# Patient Record
Sex: FEMALE | Race: WHITE | NOT HISPANIC OR LATINO
[De-identification: names, ages, dates, MRNs, and addresses within clinical notes are randomized per-mention and may not be internally consistent; named-entity substitution may affect disease eponyms.]

---

## 2017-01-18 ENCOUNTER — APPOINTMENT (OUTPATIENT)
Dept: INTERNAL MEDICINE | Facility: CLINIC | Age: 67
End: 2017-01-18

## 2017-01-18 VITALS
BODY MASS INDEX: 21.33 KG/M2 | HEIGHT: 65 IN | OXYGEN SATURATION: 100 % | HEART RATE: 72 BPM | WEIGHT: 128 LBS | SYSTOLIC BLOOD PRESSURE: 146 MMHG | TEMPERATURE: 98.2 F | RESPIRATION RATE: 16 BRPM | DIASTOLIC BLOOD PRESSURE: 68 MMHG

## 2017-01-18 VITALS — HEART RATE: 56 BPM | SYSTOLIC BLOOD PRESSURE: 134 MMHG | DIASTOLIC BLOOD PRESSURE: 76 MMHG

## 2017-01-18 DIAGNOSIS — Z87.39 PERSONAL HISTORY OF OTHER DISEASES OF THE MUSCULOSKELETAL SYSTEM AND CONNECTIVE TISSUE: ICD-10-CM

## 2017-01-18 DIAGNOSIS — D34 BENIGN NEOPLASM OF THYROID GLAND: ICD-10-CM

## 2017-01-18 DIAGNOSIS — M76.30 ILIOTIBIAL BAND SYNDROME, UNSPECIFIED LEG: ICD-10-CM

## 2017-03-13 ENCOUNTER — NON-APPOINTMENT (OUTPATIENT)
Age: 67
End: 2017-03-13

## 2017-03-13 ENCOUNTER — APPOINTMENT (OUTPATIENT)
Dept: CARDIOLOGY | Facility: CLINIC | Age: 67
End: 2017-03-13

## 2017-03-13 VITALS
BODY MASS INDEX: 21.16 KG/M2 | HEIGHT: 65 IN | TEMPERATURE: 98.3 F | OXYGEN SATURATION: 99 % | SYSTOLIC BLOOD PRESSURE: 148 MMHG | WEIGHT: 127 LBS | DIASTOLIC BLOOD PRESSURE: 65 MMHG | RESPIRATION RATE: 14 BRPM | HEART RATE: 71 BPM

## 2017-03-20 ENCOUNTER — APPOINTMENT (OUTPATIENT)
Dept: ULTRASOUND IMAGING | Facility: IMAGING CENTER | Age: 67
End: 2017-03-20

## 2017-03-22 ENCOUNTER — OUTPATIENT (OUTPATIENT)
Dept: OUTPATIENT SERVICES | Facility: HOSPITAL | Age: 67
LOS: 1 days | End: 2017-03-22
Payer: MEDICARE

## 2017-03-22 ENCOUNTER — APPOINTMENT (OUTPATIENT)
Dept: ULTRASOUND IMAGING | Facility: CLINIC | Age: 67
End: 2017-03-22

## 2017-03-22 DIAGNOSIS — Z00.8 ENCOUNTER FOR OTHER GENERAL EXAMINATION: ICD-10-CM

## 2017-03-22 PROCEDURE — 76700 US EXAM ABDOM COMPLETE: CPT

## 2017-04-24 ENCOUNTER — APPOINTMENT (OUTPATIENT)
Dept: SURGERY | Facility: CLINIC | Age: 67
End: 2017-04-24

## 2017-06-29 ENCOUNTER — MEDICATION RENEWAL (OUTPATIENT)
Age: 67
End: 2017-06-29

## 2017-06-29 ENCOUNTER — RX RENEWAL (OUTPATIENT)
Age: 67
End: 2017-06-29

## 2017-07-19 ENCOUNTER — APPOINTMENT (OUTPATIENT)
Dept: INTERNAL MEDICINE | Facility: CLINIC | Age: 67
End: 2017-07-19

## 2017-07-19 VITALS
OXYGEN SATURATION: 99 % | BODY MASS INDEX: 20.25 KG/M2 | WEIGHT: 123 LBS | DIASTOLIC BLOOD PRESSURE: 60 MMHG | HEIGHT: 65.5 IN | TEMPERATURE: 98.1 F | HEART RATE: 68 BPM | SYSTOLIC BLOOD PRESSURE: 140 MMHG

## 2017-07-19 VITALS — SYSTOLIC BLOOD PRESSURE: 132 MMHG | DIASTOLIC BLOOD PRESSURE: 60 MMHG

## 2017-07-20 LAB
ALBUMIN SERPL ELPH-MCNC: 4.1 G/DL
ALP BLD-CCNC: 49 U/L
ALT SERPL-CCNC: 21 U/L
ANION GAP SERPL CALC-SCNC: 18 MMOL/L
APPEARANCE: CLEAR
AST SERPL-CCNC: 26 U/L
BACTERIA: NEGATIVE
BASOPHILS # BLD AUTO: 0.05 K/UL
BASOPHILS NFR BLD AUTO: 0.8 %
BILIRUB SERPL-MCNC: 0.2 MG/DL
BILIRUBIN URINE: NEGATIVE
BLOOD URINE: NEGATIVE
BUN SERPL-MCNC: 20 MG/DL
CALCIUM SERPL-MCNC: 10 MG/DL
CHLORIDE SERPL-SCNC: 90 MMOL/L
CO2 SERPL-SCNC: 24 MMOL/L
COLOR: YELLOW
CREAT SERPL-MCNC: 0.97 MG/DL
EOSINOPHIL # BLD AUTO: 0.04 K/UL
EOSINOPHIL NFR BLD AUTO: 0.6 %
FOLATE SERPL-MCNC: >20 NG/ML
GLUCOSE QUALITATIVE U: NORMAL MG/DL
GLUCOSE SERPL-MCNC: 140 MG/DL
HCT VFR BLD CALC: 37.6 %
HCV AB SER QL: NONREACTIVE
HCV S/CO RATIO: 0.17 S/CO
HGB BLD-MCNC: 12.6 G/DL
HYALINE CASTS: 0 /LPF
IMM GRANULOCYTES NFR BLD AUTO: 0.3 %
KETONES URINE: NEGATIVE
LEUKOCYTE ESTERASE URINE: NEGATIVE
LYMPHOCYTES # BLD AUTO: 1.03 K/UL
LYMPHOCYTES NFR BLD AUTO: 16.2 %
MAGNESIUM SERPL-MCNC: 2.1 MG/DL
MAN DIFF?: NORMAL
MCHC RBC-ENTMCNC: 29.9 PG
MCHC RBC-ENTMCNC: 33.5 GM/DL
MCV RBC AUTO: 89.3 FL
MICROSCOPIC-UA: NORMAL
MONOCYTES # BLD AUTO: 0.77 K/UL
MONOCYTES NFR BLD AUTO: 12.1 %
NEUTROPHILS # BLD AUTO: 4.46 K/UL
NEUTROPHILS NFR BLD AUTO: 70 %
NITRITE URINE: NEGATIVE
PH URINE: 7
PLATELET # BLD AUTO: 303 K/UL
POTASSIUM SERPL-SCNC: 4.9 MMOL/L
PROT SERPL-MCNC: 7.1 G/DL
PROTEIN URINE: NEGATIVE MG/DL
RBC # BLD: 4.21 M/UL
RBC # FLD: 13.4 %
RED BLOOD CELLS URINE: 1 /HPF
SODIUM SERPL-SCNC: 132 MMOL/L
SPECIFIC GRAVITY URINE: 1.01
SQUAMOUS EPITHELIAL CELLS: 2 /HPF
UROBILINOGEN URINE: NORMAL MG/DL
VIT B12 SERPL-MCNC: 1332 PG/ML
WBC # FLD AUTO: 6.37 K/UL
WHITE BLOOD CELLS URINE: 2 /HPF

## 2017-09-18 ENCOUNTER — NON-APPOINTMENT (OUTPATIENT)
Age: 67
End: 2017-09-18

## 2017-09-18 ENCOUNTER — APPOINTMENT (OUTPATIENT)
Dept: CARDIOLOGY | Facility: CLINIC | Age: 67
End: 2017-09-18
Payer: MEDICARE

## 2017-09-18 VITALS
BODY MASS INDEX: 20.83 KG/M2 | DIASTOLIC BLOOD PRESSURE: 72 MMHG | SYSTOLIC BLOOD PRESSURE: 158 MMHG | HEART RATE: 66 BPM | RESPIRATION RATE: 14 BRPM | WEIGHT: 125 LBS | HEIGHT: 65 IN

## 2017-09-18 PROCEDURE — 93000 ELECTROCARDIOGRAM COMPLETE: CPT

## 2017-09-18 PROCEDURE — 99215 OFFICE O/P EST HI 40 MIN: CPT

## 2017-09-25 ENCOUNTER — APPOINTMENT (OUTPATIENT)
Dept: OBGYN | Facility: CLINIC | Age: 67
End: 2017-09-25
Payer: MEDICARE

## 2017-09-25 ENCOUNTER — RESULT REVIEW (OUTPATIENT)
Age: 67
End: 2017-09-25

## 2017-09-25 PROCEDURE — 99213 OFFICE O/P EST LOW 20 MIN: CPT

## 2018-01-31 ENCOUNTER — APPOINTMENT (OUTPATIENT)
Dept: INTERNAL MEDICINE | Facility: CLINIC | Age: 68
End: 2018-01-31
Payer: MEDICARE

## 2018-01-31 VITALS
HEART RATE: 62 BPM | DIASTOLIC BLOOD PRESSURE: 60 MMHG | SYSTOLIC BLOOD PRESSURE: 120 MMHG | TEMPERATURE: 98.3 F | WEIGHT: 127 LBS | OXYGEN SATURATION: 99 % | BODY MASS INDEX: 20.91 KG/M2 | HEIGHT: 65.5 IN

## 2018-01-31 VITALS — HEART RATE: 68 BPM | SYSTOLIC BLOOD PRESSURE: 144 MMHG | DIASTOLIC BLOOD PRESSURE: 70 MMHG

## 2018-01-31 DIAGNOSIS — Z11.59 ENCOUNTER FOR SCREENING FOR OTHER VIRAL DISEASES: ICD-10-CM

## 2018-01-31 DIAGNOSIS — R21 RASH AND OTHER NONSPECIFIC SKIN ERUPTION: ICD-10-CM

## 2018-01-31 DIAGNOSIS — Z87.2 PERSONAL HISTORY OF DISEASES OF THE SKIN AND SUBCUTANEOUS TISSUE: ICD-10-CM

## 2018-01-31 PROCEDURE — 90714 TD VACC NO PRESV 7 YRS+ IM: CPT | Mod: GY

## 2018-01-31 PROCEDURE — 90471 IMMUNIZATION ADMIN: CPT | Mod: GY

## 2018-01-31 PROCEDURE — 99214 OFFICE O/P EST MOD 30 MIN: CPT | Mod: 25

## 2018-01-31 RX ORDER — BIFIDOBACTERIUM LONGUM 10MM CELL
CAPSULE ORAL
Refills: 0 | Status: DISCONTINUED | COMMUNITY
End: 2018-01-31

## 2018-01-31 RX ORDER — ALENDRONATE SODIUM 70 MG/1
70 TABLET ORAL
Qty: 4 | Refills: 6 | Status: DISCONTINUED | COMMUNITY
Start: 2017-07-20 | End: 2018-01-31

## 2018-05-08 ENCOUNTER — APPOINTMENT (OUTPATIENT)
Dept: CARDIOLOGY | Facility: CLINIC | Age: 68
End: 2018-05-08
Payer: MEDICARE

## 2018-05-08 PROCEDURE — 93351 STRESS TTE COMPLETE: CPT

## 2018-05-08 PROCEDURE — 93320 DOPPLER ECHO COMPLETE: CPT

## 2018-05-08 PROCEDURE — 93325 DOPPLER ECHO COLOR FLOW MAPG: CPT

## 2018-05-16 ENCOUNTER — APPOINTMENT (OUTPATIENT)
Dept: SURGERY | Facility: CLINIC | Age: 68
End: 2018-05-16
Payer: MEDICARE

## 2018-05-16 PROCEDURE — 99213K: CUSTOM

## 2018-07-27 ENCOUNTER — APPOINTMENT (OUTPATIENT)
Dept: INTERNAL MEDICINE | Facility: CLINIC | Age: 68
End: 2018-07-27

## 2018-09-05 ENCOUNTER — APPOINTMENT (OUTPATIENT)
Dept: INTERNAL MEDICINE | Facility: CLINIC | Age: 68
End: 2018-09-05
Payer: MEDICARE

## 2018-09-05 ENCOUNTER — NON-APPOINTMENT (OUTPATIENT)
Age: 68
End: 2018-09-05

## 2018-09-05 VITALS
BODY MASS INDEX: 20.74 KG/M2 | HEART RATE: 61 BPM | DIASTOLIC BLOOD PRESSURE: 66 MMHG | TEMPERATURE: 98.8 F | WEIGHT: 123 LBS | SYSTOLIC BLOOD PRESSURE: 146 MMHG | HEIGHT: 64.5 IN | OXYGEN SATURATION: 100 %

## 2018-09-05 VITALS — DIASTOLIC BLOOD PRESSURE: 64 MMHG | SYSTOLIC BLOOD PRESSURE: 132 MMHG

## 2018-09-05 DIAGNOSIS — H26.9 UNSPECIFIED CATARACT: ICD-10-CM

## 2018-09-05 PROCEDURE — 93000 ELECTROCARDIOGRAM COMPLETE: CPT

## 2018-09-05 PROCEDURE — 99214 OFFICE O/P EST MOD 30 MIN: CPT

## 2018-09-05 NOTE — RESULTS/DATA
[] : results reviewed [de-identified] : 9/5/18: sinus bradycardia @ 46, rate variation, no ST-T abnormalities, normal axis

## 2018-09-05 NOTE — PHYSICAL EXAM
[No Acute Distress] : no acute distress [Well Nourished] : well nourished [Well-Appearing] : well-appearing [PERRL] : pupils equal round and reactive to light [EOMI] : extraocular movements intact [Normal Oropharynx] : the oropharynx was normal [Normal TMs] : both tympanic membranes were normal [Supple] : supple [No Respiratory Distress] : no respiratory distress  [Clear to Auscultation] : lungs were clear to auscultation bilaterally [Normal Rate] : normal rate  [Regular Rhythm] : with a regular rhythm [Normal S1, S2] : normal S1 and S2 [No Murmur] : no murmur heard [No Edema] : there was no peripheral edema [Soft] : abdomen soft [Non Tender] : non-tender [Non-distended] : non-distended [Normal Bowel Sounds] : normal bowel sounds [No Rash] : no rash [Normal Sclera/Conjunctiva] : normal sclera/conjunctiva [Normal Gait] : normal gait [Normal Affect] : the affect was normal [Normal Mood] : the mood was normal [Normal Insight/Judgement] : insight and judgment were intact

## 2018-09-05 NOTE — REVIEW OF SYSTEMS
[Negative] : Heme/Lymph [Vision Problems] : vision problems [Discharge] : no discharge [Pain] : no pain [Itching] : no itching

## 2018-09-05 NOTE — ASSESSMENT
[Patient Optimized for Surgery] : Patient optimized for surgery [No Further Testing Recommended] : no further testing recommended [Modify anti-platelet treatment prior to procedure] : Modify anti-platelet treatment prior to procedure [FreeTextEntry4] : Patient is a low cardiac risk for a low risk surgical procedure. No medical contraindications for planned surgery.\par \par EKG showed sinus bradycardia but no evidence of ischemia. She had a normal stress test in 5/2018. Preoperative blood work is not required for cataract surgery.\par \par Regarding her medications, patient will hold her morning Lantus to prevent hypoglycemia as she will be fasting. She will hold aspirin and all vitamins starting 1 week prior to surgery. She will take her blood pressure medications on morning of surgery with a sip of water.  [FreeTextEntry6] : hold baby aspirin 1 week prior to surgery [FreeTextEntry7] : hold all vitamins 1 week prior to surgery [FreeTextEntry2] : not required

## 2018-09-05 NOTE — HISTORY OF PRESENT ILLNESS
[No Pertinent Cardiac History] : no history of aortic stenosis, atrial fibrillation, coronary artery disease, recent myocardial infarction, or implantable device/pacemaker [No Pertinent Pulmonary History] : no history of asthma, COPD, sleep apnea, or smoking [No Adverse Anesthesia Reaction] : no adverse anesthesia reaction in self or family member [Diabetes] : diabetes [(Patient denies any chest pain, claudication, dyspnea on exertion, orthopnea, palpitations or syncope)] : Patient denies any chest pain, claudication, dyspnea on exertion, orthopnea, palpitations or syncope [Excellent (>10 METs)] : Excellent (>10 METs) [Anti-Platelet Agents: _____] : Anti-Platelet Agents: [unfilled] [Chronic Anticoagulation] : no chronic anticoagulation [Chronic Kidney Disease] : no chronic kidney disease [FreeTextEntry1] : Right cataract extraction followed by left cataract extraction [FreeTextEntry2] : September 14, 2018 then October 2, 2018 [FreeTextEntry3] : Dr. Leslie Goldberg at [facility]. Fax: [phone]

## 2018-09-07 ENCOUNTER — APPOINTMENT (OUTPATIENT)
Dept: CARDIOLOGY | Facility: CLINIC | Age: 68
End: 2018-09-07

## 2018-10-16 ENCOUNTER — RESULT REVIEW (OUTPATIENT)
Age: 68
End: 2018-10-16

## 2018-10-16 ENCOUNTER — APPOINTMENT (OUTPATIENT)
Dept: OBGYN | Facility: CLINIC | Age: 68
End: 2018-10-16
Payer: MEDICARE

## 2018-10-16 PROCEDURE — G0101: CPT

## 2018-11-13 ENCOUNTER — NON-APPOINTMENT (OUTPATIENT)
Age: 68
End: 2018-11-13

## 2018-11-13 ENCOUNTER — APPOINTMENT (OUTPATIENT)
Dept: CARDIOLOGY | Facility: CLINIC | Age: 68
End: 2018-11-13
Payer: MEDICARE

## 2018-11-13 VITALS
HEIGHT: 64.5 IN | HEART RATE: 61 BPM | WEIGHT: 124 LBS | RESPIRATION RATE: 14 BRPM | BODY MASS INDEX: 20.91 KG/M2 | OXYGEN SATURATION: 100 % | SYSTOLIC BLOOD PRESSURE: 164 MMHG | DIASTOLIC BLOOD PRESSURE: 73 MMHG

## 2018-11-13 PROCEDURE — 93000 ELECTROCARDIOGRAM COMPLETE: CPT

## 2018-11-13 PROCEDURE — 99214 OFFICE O/P EST MOD 30 MIN: CPT

## 2018-11-13 NOTE — ASSESSMENT
[FreeTextEntry1] : Hypertension, with "white coat" component. 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

## 2018-11-13 NOTE — DISCUSSION/SUMMARY
[FreeTextEntry1] : Hypertension, with "white coat" component; BP in reasonable range today on current meds. \par \par Hyperlipidemia, on medication.  Lipid profile checked last month by Dr. Phillips with good results; will continue statin.\par \par Stress echo in May showed no evidence of ischemia or infarct.  Mild MR seen with mildly myxomatous MV, but no prolapse.\par \par I advised her to continue on her current medications; she does not need new prescriptions at this time.\par \par She will return in six months.

## 2018-11-13 NOTE — REASON FOR VISIT
[FreeTextEntry1] : Keisha Roe returns today for a followup visit regarding hypertension, hyperlipidemia, and mild mitral valve insufficiency.

## 2018-11-13 NOTE — PHYSICAL EXAM
[General Appearance - Well Developed] : well developed [Normal Appearance] : normal appearance [Well Groomed] : well groomed [General Appearance - Well Nourished] : well nourished [General Appearance - In No Acute Distress] : no acute distress [Normal Conjunctiva] : the conjunctiva exhibited no abnormalities [Eyelids - No Xanthelasma] : the eyelids demonstrated no xanthelasmas [Normal Jugular Venous A Waves Present] : normal jugular venous A waves present [Normal Jugular Venous V Waves Present] : normal jugular venous V waves present [Respiration, Rhythm And Depth] : normal respiratory rhythm and effort [Auscultation Breath Sounds / Voice Sounds] : lungs were clear to auscultation bilaterally [Heart Rate And Rhythm] : heart rate and rhythm were normal [Bowel Sounds] : normal bowel sounds [Abdomen Soft] : soft [Abdomen Tenderness] : non-tender [Abnormal Walk] : normal gait [Nail Clubbing] : no clubbing of the fingernails [Cyanosis, Localized] : no localized cyanosis [Skin Color & Pigmentation] : normal skin color and pigmentation [] : no rash [No Venous Stasis] : no venous stasis [Impaired Insight] : insight and judgment were intact [Affect] : the affect was normal [Mood] : the mood was normal [FreeTextEntry1] : 2+ pulses in the upper and lower extremities. No edema.

## 2018-11-13 NOTE — HISTORY OF PRESENT ILLNESS
[FreeTextEntry1] : Since I saw her last in September.  \par \par Since her last visit, she had cataract surgery, and is pleased with the outcome.  \par \par From a cardiac standpoint she feels well.  She continues to monitor her blood pressure periodically, and numbers have remained in normal range.  She reports no episodes of exertional chest discomfort or dyspnea. She reports no palpitations, and no episodes of lightheadedness. She describes no orthopnea or PND.\par \par Her medication regimen is unchanged.

## 2018-11-28 ENCOUNTER — APPOINTMENT (OUTPATIENT)
Dept: INTERNAL MEDICINE | Facility: CLINIC | Age: 68
End: 2018-11-28
Payer: MEDICARE

## 2018-11-28 VITALS
HEIGHT: 65 IN | TEMPERATURE: 99.4 F | DIASTOLIC BLOOD PRESSURE: 60 MMHG | HEART RATE: 62 BPM | BODY MASS INDEX: 20.66 KG/M2 | WEIGHT: 124 LBS | SYSTOLIC BLOOD PRESSURE: 120 MMHG | OXYGEN SATURATION: 100 %

## 2018-11-28 DIAGNOSIS — R19.4 CHANGE IN BOWEL HABIT: ICD-10-CM

## 2018-11-28 DIAGNOSIS — Z01.818 ENCOUNTER FOR OTHER PREPROCEDURAL EXAMINATION: ICD-10-CM

## 2018-11-28 DIAGNOSIS — Z23 ENCOUNTER FOR IMMUNIZATION: ICD-10-CM

## 2018-11-28 PROCEDURE — G0439: CPT

## 2018-11-28 PROCEDURE — 99214 OFFICE O/P EST MOD 30 MIN: CPT | Mod: 25

## 2018-12-02 PROBLEM — R19.4 ALTERED BOWEL HABITS: Status: RESOLVED | Noted: 2018-01-31 | Resolved: 2018-12-02

## 2018-12-02 PROBLEM — Z23 IMMUNIZATION, TETANUS-DIPHTHERIA: Status: RESOLVED | Noted: 2018-01-31 | Resolved: 2018-12-02

## 2018-12-02 PROBLEM — Z01.818 PREOPERATIVE EXAMINATION: Status: RESOLVED | Noted: 2018-09-05 | Resolved: 2018-12-02

## 2018-12-02 LAB
APPEARANCE: CLEAR
BACTERIA: NEGATIVE
BILIRUBIN URINE: NEGATIVE
BLOOD URINE: NEGATIVE
COLOR: YELLOW
CREAT SPEC-SCNC: 18 MG/DL
GLUCOSE QUALITATIVE U: NEGATIVE MG/DL
HYALINE CASTS: 4 /LPF
KETONES URINE: NEGATIVE
LEUKOCYTE ESTERASE URINE: NEGATIVE
MICROALBUMIN 24H UR DL<=1MG/L-MCNC: <1.2 MG/DL
MICROALBUMIN/CREAT 24H UR-RTO: NORMAL
MICROSCOPIC-UA: NORMAL
NITRITE URINE: NEGATIVE
PH URINE: 7
PROTEIN URINE: NEGATIVE MG/DL
RED BLOOD CELLS URINE: 0 /HPF
SPECIFIC GRAVITY URINE: 1.01
SQUAMOUS EPITHELIAL CELLS: 1 /HPF
UROBILINOGEN URINE: NEGATIVE MG/DL
WHITE BLOOD CELLS URINE: 1 /HPF

## 2018-12-02 NOTE — ASSESSMENT
[FreeTextEntry1] : Patient is up to date with all screening tests. She will go for a repeat bone density in July 2019. She is agreeable that if this is worse she will begin a trial of Fosamax. We discussed fall precautions.\par She was given a handout concerning the Shingrix vaccine and  was advised to go to her local  pharmacy as per her insurance coverage.\par Her diabetes lipids and hypertension are controlled.  Urinalysis and urine microalbumin sent. \par She has mild chronic stable hyponatremia and will be observed. \par Advanced directives  were reviewed and the patient has them been in place.\par She will be seen again in 6 months\par

## 2018-12-02 NOTE — HISTORY OF PRESENT ILLNESS
[FreeTextEntry1] : Annual wellness visit\par Diabetes\par Hypertension\par Osteoporosis\par  [de-identified] : Overall patient has had a good year. She has had 2 cataracts extracted. She sees a gynecologist. She saw ENT and her  hearing is fine.   She is up-to-date with a dentist . She is seeing the podiatrist and all is fine.  She sees the dermatologist every 6 months and all  is fine. She gets her mammogram yearly . She goes for a breast  MRI the end of July which was fine.   She sees the breast surgeon Dr. Mathew. . She never began her Fosamax as she was questioning the accuracy of the bone density improving and worsening over the past couple of years. She wants to wait to get a repeat bone density this summer before deciding to go on Fosamax. She states her stomach is good on a semi gluten free  and lactose free diet. She exercises regularly with the  and aerobics.She has no chest pain or dyspnea She sleeps well. She sees endocrinology and her hemoglobin A1c was 7.0  She is considering getting a free style [employer] system.

## 2018-12-02 NOTE — PHYSICAL EXAM
[No Acute Distress] : no acute distress [Well Nourished] : well nourished [Well Developed] : well developed [Well-Appearing] : well-appearing [Normal Voice/Communication] : normal voice/communication [Normal Sclera/Conjunctiva] : normal sclera/conjunctiva [PERRL] : pupils equal round and reactive to light [EOMI] : extraocular movements intact [Normal Outer Ear/Nose] : the outer ears and nose were normal in appearance [Normal Oropharynx] : the oropharynx was normal [No JVD] : no jugular venous distention [No Lymphadenopathy] : no lymphadenopathy [Thyroid Normal, No Nodules] : the thyroid was normal and there were no nodules present [No Respiratory Distress] : no respiratory distress  [Clear to Auscultation] : lungs were clear to auscultation bilaterally [No Accessory Muscle Use] : no accessory muscle use [Normal Rate] : normal rate  [Regular Rhythm] : with a regular rhythm [Normal S1, S2] : normal S1 and S2 [No Murmur] : no murmur heard [No Carotid Bruits] : no carotid bruits [No Abdominal Bruit] : a ~M bruit was not heard ~T in the abdomen [No Varicosities] : no varicosities [Pedal Pulses Present] : the pedal pulses are present [No Edema] : there was no peripheral edema [No Extremity Clubbing/Cyanosis] : no extremity clubbing/cyanosis [No Palpable Aorta] : no palpable aorta [Normal Appearance] : normal in appearance [No Nipple Discharge] : no nipple discharge [No Axillary Lymphadenopathy] : no axillary lymphadenopathy [Soft] : abdomen soft [Non Tender] : non-tender [Non-distended] : non-distended [No Masses] : no abdominal mass palpated [No HSM] : no HSM [Normal Bowel Sounds] : normal bowel sounds [Normal Supraclavicular Nodes] : no supraclavicular lymphadenopathy [Normal Axillary Nodes] : no axillary lymphadenopathy [Normal Posterior Cervical Nodes] : no posterior cervical lymphadenopathy [Normal Anterior Cervical Nodes] : no anterior cervical lymphadenopathy [Normal Inguinal Nodes] : no inguinal lymphadenopathy [No CVA Tenderness] : no CVA  tenderness [No Spinal Tenderness] : no spinal tenderness [No Joint Swelling] : no joint swelling [Grossly Normal Strength/Tone] : grossly normal strength/tone [Normal Gait] : normal gait [Coordination Grossly Intact] : coordination grossly intact [No Focal Deficits] : no focal deficits [Speech Grossly Normal] : speech grossly normal [Memory Grossly Normal] : memory grossly normal [Normal Affect] : the affect was normal [Alert and Oriented x3] : oriented to person, place, and time [Normal Mood] : the mood was normal [Normal Insight/Judgement] : insight and judgment were intact [Normal TMs] : both tympanic membranes were normal [Normal Percussion] : the chest was normal to percussion [No Rash] : no rash [No Skin Lesions] : no skin lesions [Deep Tendon Reflexes (DTR)] : deep tendon reflexes were 2+ and symmetric [Comprehensive Foot Exam Normal] : Right and left foot were examined and both feet are normal. No ulcers in either foot. Toes are normal and with full ROM.  Normal tactile sensation with monofilament testing throughout both feet [Kyphosis] : no kyphosis [Scoliosis] : no scoliosis

## 2018-12-02 NOTE — DATA REVIEWED
[FreeTextEntry1] : Submitted  blood work from October 2018 cholesterol 152 HDL 73 LDL 70 triglycerides 45 comprehensive metabolic panel glucose 118 BUN and creatinine normal sodium 133, CBC  normal hemoglobin A1c 7.0

## 2018-12-02 NOTE — HEALTH RISK ASSESSMENT
[None] : None [With Family] : lives with family [Retired] : retired [College] : College [] :  [Sexually Active] : sexually active [Feels Safe at Home] : Feels safe at home [Fully functional (bathing, dressing, toileting, transferring, walking, feeding)] : Fully functional (bathing, dressing, toileting, transferring, walking, feeding) [Seat Belt] :  uses seat belt [Sunscreen] : uses sunscreen [Discussed at today's visit] : Advance Directives Discussed at today's visit [No falls in past year] : Patient reported no falls in the past year [Hepatitis C test offered] : Hepatitis C test offered [Fully functional (using the telephone, shopping, preparing meals, housekeeping, doing laundry, using] : Fully functional and needs no help or supervision to perform IADLs (using the telephone, shopping, preparing meals, housekeeping, doing laundry, using transportation, managing medications and managing finances) [Carbon Monoxide Detector] : carbon monoxide detector [Designated Healthcare Proxy] : Designated healthcare proxy [Name: ___] : Health Care Proxy's Name: [unfilled]  [Relationship: ___] : Relationship: [unfilled] [Patient reported PAP Smear was normal] : Patient reported PAP Smear was normal [# Of Children ___] : has [unfilled] children [] : No [de-identified] : 1 glass of wine a week [Change in mental status noted] : No change in mental status noted [Language] : denies difficulty with language [Behavior] : denies difficulty with behavior [Learning/Retaining New Information] : denies difficulty learning/retaining new information [Handling Complex Tasks] : denies difficulty handling complex tasks [Reasoning] : denies difficulty with reasoning [Spatial Ability and Orientation] : denies difficulty with spatial ability and orientation [High Risk Behavior] : no high risk behavior [Reports changes in hearing] : Reports no changes in hearing [Reports changes in vision] : Reports no changes in vision [Reports changes in dental health] : Reports no changes in dental health [Smoke Detector] : no smoke detector [Guns at Home] : no guns at home [Safety elements used in home] : no safety elements used in home [Travel to Developing Areas] : does not  travel to developing areas [TB Exposure] : is not being exposed to tuberculosis [Caregiver Concerns] : does not have caregiver concerns [MammogramDate] : 1/2018 [MammogramComments] : birads 2 [PapSmearDate] : 2018 [BoneDensityDate] : 7/2017 [BoneDensityComments] : osteoporosis [ColonoscopyDate] : 11/2014 [ColonoscopyComments] : no polyp [HepatitisCDate] : 7/2017 [HepatitisCComments] : nonreactive [FreeTextEntry4] : Patient states she would not want aggressive treatment if she could not have a good quality of life.  She states she has discussed this with her healthcare proxy

## 2019-04-24 ENCOUNTER — APPOINTMENT (OUTPATIENT)
Dept: SURGERY | Facility: CLINIC | Age: 69
End: 2019-04-24
Payer: MEDICARE

## 2019-04-24 PROCEDURE — 99213K: CUSTOM

## 2019-04-29 ENCOUNTER — APPOINTMENT (OUTPATIENT)
Dept: INTERNAL MEDICINE | Facility: CLINIC | Age: 69
End: 2019-04-29
Payer: MEDICARE

## 2019-04-29 VITALS
TEMPERATURE: 98.7 F | BODY MASS INDEX: 20.99 KG/M2 | WEIGHT: 126 LBS | OXYGEN SATURATION: 99 % | HEART RATE: 49 BPM | SYSTOLIC BLOOD PRESSURE: 148 MMHG | DIASTOLIC BLOOD PRESSURE: 80 MMHG | HEIGHT: 65 IN

## 2019-04-29 LAB — S PYO AG SPEC QL IA: NORMAL

## 2019-04-29 PROCEDURE — 99213 OFFICE O/P EST LOW 20 MIN: CPT | Mod: 25

## 2019-04-29 PROCEDURE — 87880 STREP A ASSAY W/OPTIC: CPT | Mod: QW

## 2019-04-29 NOTE — REVIEW OF SYSTEMS
[Chills] : chills [Fatigue] : fatigue [Earache] : earache [Sore Throat] : sore throat [Negative] : Heme/Lymph [Fever] : no fever [Night Sweats] : no night sweats [Discharge] : no discharge [Hoarseness] : no hoarseness [Nasal Discharge] : no nasal discharge [Postnasal Drip] : no postnasal drip [Chest Pain] : no chest pain [Shortness Of Breath] : no shortness of breath [Wheezing] : no wheezing [Cough] : no cough [Vomiting] : no vomiting

## 2019-04-29 NOTE — HISTORY OF PRESENT ILLNESS
[FreeTextEntry8] : presents for eval of L ear and throat discomfort for past 3-4 days, felt worsening fatigue, mild chills yesterday. no severe muscle aches, nausea or vomiting. no sinus pain or significant nasal drainage. she was exposed to strep pharyngitis, her daughter, this past week. no cough. her blood glucose was >200 this morning which usually indicates to her that there is significant infection she is fighting.

## 2019-04-29 NOTE — PHYSICAL EXAM
[No Acute Distress] : no acute distress [Well-Appearing] : well-appearing [Normal Voice/Communication] : normal voice/communication [Normal Sclera/Conjunctiva] : normal sclera/conjunctiva [Normal Outer Ear/Nose] : the outer ears and nose were normal in appearance [Normal Oropharynx] : the oropharynx was normal [Normal TMs] : both tympanic membranes were normal [Supple] : supple [No Lymphadenopathy] : no lymphadenopathy [No Respiratory Distress] : no respiratory distress  [Clear to Auscultation] : lungs were clear to auscultation bilaterally [No Accessory Muscle Use] : no accessory muscle use [Normal Supraclavicular Nodes] : no supraclavicular lymphadenopathy [Normal Posterior Cervical Nodes] : no posterior cervical lymphadenopathy [Normal Anterior Cervical Nodes] : no anterior cervical lymphadenopathy [Normal Gait] : normal gait [Normal Mood] : the mood was normal [Normal Insight/Judgement] : insight and judgment were intact [de-identified] : very mild pharyngeal erythema

## 2019-04-29 NOTE — ASSESSMENT
[FreeTextEntry1] : discussed w pt \par checked rapid strep testing today, neg \par advised increase oral fluids\par cont tylenol prn \par will give amoxicillin as precaution due to persistent ear and throat pain while awaiting throat culture. \par cont to monitor blood glucose as usual, cont current insulin regimen \par call or return prn if any new or worsening concerns

## 2019-05-02 LAB — BACTERIA THROAT CULT: NORMAL

## 2019-05-07 ENCOUNTER — APPOINTMENT (OUTPATIENT)
Dept: CARDIOLOGY | Facility: CLINIC | Age: 69
End: 2019-05-07
Payer: MEDICARE

## 2019-05-07 ENCOUNTER — NON-APPOINTMENT (OUTPATIENT)
Age: 69
End: 2019-05-07

## 2019-05-07 VITALS
BODY MASS INDEX: 21.16 KG/M2 | RESPIRATION RATE: 14 BRPM | OXYGEN SATURATION: 99 % | SYSTOLIC BLOOD PRESSURE: 162 MMHG | HEART RATE: 63 BPM | DIASTOLIC BLOOD PRESSURE: 66 MMHG | WEIGHT: 127 LBS | HEIGHT: 65 IN

## 2019-05-07 PROCEDURE — 99214 OFFICE O/P EST MOD 30 MIN: CPT

## 2019-05-07 PROCEDURE — 93000 ELECTROCARDIOGRAM COMPLETE: CPT

## 2019-05-07 NOTE — DISCUSSION/SUMMARY
[FreeTextEntry1] : \par Hypertension, with "white coat" component.  Overall, BP seems in reasonable range when one reviews her readings at home.  To continues lisinopril 10 mg. bid; if BP rises, would resume spironolactone, perhaps at lesser dose of 12. 5 mg daily.   \par \par Hyperlipidemia, on medication.  Lipid profile checked most recently by Dr. Phillips with good results; will continue statin.\par \par Mild MR; mildly myxomatous MV without prolapse - continue ACE I.\par \par She does not need new prescriptions at this time.\par \par She will return in six months.

## 2019-05-07 NOTE — HISTORY OF PRESENT ILLNESS
[FreeTextEntry1] : Since I saw her last in November.  Since that time, she found that her BP was running lower in March, and she felt unwell, similar to what she has experienced in the past with an insulin reaction.  She spoke to Dr. Phillips; spironolactone was eliminated, and currently she takes lisinoprili at a dose of 10 mg. twice daily.  On this regimen, her BP readings at home have been good overall; diastolic readings are always in normal range, and systolic reading usually are.  An occasional high systolic reading (150) is offset by readings of 110 at other times; when all her readings are considered, the average of the systolic readings is in normal range. \par \par She remains free of cardiac sxs; she describes no episodes of exertional chest discomfort or dyspnea. She reports no palpitations.  She describes no orthopnea or PND.\par \par Her medication regimen is unchanged.

## 2019-05-07 NOTE — PHYSICAL EXAM
[Normal Appearance] : normal appearance [General Appearance - Well Developed] : well developed [Well Groomed] : well groomed [General Appearance - Well Nourished] : well nourished [General Appearance - In No Acute Distress] : no acute distress [Normal Conjunctiva] : the conjunctiva exhibited no abnormalities [Normal Jugular Venous A Waves Present] : normal jugular venous A waves present [Eyelids - No Xanthelasma] : the eyelids demonstrated no xanthelasmas [Normal Jugular Venous V Waves Present] : normal jugular venous V waves present [Auscultation Breath Sounds / Voice Sounds] : lungs were clear to auscultation bilaterally [Respiration, Rhythm And Depth] : normal respiratory rhythm and effort [Heart Rate And Rhythm] : heart rate and rhythm were normal [Bowel Sounds] : normal bowel sounds [Abdomen Tenderness] : non-tender [Abdomen Soft] : soft [Abnormal Walk] : normal gait [Cyanosis, Localized] : no localized cyanosis [Nail Clubbing] : no clubbing of the fingernails [Skin Color & Pigmentation] : normal skin color and pigmentation [No Venous Stasis] : no venous stasis [] : no rash [Affect] : the affect was normal [Mood] : the mood was normal [Impaired Insight] : insight and judgment were intact [FreeTextEntry1] : 2+ pulses in the upper and lower extremities. No edema.

## 2019-05-07 NOTE — REASON FOR VISIT
[FreeTextEntry1] : \nat Roe returns today for a followup visit regarding hypertension, hyperlipidemia, and mild mitral valve insufficiency.

## 2019-05-29 ENCOUNTER — APPOINTMENT (OUTPATIENT)
Dept: INTERNAL MEDICINE | Facility: CLINIC | Age: 69
End: 2019-05-29
Payer: MEDICARE

## 2019-05-29 VITALS — DIASTOLIC BLOOD PRESSURE: 80 MMHG | SYSTOLIC BLOOD PRESSURE: 160 MMHG

## 2019-05-29 VITALS
OXYGEN SATURATION: 98 % | DIASTOLIC BLOOD PRESSURE: 74 MMHG | SYSTOLIC BLOOD PRESSURE: 176 MMHG | HEIGHT: 64.5 IN | BODY MASS INDEX: 20.91 KG/M2 | WEIGHT: 124 LBS | TEMPERATURE: 98.6 F | HEART RATE: 73 BPM

## 2019-05-29 DIAGNOSIS — R07.0 PAIN IN THROAT: ICD-10-CM

## 2019-05-29 DIAGNOSIS — Z87.09 PERSONAL HISTORY OF OTHER DISEASES OF THE RESPIRATORY SYSTEM: ICD-10-CM

## 2019-05-29 PROCEDURE — 99215 OFFICE O/P EST HI 40 MIN: CPT

## 2019-05-29 RX ORDER — SPIRONOLACTONE 25 MG/1
25 TABLET ORAL DAILY
Qty: 45 | Refills: 2 | Status: DISCONTINUED | COMMUNITY
End: 2019-05-29

## 2019-05-29 RX ORDER — AMOXICILLIN 500 MG/1
500 TABLET, FILM COATED ORAL
Qty: 14 | Refills: 0 | Status: DISCONTINUED | COMMUNITY
Start: 2019-04-29 | End: 2019-05-29

## 2019-05-31 NOTE — ASSESSMENT
[FreeTextEntry1] : Her blood pressure remains labile. I told her I felt we should stop the spironolactone due to  risk of hyperkalemia and we should try to maximize her dose of lisinopril. She will increase her lisinopril to 10 mg in the morning and 20 mg at night and if in a month her pressure is still above 140 systolic she will make his 20 mg b.i.d. She'll be seen again in 2 months if her blood pressure remains elevated we will add a small dose of amlodipine 2.5 mg. She will continue with  salt restriction regular exercise\par She was given a referral for a followup bone density this summer.\par We discussed pancreatic cancer screening and I told her I was unaware of any specific screening test for family history but would look into this further.\par She will continue to try to get the shingles vaccine at a local pharmacy

## 2019-05-31 NOTE — HISTORY OF PRESENT ILLNESS
[FreeTextEntry1] : Hypertension\par Diabetes\par Osteoporosis\par Need for shingles vaccine\par \par \par \par \par \par \par \par \par  [de-identified] : She had issues with labile blood pressure ....she was feeling her blood pressure was low and so stopped her spironolactone and several weeks later her blood pressure started to rise again .  She's been in touch with cardiology and endocrinology the past day or so and they told her to resume spironolactone 12.5 mg daily. When she would feel poorly her blood pressure was 90/60. She is careful with salt in her diet. She exercises regularly.\par She sees endocrinology regularly and her diabetes has been generally in good control. She has had  stress over the past several months as her  had been ill. Hemoglobin A1c was 7.4%. She has rare hypoglycemic episodes and takes glucose tablets. This is usually  related to too much activity.\par She sees the podiatrist and ophthalmologist and all has been fine.\par She has been seeing  breast surgeon and questions if she should be getting pancreatic cancer screening  to her family history of pancreatic cancer. Patient is reluctant to do this for the feeling that they may find a benign abnormality which could lead to invasive procedures.\par She is still awaiting the shingrix vaccine from  local pharmacy

## 2019-05-31 NOTE — PHYSICAL EXAM
[No Acute Distress] : no acute distress [Well Nourished] : well nourished [Well Developed] : well developed [Well-Appearing] : well-appearing [Normal Voice/Communication] : normal voice/communication [PERRL] : pupils equal round and reactive to light [Normal Sclera/Conjunctiva] : normal sclera/conjunctiva [EOMI] : extraocular movements intact [Normal Outer Ear/Nose] : the outer ears and nose were normal in appearance [Normal Oropharynx] : the oropharynx was normal [Normal TMs] : both tympanic membranes were normal [No JVD] : no jugular venous distention [No Lymphadenopathy] : no lymphadenopathy [No Respiratory Distress] : no respiratory distress  [Thyroid Normal, No Nodules] : the thyroid was normal and there were no nodules present [No Accessory Muscle Use] : no accessory muscle use [Clear to Auscultation] : lungs were clear to auscultation bilaterally [Normal Percussion] : the chest was normal to percussion [Normal Rate] : normal rate  [Normal S1, S2] : normal S1 and S2 [Regular Rhythm] : with a regular rhythm [No Carotid Bruits] : no carotid bruits [No Murmur] : no murmur heard [No Abdominal Bruit] : a ~M bruit was not heard ~T in the abdomen [No Varicosities] : no varicosities [Pedal Pulses Present] : the pedal pulses are present [No Extremity Clubbing/Cyanosis] : no extremity clubbing/cyanosis [No Edema] : there was no peripheral edema [No Palpable Aorta] : no palpable aorta [Normal Appearance] : normal in appearance [No Nipple Discharge] : no nipple discharge [No Axillary Lymphadenopathy] : no axillary lymphadenopathy [Soft] : abdomen soft [Non Tender] : non-tender [Non-distended] : non-distended [No Masses] : no abdominal mass palpated [No HSM] : no HSM [Normal Bowel Sounds] : normal bowel sounds [Normal Supraclavicular Nodes] : no supraclavicular lymphadenopathy [Normal Axillary Nodes] : no axillary lymphadenopathy [Normal Posterior Cervical Nodes] : no posterior cervical lymphadenopathy [Normal Anterior Cervical Nodes] : no anterior cervical lymphadenopathy [No CVA Tenderness] : no CVA  tenderness [Normal Inguinal Nodes] : no inguinal lymphadenopathy [No Spinal Tenderness] : no spinal tenderness [No Joint Swelling] : no joint swelling [No Rash] : no rash [Grossly Normal Strength/Tone] : grossly normal strength/tone [No Skin Lesions] : no skin lesions [Coordination Grossly Intact] : coordination grossly intact [Normal Gait] : normal gait [No Focal Deficits] : no focal deficits [Deep Tendon Reflexes (DTR)] : deep tendon reflexes were 2+ and symmetric [Speech Grossly Normal] : speech grossly normal [Memory Grossly Normal] : memory grossly normal [Alert and Oriented x3] : oriented to person, place, and time [Normal Affect] : the affect was normal [Normal Mood] : the mood was normal [Normal Insight/Judgement] : insight and judgment were intact [Kyphosis] : no kyphosis [Scoliosis] : no scoliosis

## 2019-06-10 ENCOUNTER — MEDICATION RENEWAL (OUTPATIENT)
Age: 69
End: 2019-06-10

## 2019-06-17 RX ORDER — LISINOPRIL 10 MG/1
10 TABLET ORAL
Qty: 90 | Refills: 1 | Status: DISCONTINUED | COMMUNITY
Start: 2019-06-10 | End: 2019-06-17

## 2019-07-11 ENCOUNTER — MEDICATION RENEWAL (OUTPATIENT)
Age: 69
End: 2019-07-11

## 2019-07-24 ENCOUNTER — APPOINTMENT (OUTPATIENT)
Dept: INTERNAL MEDICINE | Facility: CLINIC | Age: 69
End: 2019-07-24
Payer: MEDICARE

## 2019-07-24 VITALS
BODY MASS INDEX: 20.91 KG/M2 | HEIGHT: 64.75 IN | TEMPERATURE: 98.9 F | DIASTOLIC BLOOD PRESSURE: 60 MMHG | SYSTOLIC BLOOD PRESSURE: 160 MMHG | WEIGHT: 124 LBS | OXYGEN SATURATION: 99 % | HEART RATE: 63 BPM

## 2019-07-24 VITALS — SYSTOLIC BLOOD PRESSURE: 138 MMHG | DIASTOLIC BLOOD PRESSURE: 70 MMHG

## 2019-07-24 VITALS — SYSTOLIC BLOOD PRESSURE: 130 MMHG | HEART RATE: 64 BPM | DIASTOLIC BLOOD PRESSURE: 80 MMHG

## 2019-07-24 DIAGNOSIS — Z80.0 FAMILY HISTORY OF MALIGNANT NEOPLASM OF DIGESTIVE ORGANS: ICD-10-CM

## 2019-07-24 PROCEDURE — 99215 OFFICE O/P EST HI 40 MIN: CPT

## 2019-07-28 NOTE — ASSESSMENT
[FreeTextEntry1] : Her blood pressure appears controlled and she will continue her current regimen. If her systolic blood pressures on 3 consecutive days is greater than 140 she will increase the amlodipine to 2.5 mg in the morning and 5 mg at night. Salt restriction and regular exercise advised\par I told her I agree with initiation of alendronate for her osteoporosis. She does not want to consider any injectable medication such as Prolia or Forteo.\par We again reviewed the association between her BRCA positivity and pancreatic cancer with a first degree relative. She was given up-to-date information about this. She will decide if she wants to proceed with MRI pancreas\par I advised she continue on aspirin due to multiple coronary risks .                                                                                  She'll be seen again in 3 months for reevaluation\par I told her to wait a month and then get the shingrix vaccine\par

## 2019-07-28 NOTE — PHYSICAL EXAM
[No Acute Distress] : no acute distress [Well Nourished] : well nourished [Well-Appearing] : well-appearing [Well Developed] : well developed [Normal Sclera/Conjunctiva] : normal sclera/conjunctiva [PERRL] : pupils equal round and reactive to light [EOMI] : extraocular movements intact [Normal Oropharynx] : the oropharynx was normal [Normal Outer Ear/Nose] : the outer ears and nose were normal in appearance [No Lymphadenopathy] : no lymphadenopathy [No JVD] : no jugular venous distention [Thyroid Normal, No Nodules] : the thyroid was normal and there were no nodules present [Supple] : supple [No Respiratory Distress] : no respiratory distress  [No Accessory Muscle Use] : no accessory muscle use [Clear to Auscultation] : lungs were clear to auscultation bilaterally [Normal Rate] : normal rate  [Regular Rhythm] : with a regular rhythm [No Carotid Bruits] : no carotid bruits [Normal S1, S2] : normal S1 and S2 [No Murmur] : no murmur heard [No Abdominal Bruit] : a ~M bruit was not heard ~T in the abdomen [No Varicosities] : no varicosities [Pedal Pulses Present] : the pedal pulses are present [No Edema] : there was no peripheral edema [No Palpable Aorta] : no palpable aorta [No Extremity Clubbing/Cyanosis] : no extremity clubbing/cyanosis [Soft] : abdomen soft [Non Tender] : non-tender [Non-distended] : non-distended [No Masses] : no abdominal mass palpated [No HSM] : no HSM [Normal Bowel Sounds] : normal bowel sounds [Normal Posterior Cervical Nodes] : no posterior cervical lymphadenopathy [Normal Anterior Cervical Nodes] : no anterior cervical lymphadenopathy [No CVA Tenderness] : no CVA  tenderness [No Spinal Tenderness] : no spinal tenderness [No Joint Swelling] : no joint swelling [Grossly Normal Strength/Tone] : grossly normal strength/tone [No Rash] : no rash [Coordination Grossly Intact] : coordination grossly intact [No Focal Deficits] : no focal deficits [Normal Gait] : normal gait [Deep Tendon Reflexes (DTR)] : deep tendon reflexes were 2+ and symmetric [Normal Affect] : the affect was normal [Normal Insight/Judgement] : insight and judgment were intact [Normal Voice/Communication] : normal voice/communication [Normal TMs] : both tympanic membranes were normal [Normal Percussion] : the chest was normal to percussion [Normal Supraclavicular Nodes] : no supraclavicular lymphadenopathy [Normal Axillary Nodes] : no axillary lymphadenopathy [Normal Inguinal Nodes] : no inguinal lymphadenopathy [Speech Grossly Normal] : speech grossly normal [Memory Grossly Normal] : memory grossly normal [Alert and Oriented x3] : oriented to person, place, and time [Normal Mood] : the mood was normal [Scoliosis] : no scoliosis

## 2019-07-29 ENCOUNTER — APPOINTMENT (OUTPATIENT)
Dept: ORTHOPEDIC SURGERY | Facility: CLINIC | Age: 69
End: 2019-07-29
Payer: MEDICARE

## 2019-07-29 VITALS
SYSTOLIC BLOOD PRESSURE: 192 MMHG | BODY MASS INDEX: 20.91 KG/M2 | HEART RATE: 60 BPM | WEIGHT: 124 LBS | DIASTOLIC BLOOD PRESSURE: 69 MMHG | HEIGHT: 64.75 IN

## 2019-07-29 PROCEDURE — 73630 X-RAY EXAM OF FOOT: CPT | Mod: LT

## 2019-07-29 PROCEDURE — 73130 X-RAY EXAM OF HAND: CPT | Mod: LT

## 2019-07-29 PROCEDURE — 99213 OFFICE O/P EST LOW 20 MIN: CPT

## 2019-07-29 NOTE — DISCUSSION/SUMMARY
[de-identified] : Options discussed. Placed her into a left ulnar gutter splint today. Nonweightbearing. Left short CAM boot, weightbearing as tolerated. Recommend follow up with Dr. Hooker's for her hand and Dr. García for her foot this week. All questions answered she expresses understanding

## 2019-07-29 NOTE — HISTORY OF PRESENT ILLNESS
[de-identified] : 67 yo F with L hand/foot pain for 1 day.  [place], almost fell/tripped, injured lateral aspect foot as  squeezed her hand to hold her from falling.  She has swelling lateral hand and foot, significantly improved since yesterday.  She is using tylenol with relief.  using cane.  Denies numbness/tingling.\par \par The patient's past medical history, past surgical history, medications, allergies, and social history were reviewed by me today with the patient and documented accordingly. In addition, the patient's family history, which is noncontributory to this visit, was also reviewed.\par

## 2019-07-29 NOTE — PHYSICAL EXAM
[de-identified] : General Exam\par \par Well developed, well nourished\par No apparent distress\par Oriented to person, place, and time\par Mood: Normal\par Affect: Normal\par Balance and coordination: Normal\par Gait: Normal\par \par Left foot exam\par \par Skin: Clean, dry, intact\par Inspection: No obvious malalignment, no masses, no swelling, no effusion\par Tenderness: No tenderness over the medial/lateral malleolus, CFL/ATFL/PTF, or deltoid ligament. No tenderness Achilles tendon. No tenderness to heel, +ttp 5th metatarsal or LisFranc joint. No ttp over the posterior tibial tendon.\par ROM: Full pain free range of motion of foot and ankle\par Painful ROM: +\par Stability: Negative anterior/posterior drawer.\par Additional tests: negative tarsal tunnel tinels, negative single heel rise\par Strength: 5/5 ADD/ABD/TA/GS/EHL/FHL/EDL\par Neuro: Sensation in tact to light touch in dp/sp/tib/alexandru/saph distributions\par Pulses: 2+ DP/PT pulses\par \par left hand exam\par \par Skin: Clean, dry, intact. No ecchymosis. No swelling. No palpable deformity. No crepitus\par Tenderness:+ttp 5th metacarpal,  No tenderness to palpation at the scapholunate interval. No snuffbox tenderness. No ttp at the distal radius, distal ulna, or the DRUJ. -ttp LRF from distal MC to DIP joint. no pain wit v/v stress of MCP/PIP joints\par ROM: Normal cascade/no rotational deformity.\par Painful ROM: +\par Neuro: Negative tinels over median nerve, AIN/PIN/Ulnar nerve in tact to motor/sensation.\par Strength: 5/5  5/5 wrist flexion, 5/5 wrist extension, 5/5 supination, 5/5 pronation\par Sensation: In tact to light touch throughout in medial, ulnar, and radial nerve distributions\par Vasc: 2+ radial pulse, <2s cap refill throughout [de-identified] : 3 views L hand.  Preliminary report- 5th metacarpal mid shaft fx, appears slightly displaced, no dislocation\par 3 views L foot.  Preliminary report- pseudo zuleta fx\par

## 2019-07-30 ENCOUNTER — APPOINTMENT (OUTPATIENT)
Dept: ORTHOPEDIC SURGERY | Facility: CLINIC | Age: 69
End: 2019-07-30
Payer: MEDICARE

## 2019-07-30 VITALS — DIASTOLIC BLOOD PRESSURE: 63 MMHG | SYSTOLIC BLOOD PRESSURE: 179 MMHG | HEART RATE: 80 BPM | HEIGHT: 64.75 IN

## 2019-07-30 PROCEDURE — 99214 OFFICE O/P EST MOD 30 MIN: CPT

## 2019-08-01 ENCOUNTER — APPOINTMENT (OUTPATIENT)
Dept: ORTHOPEDIC SURGERY | Facility: CLINIC | Age: 69
End: 2019-08-01
Payer: MEDICARE

## 2019-08-01 PROCEDURE — 99214 OFFICE O/P EST MOD 30 MIN: CPT

## 2019-08-01 PROCEDURE — 73130 X-RAY EXAM OF HAND: CPT | Mod: LT

## 2019-08-01 RX ORDER — PEN NEEDLE, DIABETIC 29 G X1/2"
32G X 4 MM NEEDLE, DISPOSABLE MISCELLANEOUS
Qty: 500 | Refills: 0 | Status: ACTIVE | COMMUNITY
Start: 2018-08-18

## 2019-08-14 ENCOUNTER — APPOINTMENT (OUTPATIENT)
Dept: ORTHOPEDIC SURGERY | Facility: CLINIC | Age: 69
End: 2019-08-14
Payer: MEDICARE

## 2019-08-14 PROCEDURE — 99213 OFFICE O/P EST LOW 20 MIN: CPT

## 2019-08-14 PROCEDURE — 73630 X-RAY EXAM OF FOOT: CPT | Mod: LT

## 2019-08-22 ENCOUNTER — MEDICATION RENEWAL (OUTPATIENT)
Age: 69
End: 2019-08-22

## 2019-08-23 ENCOUNTER — MEDICATION RENEWAL (OUTPATIENT)
Age: 69
End: 2019-08-23

## 2019-08-26 ENCOUNTER — APPOINTMENT (OUTPATIENT)
Dept: ORTHOPEDIC SURGERY | Facility: CLINIC | Age: 69
End: 2019-08-26
Payer: MEDICARE

## 2019-08-26 VITALS
HEIGHT: 65 IN | SYSTOLIC BLOOD PRESSURE: 156 MMHG | HEART RATE: 76 BPM | WEIGHT: 123 LBS | DIASTOLIC BLOOD PRESSURE: 72 MMHG | BODY MASS INDEX: 20.49 KG/M2

## 2019-08-26 PROCEDURE — 99214 OFFICE O/P EST MOD 30 MIN: CPT

## 2019-08-26 PROCEDURE — 73130 X-RAY EXAM OF HAND: CPT | Mod: LT

## 2019-08-27 ENCOUNTER — APPOINTMENT (OUTPATIENT)
Dept: ORTHOPEDIC SURGERY | Facility: CLINIC | Age: 69
End: 2019-08-27
Payer: MEDICARE

## 2019-08-27 PROCEDURE — 99213 OFFICE O/P EST LOW 20 MIN: CPT

## 2019-08-27 PROCEDURE — 73630 X-RAY EXAM OF FOOT: CPT | Mod: LT

## 2019-09-24 ENCOUNTER — MEDICATION RENEWAL (OUTPATIENT)
Age: 69
End: 2019-09-24

## 2019-10-07 ENCOUNTER — APPOINTMENT (OUTPATIENT)
Dept: ORTHOPEDIC SURGERY | Facility: CLINIC | Age: 69
End: 2019-10-07
Payer: MEDICARE

## 2019-10-07 PROCEDURE — 99213 OFFICE O/P EST LOW 20 MIN: CPT

## 2019-10-07 PROCEDURE — 73630 X-RAY EXAM OF FOOT: CPT | Mod: LT

## 2019-11-03 ENCOUNTER — FORM ENCOUNTER (OUTPATIENT)
Age: 69
End: 2019-11-03

## 2019-11-04 ENCOUNTER — RESULT REVIEW (OUTPATIENT)
Age: 69
End: 2019-11-04

## 2019-11-04 ENCOUNTER — APPOINTMENT (OUTPATIENT)
Dept: OBGYN | Facility: CLINIC | Age: 69
End: 2019-11-04
Payer: MEDICARE

## 2019-11-04 PROCEDURE — G0101: CPT

## 2019-11-06 ENCOUNTER — APPOINTMENT (OUTPATIENT)
Dept: INTERNAL MEDICINE | Facility: CLINIC | Age: 69
End: 2019-11-06
Payer: MEDICARE

## 2019-11-06 VITALS
DIASTOLIC BLOOD PRESSURE: 60 MMHG | BODY MASS INDEX: 20.83 KG/M2 | OXYGEN SATURATION: 100 % | SYSTOLIC BLOOD PRESSURE: 130 MMHG | WEIGHT: 125 LBS | HEART RATE: 78 BPM | HEIGHT: 65 IN | TEMPERATURE: 98.6 F

## 2019-11-06 DIAGNOSIS — S62.308A UNSPECIFIED FRACTURE OF OTHER METACARPAL BONE, INITIAL ENCOUNTER FOR CLOSED FRACTURE: ICD-10-CM

## 2019-11-06 DIAGNOSIS — S62.327D DISPLACED FRACTURE OF SHAFT OF FIFTH METACARPAL BONE, LEFT HAND, SUBSEQUENT ENCOUNTER FOR FRACTURE WITH ROUTINE HEALING: ICD-10-CM

## 2019-11-06 PROCEDURE — 99214 OFFICE O/P EST MOD 30 MIN: CPT

## 2019-11-06 RX ORDER — ATORVASTATIN CALCIUM 10 MG/1
10 TABLET, FILM COATED ORAL
Qty: 30 | Refills: 0 | Status: DISCONTINUED | COMMUNITY
Start: 2019-02-04 | End: 2019-11-06

## 2019-11-06 RX ORDER — LOTEPREDNOL ETABONATE 5 MG/G
0.5 OINTMENT OPHTHALMIC
Qty: 35 | Refills: 0 | Status: DISCONTINUED | COMMUNITY
Start: 2018-09-17 | End: 2019-11-06

## 2019-11-06 RX ORDER — PREDNISONE 50 MG/1
50 TABLET ORAL
Qty: 3 | Refills: 0 | Status: DISCONTINUED | COMMUNITY
Start: 2019-04-24 | End: 2019-11-06

## 2019-11-06 RX ORDER — AMOXICILLIN 500 MG/1
500 CAPSULE ORAL
Qty: 14 | Refills: 0 | Status: DISCONTINUED | COMMUNITY
Start: 2019-04-29 | End: 2019-11-06

## 2019-11-09 PROBLEM — S62.308A: Status: ACTIVE | Noted: 2019-07-29

## 2019-11-09 PROBLEM — S62.327D CLOSED DISPLACED FRACTURE OF SHAFT OF FIFTH METACARPAL BONE OF LEFT HAND WITH ROUTINE HEALING, SUBSEQUENT ENCOUNTER: Status: ACTIVE | Noted: 2019-07-29

## 2019-11-09 NOTE — COUNSELING
[Fall prevention counseling provided] : Fall prevention counseling provided [Adequate lighting] : Adequate lighting [Use proper foot wear] : Use proper foot wear [No throw rugs] : No throw rugs [Use recommended devices] : Use recommended devices

## 2019-11-09 NOTE — HISTORY OF PRESENT ILLNESS
[FreeTextEntry1] : Hypertension\par Diabetes\par Fall with hand and foot fracture [de-identified] : She overall is feeling well. She tripped without  syncope or near syncope several months ago and fractured her left 5th metatarsal and left fifth metacarpal. She's been treated conservatively by orthopedics. She has finally moved into her new co-op. She is good with her diet.   Her last hemoglobin A1c was 7.2%. She has not been able to exercise but is very active . She is monitoring her blood pressure at home and this  has been below 140 systolic.   Rarely at about 11 AM she can feel a little lightheaded for 15-20 minutes after  she takes her 2.5 mg of amlodipine at 7 AM. She saw the dentist, endocrinologist and  the gynecologist and all was fine .  She is planning a mammogram

## 2019-11-09 NOTE — PHYSICAL EXAM
[No Acute Distress] : no acute distress [Well Nourished] : well nourished [Well Developed] : well developed [Well-Appearing] : well-appearing [Normal Voice/Communication] : normal voice/communication [Normal Sclera/Conjunctiva] : normal sclera/conjunctiva [PERRL] : pupils equal round and reactive to light [EOMI] : extraocular movements intact [Normal Outer Ear/Nose] : the outer ears and nose were normal in appearance [Normal Oropharynx] : the oropharynx was normal [Normal TMs] : both tympanic membranes were normal [No JVD] : no jugular venous distention [No Lymphadenopathy] : no lymphadenopathy [Supple] : supple [Thyroid Normal, No Nodules] : the thyroid was normal and there were no nodules present [No Respiratory Distress] : no respiratory distress  [No Accessory Muscle Use] : no accessory muscle use [Clear to Auscultation] : lungs were clear to auscultation bilaterally [Normal Percussion] : the chest was normal to percussion [Normal Rate] : normal rate  [Regular Rhythm] : with a regular rhythm [Normal S1, S2] : normal S1 and S2 [No Murmur] : no murmur heard [No Carotid Bruits] : no carotid bruits [No Abdominal Bruit] : a ~M bruit was not heard ~T in the abdomen [No Varicosities] : no varicosities [Pedal Pulses Present] : the pedal pulses are present [No Edema] : there was no peripheral edema [No Palpable Aorta] : no palpable aorta [No Extremity Clubbing/Cyanosis] : no extremity clubbing/cyanosis [Soft] : abdomen soft [Non Tender] : non-tender [Non-distended] : non-distended [No Masses] : no abdominal mass palpated [No HSM] : no HSM [Normal Bowel Sounds] : normal bowel sounds [Normal Supraclavicular Nodes] : no supraclavicular lymphadenopathy [Normal Axillary Nodes] : no axillary lymphadenopathy [Normal Posterior Cervical Nodes] : no posterior cervical lymphadenopathy [Normal Anterior Cervical Nodes] : no anterior cervical lymphadenopathy [Normal Inguinal Nodes] : no inguinal lymphadenopathy [No CVA Tenderness] : no CVA  tenderness [No Spinal Tenderness] : no spinal tenderness [No Joint Swelling] : no joint swelling [Grossly Normal Strength/Tone] : grossly normal strength/tone [No Rash] : no rash [Coordination Grossly Intact] : coordination grossly intact [No Focal Deficits] : no focal deficits [Normal Gait] : normal gait [Deep Tendon Reflexes (DTR)] : deep tendon reflexes were 2+ and symmetric [Speech Grossly Normal] : speech grossly normal [Memory Grossly Normal] : memory grossly normal [Normal Affect] : the affect was normal [Alert and Oriented x3] : oriented to person, place, and time [Normal Mood] : the mood was normal [Normal Insight/Judgement] : insight and judgment were intact [Scoliosis] : no scoliosis

## 2019-11-09 NOTE — ASSESSMENT
[FreeTextEntry1] : Her blood pressure is controlled. She may transition her total dose of amlodipine to the nighttime to see if this will avoid the early morning transient lightheadedness. She will continue to followup with endocrinology for her diabetes. Fall  precautions reviewed with recent fracture.  She will continue on alendronate. She will get a Shingrix vaccine when available from the pharmacy.   She will  be seen here again in 3-4 months for her annual wellness visit

## 2019-11-15 ENCOUNTER — NON-APPOINTMENT (OUTPATIENT)
Age: 69
End: 2019-11-15

## 2019-11-15 ENCOUNTER — APPOINTMENT (OUTPATIENT)
Dept: CARDIOLOGY | Facility: CLINIC | Age: 69
End: 2019-11-15
Payer: MEDICARE

## 2019-11-15 VITALS
OXYGEN SATURATION: 100 % | WEIGHT: 122 LBS | RESPIRATION RATE: 14 BRPM | SYSTOLIC BLOOD PRESSURE: 179 MMHG | HEART RATE: 63 BPM | HEIGHT: 65 IN | BODY MASS INDEX: 20.33 KG/M2 | DIASTOLIC BLOOD PRESSURE: 70 MMHG

## 2019-11-15 PROCEDURE — 99214 OFFICE O/P EST MOD 30 MIN: CPT

## 2019-11-15 PROCEDURE — 93000 ELECTROCARDIOGRAM COMPLETE: CPT

## 2019-11-15 NOTE — HISTORY OF PRESENT ILLNESS
[FreeTextEntry1] : Since I saw her last in May.  Since that time, At that time, she remains well from a cardiac standpoint.  She reports no cardiac sxs - there have been no episodes of exertional chest discomfort or dyspnea. She reports no palpitations.  She describes no orthopnea or PND.\par \par BP meds have been adjusted, and she is currently taking amlodipine and lisinopril.  BP readings at home have been acceptable.\par

## 2019-11-15 NOTE — DISCUSSION/SUMMARY
[FreeTextEntry1] : \par Hypertension, with "white coat" component.  BP seems in reasonable range - will continue current regimen.    \par \par Hyperlipidemia - lipid profile monitored by Dr. 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  Will arrange Ex-echo at that time.

## 2019-11-15 NOTE — PHYSICAL EXAM
[General Appearance - Well Developed] : well developed [Normal Appearance] : normal appearance [Well Groomed] : well groomed [General Appearance - In No Acute Distress] : no acute distress [General Appearance - Well Nourished] : well nourished [Normal Conjunctiva] : the conjunctiva exhibited no abnormalities [Eyelids - No Xanthelasma] : the eyelids demonstrated no xanthelasmas [Normal Jugular Venous A Waves Present] : normal jugular venous A waves present [Normal Jugular Venous V Waves Present] : normal jugular venous V waves present [Respiration, Rhythm And Depth] : normal respiratory rhythm and effort [Auscultation Breath Sounds / Voice Sounds] : lungs were clear to auscultation bilaterally [Heart Rate And Rhythm] : heart rate and rhythm were normal [Bowel Sounds] : normal bowel sounds [Abdomen Soft] : soft [Abdomen Tenderness] : non-tender [Abnormal Walk] : normal gait [Nail Clubbing] : no clubbing of the fingernails [Cyanosis, Localized] : no localized cyanosis [Skin Color & Pigmentation] : normal skin color and pigmentation [] : no rash [No Venous Stasis] : no venous stasis [Impaired Insight] : insight and judgment were intact [Affect] : the affect was normal [Mood] : the mood was normal [FreeTextEntry1] : No bruit. Liver and spleen not palpable; aortic pulsation is normal.

## 2019-11-21 ENCOUNTER — APPOINTMENT (OUTPATIENT)
Dept: ORTHOPEDIC SURGERY | Facility: CLINIC | Age: 69
End: 2019-11-21
Payer: MEDICARE

## 2019-11-21 PROCEDURE — 99213 OFFICE O/P EST LOW 20 MIN: CPT

## 2019-11-21 PROCEDURE — 73630 X-RAY EXAM OF FOOT: CPT | Mod: LT

## 2020-03-09 ENCOUNTER — APPOINTMENT (OUTPATIENT)
Dept: INTERNAL MEDICINE | Facility: CLINIC | Age: 70
End: 2020-03-09
Payer: MEDICARE

## 2020-03-09 VITALS — DIASTOLIC BLOOD PRESSURE: 80 MMHG | SYSTOLIC BLOOD PRESSURE: 136 MMHG

## 2020-03-09 VITALS
BODY MASS INDEX: 20.46 KG/M2 | HEIGHT: 64.5 IN | OXYGEN SATURATION: 100 % | TEMPERATURE: 98.8 F | DIASTOLIC BLOOD PRESSURE: 70 MMHG | WEIGHT: 121.3 LBS | SYSTOLIC BLOOD PRESSURE: 144 MMHG | HEART RATE: 64 BPM

## 2020-03-09 DIAGNOSIS — Z20.828 CONTACT WITH AND (SUSPECTED) EXPOSURE TO OTHER VIRAL COMMUNICABLE DISEASES: ICD-10-CM

## 2020-03-09 PROCEDURE — G0439: CPT

## 2020-03-09 PROCEDURE — G0444 DEPRESSION SCREEN ANNUAL: CPT | Mod: 59

## 2020-03-09 PROCEDURE — 36415 COLL VENOUS BLD VENIPUNCTURE: CPT

## 2020-03-09 PROCEDURE — 99214 OFFICE O/P EST MOD 30 MIN: CPT | Mod: 25

## 2020-03-10 PROBLEM — Z20.828 EXPOSURE TO INFLUENZA: Status: RESOLVED | Noted: 2019-12-17 | Resolved: 2020-03-10

## 2020-03-10 RX ORDER — OSELTAMIVIR PHOSPHATE 75 MG/1
75 CAPSULE ORAL
Qty: 10 | Refills: 0 | Status: DISCONTINUED | COMMUNITY
Start: 2019-12-17 | End: 2020-03-10

## 2020-03-10 NOTE — PHYSICAL EXAM
[No Acute Distress] : no acute distress [Well Nourished] : well nourished [Well Developed] : well developed [Well-Appearing] : well-appearing [Normal Voice/Communication] : normal voice/communication [Normal Sclera/Conjunctiva] : normal sclera/conjunctiva [PERRL] : pupils equal round and reactive to light [EOMI] : extraocular movements intact [Normal Outer Ear/Nose] : the outer ears and nose were normal in appearance [Normal Oropharynx] : the oropharynx was normal [Normal TMs] : both tympanic membranes were normal [No JVD] : no jugular venous distention [No Lymphadenopathy] : no lymphadenopathy [Thyroid Normal, No Nodules] : the thyroid was normal and there were no nodules present [No Respiratory Distress] : no respiratory distress  [Clear to Auscultation] : lungs were clear to auscultation bilaterally [No Accessory Muscle Use] : no accessory muscle use [Normal Percussion] : the chest was normal to percussion [Normal Rate] : normal rate  [Regular Rhythm] : with a regular rhythm [Normal S1, S2] : normal S1 and S2 [No Murmur] : no murmur heard [No Carotid Bruits] : no carotid bruits [No Abdominal Bruit] : a ~M bruit was not heard ~T in the abdomen [No Varicosities] : no varicosities [Pedal Pulses Present] : the pedal pulses are present [No Edema] : there was no peripheral edema [No Extremity Clubbing/Cyanosis] : no extremity clubbing/cyanosis [No Palpable Aorta] : no palpable aorta [Normal Appearance] : normal in appearance [No Nipple Discharge] : no nipple discharge [No Axillary Lymphadenopathy] : no axillary lymphadenopathy [Soft] : abdomen soft [Non Tender] : non-tender [Non-distended] : non-distended [No Masses] : no abdominal mass palpated [No HSM] : no HSM [Normal Bowel Sounds] : normal bowel sounds [Normal Supraclavicular Nodes] : no supraclavicular lymphadenopathy [Normal Axillary Nodes] : no axillary lymphadenopathy [Normal Posterior Cervical Nodes] : no posterior cervical lymphadenopathy [Normal Anterior Cervical Nodes] : no anterior cervical lymphadenopathy [Normal Inguinal Nodes] : no inguinal lymphadenopathy [No CVA Tenderness] : no CVA  tenderness [No Spinal Tenderness] : no spinal tenderness [No Joint Swelling] : no joint swelling [Grossly Normal Strength/Tone] : grossly normal strength/tone [No Rash] : no rash [No Skin Lesions] : no skin lesions [Normal Gait] : normal gait [Coordination Grossly Intact] : coordination grossly intact [No Focal Deficits] : no focal deficits [Deep Tendon Reflexes (DTR)] : deep tendon reflexes were 2+ and symmetric [Speech Grossly Normal] : speech grossly normal [Memory Grossly Normal] : memory grossly normal [Normal Affect] : the affect was normal [Alert and Oriented x3] : oriented to person, place, and time [Normal Mood] : the mood was normal [Normal Insight/Judgement] : insight and judgment were intact [Comprehensive Foot Exam Normal] : Right and left foot were examined and both feet are normal. No ulcers in either foot. Toes are normal and with full ROM.  Normal tactile sensation with monofilament testing throughout both feet [Kyphosis] : no kyphosis [Scoliosis] : no scoliosis

## 2020-03-10 NOTE — ASSESSMENT
[FreeTextEntry1] : Her blood pressure is well controlled.\par She states she has had recent lipids that are in control. She will continue to followup with endocrinology regarding her diabetes control. She is aware of the treatment of hypoglycemia and prevention. She'll continue with regular ophthalmology and podiatry followup.\par Will investigate whether she should get the shingrix  vaccine with an elevated varicella-zoster antibody.\par She will continue with regular weight bearing exercise.  She will have a repeat bone density in August 2020 completing 1 year on alendronate. \par She will continue with yearly mammogram, breast MRI and GYN follow up with her history of BRCA2 positivity.  She refuses pancreatic cancer screening\par Advanced directives were reviewed and the patient has them in place

## 2020-03-10 NOTE — HEALTH RISK ASSESSMENT
[No falls in past year] : Patient reported no falls in the past year [Patient reported PAP Smear was normal] : Patient reported PAP Smear was normal [Hepatitis C test offered] : Hepatitis C test offered [None] : None [With Family] : lives with family [Retired] : retired [College] : College [] :  [# Of Children ___] : has [unfilled] children [Sexually Active] : sexually active [Feels Safe at Home] : Feels safe at home [Fully functional (bathing, dressing, toileting, transferring, walking, feeding)] : Fully functional (bathing, dressing, toileting, transferring, walking, feeding) [Fully functional (using the telephone, shopping, preparing meals, housekeeping, doing laundry, using] : Fully functional and needs no help or supervision to perform IADLs (using the telephone, shopping, preparing meals, housekeeping, doing laundry, using transportation, managing medications and managing finances) [Carbon Monoxide Detector] : carbon monoxide detector [Seat Belt] :  uses seat belt [Sunscreen] : uses sunscreen [Discussed at today's visit] : Advance Directives Discussed at today's visit [Designated Healthcare Proxy] : Designated healthcare proxy [Name: ___] : Health Care Proxy's Name: [unfilled]  [Relationship: ___] : Relationship: [unfilled] [Yes] : Yes [2 - 4 times a month (2 pts)] : 2-4 times a month (2 points) [1 or 2 (0 pts)] : 1 or 2 (0 points) [Never (0 pts)] : Never (0 points) [No] : In the past 12 months have you used drugs other than those required for medical reasons? No [0] : 1) Little interest or pleasure doing things: Not at all (0) [1] : 2) Feeling down, depressed, or hopeless for several days (1) [Reports normal functional visual acuity (ie: able to read med bottle)] : Reports normal functional visual acuity [Smoke Detector] : smoke detector [With Patient/Caregiver] : With Patient/Caregiver [] : No [de-identified] : 1 glass of wine a week [de-identified] : several times a week [de-identified] : careful with salt in diet. Follows diabetic diet [[id number]] : 1 [Change in mental status noted] : No change in mental status noted [Language] : denies difficulty with language [Behavior] : denies difficulty with behavior [Learning/Retaining New Information] : denies difficulty learning/retaining new information [Handling Complex Tasks] : denies difficulty handling complex tasks [Reasoning] : denies difficulty with reasoning [Spatial Ability and Orientation] : denies difficulty with spatial ability and orientation [High Risk Behavior] : no high risk behavior [Reports changes in hearing] : Reports no changes in hearing [Reports changes in vision] : Reports no changes in vision [Reports changes in dental health] : Reports no changes in dental health [Guns at Home] : no guns at home [Safety elements used in home] : no safety elements used in home [Travel to Developing Areas] : does not  travel to developing areas [TB Exposure] : is not being exposed to tuberculosis [Caregiver Concerns] : does not have caregiver concerns [MammogramDate] : 12/2019 [MammogramComments] : birads 1 [PapSmearDate] : 11/2019 [BoneDensityDate] : 7/2019 [BoneDensityComments] : osteoporosis [ColonoscopyDate] : 11/2014 [ColonoscopyComments] : no polyp [HepatitisCDate] : 7/2017 [HepatitisCComments] : nonreactive [AdvancecareDate] : 03/2020 [FreeTextEntry4] : Patient states she would not want aggressive treatment if she could not have a good quality of life.  She states she has discussed this with her healthcare proxy

## 2020-03-10 NOTE — HISTORY OF PRESENT ILLNESS
[FreeTextEntry1] : Annual wellness visit\par Diabetes\par Hypertension\par Osteoporosis\par BRCA2 positivity [de-identified] : She feels generally she has had good winter. She sees endocrinology regularly and has blood work there frequently including lipids TFTS  renal function and urine microalbumin. She states her blood pressure has been running in good range never above 140 systolic. She might have some mild constipation on amlodipine but overall is tolerating this. She sees the eye doctor and all has been fine.   She will be seeing the podiatrist.  She has no visual issues or tingling or numbness. She saw the gynecologist and all was fine.  She had a mammogram and sonogram  and breast MRI which were fine.   She questions whether she needs to take a Shingrix vaccine as she had very high varicella-zoster IgG antibodies....endocrinology told her she may not have to.  . She is planning a colonoscopy. She sees the dermatologist and all is fine.\par She brings in recent blood work with a hemoglobin A1c of 7.6 %.  She relates this to the fact with her savita  system she has taken measures when her glucose is 70  she has  sugar. She has  found out that the sav[employer] system may not be accurate and now she would only now treat hypoglycemia if her glucose is  less than 60. She states she gets hypoglycemic if she exercises too much. She has symptoms of hypoglycemia  and has an  action plan

## 2020-03-10 NOTE — DATA REVIEWED
[FreeTextEntry1] : Submitted  blood work from 1/20202   renal panel normal, TFTS normal   HGBA1C 7.6% Vitamin D 81.  Varicella zooster titer IGG positive

## 2020-03-19 LAB
ALBUMIN SERPL ELPH-MCNC: 4.6 G/DL
ALP BLD-CCNC: 41 U/L
ALT SERPL-CCNC: 19 U/L
AST SERPL-CCNC: 22 U/L
BASOPHILS # BLD AUTO: 0.08 K/UL
BASOPHILS NFR BLD AUTO: 1.4 %
BILIRUB DIRECT SERPL-MCNC: 0.1 MG/DL
BILIRUB INDIRECT SERPL-MCNC: 0.2 MG/DL
BILIRUB SERPL-MCNC: 0.4 MG/DL
EOSINOPHIL # BLD AUTO: 0.17 K/UL
EOSINOPHIL NFR BLD AUTO: 2.9 %
HCT VFR BLD CALC: 41 %
HGB BLD-MCNC: 12.8 G/DL
IMM GRANULOCYTES NFR BLD AUTO: 0.5 %
LYMPHOCYTES # BLD AUTO: 1.57 K/UL
LYMPHOCYTES NFR BLD AUTO: 27.2 %
MAN DIFF?: NORMAL
MCHC RBC-ENTMCNC: 29.6 PG
MCHC RBC-ENTMCNC: 31.2 GM/DL
MCV RBC AUTO: 94.7 FL
MONOCYTES # BLD AUTO: 0.53 K/UL
MONOCYTES NFR BLD AUTO: 9.2 %
NEUTROPHILS # BLD AUTO: 3.39 K/UL
NEUTROPHILS NFR BLD AUTO: 58.8 %
PLATELET # BLD AUTO: 233 K/UL
PROT SERPL-MCNC: 6.6 G/DL
RBC # BLD: 4.33 M/UL
RBC # FLD: 13.3 %
WBC # FLD AUTO: 5.77 K/UL

## 2020-04-27 ENCOUNTER — APPOINTMENT (OUTPATIENT)
Dept: SURGERY | Facility: CLINIC | Age: 70
End: 2020-04-27

## 2020-07-08 ENCOUNTER — APPOINTMENT (OUTPATIENT)
Dept: SURGERY | Facility: CLINIC | Age: 70
End: 2020-07-08
Payer: MEDICARE

## 2020-07-08 PROCEDURE — 99213K: CUSTOM

## 2020-08-31 ENCOUNTER — APPOINTMENT (OUTPATIENT)
Dept: CARDIOLOGY | Facility: CLINIC | Age: 70
End: 2020-08-31
Payer: MEDICARE

## 2020-08-31 VITALS
DIASTOLIC BLOOD PRESSURE: 66 MMHG | WEIGHT: 121 LBS | RESPIRATION RATE: 14 BRPM | BODY MASS INDEX: 20.4 KG/M2 | HEIGHT: 64.5 IN | SYSTOLIC BLOOD PRESSURE: 145 MMHG | HEART RATE: 64 BPM

## 2020-08-31 PROCEDURE — 99213 OFFICE O/P EST LOW 20 MIN: CPT

## 2020-08-31 PROCEDURE — 93325 DOPPLER ECHO COLOR FLOW MAPG: CPT

## 2020-08-31 PROCEDURE — 93320 DOPPLER ECHO COMPLETE: CPT

## 2020-08-31 PROCEDURE — 93351 STRESS TTE COMPLETE: CPT

## 2020-08-31 RX ORDER — AMLODIPINE BESYLATE 2.5 MG/1
2.5 TABLET ORAL DAILY
Qty: 90 | Refills: 3 | Status: DISCONTINUED | COMMUNITY
Start: 2019-11-06 | End: 2020-08-31

## 2020-08-31 NOTE — HISTORY OF PRESENT ILLNESS
[FreeTextEntry1] : Since I saw her last in November.  Since that time, she has been well.  She describes no cardiac sxs - there have been no episodes of exertional chest discomfort or dyspnea. She reports no palpitations.  She describes no orthopnea or PND.\par \par BP readings at home generally show a systolic BP in the 130 range, occasionally up to 140 and occasionally down to 120.\par

## 2020-08-31 NOTE — DISCUSSION/SUMMARY
[FreeTextEntry1] : \par Hypertension, with "white coat" component.  Exhibits mildly elevated BP at rest today and with exercise has a mildly hypertensive response; will have her increase amlodipine to 10 mg. each evening and continue current dose of lisinopril.  \par \par Hyperlipidemia - lipid profile monitored by Dr. 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

## 2020-09-24 ENCOUNTER — APPOINTMENT (OUTPATIENT)
Dept: INTERNAL MEDICINE | Facility: CLINIC | Age: 70
End: 2020-09-24
Payer: MEDICARE

## 2020-09-24 VITALS — DIASTOLIC BLOOD PRESSURE: 80 MMHG | SYSTOLIC BLOOD PRESSURE: 136 MMHG

## 2020-09-24 VITALS
DIASTOLIC BLOOD PRESSURE: 60 MMHG | OXYGEN SATURATION: 99 % | WEIGHT: 125 LBS | HEIGHT: 64.5 IN | HEART RATE: 62 BPM | SYSTOLIC BLOOD PRESSURE: 150 MMHG | BODY MASS INDEX: 21.08 KG/M2 | TEMPERATURE: 97.16 F

## 2020-09-24 PROCEDURE — 99214 OFFICE O/P EST MOD 30 MIN: CPT | Mod: 25

## 2020-09-24 PROCEDURE — 90662 IIV NO PRSV INCREASED AG IM: CPT

## 2020-09-24 PROCEDURE — G0008: CPT

## 2020-09-24 RX ORDER — PREDNISONE 50 MG/1
50 TABLET ORAL
Qty: 3 | Refills: 0 | Status: DISCONTINUED | COMMUNITY
Start: 2020-07-08 | End: 2020-09-24

## 2020-09-28 RX ORDER — FLASH GLUCOSE SCANNING READER
EACH MISCELLANEOUS
Qty: 1 | Refills: 0 | Status: DISCONTINUED | COMMUNITY
Start: 2019-07-17 | End: 2020-09-28

## 2020-09-28 RX ORDER — CEPHALEXIN 250 MG/1
250 CAPSULE ORAL
Qty: 8 | Refills: 0 | Status: COMPLETED | COMMUNITY
Start: 2020-06-09

## 2020-09-28 RX ORDER — MOMETASONE FUROATE 1 MG/G
0.1 CREAM TOPICAL
Qty: 45 | Refills: 0 | Status: COMPLETED | COMMUNITY
Start: 2020-05-26

## 2020-09-28 RX ORDER — FLASH GLUCOSE SENSOR
KIT MISCELLANEOUS
Qty: 6 | Refills: 0 | Status: DISCONTINUED | COMMUNITY
Start: 2019-06-10 | End: 2020-09-28

## 2020-09-28 RX ORDER — METRONIDAZOLE 10 MG/G
1 GEL TOPICAL
Qty: 60 | Refills: 0 | Status: COMPLETED | COMMUNITY
Start: 2020-05-26

## 2020-09-28 NOTE — PHYSICAL EXAM
[No Acute Distress] : no acute distress [Well Nourished] : well nourished [Well Developed] : well developed [Well-Appearing] : well-appearing [Normal Sclera/Conjunctiva] : normal sclera/conjunctiva [PERRL] : pupils equal round and reactive to light [EOMI] : extraocular movements intact [Normal Outer Ear/Nose] : the outer ears and nose were normal in appearance [Normal Oropharynx] : the oropharynx was normal [No JVD] : no jugular venous distention [No Lymphadenopathy] : no lymphadenopathy [Supple] : supple [Thyroid Normal, No Nodules] : the thyroid was normal and there were no nodules present [No Respiratory Distress] : no respiratory distress  [No Accessory Muscle Use] : no accessory muscle use [Clear to Auscultation] : lungs were clear to auscultation bilaterally [Normal Rate] : normal rate  [Regular Rhythm] : with a regular rhythm [Normal S1, S2] : normal S1 and S2 [No Murmur] : no murmur heard [No Carotid Bruits] : no carotid bruits [No Abdominal Bruit] : a ~M bruit was not heard ~T in the abdomen [No Varicosities] : no varicosities [Pedal Pulses Present] : the pedal pulses are present [No Edema] : there was no peripheral edema [No Palpable Aorta] : no palpable aorta [No Extremity Clubbing/Cyanosis] : no extremity clubbing/cyanosis [Soft] : abdomen soft [Non Tender] : non-tender [Non-distended] : non-distended [No Masses] : no abdominal mass palpated [No HSM] : no HSM [Normal Bowel Sounds] : normal bowel sounds [Normal Posterior Cervical Nodes] : no posterior cervical lymphadenopathy [Normal Anterior Cervical Nodes] : no anterior cervical lymphadenopathy [No CVA Tenderness] : no CVA  tenderness [No Spinal Tenderness] : no spinal tenderness [No Joint Swelling] : no joint swelling [Grossly Normal Strength/Tone] : grossly normal strength/tone [No Rash] : no rash [Coordination Grossly Intact] : coordination grossly intact [No Focal Deficits] : no focal deficits [Normal Gait] : normal gait [Normal Affect] : the affect was normal [Normal Insight/Judgement] : insight and judgment were intact [Normal TMs] : both tympanic membranes were normal [Normal Percussion] : the chest was normal to percussion [Normal Supraclavicular Nodes] : no supraclavicular lymphadenopathy [Normal Inguinal Nodes] : no inguinal lymphadenopathy [No Skin Lesions] : no skin lesions [Deep Tendon Reflexes (DTR)] : deep tendon reflexes were 2+ and symmetric [Speech Grossly Normal] : speech grossly normal [Memory Grossly Normal] : memory grossly normal [Alert and Oriented x3] : oriented to person, place, and time [Normal Mood] : the mood was normal [de-identified] : She brings a wrist cuff with her today. By her machine  154/81 ..my machine  136/80 both arms.

## 2020-09-28 NOTE — ASSESSMENT
[FreeTextEntry1] : She will continue to followup with endocrinology regarding her diabetes. She has no evidence of end organ disease.   Her blood pressure here appears adequately controlled. She will continue with regular exercise salt restriction regular ophthalmology and podiatry followup. She will continue with regular breast MRI and mammograms for Hx BRCA2 positivity\par She was given a high dose quadrivalent  influenza vaccine today\par She will be seen again in 6 months for her annual wellness visit.

## 2020-09-28 NOTE — HISTORY OF PRESENT ILLNESS
[FreeTextEntry1] : Hypertension\par Diabetes [de-identified] : She overall has been coping during the pandemic. She is seeing her specialists... the eye doctor podiatrist breast surgeon endocrinologist. She sees  a dermatologist. . She saw endocrinology.... last hemoglobin A1c was 7.6%. She developed topical allergy to the sensor and so now is doing fingersticks regularly. She has had no hypoglycemia. She has no chest pain shortness of breath. She is monitoring her blood pressure at home and they've been generally in the 120 to 130s over 80s. She has no side effects from increased dose of amlodipine. She wants a flu vaccine today

## 2020-10-08 ENCOUNTER — TRANSCRIPTION ENCOUNTER (OUTPATIENT)
Age: 70
End: 2020-10-08

## 2020-12-06 ENCOUNTER — TRANSCRIPTION ENCOUNTER (OUTPATIENT)
Age: 70
End: 2020-12-06

## 2021-02-07 ENCOUNTER — FORM ENCOUNTER (OUTPATIENT)
Age: 71
End: 2021-02-07

## 2021-03-22 ENCOUNTER — APPOINTMENT (OUTPATIENT)
Dept: INTERNAL MEDICINE | Facility: CLINIC | Age: 71
End: 2021-03-22
Payer: MEDICARE

## 2021-03-22 VITALS — DIASTOLIC BLOOD PRESSURE: 70 MMHG | SYSTOLIC BLOOD PRESSURE: 130 MMHG

## 2021-03-22 VITALS
DIASTOLIC BLOOD PRESSURE: 64 MMHG | BODY MASS INDEX: 21.08 KG/M2 | OXYGEN SATURATION: 97 % | SYSTOLIC BLOOD PRESSURE: 132 MMHG | HEIGHT: 64.5 IN | HEART RATE: 94 BPM | WEIGHT: 125 LBS | TEMPERATURE: 96.62 F

## 2021-03-22 DIAGNOSIS — Z51.81 ENCOUNTER FOR THERAPEUTIC DRUG LVL MONITORING: ICD-10-CM

## 2021-03-22 DIAGNOSIS — S92.352D DISPLACED FRACTURE OF FIFTH METATARSAL BONE, LEFT FOOT, SUBSEQUENT ENCOUNTER FOR FRACTURE WITH ROUTINE HEALING: ICD-10-CM

## 2021-03-22 DIAGNOSIS — Z85.828 PERSONAL HISTORY OF OTHER MALIGNANT NEOPLASM OF SKIN: ICD-10-CM

## 2021-03-22 DIAGNOSIS — Z79.83 ENCOUNTER FOR THERAPEUTIC DRUG LVL MONITORING: ICD-10-CM

## 2021-03-22 PROCEDURE — 99214 OFFICE O/P EST MOD 30 MIN: CPT

## 2021-03-22 NOTE — DATA REVIEWED
[FreeTextEntry1] : Submitted blood work from November 11, 2020 reviewed,...normal renal panel normal thyroid functions normal hemoglobin A1c 7.0 blood type B+ Covid19 antibodies negative

## 2021-03-22 NOTE — ASSESSMENT
[FreeTextEntry1] : Her blood pressure is controlled.\par Her diabetes appears controlled. She will continue with regular endocrinology ophthalmology and podiatry followup. She states she gets lipid levels and urine micro-albumen every 6 months at endocrinology\par She was given a referral for a followup bone density in August 2021 to assess her response to alendronate which she will have been on for 2 years.\par She defers pancreatic cancer screening despite being BRCA positive and her sister having pancreatic cancer  and is aware of the ramifications. She will proceed with Shingrix vaccine in several months spacing it from her recent Covid 19 vaccine. She will continue to stay safe during the pandemic. She'll be seen in 6 months for her annual wellness visit\par

## 2021-03-22 NOTE — PHYSICAL EXAM
[No Acute Distress] : no acute distress [Well Nourished] : well nourished [Well Developed] : well developed [Well-Appearing] : well-appearing [Normal Voice/Communication] : normal voice/communication [Normal Sclera/Conjunctiva] : normal sclera/conjunctiva [PERRL] : pupils equal round and reactive to light [EOMI] : extraocular movements intact [Normal Outer Ear/Nose] : the outer ears and nose were normal in appearance [Normal Oropharynx] : the oropharynx was normal [Normal TMs] : both tympanic membranes were normal [No JVD] : no jugular venous distention [No Lymphadenopathy] : no lymphadenopathy [Supple] : supple [Thyroid Normal, No Nodules] : the thyroid was normal and there were no nodules present [No Respiratory Distress] : no respiratory distress  [No Accessory Muscle Use] : no accessory muscle use [Clear to Auscultation] : lungs were clear to auscultation bilaterally [Normal Rate] : normal rate  [Regular Rhythm] : with a regular rhythm [Normal S1, S2] : normal S1 and S2 [No Murmur] : no murmur heard [No Carotid Bruits] : no carotid bruits [No Abdominal Bruit] : a ~M bruit was not heard ~T in the abdomen [Pedal Pulses Present] : the pedal pulses are present [No Edema] : there was no peripheral edema [No Palpable Aorta] : no palpable aorta [Normal Appearance] : normal in appearance [No Nipple Discharge] : no nipple discharge [No Axillary Lymphadenopathy] : no axillary lymphadenopathy [Soft] : abdomen soft [Non Tender] : non-tender [Non-distended] : non-distended [No Masses] : no abdominal mass palpated [No HSM] : no HSM [Normal Bowel Sounds] : normal bowel sounds [Normal Supraclavicular Nodes] : no supraclavicular lymphadenopathy [Normal Axillary Nodes] : no axillary lymphadenopathy [Normal Posterior Cervical Nodes] : no posterior cervical lymphadenopathy [Normal Anterior Cervical Nodes] : no anterior cervical lymphadenopathy [Normal Inguinal Nodes] : no inguinal lymphadenopathy [No CVA Tenderness] : no CVA  tenderness [No Spinal Tenderness] : no spinal tenderness [No Joint Swelling] : no joint swelling [Grossly Normal Strength/Tone] : grossly normal strength/tone [No Rash] : no rash [No Skin Lesions] : no skin lesions [Coordination Grossly Intact] : coordination grossly intact [No Focal Deficits] : no focal deficits [Normal Gait] : normal gait [Deep Tendon Reflexes (DTR)] : deep tendon reflexes were 2+ and symmetric [Speech Grossly Normal] : speech grossly normal [Memory Grossly Normal] : memory grossly normal [Normal Affect] : the affect was normal [Alert and Oriented x3] : oriented to person, place, and time [Normal Mood] : the mood was normal [Normal Insight/Judgement] : insight and judgment were intact

## 2021-03-22 NOTE — HISTORY OF PRESENT ILLNESS
[FreeTextEntry1] : Hypertension\par Osteoporosis\par Diabetes\par BRCA2 positive [de-identified] : She overall has been feeling well during the pandemic. She received a [employer] Covid  19 vaccine 3/8/2021 and for several days her glucoses ran high.  She has had no hypoglycemia.  She has been staying home most of the time. She brings in blood work from the endocrinologist for me to review. She has been monitoring her blood pressure at home and it is always less than 140/80. She saw the eye doctor and  podiatrist and all was fine ..  She saw the dentist and dermatologist and all was fine. She had a mammogram and sonogram which were fine.   She is planning to see the cardiologist.   She has no stomach pain back pain.   There have  been no falls.  She is planning a followup with the gynecologist

## 2021-04-12 ENCOUNTER — FORM ENCOUNTER (OUTPATIENT)
Age: 71
End: 2021-04-12

## 2021-04-13 ENCOUNTER — RESULT REVIEW (OUTPATIENT)
Age: 71
End: 2021-04-13

## 2021-04-13 ENCOUNTER — APPOINTMENT (OUTPATIENT)
Dept: OBGYN | Facility: CLINIC | Age: 71
End: 2021-04-13
Payer: MEDICARE

## 2021-04-13 PROCEDURE — G0101: CPT

## 2021-04-14 ENCOUNTER — FORM ENCOUNTER (OUTPATIENT)
Age: 71
End: 2021-04-14

## 2021-04-19 ENCOUNTER — FORM ENCOUNTER (OUTPATIENT)
Age: 71
End: 2021-04-19

## 2021-05-04 ENCOUNTER — FORM ENCOUNTER (OUTPATIENT)
Age: 71
End: 2021-05-04

## 2021-05-05 ENCOUNTER — APPOINTMENT (OUTPATIENT)
Dept: OBGYN | Facility: CLINIC | Age: 71
End: 2021-05-05
Payer: MEDICARE

## 2021-05-05 ENCOUNTER — FORM ENCOUNTER (OUTPATIENT)
Age: 71
End: 2021-05-05

## 2021-05-05 PROCEDURE — 56605 BIOPSY OF VULVA/PERINEUM: CPT

## 2021-05-10 ENCOUNTER — FORM ENCOUNTER (OUTPATIENT)
Age: 71
End: 2021-05-10

## 2021-05-11 ENCOUNTER — APPOINTMENT (OUTPATIENT)
Dept: OBGYN | Facility: CLINIC | Age: 71
End: 2021-05-11
Payer: MEDICARE

## 2021-05-11 PROCEDURE — 99213 OFFICE O/P EST LOW 20 MIN: CPT

## 2021-05-24 ENCOUNTER — APPOINTMENT (OUTPATIENT)
Dept: OBGYN | Facility: CLINIC | Age: 71
End: 2021-05-24

## 2021-05-28 NOTE — HISTORY OF PRESENT ILLNESS
LM for family.  Please relay information from Dr Vu in regards to nutrition with ADHD. Agustina Zaldivar LPN     [FreeTextEntry1] : Hypertension\par Osteoporosis\par BRCA 2 positive [de-identified] : She had an MRI of her breasts which was normal ....she needed  premedication as she is allergic to the dye. Her glucoses have been  a little bit out of control due to the steroids but in general they have been good. She is now using a 24-hour monitor and finds this helpful. She had recent blood work by her endocrinologist with a hemoglobin A1c of 7.2 and  normal electrolytes. She has been monitoring her blood pressure on her current regimen and systolic blood pressures have been 120 to 130s and diastolics in the 60s to 70s. This morning the blood pressure was 124/59. She has no edema change or change in her bowels.\par She has decided not to undergo pancreatic cancer screening despite her BRCA2 positivity and family history of pancreatic cancer.\par She questions whether she should continue on aspirin therapy. She was begun on alendronate several weeks ago by endocrinology for her bone density done on July 11 which revealed worsening osteoporosis in her hip.   There have  been no falls\par She states she will be proceeding with a Shingrix vaccine and wants to know if she should begin this now as she is just began alendronate

## 2021-06-06 ENCOUNTER — FORM ENCOUNTER (OUTPATIENT)
Age: 71
End: 2021-06-06

## 2021-06-25 ENCOUNTER — NON-APPOINTMENT (OUTPATIENT)
Age: 71
End: 2021-06-25

## 2021-06-25 ENCOUNTER — APPOINTMENT (OUTPATIENT)
Dept: CARDIOLOGY | Facility: CLINIC | Age: 71
End: 2021-06-25
Payer: MEDICARE

## 2021-06-25 VITALS
HEART RATE: 67 BPM | DIASTOLIC BLOOD PRESSURE: 68 MMHG | BODY MASS INDEX: 20.11 KG/M2 | SYSTOLIC BLOOD PRESSURE: 140 MMHG | OXYGEN SATURATION: 99 % | RESPIRATION RATE: 14 BRPM | WEIGHT: 119 LBS

## 2021-06-25 PROCEDURE — 99214 OFFICE O/P EST MOD 30 MIN: CPT

## 2021-06-25 PROCEDURE — 93000 ELECTROCARDIOGRAM COMPLETE: CPT

## 2021-06-25 NOTE — DISCUSSION/SUMMARY
[FreeTextEntry1] : \par Hypertension, with "white coat" component.  BP reasonable ton exam today; to continue current regimen.  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

## 2021-06-25 NOTE — PHYSICAL EXAM
[General Appearance - Well Developed] : well developed [Normal Appearance] : normal appearance [Well Groomed] : well groomed [General Appearance - Well Nourished] : well nourished [General Appearance - In No Acute Distress] : no acute distress [Normal Conjunctiva] : the conjunctiva exhibited no abnormalities [Eyelids - No Xanthelasma] : the eyelids demonstrated no xanthelasmas [Normal Jugular Venous A Waves Present] : normal jugular venous A waves present [Normal Jugular Venous V Waves Present] : normal jugular venous V waves present [Respiration, Rhythm And Depth] : normal respiratory rhythm and effort [Auscultation Breath Sounds / Voice Sounds] : lungs were clear to auscultation bilaterally [Heart Rate And Rhythm] : heart rate and rhythm were normal [Bowel Sounds] : normal bowel sounds [Abdomen Soft] : soft [Abdomen Tenderness] : non-tender [Abnormal Walk] : normal gait [Cyanosis, Localized] : no localized cyanosis [Nail Clubbing] : no clubbing of the fingernails [Skin Color & Pigmentation] : normal skin color and pigmentation [] : no rash [No Venous Stasis] : no venous stasis [Impaired Insight] : insight and judgment were intact [Affect] : the affect was normal [Mood] : the mood was normal [FreeTextEntry1] : 2+ pulses in the upper and lower extremities. No edema.

## 2021-06-25 NOTE — HISTORY OF PRESENT ILLNESS
[FreeTextEntry1] : Since I saw her last in August of last year.  Since that time, she remains active and well.  There have been no  cardiac sxs - she reports no episodes of exertional chest discomfort or dyspnea. She reports no palpitations.  She describes no orthopnea or PND.\par \par She continues to check her BP at home.  Readings usual show the systolic to be 120-130 mmHg, with normal diastolic readings (usually in the 60s).

## 2021-07-14 ENCOUNTER — APPOINTMENT (OUTPATIENT)
Dept: SURGERY | Facility: CLINIC | Age: 71
End: 2021-07-14
Payer: MEDICARE

## 2021-07-14 PROCEDURE — 99213K: CUSTOM

## 2021-09-23 ENCOUNTER — APPOINTMENT (OUTPATIENT)
Dept: INTERNAL MEDICINE | Facility: CLINIC | Age: 71
End: 2021-09-23
Payer: MEDICARE

## 2021-09-23 VITALS
TEMPERATURE: 96.26 F | WEIGHT: 121 LBS | SYSTOLIC BLOOD PRESSURE: 120 MMHG | HEIGHT: 64.5 IN | OXYGEN SATURATION: 98 % | BODY MASS INDEX: 20.4 KG/M2 | HEART RATE: 77 BPM | DIASTOLIC BLOOD PRESSURE: 62 MMHG

## 2021-09-23 DIAGNOSIS — Z13.31 ENCOUNTER FOR SCREENING FOR DEPRESSION: ICD-10-CM

## 2021-09-23 DIAGNOSIS — Z23 ENCOUNTER FOR IMMUNIZATION: ICD-10-CM

## 2021-09-23 PROCEDURE — 99214 OFFICE O/P EST MOD 30 MIN: CPT | Mod: 25

## 2021-09-23 PROCEDURE — G0008: CPT

## 2021-09-23 PROCEDURE — G0439: CPT

## 2021-09-23 PROCEDURE — 90662 IIV NO PRSV INCREASED AG IM: CPT

## 2021-09-23 PROCEDURE — G0444 DEPRESSION SCREEN ANNUAL: CPT | Mod: 59

## 2021-09-25 PROBLEM — Z13.31 DEPRESSION SCREENING: Status: ACTIVE | Noted: 2020-03-10

## 2021-09-25 RX ORDER — BACILLUS COAGULANS/INULIN 1B-250 MG
CAPSULE ORAL
Refills: 0 | Status: ACTIVE | COMMUNITY

## 2021-09-25 RX ORDER — LACTOBACILLUS RHAMNOSUS GG 10B CELL
CAPSULE ORAL
Refills: 0 | Status: DISCONTINUED | COMMUNITY
End: 2021-09-25

## 2021-09-25 NOTE — PHYSICAL EXAM
[No Acute Distress] : no acute distress [Well Nourished] : well nourished [Well Developed] : well developed [Well-Appearing] : well-appearing [Normal Voice/Communication] : normal voice/communication [Normal Sclera/Conjunctiva] : normal sclera/conjunctiva [PERRL] : pupils equal round and reactive to light [EOMI] : extraocular movements intact [Normal Outer Ear/Nose] : the outer ears and nose were normal in appearance [Normal Oropharynx] : the oropharynx was normal [Normal TMs] : both tympanic membranes were normal [No JVD] : no jugular venous distention [No Lymphadenopathy] : no lymphadenopathy [Thyroid Normal, No Nodules] : the thyroid was normal and there were no nodules present [No Respiratory Distress] : no respiratory distress  [Clear to Auscultation] : lungs were clear to auscultation bilaterally [No Accessory Muscle Use] : no accessory muscle use [Normal Percussion] : the chest was normal to percussion [Normal Rate] : normal rate  [Regular Rhythm] : with a regular rhythm [Normal S1, S2] : normal S1 and S2 [No Murmur] : no murmur heard [No Carotid Bruits] : no carotid bruits [No Abdominal Bruit] : a ~M bruit was not heard ~T in the abdomen [No Varicosities] : no varicosities [Pedal Pulses Present] : the pedal pulses are present [No Edema] : there was no peripheral edema [No Extremity Clubbing/Cyanosis] : no extremity clubbing/cyanosis [No Palpable Aorta] : no palpable aorta [Normal Appearance] : normal in appearance [No Nipple Discharge] : no nipple discharge [No Axillary Lymphadenopathy] : no axillary lymphadenopathy [Soft] : abdomen soft [Non Tender] : non-tender [Non-distended] : non-distended [No Masses] : no abdominal mass palpated [No HSM] : no HSM [Normal Bowel Sounds] : normal bowel sounds [Normal Supraclavicular Nodes] : no supraclavicular lymphadenopathy [Normal Axillary Nodes] : no axillary lymphadenopathy [Normal Posterior Cervical Nodes] : no posterior cervical lymphadenopathy [Normal Anterior Cervical Nodes] : no anterior cervical lymphadenopathy [Normal Inguinal Nodes] : no inguinal lymphadenopathy [No CVA Tenderness] : no CVA  tenderness [No Spinal Tenderness] : no spinal tenderness [No Joint Swelling] : no joint swelling [Grossly Normal Strength/Tone] : grossly normal strength/tone [No Rash] : no rash [No Skin Lesions] : no skin lesions [Normal Gait] : normal gait [Coordination Grossly Intact] : coordination grossly intact [No Focal Deficits] : no focal deficits [Deep Tendon Reflexes (DTR)] : deep tendon reflexes were 2+ and symmetric [Speech Grossly Normal] : speech grossly normal [Memory Grossly Normal] : memory grossly normal [Normal Affect] : the affect was normal [Alert and Oriented x3] : oriented to person, place, and time [Normal Mood] : the mood was normal [Normal Insight/Judgement] : insight and judgment were intact [Comprehensive Foot Exam Normal] : Right and left foot were examined and both feet are normal. No ulcers in either foot. Toes are normal and with full ROM.  Normal tactile sensation with monofilament testing throughout both feet [Supple] : supple [Kyphosis] : no kyphosis [Scoliosis] : no scoliosis [de-identified] : Multiple solar ecchymoses over the arms and leg

## 2021-09-25 NOTE — HEALTH RISK ASSESSMENT
[Yes] : Yes [2 - 4 times a month (2 pts)] : 2-4 times a month (2 points) [1 or 2 (0 pts)] : 1 or 2 (0 points) [Never (0 pts)] : Never (0 points) [No] : In the past 12 months have you used drugs other than those required for medical reasons? No [No falls in past year] : Patient reported no falls in the past year [Patient reported PAP Smear was normal] : Patient reported PAP Smear was normal [Hepatitis C test offered] : Hepatitis C test offered [None] : None [With Family] : lives with family [Retired] : retired [College] : College [] :  [# Of Children ___] : has [unfilled] children [Sexually Active] : sexually active [Feels Safe at Home] : Feels safe at home [Fully functional (bathing, dressing, toileting, transferring, walking, feeding)] : Fully functional (bathing, dressing, toileting, transferring, walking, feeding) [Fully functional (using the telephone, shopping, preparing meals, housekeeping, doing laundry, using] : Fully functional and needs no help or supervision to perform IADLs (using the telephone, shopping, preparing meals, housekeeping, doing laundry, using transportation, managing medications and managing finances) [Reports normal functional visual acuity (ie: able to read med bottle)] : Reports normal functional visual acuity [Smoke Detector] : smoke detector [Carbon Monoxide Detector] : carbon monoxide detector [Seat Belt] :  uses seat belt [Sunscreen] : uses sunscreen [0] : 2) Feeling down, depressed, or hopeless: Not at all (0) [PHQ-2 Negative - No further assessment needed] : PHQ-2 Negative - No further assessment needed [Reviewed no changes] : Reviewed, no changes [Designated Healthcare Proxy] : Designated healthcare proxy [Name: ___] : Health Care Proxy's Name: [unfilled]  [Relationship: ___] : Relationship: [unfilled] [] : No [de-identified] : 1 glass of wine a week [de-identified] : several times a week [de-identified] : careful with salt in diet. Follows diabetic diet [[facility]] : 6.20 [[id number]] : 0 [Change in mental status noted] : No change in mental status noted [Language] : denies difficulty with language [Behavior] : denies difficulty with behavior [Learning/Retaining New Information] : denies difficulty learning/retaining new information [Handling Complex Tasks] : denies difficulty handling complex tasks [Reasoning] : denies difficulty with reasoning [Spatial Ability and Orientation] : denies difficulty with spatial ability and orientation [High Risk Behavior] : no high risk behavior [Reports changes in hearing] : Reports no changes in hearing [Reports changes in vision] : Reports no changes in vision [Reports changes in dental health] : Reports no changes in dental health [Guns at Home] : no guns at home [Safety elements used in home] : no safety elements used in home [Travel to Developing Areas] : does not  travel to developing areas [TB Exposure] : is not being exposed to tuberculosis [Caregiver Concerns] : does not have caregiver concerns [MammogramDate] : 3/2021 [MammogramComments] : birads 2 [PapSmearDate] : 2021 [BoneDensityDate] : 8/2021 [BoneDensityComments] : stable osteoporosis [ColonoscopyDate] : 11/2014 [ColonoscopyComments] : no polyp [HepatitisCDate] : 7/2017 [HepatitisCComments] : nonreactive [AdvancecareDate] : 09/2021

## 2021-09-25 NOTE — HISTORY OF PRESENT ILLNESS
[FreeTextEntry1] : Annual wellness visit\par Diabetes\par Hypertension\par Osteoporosis\par BRCA2 positivity                                                                                                                                                                                          Questions regarding Covid 19 [de-identified] : She overall has had a good year. She sees endocrinology regularly and brings some blood work she wants me to review. Overall she feels her glucoses have been in controlled. She has rare hypoglycemia.  She is careful with her diet .  She exercises regularly. She sees the eye doctor and has floaters but no diabetic retinopathy.   She sees the podiatrist and all is fine.  She follows up with breast surgery. She had a recent MRI of her breast which was  normal but  she had a severe reaction to gadolinium with hives despite Benadryl and steroids and was told in future  to get MRI done in the hospital. She has no dizziness. She saw the gynecologist and all was fine .  She saw ENT for  wax removal. She has no chest pain shortness of breath or palpitations.

## 2021-09-25 NOTE — ASSESSMENT
[FreeTextEntry1] : Her blood pressure is controlled. Her blood work from April 2021 was reviewed. Her lipids are in control and  her creatinine and liver functions are normal. She will get a repeat blood work from her endocrinologist soon. She will continue with regular opthalmology and  podiatry followup\par She is up to date with all screening tests except for colonoscopy\par Her recent bone density was reviewed.  She has been on Alendronate for 2 years It is hard to compare to prior in 2019 but suggests overall stability and she will continue on alendronate and get this repeated in 2 years.\par She was given high-dose influenza vaccine today. We again discussed the Shingrix vaccine which she was advised to get. She will get a Covid19 booster when available.  She has multiple questions regarding Covid 19 precautions with travel which we reviewed.\par Advanced directives were reviewed and the patient has them in place

## 2021-09-25 NOTE — DATA REVIEWED
[FreeTextEntry1] : Submitted  blood work from 4/2021   CMP normal, TFTS normal   HGBA1C 7.2% Vitamin D 82  cholesterol 153 hdl 84 ldl55

## 2021-10-07 ENCOUNTER — TRANSCRIPTION ENCOUNTER (OUTPATIENT)
Age: 71
End: 2021-10-07

## 2021-10-07 ENCOUNTER — NON-APPOINTMENT (OUTPATIENT)
Age: 71
End: 2021-10-07

## 2021-10-07 DIAGNOSIS — H33.311 HORSESHOE TEAR OF RETINA W/OUT DETACHMENT, RIGHT EYE: ICD-10-CM

## 2021-12-20 ENCOUNTER — NON-APPOINTMENT (OUTPATIENT)
Age: 71
End: 2021-12-20

## 2021-12-20 ENCOUNTER — APPOINTMENT (OUTPATIENT)
Dept: CARDIOLOGY | Facility: CLINIC | Age: 71
End: 2021-12-20
Payer: MEDICARE

## 2021-12-20 VITALS
SYSTOLIC BLOOD PRESSURE: 138 MMHG | RESPIRATION RATE: 15 BRPM | WEIGHT: 123 LBS | HEART RATE: 66 BPM | HEIGHT: 64.5 IN | OXYGEN SATURATION: 100 % | BODY MASS INDEX: 20.74 KG/M2 | DIASTOLIC BLOOD PRESSURE: 66 MMHG

## 2021-12-20 PROCEDURE — 93000 ELECTROCARDIOGRAM COMPLETE: CPT

## 2021-12-20 PROCEDURE — 99215 OFFICE O/P EST HI 40 MIN: CPT

## 2021-12-20 RX ORDER — OFLOXACIN 3 MG/ML
0.3 SOLUTION/ DROPS OPHTHALMIC
Qty: 10 | Refills: 0 | Status: COMPLETED | COMMUNITY
Start: 2021-10-06

## 2021-12-20 RX ORDER — PREDNISOLONE ACETATE 10 MG/ML
1 SUSPENSION/ DROPS OPHTHALMIC
Qty: 15 | Refills: 0 | Status: COMPLETED | COMMUNITY
Start: 2021-10-06

## 2021-12-20 RX ORDER — NAFTIFINE HYDROCHLORIDE 2 G/100G
2 GEL TOPICAL
Qty: 45 | Refills: 0 | Status: COMPLETED | COMMUNITY
Start: 2021-09-15

## 2021-12-20 RX ORDER — CLOBETASOL PROPIONATE 0.5 MG/G
0.05 OINTMENT TOPICAL
Qty: 15 | Refills: 0 | Status: COMPLETED | COMMUNITY
Start: 2021-05-11

## 2021-12-20 NOTE — DISCUSSION/SUMMARY
[FreeTextEntry1] : Hypertension, with "white coat" component.  BP remains in acceptable range on exam today and readings at home have been normal.  She will continue the current regimen.  \par \par HLD - continue statin.  Blood work in Sept. 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  I think at this time she should discontinue it, given the recent assessment suggesting that any potential benefit is outweighed by the bleeding risk of chronic aspirin use.  At her next office visit, we will arrange for a carotid Doppler to assess for any evidence of plaque.\par \par 40 minutes spent on today's office visit.\par \par She will return in six months.

## 2021-12-20 NOTE — HISTORY OF PRESENT ILLNESS
[FreeTextEntry1] : Since I saw her last, she has been well.  She remains active and reports no  cardiac sxs.  There have been no  episodes of exertional chest discomfort or ALVARES. She describes no palpitations and no episodes of lightheadedness.  She reports no orthopnea or PND.\par \par BP readings at home remain in normal range overall.

## 2022-02-07 ENCOUNTER — NON-APPOINTMENT (OUTPATIENT)
Age: 72
End: 2022-02-07

## 2022-02-07 DIAGNOSIS — Z92.89 PERSONAL HISTORY OF OTHER MEDICAL TREATMENT: ICD-10-CM

## 2022-02-07 DIAGNOSIS — Z15.09 GENETIC SUSCEPTIBILITY TO MALIGNANT NEOPLASM OF BREAST: ICD-10-CM

## 2022-02-07 DIAGNOSIS — Z98.890 OTHER SPECIFIED POSTPROCEDURAL STATES: ICD-10-CM

## 2022-02-07 DIAGNOSIS — Z15.01 GENETIC SUSCEPTIBILITY TO MALIGNANT NEOPLASM OF BREAST: ICD-10-CM

## 2022-02-07 DIAGNOSIS — Z78.9 OTHER SPECIFIED HEALTH STATUS: ICD-10-CM

## 2022-03-08 ENCOUNTER — APPOINTMENT (OUTPATIENT)
Dept: INTERNAL MEDICINE | Facility: CLINIC | Age: 72
End: 2022-03-08
Payer: MEDICARE

## 2022-03-08 VITALS
TEMPERATURE: 97.2 F | BODY MASS INDEX: 20.41 KG/M2 | HEIGHT: 65.5 IN | OXYGEN SATURATION: 99 % | WEIGHT: 124 LBS | HEART RATE: 90 BPM

## 2022-03-08 PROCEDURE — 99214 OFFICE O/P EST MOD 30 MIN: CPT | Mod: 25

## 2022-04-16 VITALS — SYSTOLIC BLOOD PRESSURE: 142 MMHG | DIASTOLIC BLOOD PRESSURE: 60 MMHG

## 2022-04-16 NOTE — ASSESSMENT
[FreeTextEntry1] : Her medical history was reviewed.  The DM is well controlled and she sees her endocrinologist.  \par \par Recent blood tests reviewed.  HgBA1c 6.8.  \par \par Lipids reviewed.  \par \par She sees an ophthalmologist, podiatrist and dermatologist.\par \par She has had the COVID-19 vaccine and booster.  \par \par Mammogram, breast U/S and colonoscopy pending.

## 2022-04-16 NOTE — HISTORY OF PRESENT ILLNESS
[FreeTextEntry1] : The patient is here for a routine visit.  [de-identified] : She has been a patient of Dr. Kovacs.\par \par She has DM type 1 DM for many years and she sees Dr. Phillips as her endocrinologist, last three weeks ago.  She exercises five days per week.  She is strict with her diet.  No chest pain or dyspnea.  She is now on Lantus 8 , 6 units.  \par \par She sees her cardiologist, Dr. Strauss.

## 2022-06-21 ENCOUNTER — APPOINTMENT (OUTPATIENT)
Dept: OBGYN | Facility: CLINIC | Age: 72
End: 2022-06-21
Payer: MEDICARE

## 2022-06-21 VITALS
BODY MASS INDEX: 19.99 KG/M2 | DIASTOLIC BLOOD PRESSURE: 65 MMHG | SYSTOLIC BLOOD PRESSURE: 176 MMHG | HEIGHT: 65 IN | WEIGHT: 120 LBS

## 2022-06-21 DIAGNOSIS — L90.0 LICHEN SCLEROSUS ET ATROPHICUS: ICD-10-CM

## 2022-06-21 DIAGNOSIS — Z01.419 ENCOUNTER FOR GYNECOLOGICAL EXAMINATION (GENERAL) (ROUTINE) W/OUT ABNORMAL FINDINGS: ICD-10-CM

## 2022-06-21 PROCEDURE — G0101: CPT

## 2022-06-21 RX ORDER — METRONIDAZOLE 7.5 MG/G
0.75 CREAM TOPICAL
Qty: 45 | Refills: 0 | Status: DISCONTINUED | COMMUNITY
Start: 2022-06-14

## 2022-06-21 RX ORDER — MOXIFLOXACIN OPHTHALMIC 5 MG/ML
0.5 SOLUTION/ DROPS OPHTHALMIC
Qty: 3 | Refills: 0 | Status: DISCONTINUED | COMMUNITY
Start: 2022-04-20

## 2022-06-21 RX ORDER — EFINACONAZOLE 100 MG/ML
10 SOLUTION TOPICAL
Qty: 4 | Refills: 0 | Status: DISCONTINUED | COMMUNITY
Start: 2021-11-29

## 2022-06-21 NOTE — HISTORY OF PRESENT ILLNESS
[Patient reported mammogram was normal] : Patient reported mammogram was normal [Patient reported bone density results were normal] : Patient reported bone density results were normal [FreeTextEntry1] : LIAN BENITEZ is a 71 year old presenting for annual and follow up of lichen sclerosis. Pt reports symptoms improved after starting clobetasol. [Mammogramdate] : 3/3/21 [PapSmeardate] : 4/2021 [BoneDensityDate] : 8/9/21 [ColonoscopyDate] : 2014

## 2022-06-21 NOTE — PLAN
[FreeTextEntry1] : LIAN BENITEZ is a 71 year old presenting for annual and follow up of lichen sclerosis\par -pap/HPV done today\par -colon up to date\par -dexa up to date\par -rx for clobetasol given\par -BSE\par -rx for mammo/sono\par -RTO 1 year

## 2022-06-21 NOTE — END OF VISIT
[FreeTextEntry3] : I, Maggie Loera, acted solely as a scribe for Dr. Anahi Murphy, on 06/21/2022. \par \par All medical record entries made by the scribe were at my, Dr. Anahi Murphy., direction and personally dictated by me on 06/21/2022. I have personally reviewed the chart and agree that the record accurately reflects my personal performance of the history, physical exam, assessment and plan.\par

## 2022-06-27 ENCOUNTER — APPOINTMENT (OUTPATIENT)
Dept: SURGERY | Facility: CLINIC | Age: 72
End: 2022-06-27
Payer: MEDICARE

## 2022-06-27 PROCEDURE — 99213K: CUSTOM

## 2022-08-01 ENCOUNTER — APPOINTMENT (OUTPATIENT)
Dept: CARDIOLOGY | Facility: CLINIC | Age: 72
End: 2022-08-01

## 2022-08-01 ENCOUNTER — NON-APPOINTMENT (OUTPATIENT)
Age: 72
End: 2022-08-01

## 2022-08-01 VITALS
HEART RATE: 57 BPM | SYSTOLIC BLOOD PRESSURE: 120 MMHG | BODY MASS INDEX: 18.97 KG/M2 | OXYGEN SATURATION: 100 % | WEIGHT: 114 LBS | DIASTOLIC BLOOD PRESSURE: 60 MMHG

## 2022-08-01 VITALS — DIASTOLIC BLOOD PRESSURE: 64 MMHG | SYSTOLIC BLOOD PRESSURE: 138 MMHG

## 2022-08-01 PROCEDURE — 93880 EXTRACRANIAL BILAT STUDY: CPT

## 2022-08-01 PROCEDURE — 99214 OFFICE O/P EST MOD 30 MIN: CPT | Mod: 25

## 2022-08-01 PROCEDURE — 93000 ELECTROCARDIOGRAM COMPLETE: CPT | Mod: 59

## 2022-08-01 NOTE — DISCUSSION/SUMMARY
[EKG obtained to assist in diagnosis and management of assessed problem(s)] : EKG obtained to assist in diagnosis and management of assessed problem(s) [FreeTextEntry1] : Hypertension, with "white coat" component.  BP remains in acceptable range on exam today; readings at home remain normal.  She will continue the current regimen of BP meds.  \par \par HLD - continue statin.  Blood work last checked by Dr. SULAIMAN Phillips; patient reports results were favorable. 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

## 2022-08-01 NOTE — HISTORY OF PRESENT ILLNESS
[FreeTextEntry1] : Since I saw her last, she remains well.  She reports no  episodes of exertional chest discomfort or ALVARES. She describes no palpitations and no episodes of lightheadedness.  She reports no orthopnea or PND.\par \par BP readings at home remain in normal range overall.

## 2022-08-01 NOTE — REASON FOR VISIT
[FreeTextEntry1] : PRELIMINARY NOTE\par \par Keisha Roe returns today for a followup visit regarding hypertension, hyperlipidemia, and mild mitral valve insufficiency.

## 2022-09-08 ENCOUNTER — APPOINTMENT (OUTPATIENT)
Dept: INTERNAL MEDICINE | Facility: CLINIC | Age: 72
End: 2022-09-08
Payer: MEDICARE

## 2022-09-08 VITALS
HEART RATE: 77 BPM | TEMPERATURE: 97.1 F | WEIGHT: 116 LBS | HEIGHT: 65 IN | OXYGEN SATURATION: 99 % | BODY MASS INDEX: 19.33 KG/M2

## 2022-09-08 DIAGNOSIS — Z71.89 OTHER SPECIFIED COUNSELING: ICD-10-CM

## 2022-09-08 DIAGNOSIS — M81.0 AGE-RELATED OSTEOPOROSIS W/OUT CURRENT PATHOLOGICAL FRACTURE: ICD-10-CM

## 2022-09-08 PROCEDURE — G0439: CPT

## 2022-09-08 PROCEDURE — G0444 DEPRESSION SCREEN ANNUAL: CPT

## 2022-09-08 RX ORDER — PREDNISONE 50 MG/1
50 TABLET ORAL
Qty: 3 | Refills: 0 | Status: DISCONTINUED | COMMUNITY
Start: 2021-07-14 | End: 2022-09-08

## 2022-09-08 RX ORDER — INSULIN GLARGINE 100 [IU]/ML
100 INJECTION, SOLUTION SUBCUTANEOUS
Refills: 4 | Status: ACTIVE | COMMUNITY

## 2022-09-08 RX ORDER — PREDNISONE 50 MG/1
50 TABLET ORAL
Qty: 3 | Refills: 0 | Status: DISCONTINUED | COMMUNITY
Start: 2022-06-27 | End: 2022-09-08

## 2022-09-29 ENCOUNTER — APPOINTMENT (OUTPATIENT)
Dept: RADIOLOGY | Facility: CLINIC | Age: 72
End: 2022-09-29

## 2022-11-28 LAB — HPV HIGH+LOW RISK DNA PNL CVX: NOT DETECTED

## 2022-12-01 ENCOUNTER — APPOINTMENT (OUTPATIENT)
Dept: MRI IMAGING | Facility: CLINIC | Age: 72
End: 2022-12-01

## 2022-12-01 ENCOUNTER — OUTPATIENT (OUTPATIENT)
Dept: OUTPATIENT SERVICES | Facility: HOSPITAL | Age: 72
LOS: 1 days | End: 2022-12-01
Payer: MEDICARE

## 2022-12-01 DIAGNOSIS — Z00.8 ENCOUNTER FOR OTHER GENERAL EXAMINATION: ICD-10-CM

## 2022-12-01 PROCEDURE — C8937: CPT

## 2022-12-01 PROCEDURE — C8908: CPT | Mod: MH

## 2022-12-01 PROCEDURE — 77049 MRI BREAST C-+ W/CAD BI: CPT | Mod: 26,MH

## 2022-12-01 PROCEDURE — A9585: CPT

## 2023-01-01 ENCOUNTER — APPOINTMENT (OUTPATIENT)
Dept: THORACIC SURGERY | Facility: CLINIC | Age: 73
End: 2023-01-01
Payer: MEDICARE

## 2023-01-01 ENCOUNTER — OUTPATIENT (OUTPATIENT)
Dept: OUTPATIENT SERVICES | Facility: HOSPITAL | Age: 73
LOS: 1 days | Discharge: HOME | End: 2023-01-01

## 2023-01-01 ENCOUNTER — APPOINTMENT (OUTPATIENT)
Dept: PULMONOLOGY | Facility: CLINIC | Age: 73
End: 2023-01-01

## 2023-01-01 ENCOUNTER — RESULT REVIEW (OUTPATIENT)
Age: 73
End: 2023-01-01

## 2023-01-01 ENCOUNTER — NON-APPOINTMENT (OUTPATIENT)
Age: 73
End: 2023-01-01

## 2023-01-01 ENCOUNTER — APPOINTMENT (OUTPATIENT)
Dept: OBGYN | Facility: CLINIC | Age: 73
End: 2023-01-01

## 2023-01-01 ENCOUNTER — APPOINTMENT (OUTPATIENT)
Dept: PULMONOLOGY | Facility: CLINIC | Age: 73
End: 2023-01-01
Payer: MEDICARE

## 2023-01-01 ENCOUNTER — APPOINTMENT (OUTPATIENT)
Dept: CT IMAGING | Facility: CLINIC | Age: 73
End: 2023-01-01

## 2023-01-01 ENCOUNTER — APPOINTMENT (OUTPATIENT)
Dept: INTERNAL MEDICINE | Facility: CLINIC | Age: 73
End: 2023-01-01

## 2023-01-01 ENCOUNTER — APPOINTMENT (OUTPATIENT)
Dept: CT IMAGING | Facility: CLINIC | Age: 73
End: 2023-01-01
Payer: MEDICARE

## 2023-01-01 ENCOUNTER — OUTPATIENT (OUTPATIENT)
Dept: OUTPATIENT SERVICES | Facility: HOSPITAL | Age: 73
LOS: 1 days | End: 2023-01-01
Payer: MEDICARE

## 2023-01-01 ENCOUNTER — APPOINTMENT (OUTPATIENT)
Dept: NUCLEAR MEDICINE | Facility: CLINIC | Age: 73
End: 2023-01-01

## 2023-01-01 ENCOUNTER — APPOINTMENT (OUTPATIENT)
Dept: HEMATOLOGY ONCOLOGY | Facility: CLINIC | Age: 73
End: 2023-01-01
Payer: MEDICARE

## 2023-01-01 ENCOUNTER — APPOINTMENT (OUTPATIENT)
Dept: RADIATION ONCOLOGY | Facility: CLINIC | Age: 73
End: 2023-01-01
Payer: MEDICARE

## 2023-01-01 ENCOUNTER — APPOINTMENT (OUTPATIENT)
Dept: INTERNAL MEDICINE | Facility: CLINIC | Age: 73
End: 2023-01-01
Payer: MEDICARE

## 2023-01-01 ENCOUNTER — APPOINTMENT (OUTPATIENT)
Dept: CARDIOLOGY | Facility: CLINIC | Age: 73
End: 2023-01-01
Payer: MEDICARE

## 2023-01-01 ENCOUNTER — OUTPATIENT (OUTPATIENT)
Dept: OUTPATIENT SERVICES | Facility: HOSPITAL | Age: 73
LOS: 1 days | Discharge: HOME | End: 2023-01-01
Payer: MEDICARE

## 2023-01-01 ENCOUNTER — APPOINTMENT (OUTPATIENT)
Dept: RADIATION ONCOLOGY | Facility: CLINIC | Age: 73
End: 2023-01-01

## 2023-01-01 ENCOUNTER — APPOINTMENT (OUTPATIENT)
Dept: NUCLEAR MEDICINE | Facility: CLINIC | Age: 73
End: 2023-01-01
Payer: MEDICARE

## 2023-01-01 ENCOUNTER — LABORATORY RESULT (OUTPATIENT)
Age: 73
End: 2023-01-01

## 2023-01-01 ENCOUNTER — APPOINTMENT (OUTPATIENT)
Dept: ULTRASOUND IMAGING | Facility: HOSPITAL | Age: 73
End: 2023-01-01

## 2023-01-01 ENCOUNTER — APPOINTMENT (OUTPATIENT)
Dept: OTOLARYNGOLOGY | Facility: CLINIC | Age: 73
End: 2023-01-01
Payer: MEDICARE

## 2023-01-01 ENCOUNTER — APPOINTMENT (OUTPATIENT)
Dept: ULTRASOUND IMAGING | Facility: IMAGING CENTER | Age: 73
End: 2023-01-01

## 2023-01-01 ENCOUNTER — APPOINTMENT (OUTPATIENT)
Dept: ULTRASOUND IMAGING | Facility: IMAGING CENTER | Age: 73
End: 2023-01-01
Payer: MEDICARE

## 2023-01-01 VITALS
OXYGEN SATURATION: 100 % | RESPIRATION RATE: 18 BRPM | DIASTOLIC BLOOD PRESSURE: 66 MMHG | SYSTOLIC BLOOD PRESSURE: 147 MMHG | BODY MASS INDEX: 17.97 KG/M2 | HEART RATE: 77 BPM | HEIGHT: 63 IN | WEIGHT: 101.41 LBS | TEMPERATURE: 98.1 F

## 2023-01-01 VITALS
BODY MASS INDEX: 18.33 KG/M2 | WEIGHT: 110 LBS | RESPIRATION RATE: 15 BRPM | SYSTOLIC BLOOD PRESSURE: 146 MMHG | DIASTOLIC BLOOD PRESSURE: 70 MMHG | HEART RATE: 101 BPM | HEIGHT: 65 IN | OXYGEN SATURATION: 88 %

## 2023-01-01 VITALS
TEMPERATURE: 97.2 F | WEIGHT: 102 LBS | HEART RATE: 102 BPM | SYSTOLIC BLOOD PRESSURE: 131 MMHG | DIASTOLIC BLOOD PRESSURE: 71 MMHG | BODY MASS INDEX: 17.83 KG/M2 | OXYGEN SATURATION: 96 % | RESPIRATION RATE: 16 BRPM

## 2023-01-01 VITALS
DIASTOLIC BLOOD PRESSURE: 71 MMHG | WEIGHT: 112 LBS | OXYGEN SATURATION: 93 % | BODY MASS INDEX: 18.66 KG/M2 | SYSTOLIC BLOOD PRESSURE: 136 MMHG | HEIGHT: 65 IN | HEART RATE: 91 BPM

## 2023-01-01 VITALS
OXYGEN SATURATION: 100 % | DIASTOLIC BLOOD PRESSURE: 65 MMHG | HEART RATE: 71 BPM | TEMPERATURE: 97.9 F | SYSTOLIC BLOOD PRESSURE: 153 MMHG | BODY MASS INDEX: 18.28 KG/M2 | WEIGHT: 103.15 LBS | RESPIRATION RATE: 16 BRPM | HEIGHT: 62.99 IN

## 2023-01-01 VITALS
BODY MASS INDEX: 18.33 KG/M2 | HEART RATE: 75 BPM | DIASTOLIC BLOOD PRESSURE: 50 MMHG | HEIGHT: 65 IN | SYSTOLIC BLOOD PRESSURE: 116 MMHG | WEIGHT: 110 LBS | OXYGEN SATURATION: 98 %

## 2023-01-01 VITALS
HEART RATE: 102 BPM | SYSTOLIC BLOOD PRESSURE: 134 MMHG | DIASTOLIC BLOOD PRESSURE: 72 MMHG | BODY MASS INDEX: 17.83 KG/M2 | HEIGHT: 65 IN | WEIGHT: 107 LBS

## 2023-01-01 VITALS — DIASTOLIC BLOOD PRESSURE: 60 MMHG | SYSTOLIC BLOOD PRESSURE: 125 MMHG

## 2023-01-01 VITALS
HEIGHT: 65 IN | WEIGHT: 113 LBS | BODY MASS INDEX: 18.83 KG/M2 | OXYGEN SATURATION: 95 % | HEART RATE: 88 BPM | TEMPERATURE: 97.7 F

## 2023-01-01 VITALS
HEIGHT: 63.43 IN | DIASTOLIC BLOOD PRESSURE: 67 MMHG | WEIGHT: 110.23 LBS | OXYGEN SATURATION: 80 % | HEART RATE: 103 BPM | SYSTOLIC BLOOD PRESSURE: 136 MMHG | RESPIRATION RATE: 20 BRPM | BODY MASS INDEX: 19.29 KG/M2

## 2023-01-01 VITALS
HEIGHT: 65 IN | OXYGEN SATURATION: 99 % | BODY MASS INDEX: 18.33 KG/M2 | TEMPERATURE: 97.7 F | WEIGHT: 110 LBS | HEART RATE: 89 BPM

## 2023-01-01 VITALS
WEIGHT: 110 LBS | BODY MASS INDEX: 18.33 KG/M2 | HEIGHT: 65 IN | HEART RATE: 80 BPM | OXYGEN SATURATION: 99 % | TEMPERATURE: 97.1 F

## 2023-01-01 VITALS — SYSTOLIC BLOOD PRESSURE: 125 MMHG | DIASTOLIC BLOOD PRESSURE: 60 MMHG

## 2023-01-01 VITALS — DIASTOLIC BLOOD PRESSURE: 60 MMHG | SYSTOLIC BLOOD PRESSURE: 130 MMHG

## 2023-01-01 DIAGNOSIS — Z00.00 ENCOUNTER FOR GENERAL ADULT MEDICAL EXAMINATION W/OUT ABNORMAL FINDINGS: ICD-10-CM

## 2023-01-01 DIAGNOSIS — C34.90 MALIGNANT NEOPLASM OF UNSPECIFIED PART OF UNSPECIFIED BRONCHUS OR LUNG: ICD-10-CM

## 2023-01-01 DIAGNOSIS — L27.0 GENERALIZED SKIN ERUPTION DUE TO DRUGS AND MEDICAMENTS TAKEN INTERNALLY: ICD-10-CM

## 2023-01-01 DIAGNOSIS — R22.1 LOCALIZED SWELLING, MASS AND LUMP, NECK: ICD-10-CM

## 2023-01-01 DIAGNOSIS — R91.8 OTHER NONSPECIFIC ABNORMAL FINDING OF LUNG FIELD: ICD-10-CM

## 2023-01-01 DIAGNOSIS — Z15.09 GENETIC SUSCEPTIBILITY TO MALIGNANT NEOPLASM OF BREAST: ICD-10-CM

## 2023-01-01 DIAGNOSIS — J98.4 OTHER DISORDERS OF LUNG: ICD-10-CM

## 2023-01-01 DIAGNOSIS — R93.0 ABNORMAL FINDINGS ON DIAGNOSTIC IMAGING OF SKULL AND HEAD, NOT ELSEWHERE CLASSIFIED: ICD-10-CM

## 2023-01-01 DIAGNOSIS — R49.0 DYSPHONIA: ICD-10-CM

## 2023-01-01 DIAGNOSIS — Z87.891 PERSONAL HISTORY OF NICOTINE DEPENDENCE: ICD-10-CM

## 2023-01-01 DIAGNOSIS — R94.2 ABNORMAL RESULTS OF PULMONARY FUNCTION STUDIES: ICD-10-CM

## 2023-01-01 DIAGNOSIS — C79.31 SECONDARY MALIGNANT NEOPLASM OF BRAIN: ICD-10-CM

## 2023-01-01 DIAGNOSIS — Z15.01 GENETIC SUSCEPTIBILITY TO MALIGNANT NEOPLASM OF BREAST: ICD-10-CM

## 2023-01-01 DIAGNOSIS — R59.1 GENERALIZED ENLARGED LYMPH NODES: ICD-10-CM

## 2023-01-01 DIAGNOSIS — E11.9 TYPE 2 DIABETES MELLITUS W/OUT COMPLICATIONS: ICD-10-CM

## 2023-01-01 LAB
ALBUMIN SERPL ELPH-MCNC: 3.2 G/DL
ALBUMIN SERPL ELPH-MCNC: 3.2 G/DL
ALBUMIN SERPL ELPH-MCNC: 3.4 G/DL
ALBUMIN SERPL ELPH-MCNC: 3.5 G/DL
ALBUMIN SERPL ELPH-MCNC: 3.5 G/DL
ALP BLD-CCNC: 113 U/L
ALP BLD-CCNC: 61 U/L
ALP BLD-CCNC: 67 U/L
ALP BLD-CCNC: 70 U/L
ALP BLD-CCNC: 76 U/L
ALT SERPL-CCNC: 11 U/L
ALT SERPL-CCNC: 12 U/L
ALT SERPL-CCNC: 14 U/L
ALT SERPL-CCNC: 15 U/L
ALT SERPL-CCNC: 15 U/L
ANION GAP SERPL CALC-SCNC: 11 MMOL/L
ANION GAP SERPL CALC-SCNC: 12 MMOL/L
ANION GAP SERPL CALC-SCNC: 13 MMOL/L
ANION GAP SERPL CALC-SCNC: 13 MMOL/L
ANION GAP SERPL CALC-SCNC: 9 MMOL/L
APTT BLD: 29.3 SEC
AST SERPL-CCNC: 15 U/L
AST SERPL-CCNC: 17 U/L
AST SERPL-CCNC: 18 U/L
AST SERPL-CCNC: 19 U/L
AST SERPL-CCNC: 21 U/L
BASOPHILS # BLD AUTO: 0.07 K/UL — SIGNIFICANT CHANGE UP (ref 0–0.2)
BASOPHILS # BLD AUTO: 0.07 K/UL — SIGNIFICANT CHANGE UP (ref 0–0.2)
BASOPHILS # BLD AUTO: 0.1 K/UL — SIGNIFICANT CHANGE UP (ref 0–0.2)
BASOPHILS NFR BLD AUTO: 0.6 % — SIGNIFICANT CHANGE UP (ref 0–2)
BASOPHILS NFR BLD AUTO: 1.9 % — SIGNIFICANT CHANGE UP (ref 0–2)
BASOPHILS NFR BLD AUTO: 1.9 % — SIGNIFICANT CHANGE UP (ref 0–2)
BILIRUB SERPL-MCNC: 0.2 MG/DL
BILIRUB SERPL-MCNC: <0.2 MG/DL
BILIRUB SERPL-MCNC: <0.2 MG/DL
BUN SERPL-MCNC: 19 MG/DL
BUN SERPL-MCNC: 20 MG/DL
CALCIUM SERPL-MCNC: 8.7 MG/DL
CALCIUM SERPL-MCNC: 9 MG/DL
CALCIUM SERPL-MCNC: 9 MG/DL
CALCIUM SERPL-MCNC: 9.3 MG/DL
CALCIUM SERPL-MCNC: 9.4 MG/DL
CEA SERPL-MCNC: 218 NG/ML
CEA SERPL-MCNC: 476 NG/ML
CHLORIDE SERPL-SCNC: 94 MMOL/L
CHLORIDE SERPL-SCNC: 96 MMOL/L
CHLORIDE SERPL-SCNC: 96 MMOL/L
CHLORIDE SERPL-SCNC: 97 MMOL/L
CHLORIDE SERPL-SCNC: 97 MMOL/L
CHOLEST SERPL-MCNC: 114 MG/DL
CK SERPL-CCNC: 46 U/L
CO2 SERPL-SCNC: 22 MMOL/L
CO2 SERPL-SCNC: 23 MMOL/L
CO2 SERPL-SCNC: 24 MMOL/L
CO2 SERPL-SCNC: 26 MMOL/L
CO2 SERPL-SCNC: 27 MMOL/L
CREAT SERPL-MCNC: 0.66 MG/DL
CREAT SERPL-MCNC: 0.71 MG/DL
CREAT SERPL-MCNC: 0.81 MG/DL
CRP SERPL-MCNC: 17 MG/L
EGFR: 76 ML/MIN/1.73M2
EGFR: 89 ML/MIN/1.73M2
EGFR: 92 ML/MIN/1.73M2
EOSINOPHIL # BLD AUTO: 0.02 K/UL — SIGNIFICANT CHANGE UP (ref 0–0.5)
EOSINOPHIL # BLD AUTO: 0.02 K/UL — SIGNIFICANT CHANGE UP (ref 0–0.5)
EOSINOPHIL # BLD AUTO: 0.08 K/UL — SIGNIFICANT CHANGE UP (ref 0–0.5)
EOSINOPHIL # BLD AUTO: 0.08 K/UL — SIGNIFICANT CHANGE UP (ref 0–0.5)
EOSINOPHIL # BLD AUTO: 0.26 K/UL — SIGNIFICANT CHANGE UP (ref 0–0.5)
EOSINOPHIL # BLD AUTO: 0.26 K/UL — SIGNIFICANT CHANGE UP (ref 0–0.5)
EOSINOPHIL NFR BLD AUTO: 0.2 % — SIGNIFICANT CHANGE UP (ref 0–6)
EOSINOPHIL NFR BLD AUTO: 0.2 % — SIGNIFICANT CHANGE UP (ref 0–6)
EOSINOPHIL NFR BLD AUTO: 1.5 % — SIGNIFICANT CHANGE UP (ref 0–6)
EOSINOPHIL NFR BLD AUTO: 1.5 % — SIGNIFICANT CHANGE UP (ref 0–6)
EOSINOPHIL NFR BLD AUTO: 1.6 % — SIGNIFICANT CHANGE UP (ref 0–6)
EOSINOPHIL NFR BLD AUTO: 1.6 % — SIGNIFICANT CHANGE UP (ref 0–6)
ERYTHROCYTE [SEDIMENTATION RATE] IN BLOOD BY WESTERGREN METHOD: 40 MM/HR
ESTIMATED AVERAGE GLUCOSE: 160 MG/DL
FERRITIN SERPL-MCNC: 132 NG/ML
FERRITIN SERPL-MCNC: 99 NG/ML
FOLATE SERPL-MCNC: >20 NG/ML
GLUCOSE SERPL-MCNC: 116 MG/DL
GLUCOSE SERPL-MCNC: 135 MG/DL
GLUCOSE SERPL-MCNC: 156 MG/DL
GLUCOSE SERPL-MCNC: 178 MG/DL
GLUCOSE SERPL-MCNC: 211 MG/DL
HAV IGM SER QL: NONREACTIVE
HBA1C MFR BLD HPLC: 7.2 %
HBV CORE IGM SER QL: NONREACTIVE
HBV SURFACE AG SER QL: NONREACTIVE
HCT VFR BLD CALC: 29.5 %
HCT VFR BLD CALC: 30.5 % — LOW (ref 34.5–45)
HCT VFR BLD CALC: 30.5 % — LOW (ref 34.5–45)
HCT VFR BLD CALC: 31.2 % — LOW (ref 34.5–45)
HCT VFR BLD CALC: 31.2 % — LOW (ref 34.5–45)
HCT VFR BLD CALC: 32.6 %
HCT VFR BLD CALC: 32.9 % — LOW (ref 34.5–45)
HCT VFR BLD CALC: 32.9 % — LOW (ref 34.5–45)
HCV AB SER QL: NONREACTIVE
HCV S/CO RATIO: 0.17 S/CO
HDLC SERPL-MCNC: 47 MG/DL
HGB BLD-MCNC: 10 G/DL
HGB BLD-MCNC: 10.3 G/DL — LOW (ref 11.5–15.5)
HGB BLD-MCNC: 10.3 G/DL — LOW (ref 11.5–15.5)
HGB BLD-MCNC: 9 G/DL
HGB BLD-MCNC: 9.5 G/DL — LOW (ref 11.5–15.5)
HGB BLD-MCNC: 9.5 G/DL — LOW (ref 11.5–15.5)
HGB BLD-MCNC: 9.7 G/DL — LOW (ref 11.5–15.5)
HGB BLD-MCNC: 9.7 G/DL — LOW (ref 11.5–15.5)
IMM GRANULOCYTES NFR BLD AUTO: 0.4 % — SIGNIFICANT CHANGE UP (ref 0–0.9)
IMM GRANULOCYTES NFR BLD AUTO: 0.8 % — SIGNIFICANT CHANGE UP (ref 0–0.9)
IMM GRANULOCYTES NFR BLD AUTO: 0.8 % — SIGNIFICANT CHANGE UP (ref 0–0.9)
INR PPP: 1.16 RATIO
IRON SATN MFR SERPL: 5 %
IRON SATN MFR SERPL: 8 %
IRON SERPL-MCNC: 14 UG/DL
IRON SERPL-MCNC: 20 UG/DL
LDH SERPL-CCNC: 225 U/L
LDLC SERPL CALC-MCNC: 53 MG/DL
LYMPHOCYTES # BLD AUTO: 0.61 K/UL — LOW (ref 1–3.3)
LYMPHOCYTES # BLD AUTO: 0.61 K/UL — LOW (ref 1–3.3)
LYMPHOCYTES # BLD AUTO: 0.7 K/UL — LOW (ref 1–3.3)
LYMPHOCYTES # BLD AUTO: 0.7 K/UL — LOW (ref 1–3.3)
LYMPHOCYTES # BLD AUTO: 0.79 K/UL — LOW (ref 1–3.3)
LYMPHOCYTES # BLD AUTO: 0.79 K/UL — LOW (ref 1–3.3)
LYMPHOCYTES # BLD AUTO: 15 % — SIGNIFICANT CHANGE UP (ref 13–44)
LYMPHOCYTES # BLD AUTO: 15 % — SIGNIFICANT CHANGE UP (ref 13–44)
LYMPHOCYTES # BLD AUTO: 4.4 % — LOW (ref 13–44)
LYMPHOCYTES # BLD AUTO: 4.4 % — LOW (ref 13–44)
LYMPHOCYTES # BLD AUTO: 5.1 % — LOW (ref 13–44)
LYMPHOCYTES # BLD AUTO: 5.1 % — LOW (ref 13–44)
MAGNESIUM SERPL-MCNC: 2.1 MG/DL
MCHC RBC-ENTMCNC: 23.1 PG — LOW (ref 27–34)
MCHC RBC-ENTMCNC: 23.1 PG — LOW (ref 27–34)
MCHC RBC-ENTMCNC: 23.2 PG — LOW (ref 27–34)
MCHC RBC-ENTMCNC: 23.2 PG — LOW (ref 27–34)
MCHC RBC-ENTMCNC: 23.8 PG — LOW (ref 27–34)
MCHC RBC-ENTMCNC: 23.8 PG — LOW (ref 27–34)
MCHC RBC-ENTMCNC: 24.9 PG
MCHC RBC-ENTMCNC: 25.3 PG
MCHC RBC-ENTMCNC: 30.5 GM/DL
MCHC RBC-ENTMCNC: 30.7 GM/DL
MCHC RBC-ENTMCNC: 31.1 G/DL — LOW (ref 32–36)
MCHC RBC-ENTMCNC: 31.3 G/DL — LOW (ref 32–36)
MCHC RBC-ENTMCNC: 31.3 G/DL — LOW (ref 32–36)
MCV RBC AUTO: 74.2 FL — LOW (ref 80–100)
MCV RBC AUTO: 74.2 FL — LOW (ref 80–100)
MCV RBC AUTO: 74.6 FL — LOW (ref 80–100)
MCV RBC AUTO: 74.6 FL — LOW (ref 80–100)
MCV RBC AUTO: 76 FL — LOW (ref 80–100)
MCV RBC AUTO: 76 FL — LOW (ref 80–100)
MCV RBC AUTO: 81.7 FL
MCV RBC AUTO: 82.5 FL
MONOCYTES # BLD AUTO: 0.64 K/UL — SIGNIFICANT CHANGE UP (ref 0–0.9)
MONOCYTES # BLD AUTO: 0.64 K/UL — SIGNIFICANT CHANGE UP (ref 0–0.9)
MONOCYTES # BLD AUTO: 0.69 K/UL — SIGNIFICANT CHANGE UP (ref 0–0.9)
MONOCYTES # BLD AUTO: 0.69 K/UL — SIGNIFICANT CHANGE UP (ref 0–0.9)
MONOCYTES # BLD AUTO: 1.17 K/UL — HIGH (ref 0–0.9)
MONOCYTES # BLD AUTO: 1.17 K/UL — HIGH (ref 0–0.9)
MONOCYTES NFR BLD AUTO: 12.2 % — SIGNIFICANT CHANGE UP (ref 2–14)
MONOCYTES NFR BLD AUTO: 12.2 % — SIGNIFICANT CHANGE UP (ref 2–14)
MONOCYTES NFR BLD AUTO: 5.8 % — SIGNIFICANT CHANGE UP (ref 2–14)
MONOCYTES NFR BLD AUTO: 5.8 % — SIGNIFICANT CHANGE UP (ref 2–14)
MONOCYTES NFR BLD AUTO: 7.3 % — SIGNIFICANT CHANGE UP (ref 2–14)
MONOCYTES NFR BLD AUTO: 7.3 % — SIGNIFICANT CHANGE UP (ref 2–14)
NEUTROPHILS # BLD AUTO: 10.44 K/UL — HIGH (ref 1.8–7.4)
NEUTROPHILS # BLD AUTO: 10.44 K/UL — HIGH (ref 1.8–7.4)
NEUTROPHILS # BLD AUTO: 13.58 K/UL — HIGH (ref 1.8–7.4)
NEUTROPHILS # BLD AUTO: 13.58 K/UL — HIGH (ref 1.8–7.4)
NEUTROPHILS # BLD AUTO: 3.63 K/UL — SIGNIFICANT CHANGE UP (ref 1.8–7.4)
NEUTROPHILS # BLD AUTO: 3.63 K/UL — SIGNIFICANT CHANGE UP (ref 1.8–7.4)
NEUTROPHILS NFR BLD AUTO: 69 % — SIGNIFICANT CHANGE UP (ref 43–77)
NEUTROPHILS NFR BLD AUTO: 69 % — SIGNIFICANT CHANGE UP (ref 43–77)
NEUTROPHILS NFR BLD AUTO: 85.3 % — HIGH (ref 43–77)
NEUTROPHILS NFR BLD AUTO: 85.3 % — HIGH (ref 43–77)
NEUTROPHILS NFR BLD AUTO: 87.9 % — HIGH (ref 43–77)
NEUTROPHILS NFR BLD AUTO: 87.9 % — HIGH (ref 43–77)
NON-GYNECOLOGICAL CYTOLOGY STUDY: SIGNIFICANT CHANGE UP
NONHDLC SERPL-MCNC: 67 MG/DL
NRBC # BLD: 0 /100 WBCS — SIGNIFICANT CHANGE UP (ref 0–0)
PLATELET # BLD AUTO: 388 K/UL — SIGNIFICANT CHANGE UP (ref 150–400)
PLATELET # BLD AUTO: 388 K/UL — SIGNIFICANT CHANGE UP (ref 150–400)
PLATELET # BLD AUTO: 488 K/UL — HIGH (ref 150–400)
PLATELET # BLD AUTO: 488 K/UL — HIGH (ref 150–400)
PLATELET # BLD AUTO: 490 K/UL
PLATELET # BLD AUTO: 523 K/UL — HIGH (ref 150–400)
PLATELET # BLD AUTO: 523 K/UL — HIGH (ref 150–400)
PLATELET # BLD AUTO: 531 K/UL
POTASSIUM SERPL-SCNC: 4.1 MMOL/L
POTASSIUM SERPL-SCNC: 4.2 MMOL/L
POTASSIUM SERPL-SCNC: 4.5 MMOL/L
POTASSIUM SERPL-SCNC: 4.6 MMOL/L
POTASSIUM SERPL-SCNC: 4.7 MMOL/L
PROT SERPL-MCNC: 6.3 G/DL
PROT SERPL-MCNC: 6.8 G/DL
PROT SERPL-MCNC: 7.1 G/DL
PROT SERPL-MCNC: 7.1 G/DL
PROT SERPL-MCNC: 7.3 G/DL
PT BLD: 13.1 SEC
RBC # BLD: 3.61 M/UL
RBC # BLD: 3.95 M/UL
RBC # BLD: 4.11 M/UL — SIGNIFICANT CHANGE UP (ref 3.8–5.2)
RBC # BLD: 4.11 M/UL — SIGNIFICANT CHANGE UP (ref 3.8–5.2)
RBC # BLD: 4.18 M/UL — SIGNIFICANT CHANGE UP (ref 3.8–5.2)
RBC # BLD: 4.18 M/UL — SIGNIFICANT CHANGE UP (ref 3.8–5.2)
RBC # BLD: 4.33 M/UL — SIGNIFICANT CHANGE UP (ref 3.8–5.2)
RBC # BLD: 4.33 M/UL — SIGNIFICANT CHANGE UP (ref 3.8–5.2)
RBC # FLD: 14.6 %
RBC # FLD: 15.8 %
RBC # FLD: 16.5 % — HIGH (ref 10.3–14.5)
RBC # FLD: 16.5 % — HIGH (ref 10.3–14.5)
RBC # FLD: 18 % — HIGH (ref 10.3–14.5)
RBC # FLD: 18 % — HIGH (ref 10.3–14.5)
RBC # FLD: 22.2 % — HIGH (ref 10.3–14.5)
RBC # FLD: 22.2 % — HIGH (ref 10.3–14.5)
SODIUM SERPL-SCNC: 131 MMOL/L
SODIUM SERPL-SCNC: 132 MMOL/L
SODIUM SERPL-SCNC: 133 MMOL/L
SODIUM SERPL-SCNC: 133 MMOL/L
SODIUM SERPL-SCNC: 134 MMOL/L
T4 FREE SERPL-MCNC: 1.1 NG/DL
TIBC SERPL-MCNC: 259 UG/DL
TIBC SERPL-MCNC: 281 UG/DL
TRIGL SERPL-MCNC: 65 MG/DL
TSH SERPL-ACNC: 1.22 UIU/ML
TSH SERPL-ACNC: 2.28 UIU/ML
TSH SERPL-ACNC: 2.54 UIU/ML
TSH SERPL-ACNC: 5.32 UIU/ML
UIBC SERPL-MCNC: 239 UG/DL
UIBC SERPL-MCNC: 267 UG/DL
VIT B12 SERPL-MCNC: 1312 PG/ML
WBC # BLD: 11.88 K/UL — HIGH (ref 3.8–10.5)
WBC # BLD: 11.88 K/UL — HIGH (ref 3.8–10.5)
WBC # BLD: 15.93 K/UL — HIGH (ref 3.8–10.5)
WBC # BLD: 15.93 K/UL — HIGH (ref 3.8–10.5)
WBC # BLD: 5.26 K/UL — SIGNIFICANT CHANGE UP (ref 3.8–10.5)
WBC # BLD: 5.26 K/UL — SIGNIFICANT CHANGE UP (ref 3.8–10.5)
WBC # FLD AUTO: 11.88 K/UL — HIGH (ref 3.8–10.5)
WBC # FLD AUTO: 11.88 K/UL — HIGH (ref 3.8–10.5)
WBC # FLD AUTO: 12.88 K/UL
WBC # FLD AUTO: 15.93 K/UL — HIGH (ref 3.8–10.5)
WBC # FLD AUTO: 15.93 K/UL — HIGH (ref 3.8–10.5)
WBC # FLD AUTO: 5.26 K/UL — SIGNIFICANT CHANGE UP (ref 3.8–10.5)
WBC # FLD AUTO: 5.26 K/UL — SIGNIFICANT CHANGE UP (ref 3.8–10.5)
WBC # FLD AUTO: 9.68 K/UL

## 2023-01-01 PROCEDURE — 70460 CT HEAD/BRAIN W/DYE: CPT | Mod: 26,MH

## 2023-01-01 PROCEDURE — 88305 TISSUE EXAM BY PATHOLOGIST: CPT | Mod: 26

## 2023-01-01 PROCEDURE — 99204 OFFICE O/P NEW MOD 45 MIN: CPT | Mod: 25,GC

## 2023-01-01 PROCEDURE — ZZZZZ: CPT

## 2023-01-01 PROCEDURE — 93010 ELECTROCARDIOGRAM REPORT: CPT

## 2023-01-01 PROCEDURE — 99214 OFFICE O/P EST MOD 30 MIN: CPT | Mod: 25

## 2023-01-01 PROCEDURE — 99215 OFFICE O/P EST HI 40 MIN: CPT | Mod: 95

## 2023-01-01 PROCEDURE — 99205 OFFICE O/P NEW HI 60 MIN: CPT | Mod: 25

## 2023-01-01 PROCEDURE — 78815 PET IMAGE W/CT SKULL-THIGH: CPT

## 2023-01-01 PROCEDURE — 88360 TUMOR IMMUNOHISTOCHEM/MANUAL: CPT | Mod: 26

## 2023-01-01 PROCEDURE — 76942 ECHO GUIDE FOR BIOPSY: CPT | Mod: 26

## 2023-01-01 PROCEDURE — 94060 EVALUATION OF WHEEZING: CPT

## 2023-01-01 PROCEDURE — 88305 TISSUE EXAM BY PATHOLOGIST: CPT

## 2023-01-01 PROCEDURE — 82270 OCCULT BLOOD FECES: CPT

## 2023-01-01 PROCEDURE — 71260 CT THORAX DX C+: CPT

## 2023-01-01 PROCEDURE — 36415 COLL VENOUS BLD VENIPUNCTURE: CPT

## 2023-01-01 PROCEDURE — 94729 DIFFUSING CAPACITY: CPT

## 2023-01-01 PROCEDURE — 93000 ELECTROCARDIOGRAM COMPLETE: CPT

## 2023-01-01 PROCEDURE — 31575 DIAGNOSTIC LARYNGOSCOPY: CPT

## 2023-01-01 PROCEDURE — 88173 CYTOPATH EVAL FNA REPORT: CPT | Mod: 26

## 2023-01-01 PROCEDURE — 38505 NEEDLE BIOPSY LYMPH NODES: CPT

## 2023-01-01 PROCEDURE — 70470 CT HEAD/BRAIN W/O & W/DYE: CPT | Mod: 26,MH

## 2023-01-01 PROCEDURE — 99203 OFFICE O/P NEW LOW 30 MIN: CPT | Mod: 25

## 2023-01-01 PROCEDURE — 99214 OFFICE O/P EST MOD 30 MIN: CPT

## 2023-01-01 PROCEDURE — 71250 CT THORAX DX C-: CPT | Mod: 26,MH

## 2023-01-01 PROCEDURE — 90662 IIV NO PRSV INCREASED AG IM: CPT

## 2023-01-01 PROCEDURE — 94726 PLETHYSMOGRAPHY LUNG VOLUMES: CPT

## 2023-01-01 PROCEDURE — 71250 CT THORAX DX C-: CPT

## 2023-01-01 PROCEDURE — 74177 CT ABD & PELVIS W/CONTRAST: CPT

## 2023-01-01 PROCEDURE — 99214 OFFICE O/P EST MOD 30 MIN: CPT | Mod: 95

## 2023-01-01 PROCEDURE — G0008: CPT

## 2023-01-01 PROCEDURE — 76942 ECHO GUIDE FOR BIOPSY: CPT

## 2023-01-01 PROCEDURE — 93306 TTE W/DOPPLER COMPLETE: CPT

## 2023-01-01 PROCEDURE — 88342 IMHCHEM/IMCYTCHM 1ST ANTB: CPT | Mod: 26,59

## 2023-01-01 PROCEDURE — 99204 OFFICE O/P NEW MOD 45 MIN: CPT

## 2023-01-01 PROCEDURE — 88341 IMHCHEM/IMCYTCHM EA ADD ANTB: CPT

## 2023-01-01 PROCEDURE — 99205 OFFICE O/P NEW HI 60 MIN: CPT

## 2023-01-01 PROCEDURE — 88360 TUMOR IMMUNOHISTOCHEM/MANUAL: CPT

## 2023-01-01 PROCEDURE — 78815 PET IMAGE W/CT SKULL-THIGH: CPT | Mod: 26,PI,MH

## 2023-01-01 PROCEDURE — 88173 CYTOPATH EVAL FNA REPORT: CPT

## 2023-01-01 PROCEDURE — A9552: CPT

## 2023-01-01 PROCEDURE — 70460 CT HEAD/BRAIN W/DYE: CPT

## 2023-01-01 PROCEDURE — 88342 IMHCHEM/IMCYTCHM 1ST ANTB: CPT

## 2023-01-01 PROCEDURE — 71260 CT THORAX DX C+: CPT | Mod: 26,MH

## 2023-01-01 PROCEDURE — 70470 CT HEAD/BRAIN W/O & W/DYE: CPT

## 2023-01-01 PROCEDURE — 88341 IMHCHEM/IMCYTCHM EA ADD ANTB: CPT | Mod: 26,59

## 2023-01-01 PROCEDURE — 74177 CT ABD & PELVIS W/CONTRAST: CPT | Mod: 26,MH

## 2023-01-01 PROCEDURE — 38505 NEEDLE BIOPSY LYMPH NODES: CPT | Mod: LT

## 2023-01-01 PROCEDURE — G0439: CPT

## 2023-01-01 PROCEDURE — 88172 CYTP DX EVAL FNA 1ST EA SITE: CPT

## 2023-01-01 RX ORDER — SODIUM PICOSULFATE, MAGNESIUM OXIDE, AND ANHYDROUS CITRIC ACID 10; 3.5; 12 MG/160ML; G/160ML; G/160ML
10-3.5-12 MG-GM LIQUID ORAL
Qty: 320 | Refills: 0 | Status: DISCONTINUED | COMMUNITY
Start: 2022-07-12 | End: 2023-01-01

## 2023-01-01 RX ORDER — PREDNISONE 50 MG/1
50 TABLET ORAL
Qty: 3 | Refills: 0 | Status: DISCONTINUED | COMMUNITY
Start: 2023-01-01 | End: 2023-01-01

## 2023-01-01 RX ORDER — FLUTICASONE FUROATE, UMECLIDINIUM BROMIDE AND VILANTEROL TRIFENATATE 100; 62.5; 25 UG/1; UG/1; UG/1
100-62.5-25 POWDER RESPIRATORY (INHALATION) DAILY
Qty: 1 | Refills: 6 | Status: DISCONTINUED | COMMUNITY
Start: 2023-01-01 | End: 2023-01-01

## 2023-01-01 RX ORDER — ALENDRONATE SODIUM 70 MG/1
70 TABLET ORAL
Qty: 4 | Refills: 6 | Status: DISCONTINUED | COMMUNITY
End: 2023-01-01

## 2023-01-01 RX ORDER — FLUDROCORTISONE ACETATE 0.1 MG/1
TABLET ORAL
Refills: 0 | Status: DISCONTINUED | COMMUNITY
End: 2023-01-01

## 2023-01-01 RX ORDER — ESTRADIOL 10 UG/1
10 INSERT VAGINAL
Refills: 0 | Status: DISCONTINUED | COMMUNITY
End: 2023-01-01

## 2023-01-01 RX ORDER — ALBUTEROL SULFATE 90 UG/1
108 (90 BASE) INHALANT RESPIRATORY (INHALATION)
Qty: 1 | Refills: 3 | Status: DISCONTINUED | COMMUNITY
Start: 2023-01-01 | End: 2023-01-01

## 2023-01-01 RX ORDER — ANTIARTHRITIC COMBINATION NO.2 900 MG
TABLET ORAL
Refills: 0 | Status: DISCONTINUED | COMMUNITY
End: 2023-01-01

## 2023-01-01 RX ORDER — CLINDAMYCIN PHOSPHATE 1 G/10ML
1 GEL TOPICAL 3 TIMES DAILY
Qty: 1 | Refills: 0 | Status: ACTIVE | COMMUNITY
Start: 2023-01-01 | End: 1900-01-01

## 2023-01-06 PROBLEM — Z71.89 EDUCATED ABOUT COVID-19 VIRUS INFECTION: Status: ACTIVE | Noted: 2021-09-25

## 2023-01-06 NOTE — HISTORY OF PRESENT ILLNESS
[FreeTextEntry1] : The patient is here for a routine visit.  [de-identified] : She exercises regularly- no chest pain or dyspnea.  Her diet is very healthy.\par \par No GI or  symptoms.

## 2023-01-06 NOTE — PHYSICAL EXAM
[No Acute Distress] : no acute distress [Well Nourished] : well nourished [Well Developed] : well developed [Well-Appearing] : well-appearing [Normal Sclera/Conjunctiva] : normal sclera/conjunctiva [PERRL] : pupils equal round and reactive to light [EOMI] : extraocular movements intact [Normal Outer Ear/Nose] : the outer ears and nose were normal in appearance [Normal Oropharynx] : the oropharynx was normal [No JVD] : no jugular venous distention [No Lymphadenopathy] : no lymphadenopathy [Supple] : supple [Thyroid Normal, No Nodules] : the thyroid was normal and there were no nodules present [No Respiratory Distress] : no respiratory distress  [No Accessory Muscle Use] : no accessory muscle use [Clear to Auscultation] : lungs were clear to auscultation bilaterally [Normal Rate] : normal rate  [Regular Rhythm] : with a regular rhythm [Normal S1, S2] : normal S1 and S2 [No Murmur] : no murmur heard [No Carotid Bruits] : no carotid bruits [No Abdominal Bruit] : a ~M bruit was not heard ~T in the abdomen [No Varicosities] : no varicosities [Pedal Pulses Present] : the pedal pulses are present [No Edema] : there was no peripheral edema [No Palpable Aorta] : no palpable aorta [No Extremity Clubbing/Cyanosis] : no extremity clubbing/cyanosis [Soft] : abdomen soft [Non Tender] : non-tender [Non-distended] : non-distended [No Masses] : no abdominal mass palpated [No HSM] : no HSM [Normal Bowel Sounds] : normal bowel sounds [Normal Posterior Cervical Nodes] : no posterior cervical lymphadenopathy [Normal Anterior Cervical Nodes] : no anterior cervical lymphadenopathy [No CVA Tenderness] : no CVA  tenderness [No Spinal Tenderness] : no spinal tenderness [No Joint Swelling] : no joint swelling [Grossly Normal Strength/Tone] : grossly normal strength/tone [No Rash] : no rash [Coordination Grossly Intact] : coordination grossly intact [No Focal Deficits] : no focal deficits [Normal Gait] : normal gait [Deep Tendon Reflexes (DTR)] : deep tendon reflexes were 2+ and symmetric [Normal Affect] : the affect was normal [Normal Insight/Judgement] : insight and judgment were intact [de-identified] : 130/55 [de-identified] : by gyn

## 2023-01-06 NOTE — HEALTH RISK ASSESSMENT
[Never] : Never [No] : No [No falls in past year] : Patient reported no falls in the past year [0] : 2) Feeling down, depressed, or hopeless: Not at all (0) [Fully functional (bathing, dressing, toileting, transferring, walking, feeding)] : Fully functional (bathing, dressing, toileting, transferring, walking, feeding) [Fully functional (using the telephone, shopping, preparing meals, housekeeping, doing laundry, using] : Fully functional and needs no help or supervision to perform IADLs (using the telephone, shopping, preparing meals, housekeeping, doing laundry, using transportation, managing medications and managing finances) [[id number]] : 0 [Reports changes in hearing] : Reports no changes in hearing [Reports changes in vision] : Reports no changes in vision [Reports changes in dental health] : Reports no changes in dental health

## 2023-01-06 NOTE — ASSESSMENT
[FreeTextEntry1] : Medical history reviewed- she has type 1 DM and she sees her endocrinologist, Dr. Phillips.  Blood tests reviewed- HgBA1c 7.4 last. Discussed diet and exercise.  \par \par She has chronic GI symptoms- IBS and she sees Dr. Christiansen.  Colonoscopy fine 7/22.\par \par She sees her cardiologist, Dr. Strauss. The BP is fine today.  \par \par S/p COVID-19 vaccine and booster and another booster is planned next month.  \par \par She is due for a breast MRI this month. Mammogram and breast U/S UTD.  She sees Dr. Nayak. \par \par She will have the flu vaccine later.  \par \par Gyn 6/22.  DEXA 8/21. \par \par She sees a dermatologist and ophthalmologist.\par \par S/p Prevnar and Pneumovax.  She might do Shingrix but she is hesitant because she had a severe reaction to Zostavax- we decided she will defer for now.  \par

## 2023-02-09 ENCOUNTER — NON-APPOINTMENT (OUTPATIENT)
Age: 73
End: 2023-02-09

## 2023-02-09 ENCOUNTER — APPOINTMENT (OUTPATIENT)
Dept: CARDIOLOGY | Facility: CLINIC | Age: 73
End: 2023-02-09
Payer: MEDICARE

## 2023-02-09 VITALS
SYSTOLIC BLOOD PRESSURE: 138 MMHG | BODY MASS INDEX: 19.33 KG/M2 | WEIGHT: 116 LBS | HEIGHT: 65 IN | DIASTOLIC BLOOD PRESSURE: 70 MMHG | OXYGEN SATURATION: 100 % | HEART RATE: 60 BPM

## 2023-02-09 PROCEDURE — 99214 OFFICE O/P EST MOD 30 MIN: CPT

## 2023-02-09 PROCEDURE — 93000 ELECTROCARDIOGRAM COMPLETE: CPT

## 2023-02-09 NOTE — HISTORY OF PRESENT ILLNESS
[FreeTextEntry1] : Since I saw her last,  she continues her usual activities and has been well.  There have been no  episodes of exertional chest discomfort or ALVARES. She describes no palpitations and no episodes of lightheadedness.  She reports no orthopnea or PND.\par \par BP readings at home remain in normal range overall.  \par \par There have been no new interval medical problems. Medications are unchanged.

## 2023-02-09 NOTE — REASON FOR VISIT
[FreeTextEntry1] : \nat Roe returns today for followup visit regarding hypertension, hyperlipidemia, and mild mitral valve insufficiency.

## 2023-02-09 NOTE — DISCUSSION/SUMMARY
[EKG obtained to assist in diagnosis and management of assessed problem(s)] : EKG obtained to assist in diagnosis and management of assessed problem(s) [FreeTextEntry1] : Hypertension, with "white coat" component.  BP remains reasonable in office and based on home readings.  She will continue the current regimen of BP meds.  Will arrange TTE at next O.V. to re-assess for hypertensive changes.\par \par HLD - continue statin.  Blood work monitor Dr. SULAIMAN Phillips; patient reports results were favorable. 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  TTE at next O.V.\par \par No significant abnormality seen on recent carotid scan.\par \par 32  minutes spent on today's office visit.\par \par She will return in six months.

## 2023-03-19 ENCOUNTER — NON-APPOINTMENT (OUTPATIENT)
Age: 73
End: 2023-03-19

## 2023-03-20 ENCOUNTER — APPOINTMENT (OUTPATIENT)
Dept: INTERNAL MEDICINE | Facility: CLINIC | Age: 73
End: 2023-03-20
Payer: MEDICARE

## 2023-03-20 VITALS
HEART RATE: 74 BPM | BODY MASS INDEX: 19.33 KG/M2 | WEIGHT: 116 LBS | OXYGEN SATURATION: 98 % | TEMPERATURE: 97.2 F | HEIGHT: 65 IN

## 2023-03-20 PROCEDURE — 36415 COLL VENOUS BLD VENIPUNCTURE: CPT

## 2023-03-20 PROCEDURE — 99214 OFFICE O/P EST MOD 30 MIN: CPT | Mod: 25

## 2023-03-24 VITALS — DIASTOLIC BLOOD PRESSURE: 60 MMHG | SYSTOLIC BLOOD PRESSURE: 145 MMHG

## 2023-03-24 NOTE — ASSESSMENT
[FreeTextEntry1] : She has DM and she sees her endocrinologist.  Recent HgBA1c 7.0. She has other blood tests on her phone- K 4.8, Na 133, Cr 0.7, calcium 9.6, lipids 153/73/70/52.  Discussed diet and exercise.  She was advised to have the formal report sent here to add to the chart.  \par \par She has been holding the Simvastatin for three weeks because of nocturnal leg cramps and finger pain.  She should restart it and check a CPK.\par \par She asks about PAD- symptoms don't suggest it- not exertional and the vascular exam is normal.  \par Monitor BP which is 145/60.  \par \par She has a likely viral URI now.  \par \par She has seen her cardiologist.  \par \par MRI breast fine 12/1/22.\par \par S/p COVID-19 bivalent vaccine.

## 2023-03-24 NOTE — PHYSICAL EXAM
Anesthesia Pre Eval Note    Anesthesia ROS/Med Hx    Overall Review:  EKG was reviewed     Anesthetic Complication History:  Patient does not have a history of anesthetic complications  Comment: \"waking up wild\"    Pulmonary Review:    The patient is a former smoker.     Neuro/Psych Review:  Negative for seizures   Negative for CVA  Positive for psychiatric history    Cardiovascular Review:  Exercise tolerance: good (>4 METS)  Negative for angina  Positive for hypertension  Positive for hyperlipidemia    GI/HEPATIC/RENAL Review:  Comments: +varices  UC  Positive for GERD  Positive for hepatitis- type C   Positive for liver disease and Cirrhosis     End/Other Review:  Positive for diabetes (borderline)  Positive for obesity   Positive for hypothyroidism  Positive for anemia  Positive for substance abuse (IVDA) - alcohol      Relevant Problems   No relevant active problems       Physical Exam     Airway   Mallampati: II  TM Distance: >3 FB  Neck ROM: Full    Cardiovascular  Cardiovascular exam normal    Pulmonary Exam  Pulmonary exam normal      Anesthesia Plan    ASA Status: 3    Anesthesia Type: MAC    Induction: Intravenous  Maintenance: TIVA      Postoperative analgesia plan does NOT include opiods    Checklist  Reviewed: Lab Results, EKG, Past Med History, Allergies, Medications, Problem list, NPO Status, Patient Summary, Nursing Notes and Consultations    Informed Consent  The proposed anesthetic plan, including its risks and benefits, have been discussed with the Patient  - along with the risks and benefits of alternatives.  Questions were encouraged and answered and the patient and/or representative understands and agrees to proceed.     Blood Products: Not Anticipated    Comments  Plan Comments: Risks of MAC anesthesia reviewed with patient, including but not limited to, serious cardiac, pulmonary or blood pressure problem. It was explained that awareness is not excluded as a possibility with MAC. The  possibility of conversion to general anesthesia if the patient cannot tolerate the procedure under MAC or in the event of airway obstruction was also discussed. Patient understands and wishes to proceed.         [Normal] : soft, non-tender, non-distended, no masses palpated, no HSM and normal bowel sounds [de-identified] : 2+DP and PT pulses

## 2023-03-24 NOTE — HISTORY OF PRESENT ILLNESS
[FreeTextEntry1] : The patient is here for a routine visit.  [de-identified] : She sees her endocrinologist, Dr. Phillips.  Her diet is very good and she exercises.  \par \par She has B/L leg pains at rest. No symptoms with exertion.  \par \par She has URI symptoms now.  No fever or dyspnea.

## 2023-03-25 ENCOUNTER — NON-APPOINTMENT (OUTPATIENT)
Age: 73
End: 2023-03-25

## 2023-03-25 LAB
ALBUMIN SERPL ELPH-MCNC: 4.4 G/DL
ALP BLD-CCNC: 55 U/L
ALT SERPL-CCNC: 31 U/L
ANION GAP SERPL CALC-SCNC: 14 MMOL/L
AST SERPL-CCNC: 30 U/L
BASOPHILS # BLD AUTO: 0.11 K/UL
BASOPHILS NFR BLD AUTO: 2 %
BILIRUB SERPL-MCNC: 0.3 MG/DL
BUN SERPL-MCNC: 16 MG/DL
CALCIUM SERPL-MCNC: 10.1 MG/DL
CHLORIDE SERPL-SCNC: 93 MMOL/L
CK SERPL-CCNC: 260 U/L
CO2 SERPL-SCNC: 24 MMOL/L
CREAT SERPL-MCNC: 0.65 MG/DL
EGFR: 92 ML/MIN/1.73M2
EOSINOPHIL # BLD AUTO: 0.19 K/UL
EOSINOPHIL NFR BLD AUTO: 3.5 %
GLUCOSE SERPL-MCNC: 147 MG/DL
HCT VFR BLD CALC: 38.1 %
HGB BLD-MCNC: 12.5 G/DL
IMM GRANULOCYTES NFR BLD AUTO: 0.4 %
LYMPHOCYTES # BLD AUTO: 0.9 K/UL
LYMPHOCYTES NFR BLD AUTO: 16.5 %
MAN DIFF?: NORMAL
MCHC RBC-ENTMCNC: 29.7 PG
MCHC RBC-ENTMCNC: 32.8 GM/DL
MCV RBC AUTO: 90.5 FL
MONOCYTES # BLD AUTO: 0.75 K/UL
MONOCYTES NFR BLD AUTO: 13.7 %
NEUTROPHILS # BLD AUTO: 3.49 K/UL
NEUTROPHILS NFR BLD AUTO: 63.9 %
PLATELET # BLD AUTO: 298 K/UL
POTASSIUM SERPL-SCNC: 4.4 MMOL/L
PROT SERPL-MCNC: 7.4 G/DL
RBC # BLD: 4.21 M/UL
RBC # FLD: 13.2 %
SODIUM SERPL-SCNC: 131 MMOL/L
T4 FREE SERPL-MCNC: 1.2 NG/DL
TSH SERPL-ACNC: 1.92 UIU/ML
WBC # FLD AUTO: 5.46 K/UL

## 2023-04-26 ENCOUNTER — APPOINTMENT (OUTPATIENT)
Dept: MRI IMAGING | Facility: CLINIC | Age: 73
End: 2023-04-26
Payer: MEDICARE

## 2023-04-26 ENCOUNTER — OUTPATIENT (OUTPATIENT)
Dept: OUTPATIENT SERVICES | Facility: HOSPITAL | Age: 73
LOS: 1 days | End: 2023-04-26
Payer: MEDICARE

## 2023-04-26 DIAGNOSIS — M43.12 SPONDYLOLISTHESIS, CERVICAL REGION: ICD-10-CM

## 2023-04-26 DIAGNOSIS — M54.12 RADICULOPATHY, CERVICAL REGION: ICD-10-CM

## 2023-04-26 DIAGNOSIS — M54.16 RADICULOPATHY, LUMBAR REGION: ICD-10-CM

## 2023-04-26 PROCEDURE — 72141 MRI NECK SPINE W/O DYE: CPT | Mod: MH

## 2023-04-26 PROCEDURE — 72141 MRI NECK SPINE W/O DYE: CPT | Mod: 26,MH

## 2023-05-24 ENCOUNTER — OUTPATIENT (OUTPATIENT)
Dept: OUTPATIENT SERVICES | Facility: HOSPITAL | Age: 73
LOS: 1 days | End: 2023-05-24
Payer: MEDICARE

## 2023-05-24 ENCOUNTER — APPOINTMENT (OUTPATIENT)
Dept: MRI IMAGING | Facility: CLINIC | Age: 73
End: 2023-05-24
Payer: MEDICARE

## 2023-05-24 DIAGNOSIS — M43.12 SPONDYLOLISTHESIS, CERVICAL REGION: ICD-10-CM

## 2023-05-24 DIAGNOSIS — M54.16 RADICULOPATHY, LUMBAR REGION: ICD-10-CM

## 2023-05-24 DIAGNOSIS — M54.12 RADICULOPATHY, CERVICAL REGION: ICD-10-CM

## 2023-05-24 PROCEDURE — 72148 MRI LUMBAR SPINE W/O DYE: CPT | Mod: MH

## 2023-05-24 PROCEDURE — 72148 MRI LUMBAR SPINE W/O DYE: CPT | Mod: 26,MH

## 2023-05-28 ENCOUNTER — NON-APPOINTMENT (OUTPATIENT)
Age: 73
End: 2023-05-28

## 2023-08-22 NOTE — HISTORY OF PRESENT ILLNESS
[FreeTextEntry1] : Since I saw her last, she tells me that she was found to have persistently low serum sodium levels.  Evaluation by Dr. Phillips raise concern of adrenal insufficiency.  She was started on fludrocortisone, and reports that her sodium levels have normalized that she seems to feel better overall. Initially, blood pressures tended to run lower but with treatment, BP has returned to normal range; reading have not been elevated at home.  She remains on the same medications, otherwise.    From a cardiac standpoint, she has been able to continue her usual activities; she remains she remains well.  She reports no episodes of exertional chest discomfort or ALVARES. There have been no palpitations or episodes of lightheadedness.  She reports no orthopnea or PND.

## 2023-08-22 NOTE — REASON FOR VISIT
[FreeTextEntry1] :  Keisha Roe returns today for follow-up visit regarding hypertension, hyperlipidemia, and mild mitral valve insufficiency.

## 2023-08-22 NOTE — DISCUSSION/SUMMARY
[EKG obtained to assist in diagnosis and management of assessed problem(s)] : EKG obtained to assist in diagnosis and management of assessed problem(s) [FreeTextEntry1] : I reassured Mrs. Roe that her exam today is unremarkable with blood pressure in normal range.  EKG shows sinus rhythm with mild NS ST/T abnormalities, and is unchanged compared to her Breeding tracings. Echocardiography shows normal cardiac chamber sizes.  LVEF is within normal range.  No hypertensive changes are seen.  The study appears unchanged compared to one done in August 2020.  Hypertension, with "white coat" component - continue the current meds.    HLD - continue statin.  Blood work is monitored by Dr. SULAIMAN Phillips, her endocrinologist.   She will f/u with Dr. Phillips re pending test results of adrenal function.  No significant abnormality seen on prior carotid scan.  33 minutes spent on today's office visit.  She will return in six months.

## 2023-08-30 NOTE — ASSESSMENT
[FreeTextEntry1] : The patient has a left neck mass which is firm and somewhat concerning.  She already has an MRI neck scheduled this week as per her ENT.  Will check blood tests including a CBC, metabolic, TFTs, ESR, CRP, LDH.  She also has an abnormal lung exam with wheezing.  Will get a CT chest without contrast.  Follow-up after that.

## 2023-08-30 NOTE — HISTORY OF PRESENT ILLNESS
[FreeTextEntry8] : The patient has a hoarse feeling.  She has had a few negative COVID-19 tests.  She has symptoms for four months- leg pains.  She had an MRI lumbar spine and was told the symptoms were radicular.    She had a sodium of 126 and saw Dr. Phillips.  She was diagnosed with low aldosterone and she is now on Fludricortisone.  She was recently moving and had fatigue.  She feels a click in her neck.  She saw an ENT, Dr. Galvez.  She had a layngoscopy. The hoarse voice is now improving.    She then felt a lump in her neck and she is going for an MRI neck this week.    Diet is ok.

## 2023-09-01 NOTE — HISTORY OF PRESENT ILLNESS
[de-identified] : The patient comes in to follow-up the anemia and other issues.  She is feeling fairly well.  She denies abdominal pain, BRBPR, black stools, diarrhea, fever, N/V, vaginal bleeding.  She has lost six pounds in six months but it has stabilized now.  She has the two left neck lumps but no other masses felt.

## 2023-09-01 NOTE — ASSESSMENT
[FreeTextEntry1] : The patient has a few issues that need evaluation-  She has a new anemia and abnormal CBC.  No evidence for bleeding. The stool is brown and negative for blood.   Recheck a CBC and check iron studies and B-12 and folate.  Consider a hematology evaluation.    She has an abnormal lung exam.  CT chest done but result pending and will check results.  She has two left neck firm lumps and she had an MRI neck by Dr. Galvez yesterday.  Result pending.  She is seeing him today.  She has had a hoarse voice for a few months- not clear if related.    Suspect an underlying condition and will need evaluation.

## 2023-09-01 NOTE — PHYSICAL EXAM
[Normal] : soft, non-tender, non-distended, no masses palpated, no HSM and normal bowel sounds [Normal Sphincter Tone] : normal sphincter tone [No Mass] : no mass [Stool Occult Blood] : stool negative for occult blood [de-identified] : left neck with two firm 1 cm masses, nontender and mobile [de-identified] : +right wheezes and rhonchi [de-identified] : no axillary LAD

## 2023-09-19 PROBLEM — J98.4 RESTRICTIVE LUNG DISEASE: Status: ACTIVE | Noted: 2023-01-01

## 2023-09-19 PROBLEM — R94.2 DECREASED DIFFUSION CAPACITY: Status: ACTIVE | Noted: 2023-01-01

## 2023-09-19 PROBLEM — J98.4 SMALL AIRWAYS DISEASE: Status: ACTIVE | Noted: 2023-01-01

## 2023-09-27 PROBLEM — R49.0 HOARSENESS: Status: ACTIVE | Noted: 2023-01-01

## 2023-09-27 PROBLEM — R22.1 NECK MASS: Status: ACTIVE | Noted: 2023-01-01

## 2023-10-12 PROBLEM — R93.0 ABNORMAL MRI OF THE HEAD: Status: ACTIVE | Noted: 2023-01-01

## 2023-11-07 PROBLEM — R91.8 LUNG MASS: Status: ACTIVE | Noted: 2023-01-01

## 2023-12-08 PROBLEM — L27.0 DRUG-INDUCED SKIN RASH: Status: ACTIVE | Noted: 2023-01-01

## 2023-12-19 NOTE — HISTORY OF PRESENT ILLNESS
[Home] : at home, [unfilled] , at the time of the visit. [Medical Office: ([facility])___] : at the medical office located in  [Verbal consent obtained from patient] : the patient, [unfilled] [FreeTextEntry1] : 73-year-old woman referred for consultation to the [facility] for COVID infection.  Patient is a lifelong non-smoker.  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  She has excellent functional status, and is currently maintained on oral Mekinist and Tafinlar past therapy.  She has no other pulmonary history, no asthma or COPD.  She is fully vaccinated for COVID.  Most recent booster was 3 weeks ago.  This is her first episode of COVID.  Her symptoms started yesterday.  They are very mild.  Feels like a "head cold."  But she has always been testing for COVID negative" was actually surprised to find a positive test time.  She does not have a fever.  sHe has no shortness of breath.  She does not have a significant cough other than associated with her postnasal drip which she is chronically.  And she has some sinus headache.  Her current medications include the Tafinlar, Mekinist, lisinopril, Lantus, simvastatin She has normal kidney function  On video she appears well and in no distress.  A little nasal congestion otherwise not even sick appearing.  Speaking clearly and easily with no respiratory effort.  Options discussed.  She prefers not to have to take any medications.  And I did feel that she overwhelmingly will likely have mild disease with very little risk to progression given her most recent vaccine status and the current variant.  She is only on day 2.  She prefers not to take anything right now, but does want to have the prescription for Paxlovid at home in case her symptoms worsen, which is very reasonable.  Patient was advised that should she start the Paxlovid she will need to hold the Tafinlar and simvastatin for the duration of therapy.  This she feels is another reason where she would not want to take it.  Prescription was sent.  If her symptoms get worse, she will start it, hold medications call and let me know

## 2023-12-22 NOTE — ASSESSMENT
[FreeTextEntry1] : Ms. Roe is a 74 yo w with IDDM, BRCA carrier, mild former smoker with adeno ca of lung, mets to multiple organs I reviewed the EMR in its entirety including notes from other providers, labs, path, and scan reports I reviewed imaging studies independently PDL1 high- 70% NGS from tissue reviewed in detail BRAF V600E BRCA2 [id number]*22 (germ line likely) MSH6 [id number]*11 NFKBIA amplification NKX2-1 amplification Given NGS results, recommend Tafinlar 75 BID and Mekinist 0.5 mg once daily She has been on it for 6 weeks. Pt is no longer using O2. However, I recommended she continue to hold on to it till the next scan Weight is stable CT head reviewed: At least 4 enhancing nodules, largest within the inferomedial aspect of the left frontal lobe measuring 1.4 x 1.3 x 1.2 cm, compatible with intracranial metastatic disease.Brain mets- can wait to see if systemic tx can help resolve these.  Bone mets- Dental clearance for Xgeva (pt states she is very hesitant) will get labs and EKG today Scans in 3 months since starting meds

## 2023-12-22 NOTE — HISTORY OF PRESENT ILLNESS
[de-identified] : Ms. 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 She was followed up by endocrine and noted to have low aldosterone.  A few days later, pt noted two left neck lumps while taking a shower. Additional work up imaging revealed pulmonary mass, adenopathy, and bone lesions suspicious for malignancy.  CT Chest on 08/29/2023: - Right lower lobe mass with inferior 4.5 cm nodular component (2-119), and elongated component that tracks centrally to the right hilum, occluding the medial basal segmental bronchus. - Patent airways through the lobar bronchi. - Several solid nodules in all lobes, for instance, largest including 1.5 cm in the superior segment of the left lower lobe and the lingula. - Trace right pleural effusion. - Bilateral mediastinal, left supraclavicular, left axillary, and left subpectoral lymphadenopathy, largest including 2.7 cm in the left supraclavicular region. - Multiple lytic bone lesions including the manubrium, inferior sternum and left 11th rib. - Small pericardial effusion. Coronary artery calcifications.  Seen on 09/13/2023: CT Chest revealing RLL mass with lymphadenopathy, suspicious for advanced malignancy.  PFTs on 09/18/2023: FVC 2.01, 68%; FEV1 1.41, 63%; DLCO 10.9, 53%  Established care with Dr. Armen Christensen on 09/27/2023 for hoarseness of voice:  However, this was all suspected to be related to lung and med findings.  09/28/2023: S/p IR bx of left supraclavicular lymph node with Dr. Clement Santos. Path level 4 LN: Metastatic lung adenocarcinoma PDL1 70%  PET/CT on 10/07/2023: 1. Right lower lobe mass not well delineated due to new right pleural effusion but appears more prominent than the on the most recent CT chest with increased activity. Multiple hypermetabolic lung nodules in both lung fields. Findings compatible with likely primary right lower lobe mass with multiple metastatic lung nodules. 2. Hypermetabolic nodes in the left neck, thorax and below the diaphragm compatible with biopsied konrad i. Extent of retroperitoneal and mesenteric konrad involvement is more extensive than usually seen with lung malignancy; may need further tissue evaluation. 3. Multiple FDG avid bone lesions and a hypermetabolic left gluteal muscle lesion compatible with metastases. Focus in the left side of the skull base is difficult to assess on this PET/CT and can be further evaluated with CT head or MRI. 4. Multiple foci of increased activity in the liver without corresponding low-dose CT abnormality most likely due to disease involvement.  10/20/23: Very fatigued, hypoxic, now on home O2.   11/7/23: Has not been using O2. No longer on pain meds. Over-exerted herself and starting ambulating a lot few days ago, but that seems to have resulted in foot sprain, She rested a little and this seems to have improved  12/8/23: Pt states she is feeling much better. She has been able to do much more than she used to. She is not using O2 anymore. She does endorse some LE pain which seems to have improved a little   [de-identified] : adeno ca

## 2023-12-22 NOTE — REVIEW OF SYSTEMS
[FreeTextEntry2] : lost weight [FreeTextEntry4] : nodes in neck have almost resolved [FreeTextEntry6] : improved [FreeTextEntry9] : foot pain still persistent

## 2024-01-01 ENCOUNTER — APPOINTMENT (OUTPATIENT)
Dept: CARDIOLOGY | Facility: CLINIC | Age: 74
End: 2024-01-01
Payer: MEDICARE

## 2024-01-01 ENCOUNTER — APPOINTMENT (OUTPATIENT)
Dept: HEMATOLOGY ONCOLOGY | Facility: CLINIC | Age: 74
End: 2024-01-01
Payer: MEDICARE

## 2024-01-01 ENCOUNTER — RESULT REVIEW (OUTPATIENT)
Age: 74
End: 2024-01-01

## 2024-01-01 ENCOUNTER — OUTPATIENT (OUTPATIENT)
Dept: OUTPATIENT SERVICES | Facility: HOSPITAL | Age: 74
LOS: 1 days | End: 2024-01-01
Payer: MEDICARE

## 2024-01-01 ENCOUNTER — APPOINTMENT (OUTPATIENT)
Dept: THORACIC SURGERY | Facility: HOSPITAL | Age: 74
End: 2024-01-01

## 2024-01-01 ENCOUNTER — APPOINTMENT (OUTPATIENT)
Dept: CARDIOLOGY | Facility: CLINIC | Age: 74
End: 2024-01-01

## 2024-01-01 ENCOUNTER — APPOINTMENT (OUTPATIENT)
Dept: INFUSION THERAPY | Facility: HOSPITAL | Age: 74
End: 2024-01-01

## 2024-01-01 ENCOUNTER — NON-APPOINTMENT (OUTPATIENT)
Age: 74
End: 2024-01-01

## 2024-01-01 ENCOUNTER — APPOINTMENT (OUTPATIENT)
Dept: SURGERY | Facility: CLINIC | Age: 74
End: 2024-01-01
Payer: MEDICARE

## 2024-01-01 ENCOUNTER — APPOINTMENT (OUTPATIENT)
Dept: THORACIC SURGERY | Facility: CLINIC | Age: 74
End: 2024-01-01
Payer: MEDICARE

## 2024-01-01 ENCOUNTER — APPOINTMENT (OUTPATIENT)
Dept: INTERNAL MEDICINE | Facility: CLINIC | Age: 74
End: 2024-01-01

## 2024-01-01 ENCOUNTER — TRANSCRIPTION ENCOUNTER (OUTPATIENT)
Age: 74
End: 2024-01-01

## 2024-01-01 ENCOUNTER — APPOINTMENT (OUTPATIENT)
Dept: HEMATOLOGY ONCOLOGY | Facility: CLINIC | Age: 74
End: 2024-01-01

## 2024-01-01 ENCOUNTER — APPOINTMENT (OUTPATIENT)
Dept: RADIATION ONCOLOGY | Facility: CLINIC | Age: 74
End: 2024-01-01
Payer: MEDICARE

## 2024-01-01 ENCOUNTER — APPOINTMENT (OUTPATIENT)
Dept: INTERNAL MEDICINE | Facility: CLINIC | Age: 74
End: 2024-01-01
Payer: MEDICARE

## 2024-01-01 ENCOUNTER — OUTPATIENT (OUTPATIENT)
Dept: OUTPATIENT SERVICES | Facility: HOSPITAL | Age: 74
LOS: 1 days | Discharge: HOME | End: 2024-01-01

## 2024-01-01 ENCOUNTER — INPATIENT (INPATIENT)
Facility: HOSPITAL | Age: 74
LOS: 1 days | Discharge: HOME HEALTH | End: 2024-04-16
Attending: THORACIC SURGERY (CARDIOTHORACIC VASCULAR SURGERY) | Admitting: THORACIC SURGERY (CARDIOTHORACIC VASCULAR SURGERY)
Payer: MEDICARE

## 2024-01-01 ENCOUNTER — INPATIENT (INPATIENT)
Facility: HOSPITAL | Age: 74
LOS: 3 days | End: 2024-06-01
Attending: THORACIC SURGERY (CARDIOTHORACIC VASCULAR SURGERY) | Admitting: THORACIC SURGERY (CARDIOTHORACIC VASCULAR SURGERY)
Payer: MEDICARE

## 2024-01-01 ENCOUNTER — APPOINTMENT (OUTPATIENT)
Dept: OBGYN | Facility: CLINIC | Age: 74
End: 2024-01-01

## 2024-01-01 ENCOUNTER — APPOINTMENT (OUTPATIENT)
Dept: CT IMAGING | Facility: CLINIC | Age: 74
End: 2024-01-01
Payer: MEDICARE

## 2024-01-01 ENCOUNTER — APPOINTMENT (OUTPATIENT)
Dept: THORACIC SURGERY | Facility: CLINIC | Age: 74
End: 2024-01-01

## 2024-01-01 ENCOUNTER — APPOINTMENT (OUTPATIENT)
Dept: RADIOLOGY | Facility: HOSPITAL | Age: 74
End: 2024-01-01

## 2024-01-01 ENCOUNTER — APPOINTMENT (OUTPATIENT)
Dept: RADIOLOGY | Facility: CLINIC | Age: 74
End: 2024-01-01
Payer: MEDICARE

## 2024-01-01 ENCOUNTER — EMERGENCY (EMERGENCY)
Facility: HOSPITAL | Age: 74
LOS: 1 days | Discharge: HOME | End: 2024-01-01
Attending: STUDENT IN AN ORGANIZED HEALTH CARE EDUCATION/TRAINING PROGRAM | Admitting: STUDENT IN AN ORGANIZED HEALTH CARE EDUCATION/TRAINING PROGRAM
Payer: MEDICARE

## 2024-01-01 ENCOUNTER — INPATIENT (INPATIENT)
Facility: HOSPITAL | Age: 74
LOS: 3 days | Discharge: HOME HEALTH | End: 2024-01-18
Attending: INTERNAL MEDICINE | Admitting: INTERNAL MEDICINE
Payer: MEDICARE

## 2024-01-01 VITALS
TEMPERATURE: 98 F | SYSTOLIC BLOOD PRESSURE: 127 MMHG | OXYGEN SATURATION: 100 % | RESPIRATION RATE: 16 BRPM | DIASTOLIC BLOOD PRESSURE: 60 MMHG | HEART RATE: 96 BPM

## 2024-01-01 VITALS
HEART RATE: 74 BPM | TEMPERATURE: 98.1 F | SYSTOLIC BLOOD PRESSURE: 144 MMHG | HEIGHT: 62 IN | WEIGHT: 104.39 LBS | BODY MASS INDEX: 19.21 KG/M2 | DIASTOLIC BLOOD PRESSURE: 75 MMHG | RESPIRATION RATE: 16 BRPM | OXYGEN SATURATION: 100 %

## 2024-01-01 VITALS
HEIGHT: 65 IN | RESPIRATION RATE: 22 BRPM | WEIGHT: 110.01 LBS | SYSTOLIC BLOOD PRESSURE: 96 MMHG | DIASTOLIC BLOOD PRESSURE: 64 MMHG | OXYGEN SATURATION: 95 % | HEART RATE: 115 BPM

## 2024-01-01 VITALS
HEART RATE: 89 BPM | TEMPERATURE: 98 F | SYSTOLIC BLOOD PRESSURE: 127 MMHG | DIASTOLIC BLOOD PRESSURE: 73 MMHG | OXYGEN SATURATION: 92 % | RESPIRATION RATE: 16 BRPM

## 2024-01-01 VITALS
OXYGEN SATURATION: 96 % | DIASTOLIC BLOOD PRESSURE: 47 MMHG | RESPIRATION RATE: 18 BRPM | HEART RATE: 104 BPM | TEMPERATURE: 98 F | SYSTOLIC BLOOD PRESSURE: 100 MMHG

## 2024-01-01 VITALS
WEIGHT: 110.87 LBS | SYSTOLIC BLOOD PRESSURE: 128 MMHG | HEART RATE: 81 BPM | BODY MASS INDEX: 20.28 KG/M2 | RESPIRATION RATE: 16 BRPM | DIASTOLIC BLOOD PRESSURE: 70 MMHG | OXYGEN SATURATION: 97 % | TEMPERATURE: 97.3 F

## 2024-01-01 VITALS
OXYGEN SATURATION: 95 % | HEART RATE: 93 BPM | WEIGHT: 110 LBS | DIASTOLIC BLOOD PRESSURE: 69 MMHG | BODY MASS INDEX: 18.33 KG/M2 | SYSTOLIC BLOOD PRESSURE: 105 MMHG | RESPIRATION RATE: 17 BRPM | HEIGHT: 65 IN

## 2024-01-01 VITALS
HEIGHT: 65 IN | OXYGEN SATURATION: 97 % | HEART RATE: 61 BPM | WEIGHT: 105 LBS | RESPIRATION RATE: 17 BRPM | DIASTOLIC BLOOD PRESSURE: 91 MMHG | BODY MASS INDEX: 17.49 KG/M2 | SYSTOLIC BLOOD PRESSURE: 129 MMHG

## 2024-01-01 VITALS
TEMPERATURE: 96.9 F | OXYGEN SATURATION: 98 % | HEART RATE: 89 BPM | DIASTOLIC BLOOD PRESSURE: 68 MMHG | BODY MASS INDEX: 19.44 KG/M2 | RESPIRATION RATE: 16 BRPM | WEIGHT: 106.26 LBS | SYSTOLIC BLOOD PRESSURE: 114 MMHG

## 2024-01-01 VITALS
BODY MASS INDEX: 19.75 KG/M2 | RESPIRATION RATE: 16 BRPM | HEART RATE: 72 BPM | TEMPERATURE: 98.4 F | DIASTOLIC BLOOD PRESSURE: 78 MMHG | SYSTOLIC BLOOD PRESSURE: 145 MMHG | WEIGHT: 108 LBS | OXYGEN SATURATION: 100 %

## 2024-01-01 VITALS
OXYGEN SATURATION: 99 % | HEART RATE: 85 BPM | TEMPERATURE: 98.5 F | SYSTOLIC BLOOD PRESSURE: 116 MMHG | WEIGHT: 108.69 LBS | BODY MASS INDEX: 18.09 KG/M2 | RESPIRATION RATE: 16 BRPM | DIASTOLIC BLOOD PRESSURE: 71 MMHG

## 2024-01-01 VITALS
SYSTOLIC BLOOD PRESSURE: 112 MMHG | RESPIRATION RATE: 60 BRPM | DIASTOLIC BLOOD PRESSURE: 65 MMHG | OXYGEN SATURATION: 91 %

## 2024-01-01 VITALS
RESPIRATION RATE: 17 BRPM | OXYGEN SATURATION: 97 % | HEIGHT: 65 IN | HEART RATE: 91 BPM | BODY MASS INDEX: 20.16 KG/M2 | SYSTOLIC BLOOD PRESSURE: 111 MMHG | DIASTOLIC BLOOD PRESSURE: 69 MMHG | WEIGHT: 121 LBS

## 2024-01-01 VITALS
OXYGEN SATURATION: 99 % | HEIGHT: 65 IN | BODY MASS INDEX: 17.49 KG/M2 | HEART RATE: 74 BPM | TEMPERATURE: 97.4 F | WEIGHT: 105 LBS

## 2024-01-01 VITALS
SYSTOLIC BLOOD PRESSURE: 106 MMHG | BODY MASS INDEX: 20.66 KG/M2 | RESPIRATION RATE: 17 BRPM | DIASTOLIC BLOOD PRESSURE: 54 MMHG | WEIGHT: 124 LBS | OXYGEN SATURATION: 100 % | HEIGHT: 65 IN | HEART RATE: 86 BPM

## 2024-01-01 VITALS
SYSTOLIC BLOOD PRESSURE: 129 MMHG | RESPIRATION RATE: 12 BRPM | DIASTOLIC BLOOD PRESSURE: 64 MMHG | OXYGEN SATURATION: 95 % | HEART RATE: 74 BPM

## 2024-01-01 VITALS
SYSTOLIC BLOOD PRESSURE: 128 MMHG | HEIGHT: 65 IN | BODY MASS INDEX: 17.83 KG/M2 | DIASTOLIC BLOOD PRESSURE: 70 MMHG | OXYGEN SATURATION: 99 % | HEART RATE: 77 BPM | WEIGHT: 107 LBS

## 2024-01-01 VITALS
DIASTOLIC BLOOD PRESSURE: 69 MMHG | SYSTOLIC BLOOD PRESSURE: 114 MMHG | WEIGHT: 105.37 LBS | HEIGHT: 62 IN | HEART RATE: 82 BPM | BODY MASS INDEX: 19.39 KG/M2 | OXYGEN SATURATION: 99 % | RESPIRATION RATE: 16 BRPM | TEMPERATURE: 93.8 F

## 2024-01-01 VITALS
DIASTOLIC BLOOD PRESSURE: 72 MMHG | HEART RATE: 104 BPM | SYSTOLIC BLOOD PRESSURE: 148 MMHG | RESPIRATION RATE: 17 BRPM | TEMPERATURE: 98 F | OXYGEN SATURATION: 95 %

## 2024-01-01 VITALS
DIASTOLIC BLOOD PRESSURE: 70 MMHG | OXYGEN SATURATION: 95 % | HEIGHT: 65 IN | HEART RATE: 100 BPM | WEIGHT: 112 LBS | SYSTOLIC BLOOD PRESSURE: 118 MMHG | BODY MASS INDEX: 18.66 KG/M2

## 2024-01-01 DIAGNOSIS — R06.2 WHEEZING: ICD-10-CM

## 2024-01-01 DIAGNOSIS — C79.31 SECONDARY MALIGNANT NEOPLASM OF BRAIN: ICD-10-CM

## 2024-01-01 DIAGNOSIS — C34.90 MALIGNANT NEOPLASM OF UNSPECIFIED PART OF UNSPECIFIED BRONCHUS OR LUNG: ICD-10-CM

## 2024-01-01 DIAGNOSIS — I10 ESSENTIAL (PRIMARY) HYPERTENSION: ICD-10-CM

## 2024-01-01 DIAGNOSIS — J90 PLEURAL EFFUSION, NOT ELSEWHERE CLASSIFIED: ICD-10-CM

## 2024-01-01 DIAGNOSIS — U07.1 COVID-19: ICD-10-CM

## 2024-01-01 DIAGNOSIS — R06.02 SHORTNESS OF BREATH: ICD-10-CM

## 2024-01-01 DIAGNOSIS — R60.0 LOCALIZED EDEMA: ICD-10-CM

## 2024-01-01 DIAGNOSIS — Z98.890 OTHER SPECIFIED POSTPROCEDURAL STATES: Chronic | ICD-10-CM

## 2024-01-01 DIAGNOSIS — E89.0 POSTPROCEDURAL HYPOTHYROIDISM: Chronic | ICD-10-CM

## 2024-01-01 DIAGNOSIS — C79.51 MALIGNANT NEOPLASM OF UNSPECIFIED PART OF UNSPECIFIED BRONCHUS OR LUNG: ICD-10-CM

## 2024-01-01 DIAGNOSIS — S81.801A UNSPECIFIED OPEN WOUND, RIGHT LOWER LEG, INITIAL ENCOUNTER: ICD-10-CM

## 2024-01-01 DIAGNOSIS — I31.39 OTHER PERICARDIAL EFFUSION (NONINFLAMMATORY): ICD-10-CM

## 2024-01-01 DIAGNOSIS — R59.1 GENERALIZED ENLARGED LYMPH NODES: ICD-10-CM

## 2024-01-01 DIAGNOSIS — E10.9 TYPE 1 DIABETES MELLITUS WITHOUT COMPLICATIONS: ICD-10-CM

## 2024-01-01 DIAGNOSIS — E78.5 HYPERLIPIDEMIA, UNSPECIFIED: ICD-10-CM

## 2024-01-01 DIAGNOSIS — R09.02 HYPOXEMIA: ICD-10-CM

## 2024-01-01 DIAGNOSIS — C79.51 SECONDARY MALIGNANT NEOPLASM OF BONE: ICD-10-CM

## 2024-01-01 DIAGNOSIS — E87.1 HYPO-OSMOLALITY AND HYPONATREMIA: ICD-10-CM

## 2024-01-01 DIAGNOSIS — I34.0 NONRHEUMATIC MITRAL (VALVE) INSUFFICIENCY: ICD-10-CM

## 2024-01-01 DIAGNOSIS — S81.802A UNSPECIFIED OPEN WOUND, RIGHT LOWER LEG, INITIAL ENCOUNTER: ICD-10-CM

## 2024-01-01 DIAGNOSIS — D64.9 ANEMIA, UNSPECIFIED: ICD-10-CM

## 2024-01-01 LAB
A1C WITH ESTIMATED AVERAGE GLUCOSE RESULT: 7 % — HIGH (ref 4–5.6)
A1C WITH ESTIMATED AVERAGE GLUCOSE RESULT: 7 % — HIGH (ref 4–5.6)
A1C WITH ESTIMATED AVERAGE GLUCOSE RESULT: 7.1 % — HIGH (ref 4–5.6)
ACANTHOCYTES BLD QL SMEAR: SIGNIFICANT CHANGE UP
ADD ON TEST-SPECIMEN IN LAB: SIGNIFICANT CHANGE UP
ALBUMIN FLD-MCNC: 1.5 G/DL — SIGNIFICANT CHANGE UP
ALBUMIN FLD-MCNC: 2.1 G/DL — SIGNIFICANT CHANGE UP
ALBUMIN FLD-MCNC: 2.1 G/DL — SIGNIFICANT CHANGE UP
ALBUMIN SERPL ELPH-MCNC: 2 G/DL — LOW (ref 3.3–5)
ALBUMIN SERPL ELPH-MCNC: 2 G/DL — LOW (ref 3.3–5)
ALBUMIN SERPL ELPH-MCNC: 2.2 G/DL — LOW (ref 3.3–5)
ALBUMIN SERPL ELPH-MCNC: 2.3 G/DL — LOW (ref 3.3–5)
ALBUMIN SERPL ELPH-MCNC: 2.4 G/DL — LOW (ref 3.3–5)
ALBUMIN SERPL ELPH-MCNC: 2.5 G/DL — LOW (ref 3.3–5)
ALBUMIN SERPL ELPH-MCNC: 2.5 G/DL — LOW (ref 3.3–5)
ALBUMIN SERPL ELPH-MCNC: 2.6 G/DL — LOW (ref 3.3–5)
ALBUMIN SERPL ELPH-MCNC: 2.8 G/DL
ALBUMIN SERPL ELPH-MCNC: 2.8 G/DL
ALBUMIN SERPL ELPH-MCNC: 3.2 G/DL
ALBUMIN SERPL ELPH-MCNC: 3.4 G/DL
ALP BLD-CCNC: 63 U/L
ALP BLD-CCNC: 70 U/L
ALP BLD-CCNC: 80 U/L
ALP BLD-CCNC: 87 U/L
ALP SERPL-CCNC: 101 U/L — SIGNIFICANT CHANGE UP (ref 40–120)
ALP SERPL-CCNC: 102 U/L — SIGNIFICANT CHANGE UP (ref 40–120)
ALP SERPL-CCNC: 102 U/L — SIGNIFICANT CHANGE UP (ref 40–120)
ALP SERPL-CCNC: 107 U/L — SIGNIFICANT CHANGE UP (ref 40–120)
ALP SERPL-CCNC: 107 U/L — SIGNIFICANT CHANGE UP (ref 40–120)
ALP SERPL-CCNC: 112 U/L — SIGNIFICANT CHANGE UP (ref 40–120)
ALP SERPL-CCNC: 71 U/L — SIGNIFICANT CHANGE UP (ref 40–120)
ALP SERPL-CCNC: 72 U/L — SIGNIFICANT CHANGE UP (ref 40–120)
ALP SERPL-CCNC: 86 U/L — SIGNIFICANT CHANGE UP (ref 40–120)
ALP SERPL-CCNC: 88 U/L — SIGNIFICANT CHANGE UP (ref 40–120)
ALP SERPL-CCNC: 89 U/L — SIGNIFICANT CHANGE UP (ref 40–120)
ALP SERPL-CCNC: 91 U/L — SIGNIFICANT CHANGE UP (ref 40–120)
ALT FLD-CCNC: 10 U/L — SIGNIFICANT CHANGE UP (ref 4–33)
ALT FLD-CCNC: 11 U/L — SIGNIFICANT CHANGE UP (ref 4–33)
ALT FLD-CCNC: 110 U/L — HIGH (ref 4–33)
ALT FLD-CCNC: 12 U/L — SIGNIFICANT CHANGE UP (ref 4–33)
ALT FLD-CCNC: 15 U/L — SIGNIFICANT CHANGE UP (ref 4–33)
ALT FLD-CCNC: 196 U/L — HIGH (ref 4–33)
ALT FLD-CCNC: 238 U/L — HIGH (ref 4–33)
ALT FLD-CCNC: 298 U/L — HIGH (ref 4–33)
ALT FLD-CCNC: 298 U/L — HIGH (ref 4–33)
ALT FLD-CCNC: 349 U/L — HIGH (ref 4–33)
ALT FLD-CCNC: 349 U/L — HIGH (ref 4–33)
ALT FLD-CCNC: 69 U/L — HIGH (ref 4–33)
ALT FLD-CCNC: 69 U/L — HIGH (ref 4–33)
ALT FLD-CCNC: 86 U/L — HIGH (ref 4–33)
ALT SERPL-CCNC: 11 U/L
ALT SERPL-CCNC: 13 U/L
ALT SERPL-CCNC: 13 U/L
ALT SERPL-CCNC: 20 U/L
ANION GAP SERPL CALC-SCNC: 10 MMOL/L
ANION GAP SERPL CALC-SCNC: 10 MMOL/L — SIGNIFICANT CHANGE UP (ref 7–14)
ANION GAP SERPL CALC-SCNC: 11 MMOL/L — SIGNIFICANT CHANGE UP (ref 7–14)
ANION GAP SERPL CALC-SCNC: 11 MMOL/L — SIGNIFICANT CHANGE UP (ref 7–14)
ANION GAP SERPL CALC-SCNC: 12 MMOL/L
ANION GAP SERPL CALC-SCNC: 12 MMOL/L — SIGNIFICANT CHANGE UP (ref 7–14)
ANION GAP SERPL CALC-SCNC: 13 MMOL/L — SIGNIFICANT CHANGE UP (ref 7–14)
ANION GAP SERPL CALC-SCNC: 14 MMOL/L — SIGNIFICANT CHANGE UP (ref 7–14)
ANION GAP SERPL CALC-SCNC: 14 MMOL/L — SIGNIFICANT CHANGE UP (ref 7–14)
ANION GAP SERPL CALC-SCNC: 15 MMOL/L — HIGH (ref 7–14)
ANION GAP SERPL CALC-SCNC: 17 MMOL/L — HIGH (ref 7–14)
ANION GAP SERPL CALC-SCNC: 21 MMOL/L — HIGH (ref 7–14)
ANION GAP SERPL CALC-SCNC: 21 MMOL/L — HIGH (ref 7–14)
ANION GAP SERPL CALC-SCNC: 4 MMOL/L — LOW (ref 7–14)
ANION GAP SERPL CALC-SCNC: 7 MMOL/L
ANION GAP SERPL CALC-SCNC: 7 MMOL/L — SIGNIFICANT CHANGE UP (ref 7–14)
ANION GAP SERPL CALC-SCNC: 7 MMOL/L — SIGNIFICANT CHANGE UP (ref 7–14)
ANION GAP SERPL CALC-SCNC: 8 MMOL/L — SIGNIFICANT CHANGE UP (ref 7–14)
ANION GAP SERPL CALC-SCNC: 9 MMOL/L
ANION GAP SERPL CALC-SCNC: 9 MMOL/L
ANISOCYTOSIS BLD QL: SLIGHT — SIGNIFICANT CHANGE UP
APPEARANCE UR: CLEAR — SIGNIFICANT CHANGE UP
APTT BLD: 26.1 SEC — SIGNIFICANT CHANGE UP (ref 24.5–35.6)
APTT BLD: 26.1 SEC — SIGNIFICANT CHANGE UP (ref 24.5–35.6)
APTT BLD: 28.2 SEC — SIGNIFICANT CHANGE UP (ref 24.5–35.6)
APTT BLD: 32.3 SEC — SIGNIFICANT CHANGE UP (ref 24.5–35.6)
AST SERPL-CCNC: 116 U/L — HIGH (ref 4–32)
AST SERPL-CCNC: 19 U/L
AST SERPL-CCNC: 19 U/L — SIGNIFICANT CHANGE UP (ref 4–32)
AST SERPL-CCNC: 21 U/L — SIGNIFICANT CHANGE UP (ref 4–32)
AST SERPL-CCNC: 22 U/L
AST SERPL-CCNC: 22 U/L
AST SERPL-CCNC: 231 U/L — HIGH (ref 4–32)
AST SERPL-CCNC: 231 U/L — HIGH (ref 4–32)
AST SERPL-CCNC: 24 U/L — SIGNIFICANT CHANGE UP (ref 4–32)
AST SERPL-CCNC: 26 U/L — SIGNIFICANT CHANGE UP (ref 4–32)
AST SERPL-CCNC: 27 U/L — SIGNIFICANT CHANGE UP (ref 4–32)
AST SERPL-CCNC: 33 U/L
AST SERPL-CCNC: 397 U/L — HIGH (ref 4–32)
AST SERPL-CCNC: 397 U/L — HIGH (ref 4–32)
AST SERPL-CCNC: 44 U/L — HIGH (ref 4–32)
AST SERPL-CCNC: 63 U/L — HIGH (ref 4–32)
AST SERPL-CCNC: 63 U/L — HIGH (ref 4–32)
AST SERPL-CCNC: 75 U/L — HIGH (ref 4–32)
B PERT IGG+IGM PNL SER: ABNORMAL
B-OH-BUTYR SERPL-SCNC: 0.2 MMOL/L — SIGNIFICANT CHANGE UP (ref 0–0.4)
B-OH-BUTYR SERPL-SCNC: 0.2 MMOL/L — SIGNIFICANT CHANGE UP (ref 0–0.4)
BASE EXCESS BLDV CALC-SCNC: -15.3 MMOL/L — LOW (ref -2–3)
BASE EXCESS BLDV CALC-SCNC: -15.3 MMOL/L — LOW (ref -2–3)
BASE EXCESS BLDV CALC-SCNC: 0.9 MMOL/L — SIGNIFICANT CHANGE UP (ref -2–3)
BASOPHILS # BLD AUTO: 0 K/UL — SIGNIFICANT CHANGE UP (ref 0–0.2)
BASOPHILS # BLD AUTO: 0 K/UL — SIGNIFICANT CHANGE UP (ref 0–0.2)
BASOPHILS # BLD AUTO: 0.02 K/UL — SIGNIFICANT CHANGE UP (ref 0–0.2)
BASOPHILS # BLD AUTO: 0.02 K/UL — SIGNIFICANT CHANGE UP (ref 0–0.2)
BASOPHILS # BLD AUTO: 0.03 K/UL — SIGNIFICANT CHANGE UP (ref 0–0.2)
BASOPHILS # BLD AUTO: 0.04 K/UL — SIGNIFICANT CHANGE UP (ref 0–0.2)
BASOPHILS # BLD AUTO: 0.05 K/UL — SIGNIFICANT CHANGE UP (ref 0–0.2)
BASOPHILS # BLD AUTO: 0.06 K/UL — SIGNIFICANT CHANGE UP (ref 0–0.2)
BASOPHILS # BLD AUTO: 0.09 K/UL — SIGNIFICANT CHANGE UP (ref 0–0.2)
BASOPHILS # BLD AUTO: 0.09 K/UL — SIGNIFICANT CHANGE UP (ref 0–0.2)
BASOPHILS # BLD AUTO: 0.11 K/UL — SIGNIFICANT CHANGE UP (ref 0–0.2)
BASOPHILS # BLD AUTO: 0.14 K/UL — SIGNIFICANT CHANGE UP (ref 0–0.2)
BASOPHILS NFR BLD AUTO: 0 % — SIGNIFICANT CHANGE UP (ref 0–2)
BASOPHILS NFR BLD AUTO: 0 % — SIGNIFICANT CHANGE UP (ref 0–2)
BASOPHILS NFR BLD AUTO: 0.2 % — SIGNIFICANT CHANGE UP (ref 0–2)
BASOPHILS NFR BLD AUTO: 0.3 % — SIGNIFICANT CHANGE UP (ref 0–2)
BASOPHILS NFR BLD AUTO: 0.3 % — SIGNIFICANT CHANGE UP (ref 0–2)
BASOPHILS NFR BLD AUTO: 0.4 % — SIGNIFICANT CHANGE UP (ref 0–2)
BASOPHILS NFR BLD AUTO: 0.7 % — SIGNIFICANT CHANGE UP (ref 0–2)
BASOPHILS NFR BLD AUTO: 0.8 % — SIGNIFICANT CHANGE UP (ref 0–2)
BASOPHILS NFR BLD AUTO: 0.8 % — SIGNIFICANT CHANGE UP (ref 0–2)
BASOPHILS NFR BLD AUTO: 1.1 % — SIGNIFICANT CHANGE UP (ref 0–2)
BASOPHILS NFR BLD AUTO: 1.1 % — SIGNIFICANT CHANGE UP (ref 0–2)
BASOPHILS NFR BLD AUTO: 1.3 % — SIGNIFICANT CHANGE UP (ref 0–2)
BILIRUB SERPL-MCNC: 0.2 MG/DL
BILIRUB SERPL-MCNC: 0.2 MG/DL — SIGNIFICANT CHANGE UP (ref 0.2–1.2)
BILIRUB SERPL-MCNC: 0.3 MG/DL — SIGNIFICANT CHANGE UP (ref 0.2–1.2)
BILIRUB SERPL-MCNC: 0.3 MG/DL — SIGNIFICANT CHANGE UP (ref 0.2–1.2)
BILIRUB SERPL-MCNC: 0.4 MG/DL — SIGNIFICANT CHANGE UP (ref 0.2–1.2)
BILIRUB SERPL-MCNC: 0.4 MG/DL — SIGNIFICANT CHANGE UP (ref 0.2–1.2)
BILIRUB SERPL-MCNC: <0.2 MG/DL
BILIRUB SERPL-MCNC: <0.2 MG/DL — SIGNIFICANT CHANGE UP (ref 0.2–1.2)
BILIRUB UR-MCNC: NEGATIVE — SIGNIFICANT CHANGE UP
BLD GP AB SCN SERPL QL: NEGATIVE — SIGNIFICANT CHANGE UP
BLOOD GAS ARTERIAL - LYTES,HGB,ICA,LACT RESULT: SIGNIFICANT CHANGE UP
BLOOD GAS ARTERIAL - LYTES,HGB,ICA,LACT RESULT: SIGNIFICANT CHANGE UP
BLOOD GAS VENOUS COMPREHENSIVE RESULT: SIGNIFICANT CHANGE UP
BUN SERPL-MCNC: 12 MG/DL — SIGNIFICANT CHANGE UP (ref 7–23)
BUN SERPL-MCNC: 14 MG/DL — SIGNIFICANT CHANGE UP (ref 7–23)
BUN SERPL-MCNC: 15 MG/DL
BUN SERPL-MCNC: 15 MG/DL — SIGNIFICANT CHANGE UP (ref 7–23)
BUN SERPL-MCNC: 17 MG/DL — SIGNIFICANT CHANGE UP (ref 7–23)
BUN SERPL-MCNC: 20 MG/DL — SIGNIFICANT CHANGE UP (ref 7–23)
BUN SERPL-MCNC: 21 MG/DL
BUN SERPL-MCNC: 21 MG/DL — SIGNIFICANT CHANGE UP (ref 7–23)
BUN SERPL-MCNC: 24 MG/DL — HIGH (ref 7–23)
BUN SERPL-MCNC: 24 MG/DL — HIGH (ref 7–23)
BUN SERPL-MCNC: 25 MG/DL
BUN SERPL-MCNC: 25 MG/DL
BUN SERPL-MCNC: 27 MG/DL — HIGH (ref 7–23)
BUN SERPL-MCNC: 30 MG/DL
BUN SERPL-MCNC: 35 MG/DL — HIGH (ref 7–23)
BUN SERPL-MCNC: 38 MG/DL — HIGH (ref 7–23)
BUN SERPL-MCNC: 38 MG/DL — HIGH (ref 7–23)
BUN SERPL-MCNC: 40 MG/DL — HIGH (ref 7–23)
BUN SERPL-MCNC: 42 MG/DL — HIGH (ref 7–23)
BUN SERPL-MCNC: 45 MG/DL — HIGH (ref 7–23)
BUN SERPL-MCNC: 45 MG/DL — HIGH (ref 7–23)
BUN SERPL-MCNC: 46 MG/DL — HIGH (ref 7–23)
BUN SERPL-MCNC: 57 MG/DL — HIGH (ref 7–23)
BUN SERPL-MCNC: 57 MG/DL — HIGH (ref 7–23)
BUN SERPL-MCNC: 9 MG/DL — SIGNIFICANT CHANGE UP (ref 7–23)
BURR CELLS BLD QL SMEAR: PRESENT — SIGNIFICANT CHANGE UP
CA-I BLD-SCNC: 0.99 MMOL/L — LOW (ref 1.15–1.29)
CA-I SERPL-SCNC: 1.18 MMOL/L — SIGNIFICANT CHANGE UP (ref 1.15–1.33)
CALCIUM SERPL-MCNC: 6.7 MG/DL — LOW (ref 8.4–10.5)
CALCIUM SERPL-MCNC: 7.2 MG/DL — LOW (ref 8.4–10.5)
CALCIUM SERPL-MCNC: 7.4 MG/DL — LOW (ref 8.4–10.5)
CALCIUM SERPL-MCNC: 7.4 MG/DL — LOW (ref 8.4–10.5)
CALCIUM SERPL-MCNC: 7.5 MG/DL — LOW (ref 8.4–10.5)
CALCIUM SERPL-MCNC: 7.6 MG/DL — LOW (ref 8.4–10.5)
CALCIUM SERPL-MCNC: 7.8 MG/DL — LOW (ref 8.4–10.5)
CALCIUM SERPL-MCNC: 7.9 MG/DL — LOW (ref 8.4–10.5)
CALCIUM SERPL-MCNC: 8 MG/DL — LOW (ref 8.4–10.5)
CALCIUM SERPL-MCNC: 8.2 MG/DL — LOW (ref 8.4–10.5)
CALCIUM SERPL-MCNC: 8.5 MG/DL
CALCIUM SERPL-MCNC: 8.6 MG/DL
CALCIUM SERPL-MCNC: 8.7 MG/DL
CALCIUM SERPL-MCNC: 8.8 MG/DL — SIGNIFICANT CHANGE UP (ref 8.4–10.5)
CALCIUM SERPL-MCNC: 9.1 MG/DL
CALCIUM SERPL-MCNC: 9.1 MG/DL
CEA SERPL-MCNC: 40.2 NG/ML
CEA SERPL-MCNC: 67.2 NG/ML
CHLORIDE BLDV-SCNC: 92 MMOL/L — LOW (ref 96–108)
CHLORIDE BLDV-SCNC: 95 MMOL/L — LOW (ref 96–108)
CHLORIDE BLDV-SCNC: 95 MMOL/L — LOW (ref 96–108)
CHLORIDE SERPL-SCNC: 100 MMOL/L
CHLORIDE SERPL-SCNC: 100 MMOL/L — SIGNIFICANT CHANGE UP (ref 98–107)
CHLORIDE SERPL-SCNC: 102 MMOL/L — SIGNIFICANT CHANGE UP (ref 98–107)
CHLORIDE SERPL-SCNC: 102 MMOL/L — SIGNIFICANT CHANGE UP (ref 98–107)
CHLORIDE SERPL-SCNC: 103 MMOL/L — SIGNIFICANT CHANGE UP (ref 98–107)
CHLORIDE SERPL-SCNC: 104 MMOL/L — SIGNIFICANT CHANGE UP (ref 98–107)
CHLORIDE SERPL-SCNC: 106 MMOL/L — SIGNIFICANT CHANGE UP (ref 98–107)
CHLORIDE SERPL-SCNC: 107 MMOL/L — SIGNIFICANT CHANGE UP (ref 98–107)
CHLORIDE SERPL-SCNC: 87 MMOL/L — LOW (ref 98–107)
CHLORIDE SERPL-SCNC: 87 MMOL/L — LOW (ref 98–107)
CHLORIDE SERPL-SCNC: 90 MMOL/L — LOW (ref 98–107)
CHLORIDE SERPL-SCNC: 91 MMOL/L — LOW (ref 98–107)
CHLORIDE SERPL-SCNC: 91 MMOL/L — LOW (ref 98–107)
CHLORIDE SERPL-SCNC: 93 MMOL/L
CHLORIDE SERPL-SCNC: 95 MMOL/L — LOW (ref 98–107)
CHLORIDE SERPL-SCNC: 96 MMOL/L
CHLORIDE SERPL-SCNC: 97 MMOL/L
CHLORIDE SERPL-SCNC: 98 MMOL/L
CHOLEST SERPL-MCNC: 99 MG/DL — SIGNIFICANT CHANGE UP
CK MB BLD-MCNC: 13.7 % — HIGH (ref 0–2.5)
CK MB CFR SERPL CALC: 4.8 NG/ML — HIGH
CK SERPL-CCNC: 35 U/L — SIGNIFICANT CHANGE UP (ref 25–170)
CO2 BLDV-SCNC: 12.1 MMOL/L — LOW (ref 22–26)
CO2 BLDV-SCNC: 12.1 MMOL/L — LOW (ref 22–26)
CO2 BLDV-SCNC: 26.4 MMOL/L — HIGH (ref 22–26)
CO2 SERPL-SCNC: 13 MMOL/L — LOW (ref 22–31)
CO2 SERPL-SCNC: 13 MMOL/L — LOW (ref 22–31)
CO2 SERPL-SCNC: 14 MMOL/L — LOW (ref 22–31)
CO2 SERPL-SCNC: 14 MMOL/L — LOW (ref 22–31)
CO2 SERPL-SCNC: 16 MMOL/L — LOW (ref 22–31)
CO2 SERPL-SCNC: 16 MMOL/L — LOW (ref 22–31)
CO2 SERPL-SCNC: 18 MMOL/L — LOW (ref 22–31)
CO2 SERPL-SCNC: 18 MMOL/L — LOW (ref 22–31)
CO2 SERPL-SCNC: 19 MMOL/L — LOW (ref 22–31)
CO2 SERPL-SCNC: 20 MMOL/L — LOW (ref 22–31)
CO2 SERPL-SCNC: 21 MMOL/L — LOW (ref 22–31)
CO2 SERPL-SCNC: 22 MMOL/L — SIGNIFICANT CHANGE UP (ref 22–31)
CO2 SERPL-SCNC: 23 MMOL/L
CO2 SERPL-SCNC: 23 MMOL/L
CO2 SERPL-SCNC: 23 MMOL/L — SIGNIFICANT CHANGE UP (ref 22–31)
CO2 SERPL-SCNC: 23 MMOL/L — SIGNIFICANT CHANGE UP (ref 22–31)
CO2 SERPL-SCNC: 24 MMOL/L
CO2 SERPL-SCNC: 27 MMOL/L
CO2 SERPL-SCNC: 27 MMOL/L — SIGNIFICANT CHANGE UP (ref 22–31)
CO2 SERPL-SCNC: 28 MMOL/L
COLOR FLD: ABNORMAL
COLOR FLD: ABNORMAL
COLOR FLD: SIGNIFICANT CHANGE UP
COLOR SPEC: YELLOW — SIGNIFICANT CHANGE UP
COMMENT - FLUIDS: SIGNIFICANT CHANGE UP
CREAT SERPL-MCNC: 0.53 MG/DL — SIGNIFICANT CHANGE UP (ref 0.5–1.3)
CREAT SERPL-MCNC: 0.59 MG/DL — SIGNIFICANT CHANGE UP (ref 0.5–1.3)
CREAT SERPL-MCNC: 0.6 MG/DL — SIGNIFICANT CHANGE UP (ref 0.5–1.3)
CREAT SERPL-MCNC: 0.6 MG/DL — SIGNIFICANT CHANGE UP (ref 0.5–1.3)
CREAT SERPL-MCNC: 0.61 MG/DL — SIGNIFICANT CHANGE UP (ref 0.5–1.3)
CREAT SERPL-MCNC: 0.61 MG/DL — SIGNIFICANT CHANGE UP (ref 0.5–1.3)
CREAT SERPL-MCNC: 0.62 MG/DL — SIGNIFICANT CHANGE UP (ref 0.5–1.3)
CREAT SERPL-MCNC: 0.69 MG/DL — SIGNIFICANT CHANGE UP (ref 0.5–1.3)
CREAT SERPL-MCNC: 0.69 MG/DL — SIGNIFICANT CHANGE UP (ref 0.5–1.3)
CREAT SERPL-MCNC: 0.71 MG/DL — SIGNIFICANT CHANGE UP (ref 0.5–1.3)
CREAT SERPL-MCNC: 0.72 MG/DL
CREAT SERPL-MCNC: 0.73 MG/DL
CREAT SERPL-MCNC: 0.74 MG/DL
CREAT SERPL-MCNC: 0.76 MG/DL — SIGNIFICANT CHANGE UP (ref 0.5–1.3)
CREAT SERPL-MCNC: 0.77 MG/DL — SIGNIFICANT CHANGE UP (ref 0.5–1.3)
CREAT SERPL-MCNC: 0.79 MG/DL
CREAT SERPL-MCNC: 0.79 MG/DL — SIGNIFICANT CHANGE UP (ref 0.5–1.3)
CREAT SERPL-MCNC: 0.88 MG/DL — SIGNIFICANT CHANGE UP (ref 0.5–1.3)
CREAT SERPL-MCNC: 0.93 MG/DL — SIGNIFICANT CHANGE UP (ref 0.5–1.3)
CREAT SERPL-MCNC: 0.93 MG/DL — SIGNIFICANT CHANGE UP (ref 0.5–1.3)
CREAT SERPL-MCNC: 0.96 MG/DL
CREAT SERPL-MCNC: 1.22 MG/DL — SIGNIFICANT CHANGE UP (ref 0.5–1.3)
CREAT SERPL-MCNC: 1.22 MG/DL — SIGNIFICANT CHANGE UP (ref 0.5–1.3)
CULTURE RESULTS: SIGNIFICANT CHANGE UP
DACRYOCYTES BLD QL SMEAR: SIGNIFICANT CHANGE UP
DIFF PNL FLD: NEGATIVE — SIGNIFICANT CHANGE UP
EGFR: 46 ML/MIN/1.73M2 — LOW
EGFR: 46 ML/MIN/1.73M2 — LOW
EGFR: 62 ML/MIN/1.73M2
EGFR: 64 ML/MIN/1.73M2 — SIGNIFICANT CHANGE UP
EGFR: 64 ML/MIN/1.73M2 — SIGNIFICANT CHANGE UP
EGFR: 68 ML/MIN/1.73M2 — SIGNIFICANT CHANGE UP
EGFR: 78 ML/MIN/1.73M2
EGFR: 78 ML/MIN/1.73M2 — SIGNIFICANT CHANGE UP
EGFR: 80 ML/MIN/1.73M2 — SIGNIFICANT CHANGE UP
EGFR: 82 ML/MIN/1.73M2 — SIGNIFICANT CHANGE UP
EGFR: 84 ML/MIN/1.73M2
EGFR: 86 ML/MIN/1.73M2
EGFR: 87 ML/MIN/1.73M2
EGFR: 89 ML/MIN/1.73M2 — SIGNIFICANT CHANGE UP
EGFR: 90 ML/MIN/1.73M2 — SIGNIFICANT CHANGE UP
EGFR: 90 ML/MIN/1.73M2 — SIGNIFICANT CHANGE UP
EGFR: 93 ML/MIN/1.73M2 — SIGNIFICANT CHANGE UP
EGFR: 94 ML/MIN/1.73M2 — SIGNIFICANT CHANGE UP
EGFR: 96 ML/MIN/1.73M2 — SIGNIFICANT CHANGE UP
ELLIPTOCYTES BLD QL SMEAR: SIGNIFICANT CHANGE UP
EOSINOPHIL # BLD AUTO: 0 K/UL — SIGNIFICANT CHANGE UP (ref 0–0.5)
EOSINOPHIL # BLD AUTO: 0.03 K/UL — SIGNIFICANT CHANGE UP (ref 0–0.5)
EOSINOPHIL # BLD AUTO: 0.04 K/UL — SIGNIFICANT CHANGE UP (ref 0–0.5)
EOSINOPHIL # BLD AUTO: 0.06 K/UL — SIGNIFICANT CHANGE UP (ref 0–0.5)
EOSINOPHIL # BLD AUTO: 0.06 K/UL — SIGNIFICANT CHANGE UP (ref 0–0.5)
EOSINOPHIL # BLD AUTO: 0.13 K/UL — SIGNIFICANT CHANGE UP (ref 0–0.5)
EOSINOPHIL # BLD AUTO: 0.16 K/UL — SIGNIFICANT CHANGE UP (ref 0–0.5)
EOSINOPHIL # BLD AUTO: 0.18 K/UL — SIGNIFICANT CHANGE UP (ref 0–0.5)
EOSINOPHIL # BLD AUTO: 0.19 K/UL — SIGNIFICANT CHANGE UP (ref 0–0.5)
EOSINOPHIL # FLD: 0 % — SIGNIFICANT CHANGE UP
EOSINOPHIL NFR BLD AUTO: 0 % — SIGNIFICANT CHANGE UP (ref 0–6)
EOSINOPHIL NFR BLD AUTO: 0.2 % — SIGNIFICANT CHANGE UP (ref 0–6)
EOSINOPHIL NFR BLD AUTO: 0.4 % — SIGNIFICANT CHANGE UP (ref 0–6)
EOSINOPHIL NFR BLD AUTO: 0.7 % — SIGNIFICANT CHANGE UP (ref 0–6)
EOSINOPHIL NFR BLD AUTO: 0.7 % — SIGNIFICANT CHANGE UP (ref 0–6)
EOSINOPHIL NFR BLD AUTO: 1.3 % — SIGNIFICANT CHANGE UP (ref 0–6)
EOSINOPHIL NFR BLD AUTO: 1.7 % — SIGNIFICANT CHANGE UP (ref 0–6)
EOSINOPHIL NFR BLD AUTO: 1.9 % — SIGNIFICANT CHANGE UP (ref 0–6)
EOSINOPHIL NFR BLD AUTO: 2.6 % — SIGNIFICANT CHANGE UP (ref 0–6)
ESTIMATED AVERAGE GLUCOSE: 154 — SIGNIFICANT CHANGE UP
ESTIMATED AVERAGE GLUCOSE: 154 — SIGNIFICANT CHANGE UP
ESTIMATED AVERAGE GLUCOSE: 157 — SIGNIFICANT CHANGE UP
FLUID INTAKE SUBSTANCE CLASS: SIGNIFICANT CHANGE UP
FOLATE+VIT B12 SERBLD-IMP: 0 % — SIGNIFICANT CHANGE UP
GAS PNL BLDV: 120 MMOL/L — CRITICAL LOW (ref 136–145)
GAS PNL BLDV: 120 MMOL/L — CRITICAL LOW (ref 136–145)
GAS PNL BLDV: 121 MMOL/L — LOW (ref 136–145)
GAS PNL BLDV: 133 MMOL/L — LOW (ref 136–145)
GAS PNL BLDV: SIGNIFICANT CHANGE UP
GIANT PLATELETS BLD QL SMEAR: PRESENT — SIGNIFICANT CHANGE UP
GLUCOSE BLDC GLUCOMTR-MCNC: 107 MG/DL — HIGH (ref 70–99)
GLUCOSE BLDC GLUCOMTR-MCNC: 124 MG/DL — HIGH (ref 70–99)
GLUCOSE BLDC GLUCOMTR-MCNC: 138 MG/DL — HIGH (ref 70–99)
GLUCOSE BLDC GLUCOMTR-MCNC: 144 MG/DL — HIGH (ref 70–99)
GLUCOSE BLDC GLUCOMTR-MCNC: 145 MG/DL — HIGH (ref 70–99)
GLUCOSE BLDC GLUCOMTR-MCNC: 146 MG/DL — HIGH (ref 70–99)
GLUCOSE BLDC GLUCOMTR-MCNC: 154 MG/DL — HIGH (ref 70–99)
GLUCOSE BLDC GLUCOMTR-MCNC: 159 MG/DL — HIGH (ref 70–99)
GLUCOSE BLDC GLUCOMTR-MCNC: 160 MG/DL — HIGH (ref 70–99)
GLUCOSE BLDC GLUCOMTR-MCNC: 165 MG/DL — HIGH (ref 70–99)
GLUCOSE BLDC GLUCOMTR-MCNC: 166 MG/DL — HIGH (ref 70–99)
GLUCOSE BLDC GLUCOMTR-MCNC: 166 MG/DL — HIGH (ref 70–99)
GLUCOSE BLDC GLUCOMTR-MCNC: 168 MG/DL — HIGH (ref 70–99)
GLUCOSE BLDC GLUCOMTR-MCNC: 171 MG/DL — HIGH (ref 70–99)
GLUCOSE BLDC GLUCOMTR-MCNC: 175 MG/DL — HIGH (ref 70–99)
GLUCOSE BLDC GLUCOMTR-MCNC: 175 MG/DL — HIGH (ref 70–99)
GLUCOSE BLDC GLUCOMTR-MCNC: 180 MG/DL — HIGH (ref 70–99)
GLUCOSE BLDC GLUCOMTR-MCNC: 186 MG/DL — HIGH (ref 70–99)
GLUCOSE BLDC GLUCOMTR-MCNC: 187 MG/DL — HIGH (ref 70–99)
GLUCOSE BLDC GLUCOMTR-MCNC: 189 MG/DL — HIGH (ref 70–99)
GLUCOSE BLDC GLUCOMTR-MCNC: 190 MG/DL — HIGH (ref 70–99)
GLUCOSE BLDC GLUCOMTR-MCNC: 192 MG/DL — HIGH (ref 70–99)
GLUCOSE BLDC GLUCOMTR-MCNC: 195 MG/DL — HIGH (ref 70–99)
GLUCOSE BLDC GLUCOMTR-MCNC: 195 MG/DL — HIGH (ref 70–99)
GLUCOSE BLDC GLUCOMTR-MCNC: 198 MG/DL — HIGH (ref 70–99)
GLUCOSE BLDC GLUCOMTR-MCNC: 200 MG/DL — HIGH (ref 70–99)
GLUCOSE BLDC GLUCOMTR-MCNC: 201 MG/DL — HIGH (ref 70–99)
GLUCOSE BLDC GLUCOMTR-MCNC: 201 MG/DL — HIGH (ref 70–99)
GLUCOSE BLDC GLUCOMTR-MCNC: 202 MG/DL — HIGH (ref 70–99)
GLUCOSE BLDC GLUCOMTR-MCNC: 205 MG/DL — HIGH (ref 70–99)
GLUCOSE BLDC GLUCOMTR-MCNC: 209 MG/DL — HIGH (ref 70–99)
GLUCOSE BLDC GLUCOMTR-MCNC: 215 MG/DL — HIGH (ref 70–99)
GLUCOSE BLDC GLUCOMTR-MCNC: 232 MG/DL — HIGH (ref 70–99)
GLUCOSE BLDC GLUCOMTR-MCNC: 240 MG/DL — HIGH (ref 70–99)
GLUCOSE BLDC GLUCOMTR-MCNC: 240 MG/DL — HIGH (ref 70–99)
GLUCOSE BLDC GLUCOMTR-MCNC: 244 MG/DL — HIGH (ref 70–99)
GLUCOSE BLDC GLUCOMTR-MCNC: 250 MG/DL — HIGH (ref 70–99)
GLUCOSE BLDC GLUCOMTR-MCNC: 264 MG/DL — HIGH (ref 70–99)
GLUCOSE BLDC GLUCOMTR-MCNC: 280 MG/DL — HIGH (ref 70–99)
GLUCOSE BLDC GLUCOMTR-MCNC: 285 MG/DL — HIGH (ref 70–99)
GLUCOSE BLDC GLUCOMTR-MCNC: 291 MG/DL — HIGH (ref 70–99)
GLUCOSE BLDC GLUCOMTR-MCNC: 291 MG/DL — HIGH (ref 70–99)
GLUCOSE BLDC GLUCOMTR-MCNC: 300 MG/DL — HIGH (ref 70–99)
GLUCOSE BLDC GLUCOMTR-MCNC: 300 MG/DL — HIGH (ref 70–99)
GLUCOSE BLDC GLUCOMTR-MCNC: 308 MG/DL — HIGH (ref 70–99)
GLUCOSE BLDC GLUCOMTR-MCNC: 308 MG/DL — HIGH (ref 70–99)
GLUCOSE BLDC GLUCOMTR-MCNC: 315 MG/DL — HIGH (ref 70–99)
GLUCOSE BLDC GLUCOMTR-MCNC: 328 MG/DL — HIGH (ref 70–99)
GLUCOSE BLDC GLUCOMTR-MCNC: 52 MG/DL — CRITICAL LOW (ref 70–99)
GLUCOSE BLDC GLUCOMTR-MCNC: 66 MG/DL — LOW (ref 70–99)
GLUCOSE BLDC GLUCOMTR-MCNC: 75 MG/DL — SIGNIFICANT CHANGE UP (ref 70–99)
GLUCOSE BLDV-MCNC: 156 MG/DL — HIGH (ref 70–99)
GLUCOSE BLDV-MCNC: 159 MG/DL — HIGH (ref 70–99)
GLUCOSE BLDV-MCNC: 252 MG/DL — HIGH (ref 70–99)
GLUCOSE BLDV-MCNC: 252 MG/DL — HIGH (ref 70–99)
GLUCOSE FLD-MCNC: 108 MG/DL — SIGNIFICANT CHANGE UP
GLUCOSE FLD-MCNC: 186 MG/DL — SIGNIFICANT CHANGE UP
GLUCOSE FLD-MCNC: 186 MG/DL — SIGNIFICANT CHANGE UP
GLUCOSE SERPL-MCNC: 129 MG/DL — HIGH (ref 70–99)
GLUCOSE SERPL-MCNC: 132 MG/DL — HIGH (ref 70–99)
GLUCOSE SERPL-MCNC: 133 MG/DL — HIGH (ref 70–99)
GLUCOSE SERPL-MCNC: 137 MG/DL
GLUCOSE SERPL-MCNC: 143 MG/DL
GLUCOSE SERPL-MCNC: 146 MG/DL — HIGH (ref 70–99)
GLUCOSE SERPL-MCNC: 147 MG/DL
GLUCOSE SERPL-MCNC: 153 MG/DL — HIGH (ref 70–99)
GLUCOSE SERPL-MCNC: 153 MG/DL — HIGH (ref 70–99)
GLUCOSE SERPL-MCNC: 154 MG/DL — HIGH (ref 70–99)
GLUCOSE SERPL-MCNC: 166 MG/DL — HIGH (ref 70–99)
GLUCOSE SERPL-MCNC: 172 MG/DL — HIGH (ref 70–99)
GLUCOSE SERPL-MCNC: 190 MG/DL — HIGH (ref 70–99)
GLUCOSE SERPL-MCNC: 192 MG/DL — HIGH (ref 70–99)
GLUCOSE SERPL-MCNC: 201 MG/DL — HIGH (ref 70–99)
GLUCOSE SERPL-MCNC: 201 MG/DL — HIGH (ref 70–99)
GLUCOSE SERPL-MCNC: 203 MG/DL — HIGH (ref 70–99)
GLUCOSE SERPL-MCNC: 204 MG/DL — HIGH (ref 70–99)
GLUCOSE SERPL-MCNC: 207 MG/DL — HIGH (ref 70–99)
GLUCOSE SERPL-MCNC: 267 MG/DL
GLUCOSE SERPL-MCNC: 289 MG/DL — HIGH (ref 70–99)
GLUCOSE SERPL-MCNC: 289 MG/DL — HIGH (ref 70–99)
GLUCOSE SERPL-MCNC: 300 MG/DL — HIGH (ref 70–99)
GLUCOSE SERPL-MCNC: 300 MG/DL — HIGH (ref 70–99)
GLUCOSE SERPL-MCNC: 77 MG/DL — SIGNIFICANT CHANGE UP (ref 70–99)
GLUCOSE UR QL: 500 MG/DL
GLUCOSE UR QL: 500 MG/DL
GLUCOSE UR QL: NEGATIVE MG/DL — SIGNIFICANT CHANGE UP
GRAM STN FLD: SIGNIFICANT CHANGE UP
HCO3 BLDV-SCNC: 11 MMOL/L — LOW (ref 22–29)
HCO3 BLDV-SCNC: 11 MMOL/L — LOW (ref 22–29)
HCO3 BLDV-SCNC: 25 MMOL/L — SIGNIFICANT CHANGE UP (ref 22–29)
HCT VFR BLD CALC: 24.7 % — LOW (ref 34.5–45)
HCT VFR BLD CALC: 25.1 % — LOW (ref 34.5–45)
HCT VFR BLD CALC: 25.8 % — LOW (ref 34.5–45)
HCT VFR BLD CALC: 25.8 % — LOW (ref 34.5–45)
HCT VFR BLD CALC: 26.5 % — LOW (ref 34.5–45)
HCT VFR BLD CALC: 27.2 % — LOW (ref 34.5–45)
HCT VFR BLD CALC: 27.5 % — LOW (ref 34.5–45)
HCT VFR BLD CALC: 27.5 % — LOW (ref 34.5–45)
HCT VFR BLD CALC: 27.6 % — LOW (ref 34.5–45)
HCT VFR BLD CALC: 27.8 % — LOW (ref 34.5–45)
HCT VFR BLD CALC: 27.9 % — LOW (ref 34.5–45)
HCT VFR BLD CALC: 28.2 % — LOW (ref 34.5–45)
HCT VFR BLD CALC: 28.2 % — LOW (ref 34.5–45)
HCT VFR BLD CALC: 28.8 % — LOW (ref 34.5–45)
HCT VFR BLD CALC: 29 %
HCT VFR BLD CALC: 29 % — LOW (ref 34.5–45)
HCT VFR BLD CALC: 29.3 % — LOW (ref 34.5–45)
HCT VFR BLD CALC: 32.8 % — LOW (ref 34.5–45)
HCT VFR BLD CALC: 32.8 % — LOW (ref 34.5–45)
HCT VFR BLDA CALC: 24 % — LOW (ref 34.5–46.5)
HCT VFR BLDA CALC: 24 % — LOW (ref 34.5–46.5)
HCT VFR BLDA CALC: 28 % — LOW (ref 34.5–46.5)
HCT VFR BLDA CALC: 30 % — LOW (ref 34.5–46.5)
HCV AB S/CO SERPL IA: 0.16 S/CO — SIGNIFICANT CHANGE UP (ref 0–0.99)
HCV AB S/CO SERPL IA: 0.16 S/CO — SIGNIFICANT CHANGE UP (ref 0–0.99)
HCV AB SERPL-IMP: SIGNIFICANT CHANGE UP
HCV AB SERPL-IMP: SIGNIFICANT CHANGE UP
HDLC SERPL-MCNC: 44 MG/DL — LOW
HGB BLD CALC-MCNC: 8.1 G/DL — LOW (ref 11.7–16.1)
HGB BLD CALC-MCNC: 8.1 G/DL — LOW (ref 11.7–16.1)
HGB BLD CALC-MCNC: 9.2 G/DL — LOW (ref 11.7–16.1)
HGB BLD CALC-MCNC: 9.9 G/DL — LOW (ref 11.7–16.1)
HGB BLD-MCNC: 10.8 G/DL — LOW (ref 11.5–15.5)
HGB BLD-MCNC: 10.8 G/DL — LOW (ref 11.5–15.5)
HGB BLD-MCNC: 7.9 G/DL — LOW (ref 11.5–15.5)
HGB BLD-MCNC: 8.3 G/DL — LOW (ref 11.5–15.5)
HGB BLD-MCNC: 8.5 G/DL — LOW (ref 11.5–15.5)
HGB BLD-MCNC: 8.6 G/DL — LOW (ref 11.5–15.5)
HGB BLD-MCNC: 8.7 G/DL — LOW (ref 11.5–15.5)
HGB BLD-MCNC: 8.7 G/DL — LOW (ref 11.5–15.5)
HGB BLD-MCNC: 8.9 G/DL — LOW (ref 11.5–15.5)
HGB BLD-MCNC: 9 G/DL
HGB BLD-MCNC: 9 G/DL — LOW (ref 11.5–15.5)
HGB BLD-MCNC: 9.1 G/DL — LOW (ref 11.5–15.5)
HGB BLD-MCNC: 9.2 G/DL — LOW (ref 11.5–15.5)
HGB BLD-MCNC: 9.3 G/DL — LOW (ref 11.5–15.5)
HGB BLD-MCNC: 9.3 G/DL — LOW (ref 11.5–15.5)
HGB BLD-MCNC: 9.4 G/DL — LOW (ref 11.5–15.5)
IANC: 11.78 K/UL — HIGH (ref 1.8–7.4)
IANC: 14.58 K/UL — HIGH (ref 1.8–7.4)
IANC: 14.92 K/UL — HIGH (ref 1.8–7.4)
IANC: 18.49 K/UL — HIGH (ref 1.8–7.4)
IANC: 27.49 K/UL — HIGH (ref 1.8–7.4)
IANC: 5.62 K/UL — SIGNIFICANT CHANGE UP (ref 1.8–7.4)
IANC: 5.92 K/UL — SIGNIFICANT CHANGE UP (ref 1.8–7.4)
IANC: 8.1 K/UL — HIGH (ref 1.8–7.4)
IANC: 9.09 K/UL — HIGH (ref 1.8–7.4)
IANC: 9.09 K/UL — HIGH (ref 1.8–7.4)
IANC: 9.46 K/UL — HIGH (ref 1.8–7.4)
IANC: 9.46 K/UL — HIGH (ref 1.8–7.4)
IMM GRANULOCYTES NFR BLD AUTO: 0.4 % — SIGNIFICANT CHANGE UP (ref 0–0.9)
IMM GRANULOCYTES NFR BLD AUTO: 0.6 % — SIGNIFICANT CHANGE UP (ref 0–0.9)
IMM GRANULOCYTES NFR BLD AUTO: 0.7 % — SIGNIFICANT CHANGE UP (ref 0–0.9)
IMM GRANULOCYTES NFR BLD AUTO: 0.7 % — SIGNIFICANT CHANGE UP (ref 0–0.9)
IMM GRANULOCYTES NFR BLD AUTO: 0.8 % — SIGNIFICANT CHANGE UP (ref 0–0.9)
IMM GRANULOCYTES NFR BLD AUTO: 0.9 % — SIGNIFICANT CHANGE UP (ref 0–0.9)
IMM GRANULOCYTES NFR BLD AUTO: 1.7 % — HIGH (ref 0–0.9)
IMM GRANULOCYTES NFR BLD AUTO: 1.7 % — HIGH (ref 0–0.9)
INR BLD: 1.11 RATIO — SIGNIFICANT CHANGE UP (ref 0.85–1.18)
INR BLD: 1.12 RATIO — SIGNIFICANT CHANGE UP (ref 0.85–1.18)
INR BLD: 1.12 RATIO — SIGNIFICANT CHANGE UP (ref 0.85–1.18)
INR BLD: 1.15 RATIO — SIGNIFICANT CHANGE UP (ref 0.85–1.18)
KETONES UR-MCNC: 15 MG/DL
KETONES UR-MCNC: 15 MG/DL
KETONES UR-MCNC: NEGATIVE MG/DL — SIGNIFICANT CHANGE UP
LACTATE BLDV-MCNC: 1.7 MMOL/L — SIGNIFICANT CHANGE UP (ref 0.5–2)
LACTATE BLDV-MCNC: 6.7 MMOL/L — CRITICAL HIGH (ref 0.5–2)
LACTATE BLDV-MCNC: 6.7 MMOL/L — CRITICAL HIGH (ref 0.5–2)
LDH SERPL L TO P-CCNC: 1326 U/L — SIGNIFICANT CHANGE UP
LDH SERPL L TO P-CCNC: 1326 U/L — SIGNIFICANT CHANGE UP
LDH SERPL L TO P-CCNC: 450 U/L — SIGNIFICANT CHANGE UP
LDH SERPL-CCNC: 203 U/L
LDH SERPL-CCNC: 241 U/L
LEUKOCYTE ESTERASE UR-ACNC: ABNORMAL
LEUKOCYTE ESTERASE UR-ACNC: NEGATIVE — SIGNIFICANT CHANGE UP
LEUKOCYTE ESTERASE UR-ACNC: NEGATIVE — SIGNIFICANT CHANGE UP
LIDOCAIN IGE QN: 22 U/L — SIGNIFICANT CHANGE UP (ref 7–60)
LIDOCAIN IGE QN: 22 U/L — SIGNIFICANT CHANGE UP (ref 7–60)
LIPID PNL WITH DIRECT LDL SERPL: 38 MG/DL — SIGNIFICANT CHANGE UP
LYMPHOCYTES # BLD AUTO: 0 % — LOW (ref 13–44)
LYMPHOCYTES # BLD AUTO: 0 K/UL — LOW (ref 1–3.3)
LYMPHOCYTES # BLD AUTO: 0.26 K/UL — LOW (ref 1–3.3)
LYMPHOCYTES # BLD AUTO: 0.45 K/UL — LOW (ref 1–3.3)
LYMPHOCYTES # BLD AUTO: 0.6 K/UL — LOW (ref 1–3.3)
LYMPHOCYTES # BLD AUTO: 0.6 K/UL — LOW (ref 1–3.3)
LYMPHOCYTES # BLD AUTO: 0.63 K/UL — LOW (ref 1–3.3)
LYMPHOCYTES # BLD AUTO: 0.64 K/UL — LOW (ref 1–3.3)
LYMPHOCYTES # BLD AUTO: 0.65 K/UL — LOW (ref 1–3.3)
LYMPHOCYTES # BLD AUTO: 0.75 K/UL — LOW (ref 1–3.3)
LYMPHOCYTES # BLD AUTO: 0.75 K/UL — LOW (ref 1–3.3)
LYMPHOCYTES # BLD AUTO: 0.78 K/UL — LOW (ref 1–3.3)
LYMPHOCYTES # BLD AUTO: 0.78 K/UL — LOW (ref 1–3.3)
LYMPHOCYTES # BLD AUTO: 0.83 K/UL — LOW (ref 1–3.3)
LYMPHOCYTES # BLD AUTO: 0.9 % — LOW (ref 13–44)
LYMPHOCYTES # BLD AUTO: 0.92 K/UL — LOW (ref 1–3.3)
LYMPHOCYTES # BLD AUTO: 0.92 K/UL — LOW (ref 1–3.3)
LYMPHOCYTES # BLD AUTO: 2.8 % — LOW (ref 13–44)
LYMPHOCYTES # BLD AUTO: 3.5 % — LOW (ref 13–44)
LYMPHOCYTES # BLD AUTO: 4.7 % — LOW (ref 13–44)
LYMPHOCYTES # BLD AUTO: 5.7 % — LOW (ref 13–44)
LYMPHOCYTES # BLD AUTO: 5.7 % — LOW (ref 13–44)
LYMPHOCYTES # BLD AUTO: 7.7 % — LOW (ref 13–44)
LYMPHOCYTES # BLD AUTO: 8.2 % — LOW (ref 13–44)
LYMPHOCYTES # BLD AUTO: 8.2 % — LOW (ref 13–44)
LYMPHOCYTES # BLD AUTO: 9.1 % — LOW (ref 13–44)
LYMPHOCYTES # BLD AUTO: 9.7 % — LOW (ref 13–44)
LYMPHOCYTES # BLD AUTO: 9.7 % — LOW (ref 13–44)
LYMPHOCYTES # BLD AUTO: 9.8 % — LOW (ref 13–44)
LYMPHOCYTES # BLD AUTO: 9.9 % — LOW (ref 13–44)
LYMPHOCYTES # FLD: 17 % — SIGNIFICANT CHANGE UP
LYMPHOCYTES # FLD: 36 % — SIGNIFICANT CHANGE UP
LYMPHOCYTES # FLD: 36 % — SIGNIFICANT CHANGE UP
MAGNESIUM SERPL-MCNC: 2 MG/DL — SIGNIFICANT CHANGE UP (ref 1.6–2.6)
MAGNESIUM SERPL-MCNC: 2.1 MG/DL
MAGNESIUM SERPL-MCNC: 2.1 MG/DL — SIGNIFICANT CHANGE UP (ref 1.6–2.6)
MAGNESIUM SERPL-MCNC: 2.1 MG/DL — SIGNIFICANT CHANGE UP (ref 1.6–2.6)
MAGNESIUM SERPL-MCNC: 2.2 MG/DL
MAGNESIUM SERPL-MCNC: 2.2 MG/DL — SIGNIFICANT CHANGE UP (ref 1.6–2.6)
MAGNESIUM SERPL-MCNC: 2.3 MG/DL — SIGNIFICANT CHANGE UP (ref 1.6–2.6)
MAGNESIUM SERPL-MCNC: 2.4 MG/DL — SIGNIFICANT CHANGE UP (ref 1.6–2.6)
MAGNESIUM SERPL-MCNC: 2.5 MG/DL — SIGNIFICANT CHANGE UP (ref 1.6–2.6)
MAGNESIUM SERPL-MCNC: 2.6 MG/DL — SIGNIFICANT CHANGE UP (ref 1.6–2.6)
MAGNESIUM SERPL-MCNC: 2.7 MG/DL — HIGH (ref 1.6–2.6)
MAGNESIUM SERPL-MCNC: 2.7 MG/DL — HIGH (ref 1.6–2.6)
MANUAL SMEAR VERIFICATION: SIGNIFICANT CHANGE UP
MCHC RBC-ENTMCNC: 20.7 PG — LOW (ref 27–34)
MCHC RBC-ENTMCNC: 20.8 PG — LOW (ref 27–34)
MCHC RBC-ENTMCNC: 20.8 PG — LOW (ref 27–34)
MCHC RBC-ENTMCNC: 21.3 PG — LOW (ref 27–34)
MCHC RBC-ENTMCNC: 22.1 PG — LOW (ref 27–34)
MCHC RBC-ENTMCNC: 22.3 PG — LOW (ref 27–34)
MCHC RBC-ENTMCNC: 22.7 PG — LOW (ref 27–34)
MCHC RBC-ENTMCNC: 22.7 PG — LOW (ref 27–34)
MCHC RBC-ENTMCNC: 22.8 PG — LOW (ref 27–34)
MCHC RBC-ENTMCNC: 23.4 PG
MCHC RBC-ENTMCNC: 24.2 PG — LOW (ref 27–34)
MCHC RBC-ENTMCNC: 24.5 PG — LOW (ref 27–34)
MCHC RBC-ENTMCNC: 24.9 PG — LOW (ref 27–34)
MCHC RBC-ENTMCNC: 25 PG — LOW (ref 27–34)
MCHC RBC-ENTMCNC: 25.1 PG — LOW (ref 27–34)
MCHC RBC-ENTMCNC: 25.1 PG — LOW (ref 27–34)
MCHC RBC-ENTMCNC: 25.4 PG — LOW (ref 27–34)
MCHC RBC-ENTMCNC: 25.5 PG — LOW (ref 27–34)
MCHC RBC-ENTMCNC: 25.5 PG — LOW (ref 27–34)
MCHC RBC-ENTMCNC: 29.7 GM/DL — LOW (ref 32–36)
MCHC RBC-ENTMCNC: 29.9 GM/DL — LOW (ref 32–36)
MCHC RBC-ENTMCNC: 30.5 GM/DL — LOW (ref 32–36)
MCHC RBC-ENTMCNC: 30.5 GM/DL — LOW (ref 32–36)
MCHC RBC-ENTMCNC: 31 GM/DL
MCHC RBC-ENTMCNC: 31.1 GM/DL — LOW (ref 32–36)
MCHC RBC-ENTMCNC: 31.3 G/DL — LOW (ref 32–36)
MCHC RBC-ENTMCNC: 31.5 GM/DL — LOW (ref 32–36)
MCHC RBC-ENTMCNC: 32.1 GM/DL — SIGNIFICANT CHANGE UP (ref 32–36)
MCHC RBC-ENTMCNC: 32.2 GM/DL — SIGNIFICANT CHANGE UP (ref 32–36)
MCHC RBC-ENTMCNC: 32.6 GM/DL — SIGNIFICANT CHANGE UP (ref 32–36)
MCHC RBC-ENTMCNC: 32.7 GM/DL — SIGNIFICANT CHANGE UP (ref 32–36)
MCHC RBC-ENTMCNC: 32.9 G/DL — SIGNIFICANT CHANGE UP (ref 32–36)
MCHC RBC-ENTMCNC: 32.9 G/DL — SIGNIFICANT CHANGE UP (ref 32–36)
MCHC RBC-ENTMCNC: 33.3 GM/DL — SIGNIFICANT CHANGE UP (ref 32–36)
MCHC RBC-ENTMCNC: 33.3 GM/DL — SIGNIFICANT CHANGE UP (ref 32–36)
MCHC RBC-ENTMCNC: 33.5 GM/DL — SIGNIFICANT CHANGE UP (ref 32–36)
MCHC RBC-ENTMCNC: 33.6 GM/DL — SIGNIFICANT CHANGE UP (ref 32–36)
MCHC RBC-ENTMCNC: 33.7 GM/DL — SIGNIFICANT CHANGE UP (ref 32–36)
MCHC RBC-ENTMCNC: 33.7 GM/DL — SIGNIFICANT CHANGE UP (ref 32–36)
MCHC RBC-ENTMCNC: 34.2 GM/DL — SIGNIFICANT CHANGE UP (ref 32–36)
MCHC RBC-ENTMCNC: 34.2 GM/DL — SIGNIFICANT CHANGE UP (ref 32–36)
MCV RBC AUTO: 68 FL — LOW (ref 80–100)
MCV RBC AUTO: 69.4 FL — LOW (ref 80–100)
MCV RBC AUTO: 69.4 FL — LOW (ref 80–100)
MCV RBC AUTO: 69.6 FL — LOW (ref 80–100)
MCV RBC AUTO: 69.7 FL — LOW (ref 80–100)
MCV RBC AUTO: 69.9 FL — LOW (ref 80–100)
MCV RBC AUTO: 70.2 FL — LOW (ref 80–100)
MCV RBC AUTO: 70.3 FL — LOW (ref 80–100)
MCV RBC AUTO: 71.8 FL — LOW (ref 80–100)
MCV RBC AUTO: 71.8 FL — LOW (ref 80–100)
MCV RBC AUTO: 73.4 FL — LOW (ref 80–100)
MCV RBC AUTO: 73.6 FL — LOW (ref 80–100)
MCV RBC AUTO: 73.6 FL — LOW (ref 80–100)
MCV RBC AUTO: 74.7 FL — LOW (ref 80–100)
MCV RBC AUTO: 75.3 FL
MCV RBC AUTO: 75.5 FL — LOW (ref 80–100)
MCV RBC AUTO: 75.7 FL — LOW (ref 80–100)
MCV RBC AUTO: 75.7 FL — LOW (ref 80–100)
MCV RBC AUTO: 76.3 FL — LOW (ref 80–100)
MCV RBC AUTO: 76.3 FL — LOW (ref 80–100)
MCV RBC AUTO: 77.1 FL — LOW (ref 80–100)
MCV RBC AUTO: 77.5 FL — LOW (ref 80–100)
MESOTHL CELL # FLD: 0 % — SIGNIFICANT CHANGE UP
MICROCYTES BLD QL: SLIGHT — SIGNIFICANT CHANGE UP
MONOCYTES # BLD AUTO: 0.26 K/UL — SIGNIFICANT CHANGE UP (ref 0–0.9)
MONOCYTES # BLD AUTO: 0.64 K/UL — SIGNIFICANT CHANGE UP (ref 0–0.9)
MONOCYTES # BLD AUTO: 0.67 K/UL — SIGNIFICANT CHANGE UP (ref 0–0.9)
MONOCYTES # BLD AUTO: 0.67 K/UL — SIGNIFICANT CHANGE UP (ref 0–0.9)
MONOCYTES # BLD AUTO: 0.7 K/UL — SIGNIFICANT CHANGE UP (ref 0–0.9)
MONOCYTES # BLD AUTO: 0.74 K/UL — SIGNIFICANT CHANGE UP (ref 0–0.9)
MONOCYTES # BLD AUTO: 0.76 K/UL — SIGNIFICANT CHANGE UP (ref 0–0.9)
MONOCYTES # BLD AUTO: 0.76 K/UL — SIGNIFICANT CHANGE UP (ref 0–0.9)
MONOCYTES # BLD AUTO: 0.8 K/UL — SIGNIFICANT CHANGE UP (ref 0–0.9)
MONOCYTES # BLD AUTO: 1 K/UL — HIGH (ref 0–0.9)
MONOCYTES # BLD AUTO: 1.06 K/UL — HIGH (ref 0–0.9)
MONOCYTES # BLD AUTO: 1.06 K/UL — HIGH (ref 0–0.9)
MONOCYTES # BLD AUTO: 1.09 K/UL — HIGH (ref 0–0.9)
MONOCYTES NFR BLD AUTO: 0.9 % — LOW (ref 2–14)
MONOCYTES NFR BLD AUTO: 5.3 % — SIGNIFICANT CHANGE UP (ref 2–14)
MONOCYTES NFR BLD AUTO: 6 % — SIGNIFICANT CHANGE UP (ref 2–14)
MONOCYTES NFR BLD AUTO: 6 % — SIGNIFICANT CHANGE UP (ref 2–14)
MONOCYTES NFR BLD AUTO: 6.1 % — SIGNIFICANT CHANGE UP (ref 2–14)
MONOCYTES NFR BLD AUTO: 6.5 % — SIGNIFICANT CHANGE UP (ref 2–14)
MONOCYTES NFR BLD AUTO: 7.2 % — SIGNIFICANT CHANGE UP (ref 2–14)
MONOCYTES NFR BLD AUTO: 7.2 % — SIGNIFICANT CHANGE UP (ref 2–14)
MONOCYTES NFR BLD AUTO: 7.3 % — SIGNIFICANT CHANGE UP (ref 2–14)
MONOCYTES NFR BLD AUTO: 7.6 % — SIGNIFICANT CHANGE UP (ref 2–14)
MONOCYTES NFR BLD AUTO: 8 % — SIGNIFICANT CHANGE UP (ref 2–14)
MONOCYTES NFR BLD AUTO: 8 % — SIGNIFICANT CHANGE UP (ref 2–14)
MONOCYTES NFR BLD AUTO: 8.9 % — SIGNIFICANT CHANGE UP (ref 2–14)
MONOCYTES NFR BLD AUTO: 9.2 % — SIGNIFICANT CHANGE UP (ref 2–14)
MONOCYTES NFR BLD AUTO: 9.6 % — SIGNIFICANT CHANGE UP (ref 2–14)
MONOS+MACROS # FLD: 3 % — SIGNIFICANT CHANGE UP
MONOS+MACROS # FLD: 3 % — SIGNIFICANT CHANGE UP
MONOS+MACROS # FLD: 67 % — SIGNIFICANT CHANGE UP
NEUTROPHILS # BLD AUTO: 11.78 K/UL — HIGH (ref 1.8–7.4)
NEUTROPHILS # BLD AUTO: 14.58 K/UL — HIGH (ref 1.8–7.4)
NEUTROPHILS # BLD AUTO: 15.9 K/UL — HIGH (ref 1.8–7.4)
NEUTROPHILS # BLD AUTO: 18.94 K/UL — HIGH (ref 1.8–7.4)
NEUTROPHILS # BLD AUTO: 27.87 K/UL — HIGH (ref 1.8–7.4)
NEUTROPHILS # BLD AUTO: 5.62 K/UL — SIGNIFICANT CHANGE UP (ref 1.8–7.4)
NEUTROPHILS # BLD AUTO: 5.92 K/UL — SIGNIFICANT CHANGE UP (ref 1.8–7.4)
NEUTROPHILS # BLD AUTO: 6.41 K/UL — SIGNIFICANT CHANGE UP (ref 1.8–7.4)
NEUTROPHILS # BLD AUTO: 6.45 K/UL — SIGNIFICANT CHANGE UP (ref 1.8–7.4)
NEUTROPHILS # BLD AUTO: 6.45 K/UL — SIGNIFICANT CHANGE UP (ref 1.8–7.4)
NEUTROPHILS # BLD AUTO: 8.1 K/UL — HIGH (ref 1.8–7.4)
NEUTROPHILS # BLD AUTO: 9.09 K/UL — HIGH (ref 1.8–7.4)
NEUTROPHILS # BLD AUTO: 9.09 K/UL — HIGH (ref 1.8–7.4)
NEUTROPHILS # BLD AUTO: 9.46 K/UL — HIGH (ref 1.8–7.4)
NEUTROPHILS # BLD AUTO: 9.46 K/UL — HIGH (ref 1.8–7.4)
NEUTROPHILS NFR BLD AUTO: 76.7 % — SIGNIFICANT CHANGE UP (ref 43–77)
NEUTROPHILS NFR BLD AUTO: 77.7 % — HIGH (ref 43–77)
NEUTROPHILS NFR BLD AUTO: 78.3 % — HIGH (ref 43–77)
NEUTROPHILS NFR BLD AUTO: 80.1 % — HIGH (ref 43–77)
NEUTROPHILS NFR BLD AUTO: 80.1 % — HIGH (ref 43–77)
NEUTROPHILS NFR BLD AUTO: 83.2 % — HIGH (ref 43–77)
NEUTROPHILS NFR BLD AUTO: 83.9 % — HIGH (ref 43–77)
NEUTROPHILS NFR BLD AUTO: 83.9 % — HIGH (ref 43–77)
NEUTROPHILS NFR BLD AUTO: 85.6 % — HIGH (ref 43–77)
NEUTROPHILS NFR BLD AUTO: 86 % — HIGH (ref 43–77)
NEUTROPHILS NFR BLD AUTO: 86 % — HIGH (ref 43–77)
NEUTROPHILS NFR BLD AUTO: 88.7 % — HIGH (ref 43–77)
NEUTROPHILS NFR BLD AUTO: 89.3 % — HIGH (ref 43–77)
NEUTROPHILS NFR BLD AUTO: 89.6 % — HIGH (ref 43–77)
NEUTROPHILS NFR BLD AUTO: 90.4 % — HIGH (ref 43–77)
NEUTROPHILS-BODY FLUID: 16 % — SIGNIFICANT CHANGE UP
NEUTROPHILS-BODY FLUID: 61 % — SIGNIFICANT CHANGE UP
NEUTROPHILS-BODY FLUID: 61 % — SIGNIFICANT CHANGE UP
NEUTS BAND # BLD: 5.4 % — SIGNIFICANT CHANGE UP (ref 0–6)
NITRITE UR-MCNC: NEGATIVE — SIGNIFICANT CHANGE UP
NON HDL CHOLESTEROL: 55 MG/DL — SIGNIFICANT CHANGE UP
NON-GYNECOLOGICAL CYTOLOGY STUDY: SIGNIFICANT CHANGE UP
NON-GYNECOLOGICAL CYTOLOGY STUDY: SIGNIFICANT CHANGE UP
NRBC # BLD: 0 /100 WBCS — SIGNIFICANT CHANGE UP (ref 0–0)
NRBC # BLD: 1 /100 WBCS — HIGH (ref 0–0)
NRBC # FLD: 0 K/UL — SIGNIFICANT CHANGE UP (ref 0–0)
NRBC # FLD: 0.02 K/UL — HIGH (ref 0–0)
NRBC # FLD: 0.02 K/UL — HIGH (ref 0–0)
NRBC # FLD: 0.04 K/UL — HIGH (ref 0–0)
NRBC # FLD: 0.06 K/UL — HIGH (ref 0–0)
NT-PROBNP SERPL-SCNC: 284 PG/ML — SIGNIFICANT CHANGE UP
NT-PROBNP SERPL-SCNC: 435 PG/ML — HIGH
NT-PROBNP SERPL-SCNC: 435 PG/ML — HIGH
OSMOLALITY UR: 593 MOSM/KG — SIGNIFICANT CHANGE UP (ref 50–1200)
OSMOLALITY UR: 593 MOSM/KG — SIGNIFICANT CHANGE UP (ref 50–1200)
OTHER CELLS FLD MANUAL: 0 % — SIGNIFICANT CHANGE UP
PCO2 BLDV: 28 MMHG — LOW (ref 39–52)
PCO2 BLDV: 28 MMHG — LOW (ref 39–52)
PCO2 BLDV: 38 MMHG — LOW (ref 39–52)
PH BLDV: 7.21 — LOW (ref 7.32–7.43)
PH BLDV: 7.21 — LOW (ref 7.32–7.43)
PH BLDV: 7.43 — SIGNIFICANT CHANGE UP (ref 7.32–7.43)
PH FLD: 7.7 — SIGNIFICANT CHANGE UP
PH UR: 6 — SIGNIFICANT CHANGE UP (ref 5–8)
PHOSPHATE SERPL-MCNC: 1.6 MG/DL — LOW (ref 2.5–4.5)
PHOSPHATE SERPL-MCNC: 1.8 MG/DL — LOW (ref 2.5–4.5)
PHOSPHATE SERPL-MCNC: 1.9 MG/DL — LOW (ref 2.5–4.5)
PHOSPHATE SERPL-MCNC: 2.3 MG/DL — LOW (ref 2.5–4.5)
PHOSPHATE SERPL-MCNC: 2.3 MG/DL — LOW (ref 2.5–4.5)
PHOSPHATE SERPL-MCNC: 2.5 MG/DL — SIGNIFICANT CHANGE UP (ref 2.5–4.5)
PHOSPHATE SERPL-MCNC: 2.6 MG/DL — SIGNIFICANT CHANGE UP (ref 2.5–4.5)
PHOSPHATE SERPL-MCNC: 3.1 MG/DL — SIGNIFICANT CHANGE UP (ref 2.5–4.5)
PHOSPHATE SERPL-MCNC: 3.2 MG/DL — SIGNIFICANT CHANGE UP (ref 2.5–4.5)
PHOSPHATE SERPL-MCNC: 3.4 MG/DL — SIGNIFICANT CHANGE UP (ref 2.5–4.5)
PHOSPHATE SERPL-MCNC: 3.4 MG/DL — SIGNIFICANT CHANGE UP (ref 2.5–4.5)
PHOSPHATE SERPL-MCNC: 3.7 MG/DL — SIGNIFICANT CHANGE UP (ref 2.5–4.5)
PHOSPHATE SERPL-MCNC: 3.9 MG/DL — SIGNIFICANT CHANGE UP (ref 2.5–4.5)
PLAT MORPH BLD: NORMAL — SIGNIFICANT CHANGE UP
PLATELET # BLD AUTO: 267 K/UL — SIGNIFICANT CHANGE UP (ref 150–400)
PLATELET # BLD AUTO: 288 K/UL — SIGNIFICANT CHANGE UP (ref 150–400)
PLATELET # BLD AUTO: 384 K/UL — SIGNIFICANT CHANGE UP (ref 150–400)
PLATELET # BLD AUTO: 388 K/UL — SIGNIFICANT CHANGE UP (ref 150–400)
PLATELET # BLD AUTO: 389 K/UL — SIGNIFICANT CHANGE UP (ref 150–400)
PLATELET # BLD AUTO: 397 K/UL — SIGNIFICANT CHANGE UP (ref 150–400)
PLATELET # BLD AUTO: 421 K/UL — HIGH (ref 150–400)
PLATELET # BLD AUTO: 421 K/UL — HIGH (ref 150–400)
PLATELET # BLD AUTO: 444 K/UL — HIGH (ref 150–400)
PLATELET # BLD AUTO: 475 K/UL — HIGH (ref 150–400)
PLATELET # BLD AUTO: 475 K/UL — HIGH (ref 150–400)
PLATELET # BLD AUTO: 495 K/UL — HIGH (ref 150–400)
PLATELET # BLD AUTO: 495 K/UL — HIGH (ref 150–400)
PLATELET # BLD AUTO: 504 K/UL — HIGH (ref 150–400)
PLATELET # BLD AUTO: 504 K/UL — HIGH (ref 150–400)
PLATELET # BLD AUTO: 528 K/UL — HIGH (ref 150–400)
PLATELET # BLD AUTO: 565 K/UL — HIGH (ref 150–400)
PLATELET # BLD AUTO: 611 K/UL
PLATELET # BLD AUTO: 670 K/UL — HIGH (ref 150–400)
PLATELET # BLD AUTO: 685 K/UL — HIGH (ref 150–400)
PLATELET # BLD AUTO: 695 K/UL — HIGH (ref 150–400)
PLATELET # BLD AUTO: 708 K/UL — HIGH (ref 150–400)
PLATELET COUNT - ESTIMATE: NORMAL — SIGNIFICANT CHANGE UP
PO2 BLDV: 22 MMHG — LOW (ref 25–45)
PO2 BLDV: 25 MMHG — SIGNIFICANT CHANGE UP (ref 25–45)
PO2 BLDV: 25 MMHG — SIGNIFICANT CHANGE UP (ref 25–45)
POIKILOCYTOSIS BLD QL AUTO: SIGNIFICANT CHANGE UP
POLYCHROMASIA BLD QL SMEAR: SLIGHT — SIGNIFICANT CHANGE UP
POTASSIUM BLDV-SCNC: 4.4 MMOL/L — SIGNIFICANT CHANGE UP (ref 3.5–5.1)
POTASSIUM BLDV-SCNC: 4.5 MMOL/L — SIGNIFICANT CHANGE UP (ref 3.5–5.1)
POTASSIUM BLDV-SCNC: 4.5 MMOL/L — SIGNIFICANT CHANGE UP (ref 3.5–5.1)
POTASSIUM BLDV-SCNC: 6 MMOL/L — HIGH (ref 3.5–5.1)
POTASSIUM SERPL-MCNC: 4.1 MMOL/L — SIGNIFICANT CHANGE UP (ref 3.5–5.3)
POTASSIUM SERPL-MCNC: 4.2 MMOL/L — SIGNIFICANT CHANGE UP (ref 3.5–5.3)
POTASSIUM SERPL-MCNC: 4.4 MMOL/L — SIGNIFICANT CHANGE UP (ref 3.5–5.3)
POTASSIUM SERPL-MCNC: 4.4 MMOL/L — SIGNIFICANT CHANGE UP (ref 3.5–5.3)
POTASSIUM SERPL-MCNC: 4.5 MMOL/L — SIGNIFICANT CHANGE UP (ref 3.5–5.3)
POTASSIUM SERPL-MCNC: 4.7 MMOL/L — SIGNIFICANT CHANGE UP (ref 3.5–5.3)
POTASSIUM SERPL-MCNC: 4.8 MMOL/L — SIGNIFICANT CHANGE UP (ref 3.5–5.3)
POTASSIUM SERPL-MCNC: 4.9 MMOL/L — SIGNIFICANT CHANGE UP (ref 3.5–5.3)
POTASSIUM SERPL-MCNC: 4.9 MMOL/L — SIGNIFICANT CHANGE UP (ref 3.5–5.3)
POTASSIUM SERPL-MCNC: 5 MMOL/L — SIGNIFICANT CHANGE UP (ref 3.5–5.3)
POTASSIUM SERPL-MCNC: 5 MMOL/L — SIGNIFICANT CHANGE UP (ref 3.5–5.3)
POTASSIUM SERPL-MCNC: 5.1 MMOL/L — SIGNIFICANT CHANGE UP (ref 3.5–5.3)
POTASSIUM SERPL-MCNC: 5.3 MMOL/L — SIGNIFICANT CHANGE UP (ref 3.5–5.3)
POTASSIUM SERPL-MCNC: 5.3 MMOL/L — SIGNIFICANT CHANGE UP (ref 3.5–5.3)
POTASSIUM SERPL-MCNC: 5.4 MMOL/L — HIGH (ref 3.5–5.3)
POTASSIUM SERPL-MCNC: 5.4 MMOL/L — HIGH (ref 3.5–5.3)
POTASSIUM SERPL-MCNC: 5.5 MMOL/L — HIGH (ref 3.5–5.3)
POTASSIUM SERPL-MCNC: 5.6 MMOL/L — HIGH (ref 3.5–5.3)
POTASSIUM SERPL-MCNC: 5.6 MMOL/L — HIGH (ref 3.5–5.3)
POTASSIUM SERPL-MCNC: 5.8 MMOL/L — HIGH (ref 3.5–5.3)
POTASSIUM SERPL-SCNC: 4.1 MMOL/L — SIGNIFICANT CHANGE UP (ref 3.5–5.3)
POTASSIUM SERPL-SCNC: 4.2 MMOL/L — SIGNIFICANT CHANGE UP (ref 3.5–5.3)
POTASSIUM SERPL-SCNC: 4.4 MMOL/L — SIGNIFICANT CHANGE UP (ref 3.5–5.3)
POTASSIUM SERPL-SCNC: 4.4 MMOL/L — SIGNIFICANT CHANGE UP (ref 3.5–5.3)
POTASSIUM SERPL-SCNC: 4.5 MMOL/L — SIGNIFICANT CHANGE UP (ref 3.5–5.3)
POTASSIUM SERPL-SCNC: 4.7 MMOL/L — SIGNIFICANT CHANGE UP (ref 3.5–5.3)
POTASSIUM SERPL-SCNC: 4.8 MMOL/L
POTASSIUM SERPL-SCNC: 4.8 MMOL/L — SIGNIFICANT CHANGE UP (ref 3.5–5.3)
POTASSIUM SERPL-SCNC: 4.9 MMOL/L
POTASSIUM SERPL-SCNC: 4.9 MMOL/L — SIGNIFICANT CHANGE UP (ref 3.5–5.3)
POTASSIUM SERPL-SCNC: 4.9 MMOL/L — SIGNIFICANT CHANGE UP (ref 3.5–5.3)
POTASSIUM SERPL-SCNC: 5 MMOL/L — SIGNIFICANT CHANGE UP (ref 3.5–5.3)
POTASSIUM SERPL-SCNC: 5 MMOL/L — SIGNIFICANT CHANGE UP (ref 3.5–5.3)
POTASSIUM SERPL-SCNC: 5.1 MMOL/L — SIGNIFICANT CHANGE UP (ref 3.5–5.3)
POTASSIUM SERPL-SCNC: 5.3 MMOL/L
POTASSIUM SERPL-SCNC: 5.3 MMOL/L — SIGNIFICANT CHANGE UP (ref 3.5–5.3)
POTASSIUM SERPL-SCNC: 5.3 MMOL/L — SIGNIFICANT CHANGE UP (ref 3.5–5.3)
POTASSIUM SERPL-SCNC: 5.4 MMOL/L
POTASSIUM SERPL-SCNC: 5.4 MMOL/L
POTASSIUM SERPL-SCNC: 5.4 MMOL/L — HIGH (ref 3.5–5.3)
POTASSIUM SERPL-SCNC: 5.4 MMOL/L — HIGH (ref 3.5–5.3)
POTASSIUM SERPL-SCNC: 5.5 MMOL/L — HIGH (ref 3.5–5.3)
POTASSIUM SERPL-SCNC: 5.6 MMOL/L — HIGH (ref 3.5–5.3)
POTASSIUM SERPL-SCNC: 5.6 MMOL/L — HIGH (ref 3.5–5.3)
POTASSIUM SERPL-SCNC: 5.8 MMOL/L — HIGH (ref 3.5–5.3)
PROT FLD-MCNC: 3.7 G/DL — SIGNIFICANT CHANGE UP
PROT FLD-MCNC: 4.2 G/DL — SIGNIFICANT CHANGE UP
PROT FLD-MCNC: 4.2 G/DL — SIGNIFICANT CHANGE UP
PROT SERPL-MCNC: 4.6 G/DL — LOW (ref 6–8.3)
PROT SERPL-MCNC: 4.8 G/DL — LOW (ref 6–8.3)
PROT SERPL-MCNC: 5 G/DL — LOW (ref 6–8.3)
PROT SERPL-MCNC: 5 G/DL — LOW (ref 6–8.3)
PROT SERPL-MCNC: 5.1 G/DL — LOW (ref 6–8.3)
PROT SERPL-MCNC: 5.1 G/DL — LOW (ref 6–8.3)
PROT SERPL-MCNC: 5.2 G/DL — LOW (ref 6–8.3)
PROT SERPL-MCNC: 5.5 G/DL — LOW (ref 6–8.3)
PROT SERPL-MCNC: 5.6 G/DL
PROT SERPL-MCNC: 6.2 G/DL
PROT SERPL-MCNC: 6.3 G/DL — SIGNIFICANT CHANGE UP (ref 6–8.3)
PROT SERPL-MCNC: 6.4 G/DL — SIGNIFICANT CHANGE UP (ref 6–8.3)
PROT SERPL-MCNC: 6.5 G/DL
PROT SERPL-MCNC: 7 G/DL
PROT SERPL-MCNC: 7.4 G/DL — SIGNIFICANT CHANGE UP (ref 6–8.3)
PROT UR-MCNC: NEGATIVE MG/DL — SIGNIFICANT CHANGE UP
PROTHROM AB SERPL-ACNC: 12.4 SEC — SIGNIFICANT CHANGE UP (ref 9.5–13)
PROTHROM AB SERPL-ACNC: 12.5 SEC — SIGNIFICANT CHANGE UP (ref 9.5–13)
PROTHROM AB SERPL-ACNC: 12.5 SEC — SIGNIFICANT CHANGE UP (ref 9.5–13)
PROTHROM AB SERPL-ACNC: 12.8 SEC — SIGNIFICANT CHANGE UP (ref 9.5–13)
RBC # BLD: 3.27 M/UL — LOW (ref 3.8–5.2)
RBC # BLD: 3.41 M/UL — LOW (ref 3.8–5.2)
RBC # BLD: 3.41 M/UL — LOW (ref 3.8–5.2)
RBC # BLD: 3.51 M/UL — LOW (ref 3.8–5.2)
RBC # BLD: 3.57 M/UL — LOW (ref 3.8–5.2)
RBC # BLD: 3.58 M/UL — LOW (ref 3.8–5.2)
RBC # BLD: 3.68 M/UL — LOW (ref 3.8–5.2)
RBC # BLD: 3.79 M/UL — LOW (ref 3.8–5.2)
RBC # BLD: 3.79 M/UL — LOW (ref 3.8–5.2)
RBC # BLD: 3.81 M/UL — SIGNIFICANT CHANGE UP (ref 3.8–5.2)
RBC # BLD: 3.83 M/UL — SIGNIFICANT CHANGE UP (ref 3.8–5.2)
RBC # BLD: 3.83 M/UL — SIGNIFICANT CHANGE UP (ref 3.8–5.2)
RBC # BLD: 3.85 M/UL
RBC # BLD: 3.89 M/UL — SIGNIFICANT CHANGE UP (ref 3.8–5.2)
RBC # BLD: 3.96 M/UL — SIGNIFICANT CHANGE UP (ref 3.8–5.2)
RBC # BLD: 3.99 M/UL — SIGNIFICANT CHANGE UP (ref 3.8–5.2)
RBC # BLD: 3.99 M/UL — SIGNIFICANT CHANGE UP (ref 3.8–5.2)
RBC # BLD: 4.1 M/UL — SIGNIFICANT CHANGE UP (ref 3.8–5.2)
RBC # BLD: 4.13 M/UL — SIGNIFICANT CHANGE UP (ref 3.8–5.2)
RBC # BLD: 4.15 M/UL — SIGNIFICANT CHANGE UP (ref 3.8–5.2)
RBC # BLD: 4.3 M/UL — SIGNIFICANT CHANGE UP (ref 3.8–5.2)
RBC # BLD: 4.3 M/UL — SIGNIFICANT CHANGE UP (ref 3.8–5.2)
RBC # FLD: 15.2 % — HIGH (ref 10.3–14.5)
RBC # FLD: 16.3 %
RBC # FLD: 16.4 % — HIGH (ref 10.3–14.5)
RBC # FLD: 16.5 % — HIGH (ref 10.3–14.5)
RBC # FLD: 16.7 % — HIGH (ref 10.3–14.5)
RBC # FLD: 19.3 % — HIGH (ref 10.3–14.5)
RBC # FLD: 19.3 % — HIGH (ref 10.3–14.5)
RBC # FLD: 19.8 % — HIGH (ref 10.3–14.5)
RBC # FLD: 19.8 % — HIGH (ref 10.3–14.5)
RBC # FLD: 20 % — HIGH (ref 10.3–14.5)
RBC # FLD: 20.1 % — HIGH (ref 10.3–14.5)
RBC # FLD: 20.3 % — HIGH (ref 10.3–14.5)
RBC # FLD: 20.3 % — HIGH (ref 10.3–14.5)
RBC # FLD: 20.5 % — HIGH (ref 10.3–14.5)
RBC # FLD: 21.2 % — HIGH (ref 10.3–14.5)
RBC # FLD: 21.2 % — HIGH (ref 10.3–14.5)
RBC # FLD: 21.3 % — HIGH (ref 10.3–14.5)
RBC # FLD: 21.3 % — HIGH (ref 10.3–14.5)
RBC # FLD: 22.1 % — HIGH (ref 10.3–14.5)
RBC # FLD: 22.1 % — HIGH (ref 10.3–14.5)
RBC BLD AUTO: ABNORMAL
RCV VOL RI: HIGH CELLS/UL (ref 0–5)
RH IG SCN BLD-IMP: POSITIVE — SIGNIFICANT CHANGE UP
SAO2 % BLDV: 27.3 % — LOW (ref 67–88)
SAO2 % BLDV: 27.4 % — LOW (ref 67–88)
SAO2 % BLDV: 27.4 % — LOW (ref 67–88)
SODIUM SERPL-SCNC: 121 MMOL/L — LOW (ref 135–145)
SODIUM SERPL-SCNC: 121 MMOL/L — LOW (ref 135–145)
SODIUM SERPL-SCNC: 123 MMOL/L — LOW (ref 135–145)
SODIUM SERPL-SCNC: 124 MMOL/L — LOW (ref 135–145)
SODIUM SERPL-SCNC: 125 MMOL/L — LOW (ref 135–145)
SODIUM SERPL-SCNC: 126 MMOL/L — LOW (ref 135–145)
SODIUM SERPL-SCNC: 126 MMOL/L — LOW (ref 135–145)
SODIUM SERPL-SCNC: 127 MMOL/L — LOW (ref 135–145)
SODIUM SERPL-SCNC: 128 MMOL/L
SODIUM SERPL-SCNC: 129 MMOL/L
SODIUM SERPL-SCNC: 130 MMOL/L — LOW (ref 135–145)
SODIUM SERPL-SCNC: 132 MMOL/L — LOW (ref 135–145)
SODIUM SERPL-SCNC: 132 MMOL/L — LOW (ref 135–145)
SODIUM SERPL-SCNC: 133 MMOL/L
SODIUM SERPL-SCNC: 133 MMOL/L — LOW (ref 135–145)
SODIUM SERPL-SCNC: 134 MMOL/L — LOW (ref 135–145)
SODIUM SERPL-SCNC: 134 MMOL/L — LOW (ref 135–145)
SODIUM SERPL-SCNC: 135 MMOL/L — SIGNIFICANT CHANGE UP (ref 135–145)
SODIUM SERPL-SCNC: 135 MMOL/L — SIGNIFICANT CHANGE UP (ref 135–145)
SODIUM SERPL-SCNC: 136 MMOL/L — SIGNIFICANT CHANGE UP (ref 135–145)
SODIUM UR-SCNC: 50 MMOL/L — SIGNIFICANT CHANGE UP
SODIUM UR-SCNC: 50 MMOL/L — SIGNIFICANT CHANGE UP
SP GR SPEC: 1.02 — SIGNIFICANT CHANGE UP (ref 1–1.03)
SPECIMEN SOURCE: SIGNIFICANT CHANGE UP
T3 SERPL-MCNC: 66 NG/DL — LOW (ref 80–200)
T3 SERPL-MCNC: 66 NG/DL — LOW (ref 80–200)
T4 AB SER-ACNC: 6.77 UG/DL — SIGNIFICANT CHANGE UP (ref 5.1–13)
T4 AB SER-ACNC: 6.77 UG/DL — SIGNIFICANT CHANGE UP (ref 5.1–13)
T4 FREE SERPL-MCNC: 1.2 NG/DL — SIGNIFICANT CHANGE UP (ref 0.9–1.8)
T4 FREE SERPL-MCNC: 1.2 NG/DL — SIGNIFICANT CHANGE UP (ref 0.9–1.8)
TOTAL CELLS COUNTED, BODY FLUID: 100 CELLS — SIGNIFICANT CHANGE UP
TOTAL NUCLEATED CELL COUNT, BODY FLUID: 1065 CELLS/UL — HIGH (ref 0–5)
TOTAL NUCLEATED CELL COUNT, BODY FLUID: 5708 CELLS/UL — HIGH (ref 0–5)
TOTAL NUCLEATED CELL COUNT, BODY FLUID: 5708 CELLS/UL — HIGH (ref 0–5)
TRIGL SERPL-MCNC: 86 MG/DL — SIGNIFICANT CHANGE UP
TROPONIN T, HIGH SENSITIVITY RESULT: 112 NG/L — CRITICAL HIGH
TROPONIN T, HIGH SENSITIVITY RESULT: 113 NG/L — CRITICAL HIGH
TROPONIN T, HIGH SENSITIVITY RESULT: 147 NG/L — CRITICAL HIGH
TROPONIN T, HIGH SENSITIVITY RESULT: 45 NG/L — SIGNIFICANT CHANGE UP
TROPONIN T, HIGH SENSITIVITY RESULT: 45 NG/L — SIGNIFICANT CHANGE UP
TSH SERPL-ACNC: 1.54 UIU/ML
TSH SERPL-ACNC: 2.12 UIU/ML
TSH SERPL-ACNC: 2.13 UIU/ML
TSH SERPL-MCNC: 2.94 UIU/ML — SIGNIFICANT CHANGE UP (ref 0.27–4.2)
TSH SERPL-MCNC: 2.94 UIU/ML — SIGNIFICANT CHANGE UP (ref 0.27–4.2)
TUBE TYPE: SIGNIFICANT CHANGE UP
UROBILINOGEN FLD QL: 0.2 MG/DL — SIGNIFICANT CHANGE UP (ref 0.2–1)
WBC # BLD: 10.56 K/UL — HIGH (ref 3.8–10.5)
WBC # BLD: 10.56 K/UL — HIGH (ref 3.8–10.5)
WBC # BLD: 11.26 K/UL — HIGH (ref 3.8–10.5)
WBC # BLD: 11.26 K/UL — HIGH (ref 3.8–10.5)
WBC # BLD: 13.76 K/UL — HIGH (ref 3.8–10.5)
WBC # BLD: 15.73 K/UL — HIGH (ref 3.8–10.5)
WBC # BLD: 15.73 K/UL — HIGH (ref 3.8–10.5)
WBC # BLD: 16.26 K/UL — HIGH (ref 3.8–10.5)
WBC # BLD: 17.93 K/UL — HIGH (ref 3.8–10.5)
WBC # BLD: 18.03 K/UL — HIGH (ref 3.8–10.5)
WBC # BLD: 20 K/UL — HIGH (ref 3.8–10.5)
WBC # BLD: 20.11 K/UL — HIGH (ref 3.8–10.5)
WBC # BLD: 21.4 K/UL — HIGH (ref 3.8–10.5)
WBC # BLD: 23.14 K/UL — HIGH (ref 3.8–10.5)
WBC # BLD: 28.64 K/UL — HIGH (ref 3.8–10.5)
WBC # BLD: 7.18 K/UL — SIGNIFICANT CHANGE UP (ref 3.8–10.5)
WBC # BLD: 7.62 K/UL — SIGNIFICANT CHANGE UP (ref 3.8–10.5)
WBC # BLD: 8.05 K/UL — SIGNIFICANT CHANGE UP (ref 3.8–10.5)
WBC # BLD: 8.05 K/UL — SIGNIFICANT CHANGE UP (ref 3.8–10.5)
WBC # BLD: 8.36 K/UL — SIGNIFICANT CHANGE UP (ref 3.8–10.5)
WBC # BLD: 9.74 K/UL — SIGNIFICANT CHANGE UP (ref 3.8–10.5)
WBC # FLD AUTO: 10.56 K/UL — HIGH (ref 3.8–10.5)
WBC # FLD AUTO: 10.56 K/UL — HIGH (ref 3.8–10.5)
WBC # FLD AUTO: 11.26 K/UL — HIGH (ref 3.8–10.5)
WBC # FLD AUTO: 11.26 K/UL — HIGH (ref 3.8–10.5)
WBC # FLD AUTO: 13.76 K/UL — HIGH (ref 3.8–10.5)
WBC # FLD AUTO: 14.41 K/UL
WBC # FLD AUTO: 15.73 K/UL — HIGH (ref 3.8–10.5)
WBC # FLD AUTO: 15.73 K/UL — HIGH (ref 3.8–10.5)
WBC # FLD AUTO: 16.26 K/UL — HIGH (ref 3.8–10.5)
WBC # FLD AUTO: 17.93 K/UL — HIGH (ref 3.8–10.5)
WBC # FLD AUTO: 18.03 K/UL — HIGH (ref 3.8–10.5)
WBC # FLD AUTO: 20 K/UL — HIGH (ref 3.8–10.5)
WBC # FLD AUTO: 20.11 K/UL — HIGH (ref 3.8–10.5)
WBC # FLD AUTO: 21.4 K/UL — HIGH (ref 3.8–10.5)
WBC # FLD AUTO: 23.14 K/UL — HIGH (ref 3.8–10.5)
WBC # FLD AUTO: 28.64 K/UL — HIGH (ref 3.8–10.5)
WBC # FLD AUTO: 7.18 K/UL — SIGNIFICANT CHANGE UP (ref 3.8–10.5)
WBC # FLD AUTO: 7.62 K/UL — SIGNIFICANT CHANGE UP (ref 3.8–10.5)
WBC # FLD AUTO: 8.05 K/UL — SIGNIFICANT CHANGE UP (ref 3.8–10.5)
WBC # FLD AUTO: 8.05 K/UL — SIGNIFICANT CHANGE UP (ref 3.8–10.5)
WBC # FLD AUTO: 8.36 K/UL — SIGNIFICANT CHANGE UP (ref 3.8–10.5)
WBC # FLD AUTO: 9.74 K/UL — SIGNIFICANT CHANGE UP (ref 3.8–10.5)

## 2024-01-01 PROCEDURE — 32557 INSERT CATH PLEURA W/ IMAGE: CPT | Mod: LT

## 2024-01-01 PROCEDURE — 88305 TISSUE EXAM BY PATHOLOGIST: CPT | Mod: 26

## 2024-01-01 PROCEDURE — 99233 SBSQ HOSP IP/OBS HIGH 50: CPT

## 2024-01-01 PROCEDURE — 99215 OFFICE O/P EST HI 40 MIN: CPT

## 2024-01-01 PROCEDURE — 93308 TTE F-UP OR LMTD: CPT | Mod: 26,GC

## 2024-01-01 PROCEDURE — 99214 OFFICE O/P EST MOD 30 MIN: CPT

## 2024-01-01 PROCEDURE — 99285 EMERGENCY DEPT VISIT HI MDM: CPT | Mod: GC

## 2024-01-01 PROCEDURE — 93321 DOPPLER ECHO F-UP/LMTD STD: CPT | Mod: 26

## 2024-01-01 PROCEDURE — 74177 CT ABD & PELVIS W/CONTRAST: CPT | Mod: 26,MH

## 2024-01-01 PROCEDURE — 71046 X-RAY EXAM CHEST 2 VIEWS: CPT

## 2024-01-01 PROCEDURE — 74176 CT ABD & PELVIS W/O CONTRAST: CPT | Mod: 26,MA

## 2024-01-01 PROCEDURE — ZZZZZ: CPT

## 2024-01-01 PROCEDURE — 93000 ELECTROCARDIOGRAM COMPLETE: CPT

## 2024-01-01 PROCEDURE — 71250 CT THORAX DX C-: CPT | Mod: 26,MA

## 2024-01-01 PROCEDURE — 99213 OFFICE O/P EST LOW 20 MIN: CPT

## 2024-01-01 PROCEDURE — 32552 REMOVE LUNG CATHETER: CPT | Mod: RT

## 2024-01-01 PROCEDURE — 88342 IMHCHEM/IMCYTCHM 1ST ANTB: CPT | Mod: 26

## 2024-01-01 PROCEDURE — 88108 CYTOPATH CONCENTRATE TECH: CPT | Mod: 26,59

## 2024-01-01 PROCEDURE — 70450 CT HEAD/BRAIN W/O DYE: CPT | Mod: 26,MA

## 2024-01-01 PROCEDURE — 99291 CRITICAL CARE FIRST HOUR: CPT

## 2024-01-01 PROCEDURE — 70496 CT ANGIOGRAPHY HEAD: CPT | Mod: 26

## 2024-01-01 PROCEDURE — 88112 CYTOPATH CELL ENHANCE TECH: CPT | Mod: 26

## 2024-01-01 PROCEDURE — 71260 CT THORAX DX C+: CPT | Mod: 26,MH

## 2024-01-01 PROCEDURE — 99221 1ST HOSP IP/OBS SF/LOW 40: CPT

## 2024-01-01 PROCEDURE — 99443: CPT | Mod: 93

## 2024-01-01 PROCEDURE — 99232 SBSQ HOSP IP/OBS MODERATE 35: CPT

## 2024-01-01 PROCEDURE — 99222 1ST HOSP IP/OBS MODERATE 55: CPT

## 2024-01-01 PROCEDURE — 93306 TTE W/DOPPLER COMPLETE: CPT

## 2024-01-01 PROCEDURE — 33016 PERICARDIOCENTESIS W/IMAGING: CPT

## 2024-01-01 PROCEDURE — 71260 CT THORAX DX C+: CPT

## 2024-01-01 PROCEDURE — 70498 CT ANGIOGRAPHY NECK: CPT | Mod: 26

## 2024-01-01 PROCEDURE — 71045 X-RAY EXAM CHEST 1 VIEW: CPT | Mod: 26

## 2024-01-01 PROCEDURE — 71046 X-RAY EXAM CHEST 2 VIEWS: CPT | Mod: 26

## 2024-01-01 PROCEDURE — 70460 CT HEAD/BRAIN W/DYE: CPT | Mod: 26,MH

## 2024-01-01 PROCEDURE — 88341 IMHCHEM/IMCYTCHM EA ADD ANTB: CPT | Mod: 26

## 2024-01-01 PROCEDURE — 95718 EEG PHYS/QHP 2-12 HR W/VEEG: CPT

## 2024-01-01 PROCEDURE — 93306 TTE W/DOPPLER COMPLETE: CPT | Mod: 26

## 2024-01-01 PROCEDURE — 32555 ASPIRATE PLEURA W/ IMAGING: CPT

## 2024-01-01 PROCEDURE — G2211 COMPLEX E/M VISIT ADD ON: CPT

## 2024-01-01 PROCEDURE — 32550 INSERT PLEURAL CATH: CPT | Mod: RT

## 2024-01-01 PROCEDURE — 99213K: CUSTOM

## 2024-01-01 PROCEDURE — 99449 NTRPROF PH1/NTRNET/EHR 31/>: CPT

## 2024-01-01 PROCEDURE — 99223 1ST HOSP IP/OBS HIGH 75: CPT

## 2024-01-01 PROCEDURE — 71045 X-RAY EXAM CHEST 1 VIEW: CPT | Mod: 26,77

## 2024-01-01 PROCEDURE — 70460 CT HEAD/BRAIN W/DYE: CPT

## 2024-01-01 PROCEDURE — 71045 X-RAY EXAM CHEST 1 VIEW: CPT | Mod: 26,76

## 2024-01-01 PROCEDURE — 93010 ELECTROCARDIOGRAM REPORT: CPT

## 2024-01-01 PROCEDURE — 99231 SBSQ HOSP IP/OBS SF/LOW 25: CPT

## 2024-01-01 PROCEDURE — 99284 EMERGENCY DEPT VISIT MOD MDM: CPT | Mod: GC

## 2024-01-01 PROCEDURE — 74177 CT ABD & PELVIS W/CONTRAST: CPT

## 2024-01-01 PROCEDURE — 99291 CRITICAL CARE FIRST HOUR: CPT | Mod: GC

## 2024-01-01 PROCEDURE — 36415 COLL VENOUS BLD VENIPUNCTURE: CPT

## 2024-01-01 PROCEDURE — 99292 CRITICAL CARE ADDL 30 MIN: CPT

## 2024-01-01 PROCEDURE — 32551 INSERTION OF CHEST TUBE: CPT | Mod: RT

## 2024-01-01 RX ORDER — LISINOPRIL 2.5 MG/1
20 TABLET ORAL
Refills: 0 | Status: DISCONTINUED | OUTPATIENT
Start: 2024-01-01 | End: 2024-01-01

## 2024-01-01 RX ORDER — INSULIN LISPRO 100/ML
2 VIAL (ML) SUBCUTANEOUS
Qty: 1 | Refills: 0
Start: 2024-01-01 | End: 2024-01-01

## 2024-01-01 RX ORDER — SIMVASTATIN 20 MG/1
1 TABLET, FILM COATED ORAL
Refills: 0 | DISCHARGE

## 2024-01-01 RX ORDER — ACETAMINOPHEN 500 MG
2 TABLET ORAL
Qty: 0 | Refills: 0 | DISCHARGE
Start: 2024-01-01

## 2024-01-01 RX ORDER — SODIUM CHLORIDE 9 MG/ML
1000 INJECTION, SOLUTION INTRAVENOUS
Refills: 0 | Status: DISCONTINUED | OUTPATIENT
Start: 2024-01-01 | End: 2024-01-01

## 2024-01-01 RX ORDER — AMLODIPINE BESYLATE 5 MG/1
5 TABLET ORAL TWICE DAILY
Refills: 0 | Status: DISCONTINUED | COMMUNITY
Start: 2019-06-20 | End: 2024-01-01

## 2024-01-01 RX ORDER — DEXTROSE 50 % IN WATER 50 %
12.5 SYRINGE (ML) INTRAVENOUS ONCE
Refills: 0 | Status: DISCONTINUED | OUTPATIENT
Start: 2024-01-01 | End: 2024-01-01

## 2024-01-01 RX ORDER — SIMVASTATIN 20 MG/1
20 TABLET, FILM COATED ORAL AT BEDTIME
Refills: 0 | Status: DISCONTINUED | OUTPATIENT
Start: 2024-01-01 | End: 2024-01-01

## 2024-01-01 RX ORDER — INSULIN LISPRO 100/ML
VIAL (ML) SUBCUTANEOUS
Refills: 0 | Status: DISCONTINUED | OUTPATIENT
Start: 2024-01-01 | End: 2024-01-01

## 2024-01-01 RX ORDER — PIPERACILLIN AND TAZOBACTAM 4; .5 G/20ML; G/20ML
3.38 INJECTION, POWDER, LYOPHILIZED, FOR SOLUTION INTRAVENOUS ONCE
Refills: 0 | Status: COMPLETED | OUTPATIENT
Start: 2024-01-01 | End: 2024-01-01

## 2024-01-01 RX ORDER — PIPERACILLIN AND TAZOBACTAM 4; .5 G/20ML; G/20ML
3.38 INJECTION, POWDER, LYOPHILIZED, FOR SOLUTION INTRAVENOUS ONCE
Refills: 0 | Status: DISCONTINUED | OUTPATIENT
Start: 2024-01-01 | End: 2024-01-01

## 2024-01-01 RX ORDER — FUROSEMIDE 40 MG
20 TABLET ORAL ONCE
Refills: 0 | Status: DISCONTINUED | OUTPATIENT
Start: 2024-01-01 | End: 2024-01-01

## 2024-01-01 RX ORDER — INSULIN LISPRO 100/ML
3 VIAL (ML) SUBCUTANEOUS ONCE
Refills: 0 | Status: COMPLETED | OUTPATIENT
Start: 2024-01-01 | End: 2024-01-01

## 2024-01-01 RX ORDER — DEXTROSE 10 % IN WATER 10 %
125 INTRAVENOUS SOLUTION INTRAVENOUS ONCE
Refills: 0 | Status: DISCONTINUED | OUTPATIENT
Start: 2024-01-01 | End: 2024-01-01

## 2024-01-01 RX ORDER — INSULIN LISPRO 100/ML
VIAL (ML) SUBCUTANEOUS AT BEDTIME
Refills: 0 | Status: DISCONTINUED | OUTPATIENT
Start: 2024-01-01 | End: 2024-01-01

## 2024-01-01 RX ORDER — AMLODIPINE BESYLATE 2.5 MG/1
5 TABLET ORAL
Refills: 0 | Status: DISCONTINUED | OUTPATIENT
Start: 2024-01-01 | End: 2024-01-01

## 2024-01-01 RX ORDER — TRAMETINIB 0.5 MG/1
0.5 TABLET, FILM COATED ORAL
Qty: 30 | Refills: 5 | Status: ACTIVE | COMMUNITY
Start: 2023-01-01 | End: 1900-01-01

## 2024-01-01 RX ORDER — SODIUM CHLORIDE 9 MG/ML
1 INJECTION INTRAMUSCULAR; INTRAVENOUS; SUBCUTANEOUS ONCE
Refills: 0 | Status: COMPLETED | OUTPATIENT
Start: 2024-01-01 | End: 2024-01-01

## 2024-01-01 RX ORDER — INSULIN GLARGINE 100 [IU]/ML
4 INJECTION, SOLUTION SUBCUTANEOUS EVERY MORNING
Refills: 0 | Status: DISCONTINUED | OUTPATIENT
Start: 2024-01-01 | End: 2024-01-01

## 2024-01-01 RX ORDER — DEXTROSE 50 % IN WATER 50 %
25 SYRINGE (ML) INTRAVENOUS ONCE
Refills: 0 | Status: DISCONTINUED | OUTPATIENT
Start: 2024-01-01 | End: 2024-01-01

## 2024-01-01 RX ORDER — DILTIAZEM HCL 120 MG
10 CAPSULE, EXT RELEASE 24 HR ORAL
Qty: 125 | Refills: 0 | Status: DISCONTINUED | OUTPATIENT
Start: 2024-01-01 | End: 2024-01-01

## 2024-01-01 RX ORDER — MIDAZOLAM HYDROCHLORIDE 1 MG/ML
2 INJECTION, SOLUTION INTRAMUSCULAR; INTRAVENOUS ONCE
Refills: 0 | Status: DISCONTINUED | OUTPATIENT
Start: 2024-01-01 | End: 2024-01-01

## 2024-01-01 RX ORDER — DEXTROSE 50 % IN WATER 50 %
50 SYRINGE (ML) INTRAVENOUS ONCE
Refills: 0 | Status: COMPLETED | OUTPATIENT
Start: 2024-01-01 | End: 2024-01-01

## 2024-01-01 RX ORDER — FUROSEMIDE 40 MG
10 TABLET ORAL ONCE
Refills: 0 | Status: COMPLETED | OUTPATIENT
Start: 2024-01-01 | End: 2024-01-01

## 2024-01-01 RX ORDER — PHENYLEPHRINE HYDROCHLORIDE 10 MG/ML
0.5 INJECTION INTRAVENOUS
Qty: 160 | Refills: 0 | Status: DISCONTINUED | OUTPATIENT
Start: 2024-01-01 | End: 2024-01-01

## 2024-01-01 RX ORDER — MORPHINE SULFATE 50 MG/1
1 CAPSULE, EXTENDED RELEASE ORAL
Qty: 100 | Refills: 0 | Status: DISCONTINUED | OUTPATIENT
Start: 2024-01-01 | End: 2024-01-01

## 2024-01-01 RX ORDER — IPRATROPIUM/ALBUTEROL SULFATE 18-103MCG
3 AEROSOL WITH ADAPTER (GRAM) INHALATION EVERY 6 HOURS
Refills: 0 | Status: DISCONTINUED | OUTPATIENT
Start: 2024-01-01 | End: 2024-01-01

## 2024-01-01 RX ORDER — DEXTROSE 50 % IN WATER 50 %
15 SYRINGE (ML) INTRAVENOUS ONCE
Refills: 0 | Status: DISCONTINUED | OUTPATIENT
Start: 2024-01-01 | End: 2024-01-01

## 2024-01-01 RX ORDER — LOTEPREDNOL ETABONATE 5 MG/G
0.5 GEL OPHTHALMIC
Qty: 5 | Refills: 0 | Status: COMPLETED | COMMUNITY
Start: 2023-01-01

## 2024-01-01 RX ORDER — PIPERACILLIN AND TAZOBACTAM 4; .5 G/20ML; G/20ML
3.38 INJECTION, POWDER, LYOPHILIZED, FOR SOLUTION INTRAVENOUS EVERY 8 HOURS
Refills: 0 | Status: DISCONTINUED | OUTPATIENT
Start: 2024-01-01 | End: 2024-01-01

## 2024-01-01 RX ORDER — DABRAFENIB 75 MG/1
2 CAPSULE ORAL
Refills: 0 | DISCHARGE

## 2024-01-01 RX ORDER — INSULIN GLARGINE 100 [IU]/ML
4 INJECTION, SOLUTION SUBCUTANEOUS AT BEDTIME
Refills: 0 | Status: DISCONTINUED | OUTPATIENT
Start: 2024-01-01 | End: 2024-01-01

## 2024-01-01 RX ORDER — GLUCAGON INJECTION, SOLUTION 0.5 MG/.1ML
1 INJECTION, SOLUTION SUBCUTANEOUS ONCE
Refills: 0 | Status: DISCONTINUED | OUTPATIENT
Start: 2024-01-01 | End: 2024-01-01

## 2024-01-01 RX ORDER — LIDOCAINE HCL 20 MG/ML
20 VIAL (ML) INJECTION ONCE
Refills: 0 | Status: COMPLETED | OUTPATIENT
Start: 2024-01-01 | End: 2024-01-01

## 2024-01-01 RX ORDER — ACETAMINOPHEN 500 MG
750 TABLET ORAL ONCE
Refills: 0 | Status: COMPLETED | OUTPATIENT
Start: 2024-01-01 | End: 2024-01-01

## 2024-01-01 RX ORDER — FENTANYL CITRATE 50 UG/ML
25 INJECTION INTRAVENOUS ONCE
Refills: 0 | Status: DISCONTINUED | OUTPATIENT
Start: 2024-01-01 | End: 2024-01-01

## 2024-01-01 RX ORDER — DORNASE ALFA 1 MG/ML
2.5 SOLUTION RESPIRATORY (INHALATION) DAILY
Refills: 0 | Status: DISCONTINUED | OUTPATIENT
Start: 2024-01-01 | End: 2024-01-01

## 2024-01-01 RX ORDER — CALCIUM GLUCONATE 100 MG/ML
1 VIAL (ML) INTRAVENOUS ONCE
Refills: 0 | Status: DISCONTINUED | OUTPATIENT
Start: 2024-01-01 | End: 2024-01-01

## 2024-01-01 RX ORDER — MAGNESIUM SULFATE 500 MG/ML
2 VIAL (ML) INJECTION ONCE
Refills: 0 | Status: COMPLETED | OUTPATIENT
Start: 2024-01-01 | End: 2024-01-01

## 2024-01-01 RX ORDER — ACETAMINOPHEN 500 MG
650 TABLET ORAL EVERY 6 HOURS
Refills: 0 | Status: DISCONTINUED | OUTPATIENT
Start: 2024-01-01 | End: 2024-01-01

## 2024-01-01 RX ORDER — ASPIRIN/CALCIUM CARB/MAGNESIUM 324 MG
81 TABLET ORAL DAILY
Refills: 0 | Status: DISCONTINUED | OUTPATIENT
Start: 2024-01-01 | End: 2024-01-01

## 2024-01-01 RX ORDER — AMLODIPINE BESYLATE 2.5 MG/1
5 TABLET ORAL DAILY
Refills: 0 | Status: DISCONTINUED | OUTPATIENT
Start: 2024-01-01 | End: 2024-01-01

## 2024-01-01 RX ORDER — ALBUMIN HUMAN 25 %
250 VIAL (ML) INTRAVENOUS ONCE
Refills: 0 | Status: COMPLETED | OUTPATIENT
Start: 2024-01-01 | End: 2024-01-01

## 2024-01-01 RX ORDER — FUROSEMIDE 20 MG/1
20 TABLET ORAL DAILY
Qty: 90 | Refills: 3 | Status: ACTIVE | COMMUNITY
Start: 2024-01-01 | End: 1900-01-01

## 2024-01-01 RX ORDER — FLUDROCORTISONE ACETATE 0.1 MG/1
0.1 TABLET ORAL DAILY
Refills: 0 | Status: DISCONTINUED | OUTPATIENT
Start: 2024-01-01 | End: 2024-01-01

## 2024-01-01 RX ORDER — AMLODIPINE BESYLATE 2.5 MG/1
1 TABLET ORAL
Qty: 0 | Refills: 0 | DISCHARGE
Start: 2024-01-01

## 2024-01-01 RX ORDER — FLUDROCORTISONE ACETATE 0.1 MG/1
1 TABLET ORAL
Refills: 0 | DISCHARGE

## 2024-01-01 RX ORDER — SODIUM CHLORIDE 9 MG/ML
1000 INJECTION INTRAMUSCULAR; INTRAVENOUS; SUBCUTANEOUS ONCE
Refills: 0 | Status: COMPLETED | OUTPATIENT
Start: 2024-01-01 | End: 2024-01-01

## 2024-01-01 RX ORDER — INSULIN LISPRO 100/ML
1 VIAL (ML) SUBCUTANEOUS
Refills: 0 | Status: DISCONTINUED | OUTPATIENT
Start: 2024-01-01 | End: 2024-01-01

## 2024-01-01 RX ORDER — LISINOPRIL 2.5 MG/1
1 TABLET ORAL
Qty: 0 | Refills: 0 | DISCHARGE
Start: 2024-01-01

## 2024-01-01 RX ORDER — INSULIN GLARGINE 100 [IU]/ML
10 INJECTION, SOLUTION SUBCUTANEOUS ONCE
Refills: 0 | Status: COMPLETED | OUTPATIENT
Start: 2024-01-01 | End: 2024-01-01

## 2024-01-01 RX ORDER — INSULIN GLARGINE 100 [IU]/ML
8 INJECTION, SOLUTION SUBCUTANEOUS EVERY MORNING
Refills: 0 | Status: DISCONTINUED | OUTPATIENT
Start: 2024-01-01 | End: 2024-01-01

## 2024-01-01 RX ORDER — MAGNESIUM SULFATE 500 MG/ML
1 VIAL (ML) INJECTION ONCE
Refills: 0 | Status: COMPLETED | OUTPATIENT
Start: 2024-01-01 | End: 2024-01-01

## 2024-01-01 RX ORDER — SENNA PLUS 8.6 MG/1
2 TABLET ORAL AT BEDTIME
Refills: 0 | Status: DISCONTINUED | OUTPATIENT
Start: 2024-01-01 | End: 2024-01-01

## 2024-01-01 RX ORDER — FLUDROCORTISONE ACETATE 0.1 MG/1
0.1 TABLET ORAL
Refills: 0 | Status: ACTIVE | COMMUNITY

## 2024-01-01 RX ORDER — ALBUMIN HUMAN 25 %
50 VIAL (ML) INTRAVENOUS ONCE
Refills: 0 | Status: COMPLETED | OUTPATIENT
Start: 2024-01-01 | End: 2024-01-01

## 2024-01-01 RX ORDER — HEPARIN SODIUM 5000 [USP'U]/ML
5000 INJECTION INTRAVENOUS; SUBCUTANEOUS EVERY 8 HOURS
Refills: 0 | Status: DISCONTINUED | OUTPATIENT
Start: 2024-01-01 | End: 2024-01-01

## 2024-01-01 RX ORDER — CHLORHEXIDINE GLUCONATE 213 G/1000ML
1 SOLUTION TOPICAL DAILY
Refills: 0 | Status: DISCONTINUED | OUTPATIENT
Start: 2024-01-01 | End: 2024-01-01

## 2024-01-01 RX ORDER — DILTIAZEM HCL 120 MG
5 CAPSULE, EXT RELEASE 24 HR ORAL ONCE
Refills: 0 | Status: COMPLETED | OUTPATIENT
Start: 2024-01-01 | End: 2024-01-01

## 2024-01-01 RX ORDER — MORPHINE SULFATE 50 MG/1
2 CAPSULE, EXTENDED RELEASE ORAL
Qty: 100 | Refills: 0 | Status: DISCONTINUED | OUTPATIENT
Start: 2024-01-01 | End: 2024-01-01

## 2024-01-01 RX ORDER — PHENYLEPHRINE HYDROCHLORIDE 10 MG/ML
0.5 INJECTION INTRAVENOUS
Qty: 40 | Refills: 0 | Status: DISCONTINUED | OUTPATIENT
Start: 2024-01-01 | End: 2024-01-01

## 2024-01-01 RX ORDER — SILVER SULFADIAZINE 10 MG/G
1 CREAM TOPICAL TWICE DAILY
Qty: 1 | Refills: 0 | Status: ACTIVE | COMMUNITY
Start: 2024-01-01 | End: 1900-01-01

## 2024-01-01 RX ORDER — LIDOCAINE 4 G/100G
1 CREAM TOPICAL DAILY
Refills: 0 | Status: DISCONTINUED | OUTPATIENT
Start: 2024-01-01 | End: 2024-01-01

## 2024-01-01 RX ORDER — INSULIN LISPRO 100/ML
2 VIAL (ML) SUBCUTANEOUS
Refills: 0 | Status: DISCONTINUED | OUTPATIENT
Start: 2024-01-01 | End: 2024-01-01

## 2024-01-01 RX ORDER — BLEOMYCIN SULFATE 30 UNIT
45 VIAL (EA) INJECTION ONCE
Refills: 0 | Status: COMPLETED | OUTPATIENT
Start: 2024-01-01 | End: 2024-01-01

## 2024-01-01 RX ORDER — CALCIUM GLUCONATE 100 MG/ML
1 VIAL (ML) INTRAVENOUS ONCE
Refills: 0 | Status: COMPLETED | OUTPATIENT
Start: 2024-01-01 | End: 2024-01-01

## 2024-01-01 RX ORDER — INSULIN GLARGINE 100 [IU]/ML
8 INJECTION, SOLUTION SUBCUTANEOUS
Refills: 0 | Status: DISCONTINUED | OUTPATIENT
Start: 2024-01-01 | End: 2024-01-01

## 2024-01-01 RX ORDER — DEXMEDETOMIDINE HYDROCHLORIDE IN 0.9% SODIUM CHLORIDE 4 UG/ML
0.3 INJECTION INTRAVENOUS
Qty: 400 | Refills: 0 | Status: DISCONTINUED | OUTPATIENT
Start: 2024-01-01 | End: 2024-01-01

## 2024-01-01 RX ORDER — FUROSEMIDE 40 MG
20 TABLET ORAL ONCE
Refills: 0 | Status: COMPLETED | OUTPATIENT
Start: 2024-01-01 | End: 2024-01-01

## 2024-01-01 RX ORDER — MIDODRINE HYDROCHLORIDE 2.5 MG/1
10 TABLET ORAL EVERY 8 HOURS
Refills: 0 | Status: DISCONTINUED | OUTPATIENT
Start: 2024-01-01 | End: 2024-01-01

## 2024-01-01 RX ORDER — INSULIN GLARGINE 100 [IU]/ML
4 INJECTION, SOLUTION SUBCUTANEOUS ONCE
Refills: 0 | Status: COMPLETED | OUTPATIENT
Start: 2024-01-01 | End: 2024-01-01

## 2024-01-01 RX ORDER — SODIUM CHLORIDE 9 MG/ML
500 INJECTION, SOLUTION INTRAVENOUS ONCE
Refills: 0 | Status: COMPLETED | OUTPATIENT
Start: 2024-01-01 | End: 2024-01-01

## 2024-01-01 RX ORDER — INSULIN GLARGINE 100 [IU]/ML
8 INJECTION, SOLUTION SUBCUTANEOUS AT BEDTIME
Refills: 0 | Status: DISCONTINUED | OUTPATIENT
Start: 2024-01-01 | End: 2024-01-01

## 2024-01-01 RX ORDER — FENTANYL CITRATE 50 UG/ML
12.5 INJECTION INTRAVENOUS ONCE
Refills: 0 | Status: DISCONTINUED | OUTPATIENT
Start: 2024-01-01 | End: 2024-01-01

## 2024-01-01 RX ORDER — CLOBETASOL PROPIONATE 0.5 MG/G
0.05 CREAM TOPICAL
Qty: 180 | Refills: 1 | Status: ACTIVE | COMMUNITY
Start: 2022-06-21 | End: 1900-01-01

## 2024-01-01 RX ORDER — LISINOPRIL 2.5 MG/1
20 TABLET ORAL DAILY
Refills: 0 | Status: DISCONTINUED | OUTPATIENT
Start: 2024-01-01 | End: 2024-01-01

## 2024-01-01 RX ORDER — MIDAZOLAM HYDROCHLORIDE 1 MG/ML
2 INJECTION, SOLUTION INTRAMUSCULAR; INTRAVENOUS EVERY 4 HOURS
Refills: 0 | Status: DISCONTINUED | OUTPATIENT
Start: 2024-01-01 | End: 2024-01-01

## 2024-01-01 RX ORDER — SODIUM CHLORIDE 9 MG/ML
4 INJECTION INTRAMUSCULAR; INTRAVENOUS; SUBCUTANEOUS EVERY 12 HOURS
Refills: 0 | Status: DISCONTINUED | OUTPATIENT
Start: 2024-01-01 | End: 2024-01-01

## 2024-01-01 RX ORDER — HEPARIN SODIUM 5000 [USP'U]/ML
5000 INJECTION INTRAVENOUS; SUBCUTANEOUS EVERY 12 HOURS
Refills: 0 | Status: DISCONTINUED | OUTPATIENT
Start: 2024-01-01 | End: 2024-01-01

## 2024-01-01 RX ORDER — DILTIAZEM HCL 120 MG
10 CAPSULE, EXT RELEASE 24 HR ORAL ONCE
Refills: 0 | Status: DISCONTINUED | OUTPATIENT
Start: 2024-01-01 | End: 2024-01-01

## 2024-01-01 RX ORDER — SODIUM CHLORIDE 9 MG/ML
500 INJECTION INTRAMUSCULAR; INTRAVENOUS; SUBCUTANEOUS ONCE
Refills: 0 | Status: DISCONTINUED | OUTPATIENT
Start: 2024-01-01 | End: 2024-01-01

## 2024-01-01 RX ORDER — DABRAFENIB 75 MG/1
75 CAPSULE ORAL
Qty: 60 | Refills: 6 | Status: ACTIVE | COMMUNITY
Start: 2023-01-01 | End: 1900-01-01

## 2024-01-01 RX ORDER — ESMOLOL HCL 100MG/10ML
50 VIAL (ML) INTRAVENOUS
Qty: 2500 | Refills: 0 | Status: DISCONTINUED | OUTPATIENT
Start: 2024-01-01 | End: 2024-01-01

## 2024-01-01 RX ORDER — MIDODRINE HYDROCHLORIDE 2.5 MG/1
15 TABLET ORAL EVERY 8 HOURS
Refills: 0 | Status: DISCONTINUED | OUTPATIENT
Start: 2024-01-01 | End: 2024-01-01

## 2024-01-01 RX ORDER — VANCOMYCIN HCL 1 G
750 VIAL (EA) INTRAVENOUS EVERY 24 HOURS
Refills: 0 | Status: DISCONTINUED | OUTPATIENT
Start: 2024-01-01 | End: 2024-01-01

## 2024-01-01 RX ORDER — INSULIN GLARGINE 100 [IU]/ML
10 INJECTION, SOLUTION SUBCUTANEOUS
Qty: 0 | Refills: 0 | DISCHARGE
Start: 2024-01-01

## 2024-01-01 RX ORDER — ASPIRIN/CALCIUM CARB/MAGNESIUM 324 MG
1 TABLET ORAL
Qty: 0 | Refills: 0 | DISCHARGE
Start: 2024-01-01

## 2024-01-01 RX ORDER — ALBUMIN HUMAN 25 %
50 VIAL (ML) INTRAVENOUS ONCE
Refills: 0 | Status: DISCONTINUED | OUTPATIENT
Start: 2024-01-01 | End: 2024-01-01

## 2024-01-01 RX ORDER — INSULIN GLARGINE 100 [IU]/ML
6 INJECTION, SOLUTION SUBCUTANEOUS AT BEDTIME
Refills: 0 | Status: DISCONTINUED | OUTPATIENT
Start: 2024-01-01 | End: 2024-01-01

## 2024-01-01 RX ORDER — LISINOPRIL 10 MG/1
10 TABLET ORAL
Qty: 90 | Refills: 1 | Status: ACTIVE | COMMUNITY
Start: 2019-06-10 | End: 1900-01-01

## 2024-01-01 RX ORDER — GABAPENTIN 400 MG/1
300 CAPSULE ORAL ONCE
Refills: 0 | Status: DISCONTINUED | OUTPATIENT
Start: 2024-01-01 | End: 2024-01-01

## 2024-01-01 RX ORDER — LABETALOL HCL 100 MG
10 TABLET ORAL ONCE
Refills: 0 | Status: DISCONTINUED | OUTPATIENT
Start: 2024-01-01 | End: 2024-01-01

## 2024-01-01 RX ORDER — ONDANSETRON 8 MG/1
4 TABLET, FILM COATED ORAL ONCE
Refills: 0 | Status: DISCONTINUED | OUTPATIENT
Start: 2024-01-01 | End: 2024-01-01

## 2024-01-01 RX ORDER — INSULIN HUMAN 100 [IU]/ML
5 INJECTION, SOLUTION SUBCUTANEOUS ONCE
Refills: 0 | Status: COMPLETED | OUTPATIENT
Start: 2024-01-01 | End: 2024-01-01

## 2024-01-01 RX ORDER — INSULIN LISPRO 100 [IU]/ML
100 INJECTION, SOLUTION SUBCUTANEOUS
Qty: 30 | Refills: 0 | Status: ACTIVE | COMMUNITY
Start: 2023-01-01

## 2024-01-01 RX ORDER — POLYETHYLENE GLYCOL 3350 17 G/17G
17 POWDER, FOR SOLUTION ORAL DAILY
Refills: 0 | Status: DISCONTINUED | OUTPATIENT
Start: 2024-01-01 | End: 2024-01-01

## 2024-01-01 RX ORDER — PHENYLEPHRINE HYDROCHLORIDE 10 MG/ML
0.9 INJECTION INTRAVENOUS
Qty: 40 | Refills: 0 | Status: DISCONTINUED | OUTPATIENT
Start: 2024-01-01 | End: 2024-01-01

## 2024-01-01 RX ORDER — NIRMATRELVIR AND RITONAVIR 300-100 MG
20 X 150 MG & KIT ORAL
Qty: 30 | Refills: 0 | Status: DISCONTINUED | COMMUNITY
Start: 2023-01-01 | End: 2024-01-01

## 2024-01-01 RX ORDER — INSULIN LISPRO 100/ML
VIAL (ML) SUBCUTANEOUS EVERY 6 HOURS
Refills: 0 | Status: DISCONTINUED | OUTPATIENT
Start: 2024-01-01 | End: 2024-01-01

## 2024-01-01 RX ORDER — TRAMETINIB 0.5 MG/1
1 TABLET, FILM COATED ORAL
Refills: 0 | DISCHARGE

## 2024-01-01 RX ADMIN — Medication 2 UNIT(S): at 17:46

## 2024-01-01 RX ADMIN — Medication 750 MILLIGRAM(S): at 06:17

## 2024-01-01 RX ADMIN — DORNASE ALFA 2.5 MILLIGRAM(S): 1 SOLUTION RESPIRATORY (INHALATION) at 07:35

## 2024-01-01 RX ADMIN — Medication 750 MILLIGRAM(S): at 20:36

## 2024-01-01 RX ADMIN — Medication 3 MILLILITER(S): at 04:14

## 2024-01-01 RX ADMIN — Medication 500 MILLILITER(S): at 05:20

## 2024-01-01 RX ADMIN — HEPARIN SODIUM 5000 UNIT(S): 5000 INJECTION INTRAVENOUS; SUBCUTANEOUS at 13:31

## 2024-01-01 RX ADMIN — FENTANYL CITRATE 25 MICROGRAM(S): 50 INJECTION INTRAVENOUS at 15:40

## 2024-01-01 RX ADMIN — Medication 4: at 22:01

## 2024-01-01 RX ADMIN — Medication 1: at 12:48

## 2024-01-01 RX ADMIN — Medication 300 MILLIGRAM(S): at 11:30

## 2024-01-01 RX ADMIN — PHENYLEPHRINE HYDROCHLORIDE 16.9 MICROGRAM(S)/KG/MIN: 10 INJECTION INTRAVENOUS at 20:08

## 2024-01-01 RX ADMIN — PHENYLEPHRINE HYDROCHLORIDE 16.9 MICROGRAM(S)/KG/MIN: 10 INJECTION INTRAVENOUS at 07:34

## 2024-01-01 RX ADMIN — Medication 2: at 08:30

## 2024-01-01 RX ADMIN — Medication 25 GRAM(S): at 11:05

## 2024-01-01 RX ADMIN — INSULIN GLARGINE 4 UNIT(S): 100 INJECTION, SOLUTION SUBCUTANEOUS at 08:53

## 2024-01-01 RX ADMIN — LISINOPRIL 20 MILLIGRAM(S): 2.5 TABLET ORAL at 09:42

## 2024-01-01 RX ADMIN — Medication 1: at 07:47

## 2024-01-01 RX ADMIN — AMLODIPINE BESYLATE 5 MILLIGRAM(S): 2.5 TABLET ORAL at 09:42

## 2024-01-01 RX ADMIN — SODIUM CHLORIDE 1000 MILLILITER(S): 9 INJECTION INTRAMUSCULAR; INTRAVENOUS; SUBCUTANEOUS at 11:21

## 2024-01-01 RX ADMIN — SIMVASTATIN 20 MILLIGRAM(S): 20 TABLET, FILM COATED ORAL at 21:12

## 2024-01-01 RX ADMIN — INSULIN GLARGINE 8 UNIT(S): 100 INJECTION, SOLUTION SUBCUTANEOUS at 08:00

## 2024-01-01 RX ADMIN — Medication 3 UNIT(S): at 17:51

## 2024-01-01 RX ADMIN — Medication 125 MILLILITER(S): at 15:35

## 2024-01-01 RX ADMIN — INSULIN GLARGINE 10 UNIT(S): 100 INJECTION, SOLUTION SUBCUTANEOUS at 08:29

## 2024-01-01 RX ADMIN — PHENYLEPHRINE HYDROCHLORIDE 16.9 MICROGRAM(S)/KG/MIN: 10 INJECTION INTRAVENOUS at 16:09

## 2024-01-01 RX ADMIN — MORPHINE SULFATE 2 MG/HR: 50 CAPSULE, EXTENDED RELEASE ORAL at 11:34

## 2024-01-01 RX ADMIN — Medication 1: at 08:04

## 2024-01-01 RX ADMIN — Medication 10 MG/HR: at 14:46

## 2024-01-01 RX ADMIN — Medication 2: at 12:59

## 2024-01-01 RX ADMIN — Medication 300 MILLIGRAM(S): at 06:02

## 2024-01-01 RX ADMIN — Medication 5 MILLIGRAM(S): at 14:45

## 2024-01-01 RX ADMIN — HEPARIN SODIUM 5000 UNIT(S): 5000 INJECTION INTRAVENOUS; SUBCUTANEOUS at 05:32

## 2024-01-01 RX ADMIN — INSULIN GLARGINE 8 UNIT(S): 100 INJECTION, SOLUTION SUBCUTANEOUS at 09:32

## 2024-01-01 RX ADMIN — PHENYLEPHRINE HYDROCHLORIDE 9.38 MICROGRAM(S)/KG/MIN: 10 INJECTION INTRAVENOUS at 07:40

## 2024-01-01 RX ADMIN — PHENYLEPHRINE HYDROCHLORIDE 6.09 MICROGRAM(S)/KG/MIN: 10 INJECTION INTRAVENOUS at 11:51

## 2024-01-01 RX ADMIN — PIPERACILLIN AND TAZOBACTAM 200 GRAM(S): 4; .5 INJECTION, POWDER, LYOPHILIZED, FOR SOLUTION INTRAVENOUS at 06:01

## 2024-01-01 RX ADMIN — Medication 20 MILLILITER(S): at 11:53

## 2024-01-01 RX ADMIN — FENTANYL CITRATE 25 MICROGRAM(S): 50 INJECTION INTRAVENOUS at 08:50

## 2024-01-01 RX ADMIN — PHENYLEPHRINE HYDROCHLORIDE 6.09 MICROGRAM(S)/KG/MIN: 10 INJECTION INTRAVENOUS at 14:00

## 2024-01-01 RX ADMIN — Medication 750 MILLIGRAM(S): at 12:30

## 2024-01-01 RX ADMIN — Medication 750 MILLIGRAM(S): at 00:30

## 2024-01-01 RX ADMIN — SODIUM CHLORIDE 75 MILLILITER(S): 9 INJECTION, SOLUTION INTRAVENOUS at 06:38

## 2024-01-01 RX ADMIN — PHENYLEPHRINE HYDROCHLORIDE 16.9 MICROGRAM(S)/KG/MIN: 10 INJECTION INTRAVENOUS at 05:33

## 2024-01-01 RX ADMIN — FENTANYL CITRATE 25 MICROGRAM(S): 50 INJECTION INTRAVENOUS at 15:07

## 2024-01-01 RX ADMIN — FENTANYL CITRATE 12.5 MICROGRAM(S): 50 INJECTION INTRAVENOUS at 12:00

## 2024-01-01 RX ADMIN — POLYETHYLENE GLYCOL 3350 17 GRAM(S): 17 POWDER, FOR SOLUTION ORAL at 23:14

## 2024-01-01 RX ADMIN — Medication 650 MILLIGRAM(S): at 20:41

## 2024-01-01 RX ADMIN — DEXMEDETOMIDINE HYDROCHLORIDE IN 0.9% SODIUM CHLORIDE 3.75 MICROGRAM(S)/KG/HR: 4 INJECTION INTRAVENOUS at 07:38

## 2024-01-01 RX ADMIN — Medication 81 MILLIGRAM(S): at 11:10

## 2024-01-01 RX ADMIN — Medication 2: at 12:02

## 2024-01-01 RX ADMIN — Medication 4: at 17:50

## 2024-01-01 RX ADMIN — MIDODRINE HYDROCHLORIDE 10 MILLIGRAM(S): 2.5 TABLET ORAL at 07:34

## 2024-01-01 RX ADMIN — Medication 500 MILLILITER(S): at 13:15

## 2024-01-01 RX ADMIN — HEPARIN SODIUM 5000 UNIT(S): 5000 INJECTION INTRAVENOUS; SUBCUTANEOUS at 21:56

## 2024-01-01 RX ADMIN — Medication 1: at 11:44

## 2024-01-01 RX ADMIN — INSULIN GLARGINE 8 UNIT(S): 100 INJECTION, SOLUTION SUBCUTANEOUS at 08:07

## 2024-01-01 RX ADMIN — Medication 100 GRAM(S): at 14:49

## 2024-01-01 RX ADMIN — Medication 750 MILLIGRAM(S): at 13:30

## 2024-01-01 RX ADMIN — Medication 3: at 08:38

## 2024-01-01 RX ADMIN — INSULIN GLARGINE 6 UNIT(S): 100 INJECTION, SOLUTION SUBCUTANEOUS at 22:33

## 2024-01-01 RX ADMIN — SIMVASTATIN 20 MILLIGRAM(S): 20 TABLET, FILM COATED ORAL at 22:03

## 2024-01-01 RX ADMIN — SODIUM CHLORIDE 75 MILLILITER(S): 9 INJECTION, SOLUTION INTRAVENOUS at 05:33

## 2024-01-01 RX ADMIN — INSULIN GLARGINE 4 UNIT(S): 100 INJECTION, SOLUTION SUBCUTANEOUS at 12:42

## 2024-01-01 RX ADMIN — Medication 650 MILLIGRAM(S): at 14:00

## 2024-01-01 RX ADMIN — Medication 1: at 17:46

## 2024-01-01 RX ADMIN — HEPARIN SODIUM 5000 UNIT(S): 5000 INJECTION INTRAVENOUS; SUBCUTANEOUS at 21:15

## 2024-01-01 RX ADMIN — AMLODIPINE BESYLATE 5 MILLIGRAM(S): 2.5 TABLET ORAL at 08:40

## 2024-01-01 RX ADMIN — Medication 50 MILLILITER(S): at 15:45

## 2024-01-01 RX ADMIN — Medication 250 MILLIGRAM(S): at 06:32

## 2024-01-01 RX ADMIN — FLUDROCORTISONE ACETATE 0.1 MILLIGRAM(S): 0.1 TABLET ORAL at 12:27

## 2024-01-01 RX ADMIN — MIDODRINE HYDROCHLORIDE 10 MILLIGRAM(S): 2.5 TABLET ORAL at 14:16

## 2024-01-01 RX ADMIN — Medication 20 MILLIGRAM(S): at 16:37

## 2024-01-01 RX ADMIN — SODIUM CHLORIDE 1 GRAM(S): 9 INJECTION INTRAMUSCULAR; INTRAVENOUS; SUBCUTANEOUS at 09:42

## 2024-01-01 RX ADMIN — MIDAZOLAM HYDROCHLORIDE 2 MILLIGRAM(S): 1 INJECTION, SOLUTION INTRAMUSCULAR; INTRAVENOUS at 12:10

## 2024-01-01 RX ADMIN — Medication 127.5 MILLIMOLE(S): at 14:46

## 2024-01-01 RX ADMIN — Medication 1 TABLET(S): at 19:45

## 2024-01-01 RX ADMIN — Medication 1 UNIT(S): at 08:05

## 2024-01-01 RX ADMIN — Medication 1 UNIT(S): at 17:09

## 2024-01-01 RX ADMIN — SODIUM CHLORIDE 4 MILLILITER(S): 9 INJECTION INTRAMUSCULAR; INTRAVENOUS; SUBCUTANEOUS at 21:48

## 2024-01-01 RX ADMIN — Medication 1: at 11:51

## 2024-01-01 RX ADMIN — SODIUM CHLORIDE 1000 MILLILITER(S): 9 INJECTION INTRAMUSCULAR; INTRAVENOUS; SUBCUTANEOUS at 11:22

## 2024-01-01 RX ADMIN — Medication 3 MILLILITER(S): at 07:30

## 2024-01-01 RX ADMIN — MORPHINE SULFATE 1 MG/HR: 50 CAPSULE, EXTENDED RELEASE ORAL at 08:04

## 2024-01-01 RX ADMIN — SODIUM CHLORIDE 75 MILLILITER(S): 9 INJECTION, SOLUTION INTRAVENOUS at 07:34

## 2024-01-01 RX ADMIN — Medication 1 UNIT(S): at 11:51

## 2024-01-01 RX ADMIN — MIDODRINE HYDROCHLORIDE 10 MILLIGRAM(S): 2.5 TABLET ORAL at 21:55

## 2024-01-01 RX ADMIN — Medication 1: at 08:17

## 2024-01-01 RX ADMIN — FENTANYL CITRATE 25 MICROGRAM(S): 50 INJECTION INTRAVENOUS at 11:45

## 2024-01-01 RX ADMIN — SODIUM CHLORIDE 30 MILLILITER(S): 9 INJECTION, SOLUTION INTRAVENOUS at 07:39

## 2024-01-01 RX ADMIN — SIMVASTATIN 20 MILLIGRAM(S): 20 TABLET, FILM COATED ORAL at 22:33

## 2024-01-01 RX ADMIN — SIMVASTATIN 20 MILLIGRAM(S): 20 TABLET, FILM COATED ORAL at 21:54

## 2024-01-01 RX ADMIN — Medication 100 GRAM(S): at 05:32

## 2024-01-01 RX ADMIN — MORPHINE SULFATE 2 MG/HR: 50 CAPSULE, EXTENDED RELEASE ORAL at 12:00

## 2024-01-01 RX ADMIN — Medication 4: at 12:55

## 2024-01-01 RX ADMIN — INSULIN GLARGINE 8 UNIT(S): 100 INJECTION, SOLUTION SUBCUTANEOUS at 18:00

## 2024-01-01 RX ADMIN — INSULIN GLARGINE 8 UNIT(S): 100 INJECTION, SOLUTION SUBCUTANEOUS at 21:52

## 2024-01-01 RX ADMIN — Medication 45 UNIT(S): at 11:52

## 2024-01-01 RX ADMIN — Medication 300 MILLIGRAM(S): at 12:15

## 2024-01-01 RX ADMIN — PHENYLEPHRINE HYDROCHLORIDE 16.9 MICROGRAM(S)/KG/MIN: 10 INJECTION INTRAVENOUS at 10:57

## 2024-01-01 RX ADMIN — Medication 300 MILLIGRAM(S): at 00:03

## 2024-01-01 RX ADMIN — INSULIN HUMAN 5 UNIT(S): 100 INJECTION, SOLUTION SUBCUTANEOUS at 15:45

## 2024-01-01 RX ADMIN — FENTANYL CITRATE 25 MICROGRAM(S): 50 INJECTION INTRAVENOUS at 11:15

## 2024-01-01 RX ADMIN — MORPHINE SULFATE 1 MG/HR: 50 CAPSULE, EXTENDED RELEASE ORAL at 19:40

## 2024-01-01 RX ADMIN — Medication 3: at 08:54

## 2024-01-01 RX ADMIN — SENNA PLUS 2 TABLET(S): 8.6 TABLET ORAL at 23:14

## 2024-01-01 RX ADMIN — Medication 125 MILLILITER(S): at 10:57

## 2024-01-01 RX ADMIN — FENTANYL CITRATE 25 MICROGRAM(S): 50 INJECTION INTRAVENOUS at 08:20

## 2024-01-01 RX ADMIN — CHLORHEXIDINE GLUCONATE 1 APPLICATION(S): 213 SOLUTION TOPICAL at 17:02

## 2024-01-01 RX ADMIN — Medication 1: at 18:08

## 2024-01-01 RX ADMIN — Medication 300 MILLIGRAM(S): at 13:00

## 2024-01-01 RX ADMIN — Medication 300 MILLIGRAM(S): at 20:21

## 2024-01-01 RX ADMIN — MIDODRINE HYDROCHLORIDE 10 MILLIGRAM(S): 2.5 TABLET ORAL at 21:12

## 2024-01-01 RX ADMIN — CHLORHEXIDINE GLUCONATE 1 APPLICATION(S): 213 SOLUTION TOPICAL at 17:09

## 2024-01-01 RX ADMIN — MIDODRINE HYDROCHLORIDE 10 MILLIGRAM(S): 2.5 TABLET ORAL at 13:31

## 2024-01-01 RX ADMIN — PHENYLEPHRINE HYDROCHLORIDE 6.09 MICROGRAM(S)/KG/MIN: 10 INJECTION INTRAVENOUS at 07:57

## 2024-01-01 RX ADMIN — HEPARIN SODIUM 5000 UNIT(S): 5000 INJECTION INTRAVENOUS; SUBCUTANEOUS at 14:15

## 2024-01-01 RX ADMIN — Medication 3 MILLILITER(S): at 21:47

## 2024-01-01 RX ADMIN — SIMVASTATIN 20 MILLIGRAM(S): 20 TABLET, FILM COATED ORAL at 21:49

## 2024-01-01 RX ADMIN — SIMVASTATIN 20 MILLIGRAM(S): 20 TABLET, FILM COATED ORAL at 21:56

## 2024-01-01 RX ADMIN — HEPARIN SODIUM 5000 UNIT(S): 5000 INJECTION INTRAVENOUS; SUBCUTANEOUS at 21:11

## 2024-01-01 RX ADMIN — Medication 1: at 16:32

## 2024-01-01 RX ADMIN — Medication 10 MILLIGRAM(S): at 05:59

## 2024-01-01 RX ADMIN — Medication 50 MILLILITER(S): at 13:35

## 2024-01-01 RX ADMIN — SODIUM CHLORIDE 30 MILLILITER(S): 9 INJECTION, SOLUTION INTRAVENOUS at 00:04

## 2024-01-01 RX ADMIN — Medication 2: at 17:08

## 2024-01-01 RX ADMIN — HEPARIN SODIUM 5000 UNIT(S): 5000 INJECTION INTRAVENOUS; SUBCUTANEOUS at 06:02

## 2024-01-01 RX ADMIN — Medication 3: at 16:59

## 2024-01-01 RX ADMIN — SODIUM CHLORIDE 4 MILLILITER(S): 9 INJECTION INTRAMUSCULAR; INTRAVENOUS; SUBCUTANEOUS at 07:30

## 2024-01-01 RX ADMIN — Medication 2 UNIT(S): at 08:18

## 2024-01-01 RX ADMIN — MIDAZOLAM HYDROCHLORIDE 2 MILLIGRAM(S): 1 INJECTION, SOLUTION INTRAMUSCULAR; INTRAVENOUS at 08:00

## 2024-01-01 RX ADMIN — SENNA PLUS 2 TABLET(S): 8.6 TABLET ORAL at 21:12

## 2024-01-01 RX ADMIN — PHENYLEPHRINE HYDROCHLORIDE 6.09 MICROGRAM(S)/KG/MIN: 10 INJECTION INTRAVENOUS at 06:11

## 2024-01-01 RX ADMIN — Medication 2 UNIT(S): at 12:00

## 2024-01-01 RX ADMIN — Medication 1: at 08:05

## 2024-01-01 RX ADMIN — Medication 500 MILLILITER(S): at 14:48

## 2024-01-01 RX ADMIN — Medication 100 GRAM(S): at 15:55

## 2024-01-01 RX ADMIN — MIDODRINE HYDROCHLORIDE 10 MILLIGRAM(S): 2.5 TABLET ORAL at 05:32

## 2024-01-01 RX ADMIN — Medication 125 MILLILITER(S): at 15:00

## 2024-01-01 RX ADMIN — Medication 650 MILLIGRAM(S): at 19:54

## 2024-01-01 RX ADMIN — SIMVASTATIN 20 MILLIGRAM(S): 20 TABLET, FILM COATED ORAL at 21:20

## 2024-01-01 RX ADMIN — SODIUM CHLORIDE 250 MILLILITER(S): 9 INJECTION INTRAMUSCULAR; INTRAVENOUS; SUBCUTANEOUS at 10:54

## 2024-01-01 RX ADMIN — SODIUM CHLORIDE 1000 MILLILITER(S): 9 INJECTION, SOLUTION INTRAVENOUS at 06:34

## 2024-01-01 RX ADMIN — HEPARIN SODIUM 5000 UNIT(S): 5000 INJECTION INTRAVENOUS; SUBCUTANEOUS at 05:56

## 2024-01-01 RX ADMIN — Medication 2 UNIT(S): at 11:46

## 2024-01-04 NOTE — PHYSICAL EXAM
[Normal] : no focal deficits [General Appearance - Well Developed] : well developed [] : no respiratory distress [No Focal Deficits] : no focal deficits [Oriented To Time, Place, And Person] : oriented to person, place, and time

## 2024-01-04 NOTE — HISTORY OF PRESENT ILLNESS
[FreeTextEntry1] : This is a 72F with IDDM, nonsmoker (1/2 pack per week x 2 years, 50 years ago) PMH follicular adenoma of thyroid (s/p partial thyroidectomy), BCC, SCC, positive for BRCA, noted two left neck lumps while taking a shower. Saw her internist Dr. Rizvi who worked her up and ref to Dr. Ding. Additional work up imaging revealed pulmonary mass c/w BRAF V600E NSCLC with adenopathy, and bone lesions suspicious for malignancy. She was found to have 4 brain lesions on CT and started on systemic therapy tafinlar/mekinist 10/23/2023. She is allergic to contrast.  Diagnosis: Brain metastases   Thoracic: Dr. Ding MEDICAL ONCOLOGIST: Dr. Plummer ** gaddollinium contrast allergy, needs premedication prednisone and benadryl; claustraphobia**   Recent Oncologic History:  CT Chest on 08/29/2023: - Right lower lobe mass with inferior 4.5 cm nodular component (2-119), and elongated component that tracks centrally to the right hilum, occluding the medial basal segmental bronchus. - Patent airways through the lobar bronchi. - Several solid nodules in all lobes, for instance, largest including 1.5 cm in the superior segment of the left lower lobe and the lingula. - Trace right pleural effusion. - Bilateral mediastinal, left supraclavicular, left axillary, and left subpectoral lymphadenopathy, largest including 2.7 cm in the left supraclavicular region. - Multiple lytic bone lesions including the manubrium, inferior sternum and left 11th rib.  09/28/2023: LYMPH NODE, Neck LEVEL 4, LEFT, US GUIDED CORE BIOPSY AND FNA POSITIVE FOR MALIGNANT CELLS. Metastatic lung adenocarcinoma PDL1 70%  PET/CT on 10/07/2023: 1. Right lower lobe mass not well delineated due to new right pleural effusion but appears more prominent than the on the most recent CT chest with increased activity. Multiple hypermetabolic lung nodules in both lung fields. Findings compatible with likely primary right lower lobe mass with multiple metastatic lung nodules. 2. Hypermetabolic nodes in the left neck, thorax and below the diaphragm compatible with biopsied konrad i. Extent of retroperitoneal and mesenteric konrad involvement is more extensive than usually seen with lung malignancy; may need further tissue evaluation. 3. Multiple FDG avid bone lesions and a hypermetabolic left gluteal muscle lesion compatible with metastases. Focus in the left side of the skull base is difficult to assess on this PET/CT and can be further evaluated with CT head or MRI. 4. Multiple foci of increased activity in the liver without corresponding low-dose CT abnormality most likely due to disease involvement.  CT 10/18 has 4 new brain mets.    No MRI  tumor genetics report? BRAF V600E  She states she has felt improved significantly on systemic treatment and was bedbound at first with no appetite with O2 most of the day. States that in 72h she radically improved, no longer needs O2, fully functional, with a strong appetite. She has no pain associated with her bone metastases.   Visit dated 1/4/2024 Patient returns with completed cranial images for review to guide the next steps in her care, Denies N/V, HA/unilateral extremity weakness/memory changes/gait disturbance/bowel/bladder dysfunction or other neurologic symptoms. No issues with speech or comprehension. Now able to participate in most activities' w/o difficulty.  Continues to follow with Dr. Plummer on Tafinlar/Mekinist per patient tolerating well.  CT head 12/29/2023 IMPRESSION: new 8mm the static lesion in the RIGHT anterior frontal cortex at the convexity. Decrease in the size of the previously noted RIGHT lateral frontal metastatic lesion now measuring 3 mm and inferior LEFT frontal region metastatic lesion now measuring 6.6 mm. Resolution of previously noted LEFT parietal metastatic lesion.   CT C/A/P 12/29/2023 IMPRESSION: In comparison with 10/7/2023, interval marked decrease of right lower lobe mass and bilateral metastatic nodule. Interval resolution of enlarged left axillary, mediastinal, bilateral hilar and retroperitoneal lymph nodes. Stable lytic bone metastases.

## 2024-01-05 NOTE — PHYSICAL EXAM
[Ambulatory and capable of all self care but unable to carry out any work activities] : Status 2- Ambulatory and capable of all self care but unable to carry out any work activities. Up and about more than 50% of waking hours [Thin] : thin [Normal] : affect appropriate [de-identified] : cervicsl LAD resolved [de-identified] : R. coarse wheeze. L lung CTA

## 2024-01-05 NOTE — REVIEW OF SYSTEMS
[Fatigue] : fatigue [Recent Change In Weight] : ~T recent weight change [Shortness Of Breath] : shortness of breath [Cough] : cough [SOB on Exertion] : shortness of breath during exertion [Diarrhea: Grade 0] : Diarrhea: Grade 0 [Negative] : Allergic/Immunologic [FreeTextEntry2] : lost weight [FreeTextEntry4] : nodes in neck have resolved [FreeTextEntry6] : improved [FreeTextEntry9] : foot pain still persistent

## 2024-01-05 NOTE — ASSESSMENT
[FreeTextEntry1] : Ms. Roe is a 74 yo w with IDDM, BRCA carrier, mild former smoker with adeno ca of lung, mets to multiple organs  PDL1 high- 70% NGS from tissue reviewed in detail BRAF V600E BRCA2 [id number]*22 (germ line likely) MSH6 [id number]*11 NFKBIA amplification NKX2-1 amplification  Given NGS results, recommend Tafinlar 75 BID and Mekinist 0.5 mg once daily She has been on it for 2.5 months and is tolerating well.. CT chest and abdomen imaging reviewed with patient and reveal interval marked decrease of right lower lobe mass and bilateral metastatic nodule. Interval resolution of enlarged left axillary, mediastinal, bilateral hilar and retroperitoneal lymph nodes.Stable lytic bone metastases. BW sent today including CBC CMP LDH and CEA Weight is stable CT head reviewed: ? new sub centimeter lesion. WIll follow up on short interval CT head (6 weeks) Bone mets- Dental clearance obtained. Starting Xgeva today. Pt is on calcium and Vit D. Will monitor calcium.  Repeat scans in 3 months   OV 1 month

## 2024-01-05 NOTE — HISTORY OF PRESENT ILLNESS
[Disease: _____________________] : Disease: [unfilled] [M: ___] : M[unfilled] [AJCC Stage: ____] : AJCC Stage: [unfilled] [de-identified] : Ms. 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 She was followed up by endocrine and noted to have low aldosterone.  A few days later, pt noted two left neck lumps while taking a shower. Additional work up imaging revealed pulmonary mass, adenopathy, and bone lesions suspicious for malignancy.  CT Chest on 08/29/2023: - Right lower lobe mass with inferior 4.5 cm nodular component (2-119), and elongated component that tracks centrally to the right hilum, occluding the medial basal segmental bronchus. - Patent airways through the lobar bronchi. - Several solid nodules in all lobes, for instance, largest including 1.5 cm in the superior segment of the left lower lobe and the lingula. - Trace right pleural effusion. - Bilateral mediastinal, left supraclavicular, left axillary, and left subpectoral lymphadenopathy, largest including 2.7 cm in the left supraclavicular region. - Multiple lytic bone lesions including the manubrium, inferior sternum and left 11th rib. - Small pericardial effusion. Coronary artery calcifications.  Seen on 09/13/2023: CT Chest revealing RLL mass with lymphadenopathy, suspicious for advanced malignancy.  PFTs on 09/18/2023: FVC 2.01, 68%; FEV1 1.41, 63%; DLCO 10.9, 53%  Established care with Dr. Armen Christensen on 09/27/2023 for hoarseness of voice:  However, this was all suspected to be related to lung and med findings.  09/28/2023: S/p IR bx of left supraclavicular lymph node with Dr. Clement Santos. Path level 4 LN: Metastatic lung adenocarcinoma PDL1 70%  PET/CT on 10/07/2023: 1. Right lower lobe mass not well delineated due to new right pleural effusion but appears more prominent than the on the most recent CT chest with increased activity. Multiple hypermetabolic lung nodules in both lung fields. Findings compatible with likely primary right lower lobe mass with multiple metastatic lung nodules. 2. Hypermetabolic nodes in the left neck, thorax and below the diaphragm compatible with biopsied konrad i. Extent of retroperitoneal and mesenteric konrad involvement is more extensive than usually seen with lung malignancy; may need further tissue evaluation. 3. Multiple FDG avid bone lesions and a hypermetabolic left gluteal muscle lesion compatible with metastases. Focus in the left side of the skull base is difficult to assess on this PET/CT and can be further evaluated with CT head or MRI. 4. Multiple foci of increased activity in the liver without corresponding low-dose CT abnormality most likely due to disease involvement.  10/20/23: Very fatigued, hypoxic, now on home O2.   11/7/23: Has not been using O2. No longer on pain meds. Over-exerted herself and starting ambulating a lot few days ago, but that seems to have resulted in foot sprain, She rested a little and this seems to have improved  12/8/23: Pt states she is feeling much better. She has been able to do much more than she used to. She is not using O2 anymore. She does endorse some LE pain which seems to have improved a little   1/5/24: Pt seen in follow up. She is feeling well. Tolerating Mekanist and tafinlar. States would like to change dosing schedule to be better suited to her lifestyle. She had recent CT imaging and CT head. CT head revealed new small subcm lesion R. ant frontal cortex. Remaining lesions improved or resolved. Pt saw Dr. Maddox yesterday who will monitor new lesion on subsequent short interval CT scan. Pt developed acne like rash which seems to be related to Glucerna rather than mek/taf.  [de-identified] : adeno ca

## 2024-01-14 NOTE — ED ADULT TRIAGE NOTE - CHIEF COMPLAINT QUOTE
Pt brought in by EMS from home complaining of SOB, hx of Lung ca. As per EMS pts spO2 was 78% on room air. pt arrives on 4L NC. Pt denies chest pain, n/v/d, fever or chills. unable to obtain temp in triage. Pt brought in by EMS from home complaining of SOB, hx of Lung ca. As per EMS pts spO2 was 78% on room air. pt arrives on 4L NC. Pt denies chest pain, n/v/d, fever or chills. unable to obtain temp in triage. pt arrives with 20g R AC with NS infusing by EMS

## 2024-01-14 NOTE — PATIENT PROFILE ADULT - FUNCTIONAL ASSESSMENT - BASIC MOBILITY 6.
3-calculated by average/Not able to assess (calculate score using [facility] averaging method)  3-calculated by average/Not able to assess (calculate score using [facility] averaging method)

## 2024-01-14 NOTE — ED PROVIDER NOTE - CLINICAL SUMMARY MEDICAL DECISION MAKING FREE TEXT BOX
Sharon: Lung cancer p/w hypoxia, new a. fib, weakness. IV contrast allergy. Will do MRI for brain mets or CVA. V/Q scan for PE. Admit. Sharon: Lung cancer p/w hypoxia, new a. fib, and slurred speech and leg swelling (B) (not red/hot). IV contrast allergy. POCUS: large pericardial effusion (CT Surg. was called). Will do MRI for brain mets or CVA. IVF for R-heart pressure support. Admit.

## 2024-01-14 NOTE — CONSULT NOTE ADULT - ASSESSMENT
HPI: Patient LIAN BENITEZ is a 73y (1950) woman with a PMHx significant for smoking, HTN, HLD, type 1 DM, thyroidectomy, BCC, SCC, RLL mass mets to brain and bone (8/23). Awoke 6am with sob, abdominal pain, and dysarthria, EMS arrives 7:30, around that time also had WFD. Reportedly hypoxic to 70s. ED course: Code stroke, no tnk  due to michael mets and out of window.  VS: HR up to 120s, hypothermic 35.6, hypotensive 81/56. requiring 4L NC. CT CAP with large pericardial and b/l pleural effusions Per CCU note found to be in afib     rpeorts allergy to both iodine and gadolinium both with sob    LKN: Wake up stroke however awoke 0600 and , 1/13/24 pm  NIHSS: 10(Questions +2, Right superior Quadrantanopia +1, Severe aphasia +2, Dysarthria +1, b/l leg drift +4)  preMRS: 0  Pt is not a candidate for tenecteplase due to outside tenecteplase window , cancer with mets to brain  Mechanical thrombectomy candidacy pending MRI/MRA (no CTA due to alergy)    Impression: Expressive aphasia and right superior quadrantanopia due to left hemispheric dysfunction. Mechanism ischemic stroke. Etiology likely cardioembolic given new afib vs hypercoagulability of malignancy      Recommendations  []STAT MRI noncon MRA head and neck with and without contrast r/o LVO (if cant get gadalidium would still get MRA head and neck without contrast)    Stroke acute management:  [] Frequent neuro-checks q1h and VS q1h; STAT CTH for change in neuro exam  [] Permissive HTN up to 220/110 for 24-48h from symptom onset followed by gradual normotension over 2-3 days   [] IVFs to maintain hydration while NPO  [] NPO unless passes dysphagia screen; swallow eval if fails  [] DVT ppx: SCDs for now     Secondary prevention of stroke:  []Atorvastatin 80 mg PO daily (long-term goal LDL < 70)  []Tight glucose control (long-term goal HgbA1c < 6%)  []Rehabilitation: PT/OT/S+S/SW/CM consults  []Anticoagulation recommendations pending further imaging     Stroke workup:  []MRI as above   []TTE  [x]Telemetry to monitor for arrhythmia; - found afib per ccu note   []Check HgbA1C, fasting lipid panel, utox    Case discussed with stroke attending Dr. Quinteros   Seen also by attending Dr. Aguilar

## 2024-01-14 NOTE — H&P ADULT - HISTORY OF PRESENT ILLNESS
Refill request   allopurinol (ZYLOPRIM) 300 MG tablet TAKE 1 TABLET TWICE DAILY   Edit       Summary: TAKE 1 TABLET TWICE DAILY  Abigailcriblanie, Disp-180 tablet, R-1       Start: 2/28/2022      Report      Pharmacy: [facility] ([facility]) - [facility] [address]    Med Dose History         Ordered on: 2/28/2022       Authorized by: GAIL JUAREZ       Dispense: 180 tablet       Refills: 1 ordered           74yo F with h/o smoking, HTN, HLD, type 1 DM, thyroidectomy, BCC, SCC found to have Rt. 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 Currently undergoing treatment w Dr. Plummer at [facility]. Per pt's , at bedside, pt had been feeling well then awoke this morning disoriented, confused and c/o significant CP and SOB. Brought by EMS to [facility] ER. Due to change in MS,  CT scan r/o code stroke. No infarcts seen.  CT scan C/A/P revealed large pericardial effusion w bilat pleural effusion. Pt  hypotensive, tachycardic. s/p 1Liter IVF.  Discussed with interventional cardiology with plan to  drain pericardial effusion and admit to CCU.   72yo F with h/o smoking, HTN, HLD, type 1 DM, thyroidectomy, BCC, SCC found to have Rt. 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 Currently undergoing treatment w Dr. Plummer at [facility]. Per pt's , at bedside, pt had been feeling well then awoke this morning disoriented, confused and c/o significant CP and SOB. Brought by EMS to [facility] ER. Due to change in MS,  CT scan r/o code stroke. No infarcts seen.  CT scan C/A/P revealed large pericardial effusion w bilat pleural effusion. Pt  hypotensive, tachycardic. s/p 1Liter IVF.  Discussed with interventional cardiology with plan to  drain pericardial effusion and admit to CCU.   74yo F with h/o smoking, HTN, HLD, type 1 DM, thyroidectomy, BCC, SCC found to have Rt. 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 Currently undergoing treatment w Dr. Plummer at [facility]. Per pt's , at bedside, pt had been feeling well then awoke this morning disoriented, confused and c/o significant CP and SOB. Brought by EMS to [facility] ER. Due to change in MS,  CT scan r/o code stroke. No infarcts seen.  CT scan C/A/P revealed large pericardial effusion w bilat pleural effusion. Pt  hypotensive, tachycardic. s/p 1Liter IVF.  Discussed with interventional cardiology with plan to  drain pericardial effusion and admit to CCU.   72yo F with h/o smoking, HTN, HLD, type 1 DM, thyroidectomy, BCC, SCC found to have Rt. 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 Currently undergoing treatment w Dr. Plummer at [facility]. Per pt's , at bedside, pt had been feeling well then awoke this morning disoriented, confused and c/o significant CP and SOB. Brought by EMS to [facility] ER. Due to change in MS,  Pt hypotensive and tachycardic. CT scan r/o code stroke. No infarcts seen.  CT scan C/A/P revealed large pericardial effusion w bilat pleural effusion. Pt  hypotensive, tachycardic. s/p 1Liter IVF.  Discussed with interventional cardiology. Now s/p drain of pericardial effusion 800 sanguineous fluid   72yo F with h/o smoking, HTN, HLD, type 1 DM, thyroidectomy, BCC, SCC found to have Rt. 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 Currently undergoing treatment w Dr. Plummer at [facility]. Per pt's , at bedside, pt had been feeling well then awoke this morning disoriented, confused and c/o significant CP and SOB. Brought by EMS to [facility] ER. Due to change in MS,  Pt hypotensive and tachycardic. CT scan r/o code stroke. No infarcts seen.  CT scan C/A/P revealed large pericardial effusion w bilat pleural effusion. Pt  hypotensive, tachycardic. s/p 1Liter IVF.  Discussed with interventional cardiology. Now s/p drain of pericardial effusion 800 sanguineous fluid   72yo F with h/o smoking, HTN, HLD, type 1 DM, thyroidectomy, BCC, SCC found to have Rt. 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 Currently undergoing treatment w Dr. Plummer at [facility]. Per pt's , at bedside, pt had been feeling well then awoke this morning disoriented, confused and c/o significant CP and SOB. Brought by EMS to [facility] ER. Due to change in MS,  Pt hypotensive and tachycardic. CT scan r/o code stroke. No infarcts seen.  CT scan C/A/P revealed large pericardial effusion w bilat pleural effusion. Pt  hypotensive, tachycardic. s/p 1Liter IVF.  Discussed with interventional cardiology. Now s/p drain of pericardial effusion 800 sanguineous fluid

## 2024-01-14 NOTE — ED ADULT NURSE NOTE - CHIEF COMPLAINT QUOTE
Pt brought in by EMS from home complaining of SOB, hx of Lung ca. As per EMS pts spO2 was 78% on room air. pt arrives on 4L NC. Pt denies chest pain, n/v/d, fever or chills. unable to obtain temp in triage. pt arrives with 20g R AC with NS infusing by EMS

## 2024-01-14 NOTE — CONSULT NOTE ADULT - SUBJECTIVE AND OBJECTIVE BOX
HPI: 72yo F with h/o smoking, HTN, HLD, type 1 DM, thyroidectomy, BCC, SCC found to have 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 Currently undergoing treatment w Dr. Plummer at [facility]. Per pt's , at bedside, pt had been feeling well then awoke this morning disoriented, confused and c/o significant CP and SOB. Brought by EMS to [facility] ER. Due to MS, sent to CT scan for r/o code stroke. No infarcts seen. Further  W/u with CT scan C/A/P revealed large pericardial effusion w bilat pleural effusion. Pt also HD unstable at the time with hypotension, tachycardia and visible signs of distress consistent with likely tamponade. Thoracic surgery urgently consulted for pericardial effusion. Pt seen and examined in ER. Pt in visible distress, c/o chest pain, SOB. Remains disoriented. Pt's  at bedside to confirm above history and presentation of symptoms. Pt hypotensive, tachycardic. s/p 1Liter IVF. Unable to further obtain ROS due to distress.     PAST MEDICAL & SURGICAL HISTORY:  Hypertension      DM type 1 (diabetes mellitus, type 1)  Dx 47 years ago      Basal cell carcinoma  forehead and leg      SCC (squamous cell carcinoma)  on chest      BRCA2 positive      Hyperlipidemia      Latex allergy      S/P  Section x2  ,       Excision of Lipoma of neck        BRCA2 positive  Bilateral Salpingo-oophorectomy : preventative  2012          REVIEW OF SYSTEMS  ROS what's noted above, uable to fully obtain due to disorientation and distress.     MEDICATIONS  (STANDING):  phenylephrine    Infusion 0.5 MICROgram(s)/kG/Min (6.09 mL/Hr) IV Continuous <Continuous>  sodium chloride 0.9% Bolus 1000 milliLiter(s) IV Bolus once    MEDICATIONS  (PRN):      Allergies    latex (Urticaria)  Gadavist (Anaphylaxis; Hives)  codeine (Vomiting)    Intolerances        SOCIAL HISTORY:  Former smoker  , lives w .     FAMILY HISTORY:  No pertinent family history in first degree relatives        Vital Signs Last 24 Hrs  T(C): --  T(F): --  HR: 127 (2024 12:00) (115 - 129)  BP: 119/89 (2024 12:00) (79/53 - 119/89)  BP(mean): 97 (2024 12:00) (60 - 97)  RR: 22 (2024 09:35) (22 - 22)  SpO2: 95% (2024 09:35) (95% - 95%)    Parameters below as of 2024 09:35  Patient On (Oxygen Delivery Method): nasal cannula  O2 Flow (L/min): 4      PHYSICAL EXAM:  General: Pt appears pale, in distress. c/o pain/sob.   Neurology: A&O x 1-2, nonfocal, GALARZA x 4  Eyes: PERRLA/ EOMI, Gross vision intact  ENT/Neck: Neck supple, trachea midline, No JVD, Gross hearing intact  Respiratory: Dec'd BS bilat  CV: + tachycardia -130s.   Abdominal: Soft, NT, ND +BS,   Extremities: No edema, + peripheral pulses        LABS:                        8.7    11.26 )-----------( 475      ( 2024 10:31 )             25.8     01-14    121<L>  |  87<L>  |  57<H>  ----------------------------<  300<H>  5.4<H>   |  13<L>  |  1.22    Ca    7.8<L>      2024 10:31    TPro  5.5<L>  /  Alb  2.6<L>  /  TBili  0.4  /  DBili  x   /  AST  63<H>  /  ALT  69<H>  /  AlkPhos  102  01-14    PT/INR - ( 2024 10:31 )   PT: 12.5 sec;   INR: 1.12 ratio         PTT - ( 2024 10:31 )  PTT:26.1 sec  Urinalysis Basic - ( 2024 10:31 )    Color: x / Appearance: x / SG: x / pH: x  Gluc: 300 mg/dL / Ketone: x  / Bili: x / Urobili: x   Blood: x / Protein: x / Nitrite: x   Leuk Esterase: x / RBC: x / WBC x   Sq Epi: x / Non Sq Epi: x / Bacteria: x        RADIOLOGY & ADDITIONAL STUDIES:  ACC: [id number] EXAM:  CT CHEST   ORDERED BY: JASON CARTY     ACC: [id number] EXAM:  CT ABDOMEN AND PELVIS   ORDERED BY: JASON CARTY     PROCEDURE DATE:  2024          INTERPRETATION:  CLINICAL INFORMATION: Shortness of breath and abdominal   pain. Lung cancer.    COMPARISON: CT chest abdomen pelvis 2023. PET-CT 10/7/2023.    CONTRAST/COMPLICATIONS:  IV Contrast: None  Oral Contrast: None  Complications: None reported    PROCEDURE:  CT of the Chest, Abdomen and Pelvis was performed.  Sagittal and coronal reformats were performed.    FINDINGS:    CHEST:  LUNGS AND LARGE AIRWAYS: Bilateral distal airways impaction. Multifocal   pulmonary opacities predominantly of the lower lobes and interlobular   septal thickening are noted. Pulmonary opacities limit evaluation for   previously described right lower lobe mass and small residual metastatic   nodules.  PLEURA: New small to moderate sized bilateral layering pleural effusions.   No pneumothorax.  VESSELS: Normal caliber of the thoracic aorta with atheromatous changes.   Main pulmonary artery size is within normal limits.  HEART: Heart size is within normal limits. Correlate artery   calcifications. Large pericardial effusion is new. Recommend   echocardiogram to exclude tamponade not.  MEDIASTINUM AND LINDSAY: No enlarged lymph nodes of the thorax by CT size   criteria. Esophagus is nondistended.  CHEST WALL AND LOWER NECK: Left breast coarse calcifications. Soft tissue   edema.    ABDOMEN AND PELVIS:    Solid organ evaluation limited due to noncontrast technique.    LIVER: Liver size within normal limits. Subcentimeter hepatic   hypodensities are too small to characterize  BILE DUCTS: No distention  GALLBLADDER: Prominent gallbladder wall edema likely due to third spacing.  SPLEEN: Spleen size within normal limits  PANCREAS: No acute peripancreatic inflammation  ADRENALS: Mild adrenal thickening similar to prior study. Unremarkable   right adrenal.  KIDNEYS/URETERS: No hydronephrosis    BLADDER: Minimally distended.  REPRODUCTIVE ORGANS: Uterus and adnexa are suboptimally characterized on   CT    BOWEL: Stomach is underdistended. No small bowel distention. Appendix is   unremarkable. Mild stool burden of the colon and overall underdistention   limits evaluation of the colonic mucosa.  PERITONEUM: Small abdominopelvic ascites  VESSELS: No abdominal aortic aneurysm. Atheromatous changes.  RETROPERITONEUM/LYMPH NODES: No enlarged lymph nodes by CT size criteria.  ABDOMINAL WALL: Soft tissue edema.  BONES: Osseous metastases of the manubrium and left posterior 11th rib   (with chronic fracture) are redemonstrated. Correlate with prior PET/CT   findings. Chronic right L1 transverse process and posterior right 12th   rib fractures.    IMPRESSION:    Limited noncontrast study.    New large pericardial effusion. Recommend echocardiogram to exclude   cardiac tamponade.    Multifocal lung parenchymal opacities and interlobular septal thickening.   Pulmonary edema is likely present. Superimposed infection is not   excluded. Pulmonary opacities limit evaluation for previously described   right lower lobe mass and small residual metastatic nodules. New small to   moderate sized bilateral layering pleural effusions. Follow to resolution.    Significant gallbladder wall edema may be due to third spacing. Correlate   with LFTs and ultrasound. Small abdominopelvic ascites.    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 Correlate with prior PET/CT   findings.       HPI: 74yo F with h/o smoking, HTN, HLD, type 1 DM, thyroidectomy, BCC, SCC found to have 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 Currently undergoing treatment w Dr. Plummer at [facility]. Per pt's , at bedside, pt had been feeling well then awoke this morning disoriented, confused and c/o significant CP and SOB. Brought by EMS to [facility] ER. Due to MS, sent to CT scan for r/o code stroke. No infarcts seen. Further  W/u with CT scan C/A/P revealed large pericardial effusion w bilat pleural effusion. Pt also HD unstable at the time with hypotension, tachycardia and visible signs of distress consistent with likely tamponade. Thoracic surgery urgently consulted for pericardial effusion. Pt seen and examined in ER. Pt in visible distress, c/o chest pain, SOB. Remains disoriented. Pt's  at bedside to confirm above history and presentation of symptoms. Pt hypotensive, tachycardic. s/p 1Liter IVF. Unable to further obtain ROS due to distress.     PAST MEDICAL & SURGICAL HISTORY:  Hypertension      DM type 1 (diabetes mellitus, type 1)  Dx 47 years ago      Basal cell carcinoma  forehead and leg      SCC (squamous cell carcinoma)  on chest      BRCA2 positive      Hyperlipidemia      Latex allergy      S/P  Section x2  ,       Excision of Lipoma of neck        BRCA2 positive  Bilateral Salpingo-oophorectomy : preventative  2012          REVIEW OF SYSTEMS  ROS what's noted above, uable to fully obtain due to disorientation and distress.     MEDICATIONS  (STANDING):  phenylephrine    Infusion 0.5 MICROgram(s)/kG/Min (6.09 mL/Hr) IV Continuous <Continuous>  sodium chloride 0.9% Bolus 1000 milliLiter(s) IV Bolus once    MEDICATIONS  (PRN):      Allergies    latex (Urticaria)  Gadavist (Anaphylaxis; Hives)  codeine (Vomiting)    Intolerances        SOCIAL HISTORY:  Former smoker  , lives w .     FAMILY HISTORY:  No pertinent family history in first degree relatives        Vital Signs Last 24 Hrs  T(C): --  T(F): --  HR: 127 (2024 12:00) (115 - 129)  BP: 119/89 (2024 12:00) (79/53 - 119/89)  BP(mean): 97 (2024 12:00) (60 - 97)  RR: 22 (2024 09:35) (22 - 22)  SpO2: 95% (2024 09:35) (95% - 95%)    Parameters below as of 2024 09:35  Patient On (Oxygen Delivery Method): nasal cannula  O2 Flow (L/min): 4      PHYSICAL EXAM:  General: Pt appears pale, in distress. c/o pain/sob.   Neurology: A&O x 1-2, nonfocal, GALARZA x 4  Eyes: PERRLA/ EOMI, Gross vision intact  ENT/Neck: Neck supple, trachea midline, No JVD, Gross hearing intact  Respiratory: Dec'd BS bilat  CV: + tachycardia -130s.   Abdominal: Soft, NT, ND +BS,   Extremities: No edema, + peripheral pulses        LABS:                        8.7    11.26 )-----------( 475      ( 2024 10:31 )             25.8     01-14    121<L>  |  87<L>  |  57<H>  ----------------------------<  300<H>  5.4<H>   |  13<L>  |  1.22    Ca    7.8<L>      2024 10:31    TPro  5.5<L>  /  Alb  2.6<L>  /  TBili  0.4  /  DBili  x   /  AST  63<H>  /  ALT  69<H>  /  AlkPhos  102  01-14    PT/INR - ( 2024 10:31 )   PT: 12.5 sec;   INR: 1.12 ratio         PTT - ( 2024 10:31 )  PTT:26.1 sec  Urinalysis Basic - ( 2024 10:31 )    Color: x / Appearance: x / SG: x / pH: x  Gluc: 300 mg/dL / Ketone: x  / Bili: x / Urobili: x   Blood: x / Protein: x / Nitrite: x   Leuk Esterase: x / RBC: x / WBC x   Sq Epi: x / Non Sq Epi: x / Bacteria: x        RADIOLOGY & ADDITIONAL STUDIES:  ACC: [id number] EXAM:  CT CHEST   ORDERED BY: JASON CARTY     ACC: [id number] EXAM:  CT ABDOMEN AND PELVIS   ORDERED BY: JASON CARTY     PROCEDURE DATE:  2024          INTERPRETATION:  CLINICAL INFORMATION: Shortness of breath and abdominal   pain. Lung cancer.    COMPARISON: CT chest abdomen pelvis 2023. PET-CT 10/7/2023.    CONTRAST/COMPLICATIONS:  IV Contrast: None  Oral Contrast: None  Complications: None reported    PROCEDURE:  CT of the Chest, Abdomen and Pelvis was performed.  Sagittal and coronal reformats were performed.    FINDINGS:    CHEST:  LUNGS AND LARGE AIRWAYS: Bilateral distal airways impaction. Multifocal   pulmonary opacities predominantly of the lower lobes and interlobular   septal thickening are noted. Pulmonary opacities limit evaluation for   previously described right lower lobe mass and small residual metastatic   nodules.  PLEURA: New small to moderate sized bilateral layering pleural effusions.   No pneumothorax.  VESSELS: Normal caliber of the thoracic aorta with atheromatous changes.   Main pulmonary artery size is within normal limits.  HEART: Heart size is within normal limits. Correlate artery   calcifications. Large pericardial effusion is new. Recommend   echocardiogram to exclude tamponade not.  MEDIASTINUM AND LINDSAY: No enlarged lymph nodes of the thorax by CT size   criteria. Esophagus is nondistended.  CHEST WALL AND LOWER NECK: Left breast coarse calcifications. Soft tissue   edema.    ABDOMEN AND PELVIS:    Solid organ evaluation limited due to noncontrast technique.    LIVER: Liver size within normal limits. Subcentimeter hepatic   hypodensities are too small to characterize  BILE DUCTS: No distention  GALLBLADDER: Prominent gallbladder wall edema likely due to third spacing.  SPLEEN: Spleen size within normal limits  PANCREAS: No acute peripancreatic inflammation  ADRENALS: Mild adrenal thickening similar to prior study. Unremarkable   right adrenal.  KIDNEYS/URETERS: No hydronephrosis    BLADDER: Minimally distended.  REPRODUCTIVE ORGANS: Uterus and adnexa are suboptimally characterized on   CT    BOWEL: Stomach is underdistended. No small bowel distention. Appendix is   unremarkable. Mild stool burden of the colon and overall underdistention   limits evaluation of the colonic mucosa.  PERITONEUM: Small abdominopelvic ascites  VESSELS: No abdominal aortic aneurysm. Atheromatous changes.  RETROPERITONEUM/LYMPH NODES: No enlarged lymph nodes by CT size criteria.  ABDOMINAL WALL: Soft tissue edema.  BONES: Osseous metastases of the manubrium and left posterior 11th rib   (with chronic fracture) are redemonstrated. Correlate with prior PET/CT   findings. Chronic right L1 transverse process and posterior right 12th   rib fractures.    IMPRESSION:    Limited noncontrast study.    New large pericardial effusion. Recommend echocardiogram to exclude   cardiac tamponade.    Multifocal lung parenchymal opacities and interlobular septal thickening.   Pulmonary edema is likely present. Superimposed infection is not   excluded. Pulmonary opacities limit evaluation for previously described   right lower lobe mass and small residual metastatic nodules. New small to   moderate sized bilateral layering pleural effusions. Follow to resolution.    Significant gallbladder wall edema may be due to third spacing. Correlate   with LFTs and ultrasound. Small abdominopelvic ascites.    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 Correlate with prior PET/CT   findings.

## 2024-01-14 NOTE — CHART NOTE - NSCHARTNOTEFT_GEN_A_CORE
72 y/o F, BRCA carrier, PMHx of Stage IV NSCLC adenocarcinoma on histology (PDL1 high- 70%, NGS  BRAF V600E; BRCA2 N2595ll*22 (germ line likely); MSH6 G749fs*11; NFKBIA amplification NKX2-1 amplification) on Tafinlar 75 BID and Mekinist 0.5 mg once daily, follows with Dr. Plummer at [facility],  HTN, HLD, type 1 DM, thyroidectomy, presented with CP and SOB and change in MS; was found to be tachycardic hypotensive.   CT C/A/P : revealed large pericardial effusion w bilat pleural effusion, c/w cardiac tamponade treated with fluids and  s/p drain of pericardial effusion 800 sanguineous fluid.     -Follow cytology   -Hold Tafinlar 75 BID and Mekinist 0.5 mg while inpatient   -Agree with [facility]   -Patient to follow with Dr. Plummer after d/c     Case d/w Dr. Vishal Bazan MD.   PGY5 HEMONC fellow 72 y/o F, BRCA carrier, PMHx of Stage IV NSCLC adenocarcinoma on histology (PDL1 high- 70%, NGS  BRAF V600E; BRCA2 D2456wl*22 (germ line likely); MSH6 G749fs*11; NFKBIA amplification NKX2-1 amplification) on Tafinlar 75 BID and Mekinist 0.5 mg once daily, follows with Dr. Plummer at [facility],  HTN, HLD, type 1 DM, thyroidectomy, presented with CP and SOB and change in MS; was found to be tachycardic hypotensive.   CT C/A/P : revealed large pericardial effusion w bilat pleural effusion, c/w cardiac tamponade treated with fluids and  s/p drain of pericardial effusion 800 sanguineous fluid.     -Follow cytology   -Hold Tafinlar 75 BID and Mekinist 0.5 mg while inpatient   -Agree with [facility]   -Patient to follow with Dr. Plummer after d/c     Case d/w Dr. Vishal Bazan MD.   PGY5 HEMONC fellow

## 2024-01-14 NOTE — PATIENT PROFILE ADULT - FALL HARM RISK - HARM RISK INTERVENTIONS
Monitor for mental status changes/Monitor gait and stability/Reinforce activity limits and safety measures with patient and family/Review medications for side effects contributing to fall risk/Use of alarms - bed, chair and/or voice tab/Visual Cue: Yellow wristband and red socks/Bed in lowest position, wheels locked, appropriate side rails in place/Call bell, personal items and telephone in reach/Instruct patient to call for assistance before getting out of bed or chair/Non-slip footwear when patient is out of bed/[place] to call system/Physically safe environment - no spills, clutter or unnecessary equipment/Purposeful Proactive Rounding/Room/bathroom lighting operational, light cord in reach Monitor for mental status changes/Monitor gait and stability/Reinforce activity limits and safety measures with patient and family/Review medications for side effects contributing to fall risk/Use of alarms - bed, chair and/or voice tab/Visual Cue: Yellow wristband and red socks/Bed in lowest position, wheels locked, appropriate side rails in place/Call bell, personal items and telephone in reach/Instruct patient to call for assistance before getting out of bed or chair/Non-slip footwear when patient is out of bed/[place] to call system/Physically safe environment - no spills, clutter or unnecessary equipment/Purposeful Proactive Rounding/Room/bathroom lighting operational, light cord in reach Assistance with ambulation/Assistance OOB with selected safe patient handling equipment/Communicate Risk of Fall with Harm to all staff/Discuss with provider need for PT consult/Monitor gait and stability/Provide patient with walking aids - walker, cane, crutches/Reinforce activity limits and safety measures with patient and family/Sit up slowly, dangle for a short time, stand at bedside before walking/Tailored Fall Risk Interventions/Use of alarms - bed, chair and/or voice tab/Visual Cue: Yellow wristband and red socks/Bed in lowest position, wheels locked, appropriate side rails in place/Call bell, personal items and telephone in reach/Instruct patient to call for assistance before getting out of bed or chair/Non-slip footwear when patient is out of bed/[place] to call system/Physically safe environment - no spills, clutter or unnecessary equipment/Purposeful Proactive Rounding/Room/bathroom lighting operational, light cord in reach Assistance with ambulation/Assistance OOB with selected safe patient handling equipment/Communicate Risk of Fall with Harm to all staff/Discuss with provider need for PT consult/Monitor gait and stability/Provide patient with walking aids - walker, cane, crutches/Reinforce activity limits and safety measures with patient and family/Sit up slowly, dangle for a short time, stand at bedside before walking/Tailored Fall Risk Interventions/Use of alarms - bed, chair and/or voice tab/Visual Cue: Yellow wristband and red socks/Bed in lowest position, wheels locked, appropriate side rails in place/Call bell, personal items and telephone in reach/Instruct patient to call for assistance before getting out of bed or chair/Non-slip footwear when patient is out of bed/[place] to call system/Physically safe environment - no spills, clutter or unnecessary equipment/Purposeful Proactive Rounding/Room/bathroom lighting operational, light cord in reach

## 2024-01-14 NOTE — H&P ADULT - NSHPPHYSICALEXAM_GEN_ALL_CORE
Appearance: Normal	  HEENT:   Normal oral mucosa, PERRL, EOMI	  Lymphatic: No lymphadenopathy  Cardiovascular: Normal S1 S2, No JVD, No murmurs, No edema  Respiratory: Lungs clear to auscultation	  Psychiatry: A & O x 3, Mood & affect appropriate  Gastrointestinal:  Soft, Non-tender, + BS	  Skin: No rashes, No ecchymoses, No cyanosis	  Neurologic: Non-focal, A&Ox3, nonfocal, GALARZA x 4  Extremities: Normal range of motion, No clubbing, cyanosis or edema  Vascular: Peripheral pulses palpable 2+ bilaterally Appearance: Normal	  HEENT:   Normal oral mucosa, 	  Cardiovascular: Normal S1 S2, No JVD, No murmurs, No edema  Respiratory: Lungs clear to auscultation	  Psychiatry: A & O x 3, Mood & affect appropriate  Gastrointestinal:  Soft, Non-tender, + BS	  Skin: No rashes, No ecchymoses, No cyanosis	  Neurologic: Non-focal, A&Ox3, nonfocal, GALARZA x 4  Extremities: Normal range of motion, No clubbing, cyanosis or edema  Vascular: Peripheral pulses palpable 2+ bilaterally

## 2024-01-14 NOTE — ED ADULT NURSE NOTE - NSICDXPASTSURGICALHX_GEN_ALL_CORE_FT
PAST SURGICAL HISTORY:  BRCA2 positive Bilateral Salpingo-oophorectomy : preventative  2012    Excision of Lipoma of neck     S/P  Section x2 ,

## 2024-01-14 NOTE — CONSULT NOTE ADULT - ATTENDING COMMENTS
This is a 73F with SCC, FLL mass with mets to brain and bone who woke up with SOB and dysarthia for whom EMS was called and brought to the ED as stroke code. In the ED, the patient was hypothermic, tachycardic, and hypoxic. CTH without stroke, CT CAP with large pericardial effusion s/p pericardial window. She was noted to be aphasic during the code, but CTA was not done as patient's  notified the team that she is allergic to iodine.     Exam: Aphasic with paraphasic errors, cannot name high or low frequency items. Can follow simple commands but cannot cross midline. Rest of exam as above.    Imp: This is a 73F with RLL mass with mets to the brain who presents with expressive aphasia. Would like to prioritize MRI/MRA if patient is stable as she is still within window for possible thrombectomy if she has a LVO.    - If patient hemodynamically stable and no longer tenuous, prioritize MRI/MRA  - Rest as above

## 2024-01-14 NOTE — ED ADULT NURSE NOTE - NSICDXPASTMEDICALHX_GEN_ALL_CORE_FT
PAST MEDICAL HISTORY:  Basal cell carcinoma forehead and leg    BRCA2 positive     DM type 1 (diabetes mellitus, type 1) Dx 47 years ago    Hyperlipidemia     Hypertension     Latex allergy     SCC (squamous cell carcinoma) on chest

## 2024-01-14 NOTE — ED ADULT NURSE NOTE - NSICDXNOFAMILYHX_GEN_ALL_CORE
Pt presents via EMS for evaluation of seizures, with mom carrying pt in. Pt first had a seizure at  that lasted for 5 minutes. Mom brought pt to clinic and then pt had another seizure, which presented as twitching. Mom denies any medical history.    <-- Click to add NO pertinent Family History

## 2024-01-14 NOTE — ED PROVIDER NOTE - OBJECTIVE STATEMENT
73-year-old female history of type 1 diabetes hypertension history of lung cancer chief complaint shortness of breath. PT present w/  who states that she is being complaining of increased shortness of breath.  Patient was found to have dysarthria code stroke was called.

## 2024-01-14 NOTE — ED PROVIDER NOTE - PHYSICAL EXAMINATION
GENERAL: Awake, alert, appears in distress  LUNGS: labored breathing   CARDIAC: tacy afib new   ABDOMEN: Soft, , non tender, non distended, no rebound, no guarding  EXT: No edema, no calf tenderness, 2+ DP pulses bilaterally, no deformities.  NEURO: A&Ox3.dysarthric speech full strength UE and LE   SKIN: Warm and dry. No rash.  PSYCH: Normal affect.

## 2024-01-14 NOTE — H&P ADULT - ASSESSMENT
74yo F with h/o smoking, HTN, HLD, type 1 DM, thyroidectomy, BCC, SCC found to have Rt. 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 Currently undergoing treatment w Dr. Plummer at [facility]. Per pt's , at bedside, pt had been feeling well then awoke this morning disoriented, confused and c/o significant CP and SOB. Brought by EMS to [facility] ER. Due to change in MS,  CT scan r/o code stroke. No infarcts seen.  CT scan C/A/P revealed large pericardial effusion w bilat pleural effusion. Pt  hypotensive, tachycardic. s/p 1Liter IVF.  Discussed with interventional cardiology with plan to  drain pericardial effusion and admit to CCU.    #Neuro      #Respiratory    #Cards    Pericardial effusion    New Afib    HTN  Patient with h/o hypertension and is on  Continue home medications  DASH diet  Monitor blood pressure      #GI    #    #Endo    -Diabetes  DM2 (diabetes mellitus, type II)  Home medication:  blood glucose ACHS with ISS  Check HgA1c  Diabetic education  Consistent carbohydrate diet  Initiate statin therapy    PPx           74yo F with h/o smoking, HTN, HLD, type 1 DM, thyroidectomy, BCC, SCC found to have Rt. 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 Currently undergoing treatment w Dr. Plummer at [facility]. Per pt's , at bedside, pt had been feeling well then awoke this morning disoriented, confused and c/o significant CP and SOB. Brought by EMS to [facility] ER. Due to change in MS,  CT scan r/o code stroke. No infarcts seen.  CT scan C/A/P revealed large pericardial effusion w bilat pleural effusion. Pt  hypotensive, tachycardic. s/p 1Liter IVF.  Discussed with interventional cardiology with plan to  drain pericardial effusion and admit to CCU.    #Neuro      #Respiratory    #Cards    Pericardial effusion    New Afib    HTN  Patient with h/o hypertension and is on  Continue home medications  DASH diet  Monitor blood pressure      #GI    #    #Endo    -Diabetes  DM2 (diabetes mellitus, type II)  Home medication:  blood glucose ACHS with ISS  Check HgA1c  Diabetic education  Consistent carbohydrate diet  Initiate statin therapy    PPx           72yo F with h/o smoking, HTN, HLD, type 1 DM, thyroidectomy, BCC, SCC found to have Rt. 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 Currently undergoing treatment w Dr. Plummer at [facility]. Per pt's , at bedside, pt had been feeling well then awoke this morning disoriented, confused and c/o significant CP and SOB. Brought by EMS to [facility] ER. Due to change in MS,  CT scan r/o code stroke. No infarcts seen.  CT scan C/A/P revealed large pericardial effusion w bilat pleural effusion. Pt  hypotensive, tachycardic. s/p 1Liter IVF.  Discussed with interventional cardiology with plan to  drain pericardial effusion and admit to CCU.    #Neuro      #Respiratory    #Cards    Pericardial effusion    New Afib    HTN  Patient with h/o hypertension and is on  Continue home medications  DASH diet  Monitor blood pressure      #GI    #    #Endo    -Diabetes  DM2 (diabetes mellitus, type II)  Home medication:  blood glucose ACHS with ISS  Check HgA1c  Diabetic education  Consistent carbohydrate diet  Initiate statin therapy    PPx           72yo F with h/o smoking, HTN, HLD, type 1 DM, thyroidectomy, BCC, SCC found to have Rt. 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 Currently undergoing treatment w Dr. Plummer at [facility]. Per pt's , at bedside, pt had been feeling well then awoke this morning disoriented, confused and c/o significant CP and SOB. Brought by EMS to [facility] ER. Due to change in MS,  CT scan r/o code stroke. No infarcts seen.  CT scan C/A/P revealed large pericardial effusion w bilat pleural effusion. Pt  hypotensive, tachycardic. s/p 1Liter IVF.  Discussed with interventional cardiology with plan to  drain pericardial effusion and admit to CCU.    #Neuro  A+O x 3    #Respiratory  Currently in no acute distress  Satting 98% 2L NC  bilat pleural effusion.    #Cards    Pericardial effusion  CT scan C/A/P revealed large pericardial effusion w bilat pleural effusion.  Pericardial drain placed for tamponade , 800cc sanguineous fluid removed  Cytology pending          New Afib    HTN  Patient with h/o hypertension and is on  Continue home medications  DASH diet  Monitor blood pressure      #GI    #    #Endo    -Diabetes  DM2 (diabetes mellitus, type II)  Home medication:  blood glucose ACHS with ISS  Check HgA1c  Diabetic education  Consistent carbohydrate diet  Initiate statin therapy    Heme  BCC, SCC   found to have Rt. 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 Currently undergoing treatment w Dr. Plummer at [facility].     PPx           74yo F with h/o smoking, HTN, HLD, type 1 DM, thyroidectomy, BCC, SCC found to have Rt. 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 Currently undergoing treatment w Dr. Plummer at [facility]. Per pt's , at bedside, pt had been feeling well then awoke this morning disoriented, confused and c/o significant CP and SOB. Brought by EMS to [facility] ER. Due to change in MS,  CT scan r/o code stroke. No infarcts seen.  CT scan C/A/P revealed large pericardial effusion w bilat pleural effusion. Pt  hypotensive, tachycardic. s/p 1Liter IVF.  Discussed with interventional cardiology with plan to  drain pericardial effusion and admit to CCU.    #Neuro  A+O x 3    #Respiratory  Currently in no acute distress  Satting 98% 2L NC  bilat pleural effusion.    #Cards    Pericardial effusion  CT scan C/A/P revealed large pericardial effusion w bilat pleural effusion.  Pericardial drain placed for tamponade , 800cc sanguineous fluid removed  Cytology pending          New Afib    HTN  Patient with h/o hypertension and is on  Continue home medications  DASH diet  Monitor blood pressure      #GI    #    #Endo    -Diabetes  DM2 (diabetes mellitus, type II)  Home medication:  blood glucose ACHS with ISS  Check HgA1c  Diabetic education  Consistent carbohydrate diet  Initiate statin therapy    Heme  BCC, SCC   found to have Rt. 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 Currently undergoing treatment w Dr. Plummer at [facility].     PPx           72yo F with h/o smoking, HTN, HLD, type 1 DM, thyroidectomy, BCC, SCC found to have Rt. 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 Currently undergoing treatment w Dr. Plummer at [facility]. Per pt's , at bedside, pt had been feeling well then awoke this morning disoriented, confused and c/o significant CP and SOB. Brought by EMS to [facility] ER. Due to change in MS,  CT scan r/o code stroke. No infarcts seen.  CT scan C/A/P revealed large pericardial effusion w bilat pleural effusion. Pt  hypotensive, tachycardic. s/p 1Liter IVF.  Discussed with interventional cardiology with plan to  drain pericardial effusion and admit to CCU.    #Neuro  A+O x 3    #Respiratory  Currently in no acute distress  Satting 98% 2L NC  bilat pleural effusion.    #Cards    Pericardial effusion  CT scan C/A/P revealed large pericardial effusion w bilat pleural effusion.  Pericardial drain placed for tamponade , 800cc sanguineous fluid removed  Cytology pending          New Afib    HTN  Patient with h/o hypertension and is on  Continue home medications  DASH diet  Monitor blood pressure      #GI    #    #Endo    -Diabetes  DM2 (diabetes mellitus, type II)  Home medication:  blood glucose ACHS with ISS  Check HgA1c  Diabetic education  Consistent carbohydrate diet  Initiate statin therapy    Heme  BCC, SCC   found to have Rt. 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 Currently undergoing treatment w Dr. Plummer at [facility].     PPx           74yo F with h/o smoking, HTN, HLD, type 1 DM, thyroidectomy, BCC, SCC found to have Rt. 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 Currently undergoing treatment w Dr. Plummer at [facility] [place]. Per pt's , at bedside, pt had been feeling well then awoke this morning disoriented, confused and c/o significant CP and SOB. Brought by EMS to [facility] ER. Due to change in MS,  CT scan r/o code stroke. No infarcts seen.  CT scan C/A/P revealed large pericardial effusion w bilat pleural effusion. Pt  hypotensive, tachycardic. s/p 1Liter IVF.  Discussed with interventional cardiology with plan to  drain pericardial effusion and admit to CCU.    #Neuro  A+O x 3    Code stroke  Case reviewed on site by Dr. Ugarte of radiology with Dr. Mccloud of   stroke neurology at 10:45 AM on 1/14/2024; Negative head CT results   communicated with read back confirmation.  No evidence of a large acute infarction   or large acute hemorrhage. Minimal microvascular disease.    - Pt is not a candidate for tenecteplase due to outside tenecteplase window , cancer with mets to brain  Mechanical thrombectomy candidacy pending MRI/MRA (no CTA due to alergy)    MRI noncon MRA head and neck with and without contrast r/o LVO (if cant get gadalidium would still get MRA head and neck without contrast)    Frequent neuro-checks q1h and VS q1h; STAT CTH for change in neuro exam        -      -     #Respiratory  - Currently in no acute distress  - Satting 98% 2L NC  - bilat pleural effusion.    #Cards    Pericardial effusion  CT scan C/A/P revealed large pericardial effusion w bilat pleural effusion.  Pericardial drain placed for tamponade , 800cc sanguineous fluid removed  Cytology pending          New Afib  iso pericardial eff. with tamponade physiology    ChadsVasc    Anticoagulation recommendations pending further imaging       HTN  Patient with h/o hypertension and is on lisinopril 20mg BID, amlodipine 5mg BID,     Hold [place] medications  -permissive HTN up to 220/110 for 24-48h from symptom onset followed by gradual normotension over 2-3 days   DASH diet  Monitor blood pressure      #GI  NPO unless passes dysphagia screen; swallow eval if fails  Consistent carb    HLD    simvastatin 20mg      #          #Endo    -Diabetes  DM2 (diabetes mellitus, type II)  Home medication: Lantus 11 units am and 7 units 6pm  novalog/humalog 2-4 units pre-meal  blood glucose ACHS with ISS  Check HgA1c  Diabetic education  Consistent carbohydrate diet  continue therapy    Heme  BCC, SCC   found to have Rt. 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 Currently undergoing treatment w Dr. Plummer at [facility] [place].   Home meds: Mekinist 0.5mg 10pm Tafinlar 75mg BID        PPx  Anticoagulation recommendations pending further imaging              72yo F with h/o smoking, HTN, HLD, type 1 DM, thyroidectomy, BCC, SCC found to have Rt. 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 Currently undergoing treatment w Dr. Plummer at [facility]. Per pt's , at bedside, pt had been feeling well then awoke this morning disoriented, confused and c/o significant CP and SOB. Brought by EMS to [facility] ER. Due to change in MS,  CT scan r/o code stroke. No infarcts seen.  CT scan C/A/P revealed large pericardial effusion w bilat pleural effusion. Pt  hypotensive, tachycardic. s/p 1Liter IVF.  Discussed with interventional cardiology with plan to  drain pericardial effusion and admit to CCU.    #Neuro  A+O x 3    Code stroke  Case reviewed on site by Dr. Ugarte of radiology with Dr. Mccloud of   stroke neurology at 10:45 AM on 1/14/2024; Negative head CT results   communicated with read back confirmation.  No evidence of a large acute infarction   or large acute hemorrhage. Minimal microvascular disease.    - Pt is not a candidate for tenecteplase due to outside tenecteplase window , cancer with mets to brain  Mechanical thrombectomy candidacy pending MRI/MRA (no CTA due to alergy)    MRI noncon MRA head and neck with and without contrast r/o LVO (if cant get gadalidium would still get MRA head and neck without contrast)    Frequent neuro-checks q1h and VS q1h; STAT CTH for change in neuro exam        -      -     #Respiratory  - Currently in no acute distress  - Satting 98% 2L NC  - bilat pleural effusion.    #Cards    Pericardial effusion  CT scan C/A/P revealed large pericardial effusion w bilat pleural effusion.  Pericardial drain placed for tamponade , 800cc sanguineous fluid removed  Cytology pending          New Afib  iso pericardial eff. with tamponade physiology    ChadsVasc    Anticoagulation recommendations pending further imaging       HTN  Patient with h/o hypertension and is on lisinopril 20mg BID, amlodipine 5mg BID,     Hold [place] medications  -permissive HTN up to 220/110 for 24-48h from symptom onset followed by gradual normotension over 2-3 days   DASH diet  Monitor blood pressure      #GI  NPO unless passes dysphagia screen; swallow eval if fails  Consistent carb    HLD    simvastatin 20mg      #          #Endo    -Diabetes  DM2 (diabetes mellitus, type II)  Home medication: Lantus 11 units am and 7 units 6pm  novalog/humalog 2-4 units pre-meal  blood glucose ACHS with ISS  Check HgA1c  Diabetic education  Consistent carbohydrate diet  continue therapy    Heme  BCC, SCC   found to have Rt. 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 Currently undergoing treatment w Dr. Plummer at [facility].   Home meds: Mekinist 0.5mg 10pm Tafinlar 75mg BID        PPx  Anticoagulation recommendations pending further imaging              74yo F with h/o smoking, HTN, HLD, type 1 DM, thyroidectomy, BCC, SCC found to have Rt. 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 Currently undergoing treatment w Dr. Plummer at [facility]. Per pt's , at bedside, pt had been feeling well then awoke this morning disoriented, confused and c/o significant CP and SOB. Brought by EMS to [facility] ER. Due to change in MS,  CT scan r/o code stroke. No infarcts seen.  CT scan C/A/P revealed large pericardial effusion w bilat pleural effusion. Pt  hypotensive, tachycardic. s/p 1Liter IVF.  Discussed with interventional cardiology with plan to  drain pericardial effusion and admit to CCU.    #Neuro  A+O x 3    Code stroke  Case reviewed on site by Dr. Ugarte of radiology with Dr. Mccloud of   stroke neurology at 10:45 AM on 1/14/2024; Negative head CT results   communicated with read back confirmation.  No evidence of a large acute infarction   or large acute hemorrhage. Minimal microvascular disease.    - Pt is not a candidate for tenecteplase due to outside tenecteplase window , cancer with mets to brain  Mechanical thrombectomy candidacy pending MRI/MRA (no CTA due to alergy)    MRI noncon MRA head and neck with and without contrast r/o LVO (if cant get gadalidium would still get MRA head and neck without contrast)    Frequent neuro-checks q1h and VS q1h; STAT CTH for change in neuro exam        -      -     #Respiratory  - Currently in no acute distress  - Satting 98% 2L NC  - bilat pleural effusion.    #Cards    Pericardial effusion  CT scan C/A/P revealed large pericardial effusion w bilat pleural effusion.  Pericardial drain placed for tamponade , 800cc sanguineous fluid removed  Cytology pending          New Afib  iso pericardial eff. with tamponade physiology    ChadsVasc    Anticoagulation recommendations pending further imaging       HTN  Patient with h/o hypertension and is on lisinopril 20mg BID, amlodipine 5mg BID,     Hold [place] medications  -permissive HTN up to 220/110 for 24-48h from symptom onset followed by gradual normotension over 2-3 days   DASH diet  Monitor blood pressure      #GI  NPO unless passes dysphagia screen; swallow eval if fails  Consistent carb    HLD    simvastatin 20mg      #          #Endo    -Diabetes  DM2 (diabetes mellitus, type II)  Home medication: Lantus 11 units am and 7 units 6pm  novalog/humalog 2-4 units pre-meal  blood glucose ACHS with ISS  Check HgA1c  Diabetic education  Consistent carbohydrate diet  continue therapy    Heme  BCC, SCC   found to have Rt. 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 Currently undergoing treatment w Dr. Plummer at [facility].   Home meds: Mekinist 0.5mg 10pm Tafinlar 75mg BID        PPx  Anticoagulation recommendations pending further imaging              74yo F with h/o smoking, HTN, HLD, type 1 DM, thyroidectomy, BCC, SCC found to have Rt. 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 Currently undergoing treatment w Dr. Plummer at [facility]. Per pt's , at bedside, pt had been feeling well then awoke this morning disoriented, confused and c/o significant CP and SOB. Brought by EMS to [facility] ER. Due to change in MS,  CT scan r/o code stroke. No infarcts seen.  CT scan C/A/P revealed large pericardial effusion w bilat pleural effusion. Pt  hypotensive, tachycardic. s/p 1Liter IVF.  Discussed with interventional cardiology with plan to  drain pericardial effusion and admit to CCU.    #Neuro  A+O x 3    Code stroke  Case reviewed on site by Dr. Ugarte of radiology with Dr. Mccloud of   stroke neurology at 10:45 AM on 1/14/2024; Negative head CT results   communicated with read back confirmation.  No evidence of a large acute infarction   or large acute hemorrhage. Minimal microvascular disease.    Pt is not a candidate for tenecteplase due to outside tenecteplase window , cancer with mets to brain  Mechanical thrombectomy candidacy pending MRI/MRA (no CTA due to alergy)    MRI noncon MRA head and neck with and without contrast r/o LVO (if cant get gadalidium would still get MRA head and neck without contrast)    Frequent neuro-checks q1h and VS q1h; STAT CTH for change in neuro exam        -      -     #Respiratory  - Currently in no acute distress  - Satting 98% 2L NC  - bilat pleural effusion.    #Cards    Pericardial effusion  CT scan C/A/P revealed large pericardial effusion w bilat pleural effusion.  Pericardial drain placed for tamponade , 800cc sanguineous fluid removed  Cytology pending  Monitor Drain        New Afib  iso pericardial eff. with tamponade physiology    ChadsVasc    Anticoagulation recommendations pending further imaging       HTN  Patient with h/o hypertension and is on lisinopril 20mg BID, amlodipine 5mg BID,     Hold [place] medications  -permissive HTN up to 220/110 for 24-48h from symptom onset followed by gradual normotension over 2-3 days   DASH diet  Monitor blood pressure      #GI  NPO unless passes dysphagia screen; swallow eval if fails  Consistent carb    HLD    simvastatin 20mg      #          #Endo    -Diabetes  DM2 (diabetes mellitus, type II)  Home medication: Lantus 11 units am and 7 units 6pm  novalog/humalog 2-4 units pre-meal  blood glucose ACHS with ISS  Check HgA1c  Diabetic education  Consistent carbohydrate diet  continue therapy    Heme  BCC, SCC   found to have Rt. 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 Currently undergoing treatment w Dr. Plummer at [facility].   Home meds: Mekinist 0.5mg 10pm Tafinlar 75mg BID        PPx  Anticoagulation recommendations pending further imaging              72yo F with h/o smoking, HTN, HLD, type 1 DM, thyroidectomy, BCC, SCC found to have Rt. 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 Currently undergoing treatment w Dr. Plummer at [facility]. Per pt's , at bedside, pt had been feeling well then awoke this morning disoriented, confused and c/o significant CP and SOB. Brought by EMS to [facility] ER. Due to change in MS,  CT scan r/o code stroke. No infarcts seen.  CT scan C/A/P revealed large pericardial effusion w bilat pleural effusion. Pt  hypotensive, tachycardic. s/p 1Liter IVF.  Discussed with interventional cardiology with plan to  drain pericardial effusion and admit to CCU.    #Neuro  A+O x 3    Code stroke  Case reviewed on site by Dr. Ugarte of radiology with Dr. Mccloud of   stroke neurology at 10:45 AM on 1/14/2024; Negative head CT results   communicated with read back confirmation.  No evidence of a large acute infarction   or large acute hemorrhage. Minimal microvascular disease.    Pt is not a candidate for tenecteplase due to outside tenecteplase window , cancer with mets to brain  Mechanical thrombectomy candidacy pending MRI/MRA (no CTA due to alergy)    MRI noncon MRA head and neck with and without contrast r/o LVO (if cant get gadalidium would still get MRA head and neck without contrast)    Frequent neuro-checks q1h and VS q1h; STAT CTH for change in neuro exam        -      -     #Respiratory  - Currently in no acute distress  - Satting 98% 2L NC  - bilat pleural effusion.    #Cards    Pericardial effusion  CT scan C/A/P revealed large pericardial effusion w bilat pleural effusion.  Pericardial drain placed for tamponade , 800cc sanguineous fluid removed  Cytology pending  Monitor Drain        New Afib  iso pericardial eff. with tamponade physiology    ChadsVasc    Anticoagulation recommendations pending further imaging       HTN  Patient with h/o hypertension and is on lisinopril 20mg BID, amlodipine 5mg BID,     Hold [place] medications  -permissive HTN up to 220/110 for 24-48h from symptom onset followed by gradual normotension over 2-3 days   DASH diet  Monitor blood pressure      #GI  NPO unless passes dysphagia screen; swallow eval if fails  Consistent carb    HLD    simvastatin 20mg      #          #Endo    -Diabetes  DM2 (diabetes mellitus, type II)  Home medication: Lantus 11 units am and 7 units 6pm  novalog/humalog 2-4 units pre-meal  blood glucose ACHS with ISS  Check HgA1c  Diabetic education  Consistent carbohydrate diet  continue therapy    Heme  BCC, SCC   found to have Rt. 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 Currently undergoing treatment w Dr. Plummer at [facility].   Home meds: Mekinist 0.5mg 10pm Tafinlar 75mg BID        PPx  Anticoagulation recommendations pending further imaging              72yo F with h/o smoking, HTN, HLD, type 1 DM, thyroidectomy, BCC, SCC found to have Rt. 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 Currently undergoing treatment w Dr. Plummer at [facility]. Per pt's , at bedside, pt had been feeling well then awoke this morning disoriented, confused and c/o significant CP and SOB. Brought by EMS to [facility] ER. Due to change in MS,  CT scan r/o code stroke. No infarcts seen.  CT scan C/A/P revealed large pericardial effusion w bilat pleural effusion. Pt  hypotensive, tachycardic. s/p 1Liter IVF.  Discussed with interventional cardiology with plan to  drain pericardial effusion and admit to CCU.    #Neuro  A+O x 3    Code stroke  Case reviewed on site by Dr. Ugarte of radiology with Dr. Mccloud of   stroke neurology at 10:45 AM on 1/14/2024; Negative head CT results   communicated with read back confirmation.  No evidence of a large acute infarction   or large acute hemorrhage. Minimal microvascular disease.    Pt is not a candidate for tenecteplase due to outside tenecteplase window , cancer with mets to brain  Mechanical thrombectomy candidacy pending MRI/MRA (no CTA due to alergy)    MRI noncon MRA head and neck with and without contrast r/o LVO (if cant get gadalidium would still get MRA head and neck without contrast)    Frequent neuro-checks q1h and VS q1h; STAT CTH for change in neuro exam        -      -     #Respiratory  - Currently in no acute distress  - Satting 98% 2L NC  - bilat pleural effusion.    #Cards    Pericardial effusion  CT scan C/A/P revealed large pericardial effusion w bilat pleural effusion.  Pericardial drain placed for tamponade , 800cc sanguineous fluid removed  Cytology pending  Monitor Drain        New Afib  iso pericardial eff. with tamponade physiology    ChadsVasc    Anticoagulation recommendations pending further imaging       HTN  Patient with h/o hypertension and is on lisinopril 20mg BID, amlodipine 5mg BID,     Hold [place] medications  -permissive HTN up to 220/110 for 24-48h from symptom onset followed by gradual normotension over 2-3 days   DASH diet  Monitor blood pressure      #GI  NPO unless passes dysphagia screen; swallow eval if fails  Consistent carb    HLD    simvastatin 20mg      #          #Endo    -Diabetes  DM2 (diabetes mellitus, type II)  Home medication: Lantus 11 units am and 7 units 6pm  novalog/humalog 2-4 units pre-meal  blood glucose ACHS with ISS  Check HgA1c  Diabetic education  Consistent carbohydrate diet  continue therapy    Heme  BCC, SCC   found to have Rt. 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 Currently undergoing treatment w Dr. Plummer at [facility].   Home meds: Mekinist 0.5mg 10pm Tafinlar 75mg BID        PPx  Anticoagulation recommendations pending further imaging              74yo F with h/o smoking, HTN, HLD, type 1 DM, thyroidectomy, BCC, SCC found to have Rt. 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 Currently undergoing treatment w Dr. Plummer at [facility]. Per pt's , at bedside, pt had been feeling well then awoke this morning disoriented, confused and c/o significant CP and SOB. Brought by EMS to [facility] ER. Due to change in MS,  CT scan r/o code stroke. No infarcts seen.  CT scan C/A/P revealed large pericardial effusion w bilat pleural effusion. Pt  hypotensive, tachycardic. s/p 1Liter IVF.  Discussed with interventional cardiology with plan to  drain pericardial effusion and admit to CCU.    #Neuro  A+O x 3    Code stroke  NEURO:  Impression: Expressive aphasia and right superior quadrantanopia due to left hemispheric dysfunction. Mechanism ischemic stroke. Etiology likely cardioembolic given new afib vs hypercoagulability of malignancy      Pt is not a candidate for tenecteplase due to outside tenecteplase window , cancer with mets to brain  Mechanical thrombectomy candidacy pending MRI/MRA  without contrast(no CTA due to alergy)    CT head:  LimitedNo evidence of a large acute infarction   or large acute hemorrhage. Minimal microvascular disease.        #Respiratory  - Currently in no acute distress  - Satting 98% 2L NC  - bilat pleural effusion.    #Cards    Pericardial effusion  CT scan C/A/P revealed large pericardial effusion w bilat pleural effusion.  Pericardial drain placed for tamponade , 800cc sanguineous fluid removed  Cytology pending  Monitor Drain        New Afib  iso pericardial eff. with tamponade physiology    ChadsVasc: 7  Has-Bled: 3  Anticoagulation recommendations pending further imaging       HTN  Patient with h/o hypertension and is on lisinopril 20mg BID, amlodipine 5mg BID,     Hold Home medications  -permissive HTN up to 220/110 for 24-48h from symptom onset followed by gradual normotension over 2-3 days   DASH diet  Monitor blood pressure      #GI  NPO unless passes dysphagia screen; swallow eval if fails  Consistent carb    HLD    simvastatin 20mg      #    Normal indices      #Endo    -Diabetes  DM2 (diabetes mellitus, type II)  Home medication: Lantus 11 units am and 7 units 6pm  novalog/humalog 2-4 units pre-meal  blood glucose ACHS with ISS  Check HgA1c  Diabetic education  Consistent carbohydrate diet  continue therapy    Heme  BCC, SCC   found to have Rt. 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   Currently undergoing treatment w Dr. Plummer at [facility].   Discussed with [facility] Dr plummer:  Please hold Home chemo meds: Mekinist 0.5mg 10pm Tafinlar 75mg BID while inpatient        PPx  Anticoagulation recommendations pending further imaging              74yo F with h/o smoking, HTN, HLD, type 1 DM, thyroidectomy, BCC, SCC found to have Rt. 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 Currently undergoing treatment w Dr. Plummer at [facility]. Per pt's , at bedside, pt had been feeling well then awoke this morning disoriented, confused and c/o significant CP and SOB. Brought by EMS to [facility] ER. Due to change in MS,  CT scan r/o code stroke. No infarcts seen.  CT scan C/A/P revealed large pericardial effusion w bilat pleural effusion. Pt  hypotensive, tachycardic. s/p 1Liter IVF.  Discussed with interventional cardiology with plan to  drain pericardial effusion and admit to CCU.    #Neuro  A+O x 3    Code stroke  NEURO:  Impression: Expressive aphasia and right superior quadrantanopia due to left hemispheric dysfunction. Mechanism ischemic stroke. Etiology likely cardioembolic given new afib vs hypercoagulability of malignancy      Pt is not a candidate for tenecteplase due to outside tenecteplase window , cancer with mets to brain  Mechanical thrombectomy candidacy pending MRI/MRA  without contrast(no CTA due to alergy)    CT head:  LimitedNo evidence of a large acute infarction   or large acute hemorrhage. Minimal microvascular disease.        #Respiratory  - Currently in no acute distress  - Satting 98% 2L NC  - bilat pleural effusion.    #Cards    Pericardial effusion  CT scan C/A/P revealed large pericardial effusion w bilat pleural effusion.  Pericardial drain placed for tamponade , 800cc sanguineous fluid removed  Cytology pending  Monitor Drain        New Afib  iso pericardial eff. with tamponade physiology    ChadsVasc: 7  Has-Bled: 3  Anticoagulation recommendations pending further imaging       HTN  Patient with h/o hypertension and is on lisinopril 20mg BID, amlodipine 5mg BID,     Hold Home medications  -permissive HTN up to 220/110 for 24-48h from symptom onset followed by gradual normotension over 2-3 days   DASH diet  Monitor blood pressure      #GI  NPO unless passes dysphagia screen; swallow eval if fails  Consistent carb    HLD    simvastatin 20mg      #    Normal indices      #Endo    -Diabetes  DM2 (diabetes mellitus, type II)  Home medication: Lantus 11 units am and 7 units 6pm  novalog/humalog 2-4 units pre-meal  blood glucose ACHS with ISS  Check HgA1c  Diabetic education  Consistent carbohydrate diet  continue therapy    Heme  BCC, SCC   found to have Rt. 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   Currently undergoing treatment w Dr. Plummer at [facility].   Discussed with [facility] Dr plummer:  Please hold Home chemo meds: Mekinist 0.5mg 10pm Tafinlar 75mg BID while inpatient        PPx  Anticoagulation recommendations pending further imaging              74yo F with h/o smoking, HTN, HLD, type 1 DM, thyroidectomy, BCC, SCC found to have Rt. 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 Currently undergoing treatment w Dr. Plummer at [facility]. Per pt's , at bedside, pt had been feeling well then awoke this morning disoriented, confused and c/o significant CP and SOB. Brought by EMS to [facility] ER. Due to change in MS,  CT scan r/o code stroke. No infarcts seen.  CT scan C/A/P revealed large pericardial effusion w bilat pleural effusion. Pt  hypotensive, tachycardic. s/p 1Liter IVF.  Discussed with interventional cardiology with plan to  drain pericardial effusion and admit to CCU.    #Neuro  A+O x 3    Code stroke  NEURO:  Impression: Expressive aphasia and right superior quadrantanopia due to left hemispheric dysfunction. Mechanism ischemic stroke. Etiology likely cardioembolic given new afib vs hypercoagulability of malignancy      Pt is not a candidate for tenecteplase due to outside tenecteplase window , cancer with mets to brain  Mechanical thrombectomy candidacy pending MRI/MRA  without contrast(no CTA due to alergy)    CT head:  LimitedNo evidence of a large acute infarction   or large acute hemorrhage. Minimal microvascular disease.        #Respiratory  - Currently in no acute distress  - Satting 98% 2L NC  - bilat pleural effusion.    #Cards    Pericardial effusion  CT scan C/A/P revealed large pericardial effusion w bilat pleural effusion.  Pericardial drain placed for tamponade , 800cc sanguineous fluid removed  Cytology pending  Monitor Drain        New Afib  iso pericardial eff. with tamponade physiology    ChadsVasc: 7  Has-Bled: 3  Anticoagulation recommendations pending further imaging       HTN  Patient with h/o hypertension and is on lisinopril 20mg BID, amlodipine 5mg BID,     Hold Home medications  -permissive HTN up to 220/110 for 24-48h from symptom onset followed by gradual normotension over 2-3 days   DASH diet  Monitor blood pressure      #GI  NPO unless passes dysphagia screen; swallow eval if fails  Consistent carb    HLD    simvastatin 20mg      #    Normal indices      #Endo    -Diabetes  DM2 (diabetes mellitus, type II)  Home medication: Lantus 11 units am and 7 units 6pm  novalog/humalog 2-4 units pre-meal  blood glucose ACHS with ISS  Check HgA1c  Diabetic education  Consistent carbohydrate diet  continue therapy    Heme  BCC, SCC   found to have Rt. 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   Currently undergoing treatment w Dr. Plummer at [facility].   Discussed with [facility] Dr plummer:  Please hold Home chemo meds: Mekinist 0.5mg 10pm Tafinlar 75mg BID while inpatient        PPx  Anticoagulation recommendations pending further imaging              74yo F with h/o smoking, HTN, HLD, type 1 DM, thyroidectomy, BCC, SCC found to have Rt. 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 Currently undergoing treatment w Dr. Plummer at [facility]. Per pt's , at bedside, pt had been feeling well then awoke this morning disoriented, confused and c/o significant CP and SOB. Brought by EMS to [facility] ER. Due to change in MS,  CT scan r/o code stroke. No infarcts seen.  CT scan C/A/P revealed large pericardial effusion w bilat pleural effusion. Pt  hypotensive, tachycardic. s/p 1Liter IVF.  Discussed with interventional cardiology with plan to  drain pericardial effusion and admit to CCU.    #Neuro  A+O x 3    Code stroke  NEURO:  Impression: Expressive aphasia and right superior quadrantanopia due to left hemispheric dysfunction. Mechanism ischemic stroke. Etiology likely cardioembolic given new afib vs hypercoagulability of malignancy      Pt is not a candidate for tenecteplase due to outside tenecteplase window , cancer with mets to brain  Mechanical thrombectomy candidacy pending MRI/MRA  without contrast(no CTA due to alergy)    CT head:  LimitedNo evidence of a large acute infarction   or large acute hemorrhage. Minimal microvascular disease.        #Respiratory  - Currently in no acute distress  - Satting 98% 2L NC  - bilat pleural effusion.    #Cards    Pericardial effusion  CT scan C/A/P revealed large pericardial effusion w bilat pleural effusion.  Pericardial drain placed for tamponade , 800cc sanguineous fluid removed  Cytology pending  Monitor Drain        New Afib  iso pericardial eff. with tamponade physiology    ChadsVasc: 7  Has-Bled: 3  Anticoagulation recommendations pending further imaging   B/L compression stockings      HTN  Patient with h/o hypertension and is on lisinopril 20mg BID, amlodipine 5mg BID,   Bashir-Synephrine now Dc'd   Hold Home medications  -permissive HTN up to 220/110 for 24-48h from symptom onset followed by gradual normotension over 2-3 days   DASH diet  Monitor blood pressure      #GI  NPO unless passes dysphagia screen; swallow eval if fails  Consistent carb    HLD    simvastatin 20mg      #    Normal indices      #Endo    -Diabetes  DM2 (diabetes mellitus, type II)  Home medication: Lantus 11 units am and 7 units 6pm  novalog/humalog 2-4 units pre-meal  blood glucose ACHS with ISS  Check HgA1c  Diabetic education  Consistent carbohydrate diet  continue therapy    Heme  BCC, SCC   found to have Rt. 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   Currently undergoing treatment w Dr. Plummer at [facility].   Discussed with [facility] Dr plummer:  Please hold Home chemo meds: Mekinist 0.5mg 10pm Tafinlar 75mg BID while inpatient        PPx  Anticoagulation recommendations pending further imaging              74yo F with h/o smoking, HTN, HLD, type 1 DM, thyroidectomy, BCC, SCC found to have Rt. 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 Currently undergoing treatment w Dr. Plummer at [facility]. Per pt's , at bedside, pt had been feeling well then awoke this morning disoriented, confused and c/o significant CP and SOB. Brought by EMS to [facility] ER. Due to change in MS,  CT scan r/o code stroke. No infarcts seen.  CT scan C/A/P revealed large pericardial effusion w bilat pleural effusion. Pt  hypotensive, tachycardic. s/p 1Liter IVF.  Discussed with interventional cardiology with plan to  drain pericardial effusion and admit to CCU.    #Neuro  A+O x 3    Code stroke  NEURO:  Impression: Expressive aphasia and right superior quadrantanopia due to left hemispheric dysfunction. Mechanism ischemic stroke. Etiology likely cardioembolic given new afib vs hypercoagulability of malignancy      Pt is not a candidate for tenecteplase due to outside tenecteplase window , cancer with mets to brain  Mechanical thrombectomy candidacy pending MRI/MRA  without contrast(no CTA due to alergy)    CT head:  LimitedNo evidence of a large acute infarction   or large acute hemorrhage. Minimal microvascular disease.        #Respiratory  - Currently in no acute distress  - Satting 98% 2L NC  - bilat pleural effusion.    #Cards    Pericardial effusion  CT scan C/A/P revealed large pericardial effusion w bilat pleural effusion.  Pericardial drain placed for tamponade , 800cc sanguineous fluid removed  Cytology pending  Monitor Drain        New Afib  iso pericardial eff. with tamponade physiology    ChadsVasc: 7  Has-Bled: 3  Anticoagulation recommendations pending further imaging   B/L compression stockings      HTN  Patient with h/o hypertension and is on lisinopril 20mg BID, amlodipine 5mg BID,   Bashir-Synephrine now Dc'd   Hold Home medications  -permissive HTN up to 220/110 for 24-48h from symptom onset followed by gradual normotension over 2-3 days   DASH diet  Monitor blood pressure      #GI  NPO unless passes dysphagia screen; swallow eval if fails  Consistent carb    HLD    simvastatin 20mg      #    Normal indices      #Endo    -Diabetes  DM2 (diabetes mellitus, type II)  Home medication: Lantus 11 units am and 7 units 6pm  novalog/humalog 2-4 units pre-meal  blood glucose ACHS with ISS  Check HgA1c  Diabetic education  Consistent carbohydrate diet  continue therapy    Heme  BCC, SCC   found to have Rt. 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   Currently undergoing treatment w Dr. Plummer at [facility].   Discussed with [facility] Dr plummer:  Please hold Home chemo meds: Mekinist 0.5mg 10pm Tafinlar 75mg BID while inpatient        PPx  Anticoagulation recommendations pending further imaging              74yo F with h/o smoking, HTN, HLD, type 1 DM, thyroidectomy, BCC, SCC found to have Rt. 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 Currently undergoing treatment w Dr. Plummer at [facility]. Per pt's , at bedside, pt had been feeling well then awoke this morning disoriented, confused and c/o significant CP and SOB. Brought by EMS to [facility] ER. Due to change in MS,  CT scan r/o code stroke. No infarcts seen.  CT scan C/A/P revealed large pericardial effusion w bilat pleural effusion. Pt  hypotensive, tachycardic. s/p 1Liter IVF.  Discussed with interventional cardiology with plan to  drain pericardial effusion and admit to CCU.    #Neuro  A+O x 3    Code stroke  NEURO:  Impression: Expressive aphasia and right superior quadrantanopia due to left hemispheric dysfunction. Mechanism ischemic stroke. Etiology likely cardioembolic given new afib vs hypercoagulability of malignancy      Pt is not a candidate for tenecteplase due to outside tenecteplase window , cancer with mets to brain  Mechanical thrombectomy candidacy pending MRI/MRA  without contrast(no CTA due to alergy)    CT head:  LimitedNo evidence of a large acute infarction   or large acute hemorrhage. Minimal microvascular disease.        #Respiratory  - Currently in no acute distress  - Satting 98% 2L NC  - bilat pleural effusion.    #Cards    Pericardial effusion  CT scan C/A/P revealed large pericardial effusion w bilat pleural effusion.  Pericardial drain placed for tamponade , 800cc sanguineous fluid removed  Cytology pending  Monitor Drain        New Afib  iso pericardial eff. with tamponade physiology    ChadsVasc: 7  Has-Bled: 3  Anticoagulation recommendations pending further imaging   B/L compression stockings      HTN  Patient with h/o hypertension and is on lisinopril 20mg BID, amlodipine 5mg BID,   Bashir-Synephrine now Dc'd   Hold Home medications  -permissive HTN up to 220/110 for 24-48h from symptom onset followed by gradual normotension over 2-3 days   DASH diet  Monitor blood pressure      #GI  NPO unless passes dysphagia screen; swallow eval if fails  Consistent carb    HLD    simvastatin 20mg      #    Normal indices      #Endo    -Diabetes  DM2 (diabetes mellitus, type II)  Home medication: Lantus 11 units am and 7 units 6pm  novalog/humalog 2-4 units pre-meal  blood glucose ACHS with ISS  Check HgA1c  Diabetic education  Consistent carbohydrate diet  continue therapy    Heme  BCC, SCC   found to have Rt. 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   Currently undergoing treatment w Dr. Plummer at [facility].   Discussed with [facility] Dr plummer:  Please hold Home chemo meds: Mekinist 0.5mg 10pm Tafinlar 75mg BID while inpatient        PPx  Anticoagulation recommendations pending further imaging

## 2024-01-14 NOTE — CONSULT NOTE ADULT - ASSESSMENT
72yo F with stage 4 [place] CA of the lung now with large pericardial effusion, tamponade.     Plan:  -Above d/w Dr. Helms. Urgent consult to Interventional Cardiology for drainage with admission to CCU.   -IVF support, pressors  -Would send fluid for cytology, likely effusion is malignant in nature due to advanced disease.   -No indication for thoracic surgery intervention at this time.  -Will follow pt  Above d/w ER and Cardiology fellow.

## 2024-01-14 NOTE — ED ADULT NURSE NOTE - OBJECTIVE STATEMENT
Pt received with  at bedside, BIBEMS for sob.  report pt waking him up this morning at approximately 6am with SOB, abd pain, and having a hard time communicating. Pt placed on continuous cardiac monitor, pulse ox, with a-fib RVR. Pt report pt having Lung CA with Mets. Pt complains of abd pain. Pt noted having difficulty finding her words.  reports that this is new. [place] code called by ER MD and pt transported for CT scan.

## 2024-01-14 NOTE — ED ADULT NURSE NOTE - NSFALLRISKINTERV_ED_ALL_ED
Assistance OOB with selected safe patient handling equipment if applicable/Assistance with ambulation/Communicate fall risk and risk factors to all staff, patient, and family/Monitor gait and stability/Monitor for mental status changes and reorient to person, place, and time, as needed/Provide visual cue: yellow wristband, yellow gown, etc/Reinforce activity limits and safety measures with patient and family/Toileting schedule using arm’s reach rule for commode and bathroom/Use of alarms - bed, stretcher, chair and/or video monitoring/Call bell, personal items and telephone in reach/Instruct patient to call for assistance before getting out of bed/chair/stretcher/Non-slip footwear applied when patient is off stretcher/[place] to call system/Physically safe environment - no spills, clutter or unnecessary equipment/Purposeful Proactive Rounding/Room/bathroom lighting operational, light cord in reach Assistance OOB with selected safe patient handling equipment if applicable/Assistance with ambulation/Communicate fall risk and risk factors to all staff, patient, and family/Monitor gait and stability/Monitor for mental status changes and reorient to person, place, and time, as needed/Provide visual cue: yellow wristband, yellow gown, etc/Reinforce activity limits and safety measures with patient and family/Toileting schedule using arm’s reach rule for commode and bathroom/Use of alarms - bed, stretcher, chair and/or video monitoring/Call bell, personal items and telephone in reach/Instruct patient to call for assistance before getting out of bed/chair/stretcher/Non-slip footwear applied when patient is off stretcher/[place] to call system/Physically safe environment - no spills, clutter or unnecessary equipment/Purposeful Proactive Rounding/Room/bathroom lighting operational, light cord in reach

## 2024-01-14 NOTE — CONSULT NOTE ADULT - SUBJECTIVE AND OBJECTIVE BOX
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 Awoke 6am with sob, abdominal pain, and dysarthria, EMS arrives 7:30, around that time also had WFD. Reportedly hypoxic to 70s. ED course: Code stroke, no tnk  due to michael mets and out of window.  VS: HR up to 120s, hypothermic 35.6, hypotensive 81/56. requiring 4L NC. CT CAP with large pericardial and b/l pleural effusions Per CCU note found to be in afib     rpeorts allergy to both iodine and gadolinium both with sob    LKN: Wake up stroke however awoke 0600 and , 1/13/24 pm  NIHSS: 10(Questions +2, Right superior Quadrantanopia +1, Severe aphasia +2, Dysarthria +1, b/l leg drift +4)  preMRS: 0  Pt is not a candidate for tenecteplase due to outside tenecteplase window , cancer with mets to brain  Mechanical thrombectomy candidacy pending MRI/MRA (no CTA due to alergy)    Review of Systems:    unable to obtian due to aphasia  c/o abdominal pain     Allergies:  latex (Urticaria)  Actonel (Unknown)  Gadavist (Anaphylaxis; Hives)  codeine (Vomiting)      PMHx/PSHx/Family Hx: As above, otherwise see below   Hypertension  DM type 1 (diabetes mellitus, type 1)  Basal cell carcinoma  SCC (squamous cell carcinoma)  BRCA2 positive  Hyperlipidemia  Latex allergy        Social Hx:      Medications:  MEDICATIONS  (STANDING):  phenylephrine    Infusion 0.5 MICROgram(s)/kG/Min (6.09 mL/Hr) IV Continuous <Continuous>  sodium chloride 0.9% Bolus 1000 milliLiter(s) IV Bolus once    MEDICATIONS  (PRN):      Vitals:  T(C): 35.6 (01-14-24 @ 11:30), Max: 35.6 (01-14-24 @ 11:30)  HR: 128 (01-14-24 @ 12:10) (115 - 129)  BP: 111/78 (01-14-24 @ 12:10) (79/53 - 126/73)  RR: 20 (01-14-24 @ 12:10) (20 - 22)  SpO2: 95% (01-14-24 @ 09:35) (95% - 95%)    Physical Examination:   General - NAD    Neurologic Exam:  Mental status - Awake, Alert, unclear orientation.  Language with mild slurring. Unable to name high frequency items. Unable to repeat. Able to follow simple commands. Able to follow multi step commands with minor errors (Laterality)    Cranial nerves - PERRL, +right superior quadrantinopia, EOMI, face sensation (V1-V3) intact b/l though not with certainty due to aphasia, facial strength intact without asymmetry b/l, hearing intact     Motor - Normal bulk and tone throughout. No pronator drift. B/L leg can come up easily but after ~1s fall to bed    Sensation - Grimace to nxious in all 4 extremities    Coordination - Finger to Nose intact b/l. No tremors appreciated      Labs:                        8.7    11.26 )-----------( 475      ( 14 Jan 2024 10:31 )             25.8     01-14    121<L>  |  87<L>  |  57<H>  ----------------------------<  300<H>  5.4<H>   |  13<L>  |  1.22    Ca    7.8<L>      14 Jan 2024 10:31    TPro  5.5<L>  /  Alb  2.6<L>  /  TBili  0.4  /  DBili  x   /  AST  63<H>  /  ALT  69<H>  /  AlkPhos  102  01-14    CAPILLARY BLOOD GLUCOSE  308 (14 Jan 2024 10:46)      POCT Blood Glucose.: 312 mg/dL (14 Jan 2024 10:05)    LIVER FUNCTIONS - ( 14 Jan 2024 10:31 )  Alb: 2.6 g/dL / Pro: 5.5 g/dL / ALK PHOS: 102 U/L / ALT: 69 U/L / AST: 63 U/L / GGT: x             PT/INR - ( 14 Jan 2024 10:31 )   PT: 12.5 sec;   INR: 1.12 ratio         PTT - ( 14 Jan 2024 10:31 )  PTT:26.1 sec  CSF:                  Radiology:  < from: CT Brain Stroke Protocol (01.14.24 @ 10:36) >    ACC: [id number] EXAM:  CT BRAIN STROKE PROTOCOL   ORDERED BY: JASON CARTY     *** ADDENDUM # 1 ***    Addendum to reflect in-hospital communication.    Case reviewed on site by Dr. Ugarte of radiology with Dr. Mccloud of   stroke neurology at 10:45 AM on 1/14/2024; Negative head CT results   communicated with read back confirmation.    --- End of Report ---    *** END OF ADDENDUM # 1 ***      PROCEDURE DATE:  01/14/2024          INTERPRETATION:  Stroke code.    Technique: CT head was performed utilizing axial images from the base of   the skull through the vertex without the administration of intravenous   contrast. Images were reviewed in the bone, brain and subdural windows.    Findings: There are no prior studies available for comparison.    There is motion artifact that limits this evaluation.    There is no evidence of large acute intracranial hemorrhage or acute   transcortical infarct. There is minimal microvascular disease. The   ventricles are normal in size and caliber.  There is no evidence of mass-effect or midline shift. There is no   evidence of an intra or extra-axial fluid collection.    Visualized paranasal sinuses and bilateral mastoid air cells are clear.    Impression: Limited evaluation. No evidence of a large acute infarction   or large acute hemorrhage. Minimal microvascular disease.    --- End of Report ---    ***Please see the addendum at the top of this report. It may contain   additional important information or changes.****        DARLIN OCHOA MD; Attending Radiologist  This document has been electronically signed. Jan 14 2024 10:44AM  1st Addendum: DARLIN OCHOA MD; Attending Radiologist  The first addendum was electronically signed on: Jan 14 2024 11:46AM.    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 Awoke 6am with sob, abdominal pain, and dysarthria, EMS arrives 7:30, around that time also had WFD. Reportedly hypoxic to 70s. ED course: Code stroke, no tnk  due to michael mets and out of window.  VS: HR up to 120s, hypothermic 35.6, hypotensive 81/56. requiring 4L NC. CT CAP with large pericardial and b/l pleural effusions Per CCU note found to be in afib     rpeorts allergy to both iodine and gadolinium both with sob    LKN: Wake up stroke however awoke 0600 and , 1/13/24 pm  NIHSS: 10(Questions +2, Right superior Quadrantanopia +1, Severe aphasia +2, Dysarthria +1, b/l leg drift +4)  preMRS: 0  Pt is not a candidate for tenecteplase due to outside tenecteplase window , cancer with mets to brain  Mechanical thrombectomy candidacy pending MRI/MRA (no CTA due to alergy)    Review of Systems:    unable to obtian due to aphasia  c/o abdominal pain     Allergies:  latex (Urticaria)  Actonel (Unknown)  Gadavist (Anaphylaxis; Hives)  codeine (Vomiting)      PMHx/PSHx/Family Hx: As above, otherwise see below   Hypertension  DM type 1 (diabetes mellitus, type 1)  Basal cell carcinoma  SCC (squamous cell carcinoma)  BRCA2 positive  Hyperlipidemia  Latex allergy        Social Hx:      Medications:  MEDICATIONS  (STANDING):  phenylephrine    Infusion 0.5 MICROgram(s)/kG/Min (6.09 mL/Hr) IV Continuous <Continuous>  sodium chloride 0.9% Bolus 1000 milliLiter(s) IV Bolus once    MEDICATIONS  (PRN):      Vitals:  T(C): 35.6 (01-14-24 @ 11:30), Max: 35.6 (01-14-24 @ 11:30)  HR: 128 (01-14-24 @ 12:10) (115 - 129)  BP: 111/78 (01-14-24 @ 12:10) (79/53 - 126/73)  RR: 20 (01-14-24 @ 12:10) (20 - 22)  SpO2: 95% (01-14-24 @ 09:35) (95% - 95%)    Physical Examination:   General - NAD    Neurologic Exam:  Mental status - Awake, Alert, unclear orientation.  Language with mild slurring. Unable to name high frequency items. Unable to repeat. Able to follow simple commands. Able to follow multi step commands with minor errors (Laterality)    Cranial nerves - PERRL, +right superior quadrantinopia, EOMI, face sensation (V1-V3) intact b/l though not with certainty due to aphasia, facial strength intact without asymmetry b/l, hearing intact     Motor - Normal bulk and tone throughout. No pronator drift. B/L leg can come up easily but after ~1s fall to bed    Sensation - Grimace to nxious in all 4 extremities    Coordination - Finger to Nose intact b/l. No tremors appreciated      Labs:                        8.7    11.26 )-----------( 475      ( 14 Jan 2024 10:31 )             25.8     01-14    121<L>  |  87<L>  |  57<H>  ----------------------------<  300<H>  5.4<H>   |  13<L>  |  1.22    Ca    7.8<L>      14 Jan 2024 10:31    TPro  5.5<L>  /  Alb  2.6<L>  /  TBili  0.4  /  DBili  x   /  AST  63<H>  /  ALT  69<H>  /  AlkPhos  102  01-14    CAPILLARY BLOOD GLUCOSE  308 (14 Jan 2024 10:46)      POCT Blood Glucose.: 312 mg/dL (14 Jan 2024 10:05)    LIVER FUNCTIONS - ( 14 Jan 2024 10:31 )  Alb: 2.6 g/dL / Pro: 5.5 g/dL / ALK PHOS: 102 U/L / ALT: 69 U/L / AST: 63 U/L / GGT: x             PT/INR - ( 14 Jan 2024 10:31 )   PT: 12.5 sec;   INR: 1.12 ratio         PTT - ( 14 Jan 2024 10:31 )  PTT:26.1 sec  CSF:                  Radiology:  < from: CT Brain Stroke Protocol (01.14.24 @ 10:36) >    ACC: [id number] EXAM:  CT BRAIN STROKE PROTOCOL   ORDERED BY: JASON CARTY     *** ADDENDUM # 1 ***    Addendum to reflect in-hospital communication.    Case reviewed on site by Dr. Ugarte of radiology with Dr. Mccloud of   stroke neurology at 10:45 AM on 1/14/2024; Negative head CT results   communicated with read back confirmation.    --- End of Report ---    *** END OF ADDENDUM # 1 ***      PROCEDURE DATE:  01/14/2024          INTERPRETATION:  Stroke code.    Technique: CT head was performed utilizing axial images from the base of   the skull through the vertex without the administration of intravenous   contrast. Images were reviewed in the bone, brain and subdural windows.    Findings: There are no prior studies available for comparison.    There is motion artifact that limits this evaluation.    There is no evidence of large acute intracranial hemorrhage or acute   transcortical infarct. There is minimal microvascular disease. The   ventricles are normal in size and caliber.  There is no evidence of mass-effect or midline shift. There is no   evidence of an intra or extra-axial fluid collection.    Visualized paranasal sinuses and bilateral mastoid air cells are clear.    Impression: Limited evaluation. No evidence of a large acute infarction   or large acute hemorrhage. Minimal microvascular disease.    --- End of Report ---    ***Please see the addendum at the top of this report. It may contain   additional important information or changes.****        DARLIN OCHOA MD; Attending Radiologist  This document has been electronically signed. Jan 14 2024 10:44AM  1st Addendum: DARLIN OCHOA MD; Attending Radiologist  The first addendum was electronically signed on: Jan 14 2024 11:46AM.    < end of copied text >

## 2024-01-14 NOTE — ED PROVIDER NOTE - ATTENDING CONTRIBUTION TO CARE
I performed a face-to-face evaluation of the patient and performed a history and physical examination. I agree with the history and physical examination. If this was a PA visit, I personally saw the patient with the PA and performed a substantive portion of the visit including all aspects of the medical decision making.    Sharon: Lung cancer p/w hypoxia, new a. fib, weakness. IV contrast allergy. Will do MRI for brain mets or CVA. V/Q scan for PE. Admit. I performed a face-to-face evaluation of the patient and performed a history and physical examination. I agree with the history and physical examination. If this was a PA visit, I personally saw the patient with the PA and performed a substantive portion of the visit including all aspects of the medical decision making.    Sharon: Lung cancer p/w hypoxia, new a. fib, and slurred speech and leg swelling (B) (not red/hot). IV contrast allergy. POCUS: large pericardial effusion (CT Surg. was called). Will do MRI for brain mets or CVA. IVF for R-heart pressure support. Admit.

## 2024-01-14 NOTE — ED PROVIDER NOTE - PROGRESS NOTE DETAILS
Arjun PGY 3 pt had code stroke activated by provider given dysarthric speech, pt CT Chest abd pelvis found to have large effusion   CT surg made aware, card made aware echo performed and showed tampondae pt going to

## 2024-01-14 NOTE — H&P ADULT - NSHPPOADEEPVENOUSTHROMB_GEN_A_CORE
no negative Symmetric breath sounds clear to auscultation and percussion/No chest wall deformities/Normal respiratory pattern

## 2024-01-14 NOTE — H&P ADULT - COMMENTS
REVIEW OF SYSTEMS  Denies CP, palpitations, syncope, near syncope, cough, SOB, orthopnea, PND, edema, fever, chills, N/V/ diarrhea, constipation    >>> <<< REVIEW OF SYSTEMS  Denies , palpitations, syncope, near syncope, , orthopnea, PND, edema, fever, chills, N/V/ diarrhea, constipation    >>> <<<

## 2024-01-15 NOTE — CONSULT NOTE ADULT - ASSESSMENT
72yo F with h/o smoking, HTN, HLD, type 1 DM, thyroidectomy, BCC, SCC found to have Rt. lower lobe mass with mets to multiple organs (bone, brain) in August 2023 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 Endocrinology consulted for T1DM.    #Type 1Diabetes Mellitus   - A1C with Estimated Average Glucose Result: 7.0 % (01-15-24)  - home regimen: lantus 10 units BID. humalog 2-4 units (depending on size of meal)  - eGFR: 93 mL/min/1.73m2 (01-15-24)  - no evidence of DKA on labs  - no insulin given last night, 10U lantus given this AM  INPATIENT PLAN:  - Recommend Lantus 8 units q12h (would normally recommend once a day dosing for lantus but pt feels strongly this is what works for her, would like to maintain some control during this admission)  - Recommend admelog 1 units TIDAC before meals (hold if not eating)  - Recommend Low dose admelog correction scale TIDQAC and separate low dose scale QHS  - Please check FSG before meals and QHS, or q6h while NPO  - Inpatient glucose goals: 100-180  - consistent carb diet when eating  DISCHARGE PLANNING:  - Discharge recs pending clinical course: likely basal/bolus insulin (dose TBD)  - follow up w/ pt's own endo Dr. Phillips    #Hypertension  - Goal BP <130/80  - Management as per primary team    #Hyperlipidemia  - LDL goal <100  - on simvastatin 20mg daily    #Obesity:  - discussed healthy diet options, increased exercise when feeling better  - pt may benefit from GLP1 agonist as an outpatient    Kaleb Andrade MD  Endocrine Fellow  For nonurgent matters, please email [email or web address] or [email or web address]. For urgent follow up questions, discharge recommendations, or new consults please call answering service at [phone] (weekdays), [phone] (nights/weekends).    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 Endocrinology consulted for T1DM.    #Type 1Diabetes Mellitus   - A1C with Estimated Average Glucose Result: 7.0 % (01-15-24)  - home regimen: lantus 10 units BID. humalog 2-4 units (depending on size of meal)  - eGFR: 93 mL/min/1.73m2 (01-15-24)  - no evidence of DKA on labs  - no insulin given last night, 10U lantus given this AM  INPATIENT PLAN:  - Recommend Lantus 8 units q12h (would normally recommend once a day dosing for lantus but pt feels strongly this is what works for her, would like to maintain some control during this admission)  - Recommend admelog 1 units TIDAC before meals (hold if not eating)  - Recommend Low dose admelog correction scale TIDQAC and separate low dose scale QHS  - Please check FSG before meals and QHS, or q6h while NPO  - Inpatient glucose goals: 100-180  - consistent carb diet when eating  DISCHARGE PLANNING:  - Discharge recs pending clinical course: likely basal/bolus insulin (dose TBD)  - follow up w/ pt's own endo Dr. Phillips    #Hypertension  - Goal BP <130/80  - Management as per primary team    #Hyperlipidemia  - LDL goal <100  - on simvastatin 20mg daily    #Obesity:  - discussed healthy diet options, increased exercise when feeling better  - pt may benefit from GLP1 agonist as an outpatient    Kaleb Andrade MD  Endocrine Fellow  For nonurgent matters, please email [email or web address] or [email or web address]. For urgent follow up questions, discharge recommendations, or new consults please call answering service at [phone] (weekdays), [phone] (nights/weekends).    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 Endocrinology consulted for T1DM.    #Type 1Diabetes Mellitus   - A1C with Estimated Average Glucose Result: 7.0 % (01-15-24)  - home regimen: lantus 10 units BID. humalog 2-4 units (depending on size of meal)  - eGFR: 93 mL/min/1.73m2 (01-15-24)  - no evidence of DKA on labs  - no insulin given last night, 10U lantus given this AM  INPATIENT PLAN:  - Recommend Lantus 8 units q12h (would normally recommend once a day dosing for lantus but pt feels strongly this is what works for her, would like to maintain some control during this admission)  - Recommend admelog 1 units TIDAC before meals (hold if not eating)  - Recommend Low dose admelog correction scale TIDQAC and separate low dose scale QHS  - Please check FSG before meals and QHS, or q6h while NPO  - Inpatient glucose goals: 100-180  - consistent carb diet when eating  DISCHARGE PLANNING:  - Discharge recs pending clinical course: likely basal/bolus insulin (dose TBD)  - follow up w/ pt's own endo Dr. Phillips    #Hypertension  - Goal BP <130/80  - Management as per primary team    #Hyperlipidemia  - LDL goal <100  - on simvastatin 20mg daily    #Obesity:  - discussed healthy diet options, increased exercise when feeling better  - pt may benefit from GLP1 agonist as an outpatient    Kaleb Andrade MD  Endocrine Fellow  For nonurgent matters, please email [email or web address] or [email or web address]. For urgent follow up questions, discharge recommendations, or new consults please call answering service at [phone] (weekdays), [phone] (nights/weekends).    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 Endocrinology consulted for T1DM.    #Type 1Diabetes Mellitus   - A1C with Estimated Average Glucose Result: 7.0 % (01-15-24)  - home regimen: lantus 10 units BID. humalog 2-4 units (depending on size of meal)  - eGFR: 93 mL/min/1.73m2 (01-15-24)  - no evidence of DKA on labs  - no insulin given last night, 10U lantus given this AM  INPATIENT PLAN:  - Recommend Lantus 8 units q12h (would normally recommend once a day dosing for lantus but pt feels strongly this is what works for her, would like to maintain some control during this admission)  - Recommend admelog 1 units TIDAC before meals (hold if not eating)  - Recommend Low dose admelog correction scale TIDQAC and separate low dose scale QHS  - Please check FSG before meals and QHS, or q6h while NPO  - Inpatient glucose goals: 100-180  - consistent carb diet when eating  DISCHARGE PLANNING:  - Discharge recs pending clinical course: likely basal/bolus insulin (dose TBD)  - follow up w/ pt's own endo Dr. Phillips    #Hypertension  - Goal BP <130/80  - Management as per primary team    #Hyperlipidemia  - LDL goal <100  - on simvastatin 20mg daily    Kaleb Andrade MD  Endocrine Fellow  For nonurgent matters, please email [email or web address] or [email or web address]. For urgent follow up questions, discharge recommendations, or new consults please call answering service at [phone] (weekdays), [phone] (nights/weekends).    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 Endocrinology consulted for T1DM.    #Type 1Diabetes Mellitus   - A1C with Estimated Average Glucose Result: 7.0 % (01-15-24)  - home regimen: lantus 10 units BID. humalog 2-4 units (depending on size of meal)  - eGFR: 93 mL/min/1.73m2 (01-15-24)  - no evidence of DKA on labs  - no insulin given last night, 10U lantus given this AM  INPATIENT PLAN:  - Recommend Lantus 8 units q12h (would normally recommend once a day dosing for lantus but pt feels strongly this is what works for her, would like to maintain some control during this admission)  - Recommend admelog 1 units TIDAC before meals (hold if not eating)  - Recommend Low dose admelog correction scale TIDQAC and separate low dose scale QHS  - Please check FSG before meals and QHS, or q6h while NPO  - Inpatient glucose goals: 100-180  - consistent carb diet when eating  DISCHARGE PLANNING:  - Discharge recs pending clinical course: likely basal/bolus insulin (dose TBD)  - follow up w/ pt's own endo Dr. Phillips    #Hypertension  - Goal BP <130/80  - Management as per primary team    #Hyperlipidemia  - LDL goal <100  - on simvastatin 20mg daily    Kaleb Andrade MD  Endocrine Fellow  For nonurgent matters, please email [email or web address] or [email or web address]. For urgent follow up questions, discharge recommendations, or new consults please call answering service at [phone] (weekdays), [phone] (nights/weekends).    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 Endocrinology consulted for T1DM.    #Type 1Diabetes Mellitus   - A1C with Estimated Average Glucose Result: 7.0 % (01-15-24)  - home regimen: lantus 10 units BID. humalog 2-4 units (depending on size of meal)  - eGFR: 93 mL/min/1.73m2 (01-15-24)  - no evidence of DKA on labs  - no insulin given last night, 10U lantus given this AM  INPATIENT PLAN:  - Recommend Lantus 8 units q12h (would normally recommend once a day dosing for lantus but pt feels strongly this is what works for her, would like to maintain some control during this admission)  - Recommend admelog 1 units TIDAC before meals (hold if not eating)  - Recommend Low dose admelog correction scale TIDQAC and separate low dose scale QHS  - Please check FSG before meals and QHS, or q6h while NPO  - Inpatient glucose goals: 100-180  - consistent carb diet when eating  DISCHARGE PLANNING:  - Discharge recs pending clinical course: likely basal/bolus insulin (dose TBD)  - follow up w/ pt's own endo Dr. Phillips    #Hypertension  - Goal BP <130/80  - Management as per primary team    #Hyperlipidemia  - LDL goal <100  - on simvastatin 20mg daily    Kaleb Andrade MD  Endocrine Fellow  For nonurgent matters, please email [email or web address] or [email or web address]. For urgent follow up questions, discharge recommendations, or new consults please call answering service at [phone] (weekdays), [phone] (nights/weekends).

## 2024-01-15 NOTE — PROGRESS NOTE ADULT - SUBJECTIVE AND OBJECTIVE BOX
CCU Service  Cardiology   Spect: [id number] ([facility])     PATIENT: LIAN BENITEZ, MRN: [id number]    CHIEF COMPLAINT: 72yo F with h/o smoking, HTN, HLD, type 1 DM, thyroidectomy, BCC, SCC found to have 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 Currently undergoing treatment w Dr. Plummer at [facility]. presents as code stroke for MS change CT scan r/o code stroke.     transferred to CCU s/p pericardiocentesis, 800cc sanguineous drainage, now with drain.        INTERVAL HISTORY/OVERNIGHT EVENTS:     TELEMETRY:     EKG:       ALLERGIES: Allergies    latex (Urticaria)  Actonel (Unknown)  Gadavist (Anaphylaxis; Hives)  codeine (Vomiting)    Intolerances        MEDICATIONS:  MEDICATIONS  (STANDING):  chlorhexidine 2% Cloths 1 Application(s) Topical daily  dextrose 5%. 1000 milliLiter(s) (50 mL/Hr) IV Continuous <Continuous>  dextrose 5%. 1000 milliLiter(s) (100 mL/Hr) IV Continuous <Continuous>  dextrose 50% Injectable 12.5 Gram(s) IV Push once  dextrose 50% Injectable 25 Gram(s) IV Push once  dextrose 50% Injectable 25 Gram(s) IV Push once  glucagon  Injectable 1 milliGRAM(s) IntraMuscular once  insulin lispro (ADMELOG) corrective regimen sliding scale   SubCutaneous three times a day before meals  insulin lispro (ADMELOG) corrective regimen sliding scale   SubCutaneous at bedtime  phenylephrine    Infusion 0.5 MICROgram(s)/kG/Min (6.09 mL/Hr) IV Continuous <Continuous>  simvastatin 20 milliGRAM(s) Oral at bedtime    MEDICATIONS  (PRN):  dextrose Oral Gel 15 Gram(s) Oral once PRN Blood Glucose LESS THAN 70 milliGRAM(s)/deciliter        OBJECTIVE:  ICU Vital Signs Last 24 Hrs  T(C): 36.7 (15 [place] 2024 04:00), Max: 36.8 (2024 20:00)  T(F): 98 (15 [place] 2024 04:00), Max: 98.3 (2024 20:00)  HR: 67 (15 [place] 2024 06:00) (61 - 129)  BP: 128/60 (15 [place] 2024 06:00) (79/53 - 130/55)  BP(mean): 79 (15 [place] 2024 06:00) (60 - 97)  ABP: --  ABP(mean): --  RR: 17 (15 [place] 2024 06:00) (13 - 26)  SpO2: 100% (15 [place] 2024 06:00) (95% - 100%)    O2 Parameters below as of 15 [place] 2024 06:00  Patient On (Oxygen Delivery Method): nasal cannula  O2 Flow (L/min): 4          Adult Advanced Hemodynamics Last 24 Hrs  CVP(mm Hg): --  CVP(cm H2O): --  CO: --  CI: --  PA: --  PA(mean): --  PCWP: --  SVR: --  SVRI: --  PVR: --  PVRI: --  CAPILLARY BLOOD GLUCOSE  308 (2024 10:46)      POCT Blood Glucose.: 240 mg/dL (2024 21:44)  POCT Blood Glucose.: 300 mg/dL (2024 16:40)  POCT Blood Glucose.: 291 mg/dL (2024 14:08)  POCT Blood Glucose.: 312 mg/dL (2024 10:05)  POCT Blood Glucose.: 308 mg/dL (2024 09:45)    CAPILLARY BLOOD GLUCOSE  308 (2024 10:46)      POCT Blood Glucose.: 240 mg/dL (2024 21:44)    I&O's Summary    2024 07:01  -  15 [place] 2024 06:49  --------------------------------------------------------  IN: 295.2 mL / OUT: 1410 mL / NET: -1114.8 mL      Daily Height in cm: 165.1 (2024 13:27)    Daily Weight in k.2 (15 [place] 2024 04:00)    REVIEW OF SYSTEMS  See interval history otherwise negative    PHYSICAL EXAMINATION:  General: Comfortable, no acute distress, cooperative with exam.  HEENT: Moist mucous membranes.  Respiratory: CTAB, normal respiratory effort, no coughing, wheezes, crackles, or rales.  CV: RRR, S1S2, no murmurs, rubs or gallops. No JVD. Distal pulses intact.  Abdominal: Soft, nontender, nondistended, no rebound or guarding, normal bowel sounds.  Neurology: AOx3, no focal neuro defects, GALARZA x 4.  Extremities: No pitting edema, + Peripheral pulses.  Cath site:   Tubes:        LABS:                          9.3    15.73 )-----------( 495      ( 2024 17:31 )             27.2         123<L>  |  90<L>  |  45<H>  ----------------------------<  289<H>  5.6<H>   |  21<L>  |  0.93    Ca    7.8<L>      2024 17:31  Phos  3.4       Mg     2.40         TPro  5.5<L>  /  Alb  2.5<L>  /  TBili  0.2  /  DBili  x   /  AST  397<H>  /  ALT  349<H>  /  AlkPhos  107      LIVER FUNCTIONS - ( 2024 17:31 )  Alb: 2.5 g/dL / Pro: 5.5 g/dL / ALK PHOS: 107 U/L / ALT: 349 U/L / AST: 397 U/L / GGT: x           PT/INR - ( 2024 10:31 )   PT: 12.5 sec;   INR: 1.12 ratio         PTT - ( 2024 10:31 )  PTT:26.1 sec        Urinalysis Basic - ( 2024 17:31 )    Color: x / Appearance: x / SG: x / pH: x  Gluc: 289 mg/dL / Ketone: x  / Bili: x / Urobili: x   Blood: x / Protein: x / Nitrite: x   Leuk Esterase: x / RBC: x / WBC x   Sq Epi: x / Non Sq Epi: x / Bacteria: x    Assessment:  · Assessment	  72yo F with h/o smoking, HTN, HLD, type 1 DM, thyroidectomy, BCC, SCC found to have 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 Currently undergoing treatment w Dr. Plummer at [facility]. Per pt's , at bedside, pt had been feeling well then awoke this morning disoriented, confused and c/o significant CP and SOB. Brought by EMS to [facility] ER. Due to change in MS,  CT scan r/o code stroke. No infarcts seen.  CT scan C/A/P revealed large pericardial effusion w bilat pleural effusion. Pt  hypotensive, tachycardic. s/p 1Liter IVF.  Discussed with interventional cardiology with plan to  drain pericardial effusion and admit to CCU.    #Neuro  A+O x 3    Code stroke  NEURO:  Impression: Expressive aphasia and right superior quadrantanopia due to left hemispheric dysfunction. Mechanism ischemic stroke. Etiology likely cardioembolic given new afib vs hypercoagulability of malignancy      Pt is not a candidate for tenecteplase due to outside tenecteplase window , cancer with mets to brain  Mechanical thrombectomy candidacy pending MRI/MRA  without contrast(no CTA due to alergy)    CT head:  LimitedNo evidence of a large acute infarction   or large acute hemorrhage. Minimal microvascular disease.        #Respiratory  - Currently in no acute distress  - Satting 98% 2L NC  - bilat pleural effusion.    #Cards    Pericardial effusion  CT scan C/A/P revealed large pericardial effusion w bilat pleural effusion.  Pericardial drain placed for tamponade , 800cc sanguineous fluid removed  Cytology pending  Monitor Drain        New Afib  iso pericardial eff. with tamponade physiology    ChadsVasc: 7  Has-Bled: 3  Anticoagulation recommendations pending further imaging   B/L compression stockings      HTN  Patient with h/o hypertension and is on lisinopril 20mg BID, amlodipine 5mg BID,   Bashir-Synephrine now Dc'd   Hold Home medications  -permissive HTN up to 220/110 for 24-48h from symptom onset followed by gradual normotension over 2-3 days   DASH diet  Monitor blood pressure      #GI  NPO unless passes dysphagia screen; swallow eval if fails  Consistent carb    HLD    simvastatin 20mg      #    Normal indices      #Endo    -Diabetes  DM2 (diabetes mellitus, type II)  Home medication: Lantus 11 units am and 7 units 6pm  novalog/humalog 2-4 units pre-meal  blood glucose ACHS with ISS  Check HgA1c  Diabetic education  Consistent carbohydrate diet  continue therapy    Heme  BCC, SCC   found to have 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   Currently undergoing treatment w Dr. Plummer at [facility].   Discussed with [facility] Dr plummer:  Please hold Home chemo meds: Mekinist 0.5mg 10pm Tafinlar 75mg BID while inpatient        PPx  Anticoagulation recommendations pending further imaging     Fluids  Electrolytes  Nutrition  Access  prophylaxis (GI and DVT)   CCU Service  Cardiology   Spect: 97880 ([facility])     PATIENT: LIAN BENITEZ, MRN: [id number]    CHIEF COMPLAINT: 74yo F with h/o smoking, HTN, HLD, type 1 DM, thyroidectomy, BCC, SCC found to have 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 Currently undergoing treatment w Dr. Plummer at [facility]. presents as code stroke for MS change CT scan r/o code stroke.     transferred to CCU s/p pericardiocentesis, 800cc sanguineous drainage, now with drain.        INTERVAL HISTORY/OVERNIGHT EVENTS:     TELEMETRY:     EKG:       ALLERGIES: Allergies    latex (Urticaria)  Actonel (Unknown)  Gadavist (Anaphylaxis; Hives)  codeine (Vomiting)    Intolerances        MEDICATIONS:  MEDICATIONS  (STANDING):  chlorhexidine 2% Cloths 1 Application(s) Topical daily  dextrose 5%. 1000 milliLiter(s) (50 mL/Hr) IV Continuous <Continuous>  dextrose 5%. 1000 milliLiter(s) (100 mL/Hr) IV Continuous <Continuous>  dextrose 50% Injectable 12.5 Gram(s) IV Push once  dextrose 50% Injectable 25 Gram(s) IV Push once  dextrose 50% Injectable 25 Gram(s) IV Push once  glucagon  Injectable 1 milliGRAM(s) IntraMuscular once  insulin lispro (ADMELOG) corrective regimen sliding scale   SubCutaneous three times a day before meals  insulin lispro (ADMELOG) corrective regimen sliding scale   SubCutaneous at bedtime  phenylephrine    Infusion 0.5 MICROgram(s)/kG/Min (6.09 mL/Hr) IV Continuous <Continuous>  simvastatin 20 milliGRAM(s) Oral at bedtime    MEDICATIONS  (PRN):  dextrose Oral Gel 15 Gram(s) Oral once PRN Blood Glucose LESS THAN 70 milliGRAM(s)/deciliter        OBJECTIVE:  ICU Vital Signs Last 24 Hrs  T(C): 36.7 (15 [place] 2024 04:00), Max: 36.8 (2024 20:00)  T(F): 98 (15 [place] 2024 04:00), Max: 98.3 (2024 20:00)  HR: 67 (15 [place] 2024 06:00) (61 - 129)  BP: 128/60 (15 [place] 2024 06:00) (79/53 - 130/55)  BP(mean): 79 (15 [place] 2024 06:00) (60 - 97)  ABP: --  ABP(mean): --  RR: 17 (15 [place] 2024 06:00) (13 - 26)  SpO2: 100% (15 [place] 2024 06:00) (95% - 100%)    O2 Parameters below as of 15 [place] 2024 06:00  Patient On (Oxygen Delivery Method): nasal cannula  O2 Flow (L/min): 4          Adult Advanced Hemodynamics Last 24 Hrs  CVP(mm Hg): --  CVP(cm H2O): --  CO: --  CI: --  PA: --  PA(mean): --  PCWP: --  SVR: --  SVRI: --  PVR: --  PVRI: --  CAPILLARY BLOOD GLUCOSE  308 (2024 10:46)      POCT Blood Glucose.: 240 mg/dL (2024 21:44)  POCT Blood Glucose.: 300 mg/dL (2024 16:40)  POCT Blood Glucose.: 291 mg/dL (2024 14:08)  POCT Blood Glucose.: 312 mg/dL (2024 10:05)  POCT Blood Glucose.: 308 mg/dL (2024 09:45)    CAPILLARY BLOOD GLUCOSE  308 (2024 10:46)      POCT Blood Glucose.: 240 mg/dL (2024 21:44)    I&O's Summary    2024 07:01  -  15 [place] 2024 06:49  --------------------------------------------------------  IN: 295.2 mL / OUT: 1410 mL / NET: -1114.8 mL      Daily Height in cm: 165.1 (2024 13:27)    Daily Weight in k.2 (15 [place] 2024 04:00)    REVIEW OF SYSTEMS  See interval history otherwise negative    PHYSICAL EXAMINATION:  General: Comfortable, no acute distress, cooperative with exam.  HEENT: Moist mucous membranes.  Respiratory: CTAB, normal respiratory effort, no coughing, wheezes, crackles, or rales.  CV: RRR, S1S2, no murmurs, rubs or gallops. No JVD. Distal pulses intact.  Abdominal: Soft, nontender, nondistended, no rebound or guarding, normal bowel sounds.  Neurology: AOx3, no focal neuro defects, GALARZA x 4.  Extremities: No pitting edema, + Peripheral pulses.  Cath site:   Tubes:        LABS:                          9.3    15.73 )-----------( 495      ( 2024 17:31 )             27.2         123<L>  |  90<L>  |  45<H>  ----------------------------<  289<H>  5.6<H>   |  21<L>  |  0.93    Ca    7.8<L>      2024 17:31  Phos  3.4       Mg     2.40         TPro  5.5<L>  /  Alb  2.5<L>  /  TBili  0.2  /  DBili  x   /  AST  397<H>  /  ALT  349<H>  /  AlkPhos  107      LIVER FUNCTIONS - ( 2024 17:31 )  Alb: 2.5 g/dL / Pro: 5.5 g/dL / ALK PHOS: 107 U/L / ALT: 349 U/L / AST: 397 U/L / GGT: x           PT/INR - ( 2024 10:31 )   PT: 12.5 sec;   INR: 1.12 ratio         PTT - ( 2024 10:31 )  PTT:26.1 sec        Urinalysis Basic - ( 2024 17:31 )    Color: x / Appearance: x / SG: x / pH: x  Gluc: 289 mg/dL / Ketone: x  / Bili: x / Urobili: x   Blood: x / Protein: x / Nitrite: x   Leuk Esterase: x / RBC: x / WBC x   Sq Epi: x / Non Sq Epi: x / Bacteria: x    Assessment:  · Assessment	  74yo F with h/o smoking, HTN, HLD, type 1 DM, thyroidectomy, BCC, SCC found to have 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 Currently undergoing treatment w Dr. Plummer at [facility]. Per pt's , at bedside, pt had been feeling well then awoke this morning disoriented, confused and c/o significant CP and SOB. Brought by EMS to [facility] ER. Due to change in MS,  CT scan r/o code stroke. No infarcts seen.  CT scan C/A/P revealed large pericardial effusion w bilat pleural effusion. Pt  hypotensive, tachycardic. s/p 1Liter IVF.  Discussed with interventional cardiology with plan to  drain pericardial effusion and admit to CCU.    #Neuro  A+O x 3    Code stroke  NEURO:  Impression: Expressive aphasia and right superior quadrantanopia due to left hemispheric dysfunction. Mechanism ischemic stroke. Etiology likely cardioembolic given new afib vs hypercoagulability of malignancy      Pt is not a candidate for tenecteplase due to outside tenecteplase window , cancer with mets to brain  Mechanical thrombectomy candidacy pending MRI/MRA  without contrast(no CTA due to alergy)    CT head:  LimitedNo evidence of a large acute infarction   or large acute hemorrhage. Minimal microvascular disease.        #Respiratory  - Currently in no acute distress  - Satting 98% 2L NC  - bilat pleural effusion.    #Cards    Pericardial effusion  CT scan C/A/P revealed large pericardial effusion w bilat pleural effusion.  Pericardial drain placed for tamponade , 800cc sanguineous fluid removed  Cytology pending  Monitor Drain        New Afib  iso pericardial eff. with tamponade physiology    ChadsVasc: 7  Has-Bled: 3  Anticoagulation recommendations pending further imaging   B/L compression stockings      HTN  Patient with h/o hypertension and is on lisinopril 20mg BID, amlodipine 5mg BID,   Bashir-Synephrine now Dc'd   Hold Home medications  -permissive HTN up to 220/110 for 24-48h from symptom onset followed by gradual normotension over 2-3 days   DASH diet  Monitor blood pressure      #GI  NPO unless passes dysphagia screen; swallow eval if fails  Consistent carb    HLD    simvastatin 20mg      #    Normal indices      #Endo    -Diabetes  DM2 (diabetes mellitus, type II)  Home medication: Lantus 11 units am and 7 units 6pm  novalog/humalog 2-4 units pre-meal  blood glucose ACHS with ISS  Check HgA1c  Diabetic education  Consistent carbohydrate diet  continue therapy    Heme  BCC, SCC   found to have 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   Currently undergoing treatment w Dr. Plummer at [facility].   Discussed with [facility] Dr plummer:  Please hold Home chemo meds: Mekinist 0.5mg 10pm Tafinlar 75mg BID while inpatient        PPx  Anticoagulation recommendations pending further imaging     Fluids  Electrolytes  Nutrition  Access  prophylaxis (GI and DVT)   CCU Service  Cardiology   Spect: 96397 ([facility])     PATIENT: LIAN BENITEZ, MRN: [id number]    CHIEF COMPLAINT: 72yo F with h/o smoking, HTN, HLD, type 1 DM, thyroidectomy, BCC, SCC found to have 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 Currently undergoing treatment w Dr. Plummer at [facility]. presents as code stroke for MS change CT scan r/o code stroke.     transferred to CCU s/p pericardiocentesis, 800cc sanguineous drainage, now with drain.        INTERVAL HISTORY/OVERNIGHT EVENTS:     TELEMETRY:     EKG:       ALLERGIES: Allergies    latex (Urticaria)  Actonel (Unknown)  Gadavist (Anaphylaxis; Hives)  codeine (Vomiting)    Intolerances        MEDICATIONS:  MEDICATIONS  (STANDING):  chlorhexidine 2% Cloths 1 Application(s) Topical daily  dextrose 5%. 1000 milliLiter(s) (50 mL/Hr) IV Continuous <Continuous>  dextrose 5%. 1000 milliLiter(s) (100 mL/Hr) IV Continuous <Continuous>  dextrose 50% Injectable 12.5 Gram(s) IV Push once  dextrose 50% Injectable 25 Gram(s) IV Push once  dextrose 50% Injectable 25 Gram(s) IV Push once  glucagon  Injectable 1 milliGRAM(s) IntraMuscular once  insulin lispro (ADMELOG) corrective regimen sliding scale   SubCutaneous three times a day before meals  insulin lispro (ADMELOG) corrective regimen sliding scale   SubCutaneous at bedtime  phenylephrine    Infusion 0.5 MICROgram(s)/kG/Min (6.09 mL/Hr) IV Continuous <Continuous>  simvastatin 20 milliGRAM(s) Oral at bedtime    MEDICATIONS  (PRN):  dextrose Oral Gel 15 Gram(s) Oral once PRN Blood Glucose LESS THAN 70 milliGRAM(s)/deciliter        OBJECTIVE:  ICU Vital Signs Last 24 Hrs  T(C): 36.7 (15 [place] 2024 04:00), Max: 36.8 (2024 20:00)  T(F): 98 (15 [place] 2024 04:00), Max: 98.3 (2024 20:00)  HR: 67 (15 [place] 2024 06:00) (61 - 129)  BP: 128/60 (15 [place] 2024 06:00) (79/53 - 130/55)  BP(mean): 79 (15 [place] 2024 06:00) (60 - 97)  ABP: --  ABP(mean): --  RR: 17 (15 [place] 2024 06:00) (13 - 26)  SpO2: 100% (15 [place] 2024 06:00) (95% - 100%)    O2 Parameters below as of 15 [place] 2024 06:00  Patient On (Oxygen Delivery Method): nasal cannula  O2 Flow (L/min): 4          Adult Advanced Hemodynamics Last 24 Hrs  CVP(mm Hg): --  CVP(cm H2O): --  CO: --  CI: --  PA: --  PA(mean): --  PCWP: --  SVR: --  SVRI: --  PVR: --  PVRI: --  CAPILLARY BLOOD GLUCOSE  308 (2024 10:46)      POCT Blood Glucose.: 240 mg/dL (2024 21:44)  POCT Blood Glucose.: 300 mg/dL (2024 16:40)  POCT Blood Glucose.: 291 mg/dL (2024 14:08)  POCT Blood Glucose.: 312 mg/dL (2024 10:05)  POCT Blood Glucose.: 308 mg/dL (2024 09:45)    CAPILLARY BLOOD GLUCOSE  308 (2024 10:46)      POCT Blood Glucose.: 240 mg/dL (2024 21:44)    I&O's Summary    2024 07:01  -  15 [place] 2024 06:49  --------------------------------------------------------  IN: 295.2 mL / OUT: 1410 mL / NET: -1114.8 mL      Daily Height in cm: 165.1 (2024 13:27)    Daily Weight in k.2 (15 [place] 2024 04:00)    REVIEW OF SYSTEMS  See interval history otherwise negative    PHYSICAL EXAMINATION:  General: Comfortable, no acute distress, cooperative with exam.  HEENT: Moist mucous membranes.  Respiratory: CTAB, normal respiratory effort, no coughing, wheezes, crackles, or rales.  CV: RRR, S1S2, no murmurs, rubs or gallops. No JVD. Distal pulses intact.  Abdominal: Soft, nontender, nondistended, no rebound or guarding, normal bowel sounds.  Neurology: AOx3, no focal neuro defects, GALARZA x 4.  Extremities: No pitting edema, + Peripheral pulses.  Cath site:   Tubes:        LABS:                          9.3    15.73 )-----------( 495      ( 2024 17:31 )             27.2         123<L>  |  90<L>  |  45<H>  ----------------------------<  289<H>  5.6<H>   |  21<L>  |  0.93    Ca    7.8<L>      2024 17:31  Phos  3.4       Mg     2.40         TPro  5.5<L>  /  Alb  2.5<L>  /  TBili  0.2  /  DBili  x   /  AST  397<H>  /  ALT  349<H>  /  AlkPhos  107      LIVER FUNCTIONS - ( 2024 17:31 )  Alb: 2.5 g/dL / Pro: 5.5 g/dL / ALK PHOS: 107 U/L / ALT: 349 U/L / AST: 397 U/L / GGT: x           PT/INR - ( 2024 10:31 )   PT: 12.5 sec;   INR: 1.12 ratio         PTT - ( 2024 10:31 )  PTT:26.1 sec        Urinalysis Basic - ( 2024 17:31 )    Color: x / Appearance: x / SG: x / pH: x  Gluc: 289 mg/dL / Ketone: x  / Bili: x / Urobili: x   Blood: x / Protein: x / Nitrite: x   Leuk Esterase: x / RBC: x / WBC x   Sq Epi: x / Non Sq Epi: x / Bacteria: x    Assessment:  · Assessment	  72yo F with h/o smoking, HTN, HLD, type 1 DM, thyroidectomy, BCC, SCC found to have 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 Currently undergoing treatment w Dr. Plummer at [facility]. Per pt's , at bedside, pt had been feeling well then awoke this morning disoriented, confused and c/o significant CP and SOB. Brought by EMS to [facility] ER. Due to change in MS,  CT scan r/o code stroke. No infarcts seen.  CT scan C/A/P revealed large pericardial effusion w bilat pleural effusion. Pt  hypotensive, tachycardic. s/p 1Liter IVF.  Discussed with interventional cardiology with plan to  drain pericardial effusion and admit to CCU.    #Neuro  A+O x 3    Code stroke  NEURO:  Impression: Expressive aphasia and right superior quadrantanopia due to left hemispheric dysfunction. Mechanism ischemic stroke. Etiology likely cardioembolic given new afib vs hypercoagulability of malignancy      Pt is not a candidate for tenecteplase due to outside tenecteplase window , cancer with mets to brain  Mechanical thrombectomy candidacy pending MRI/MRA  without contrast(no CTA due to alergy)    CT head:  LimitedNo evidence of a large acute infarction   or large acute hemorrhage. Minimal microvascular disease.        #Respiratory  - Currently in no acute distress  - Satting 98% 2L NC  - bilat pleural effusion.    #Cards    Pericardial effusion  CT scan C/A/P revealed large pericardial effusion w bilat pleural effusion.  Pericardial drain placed for tamponade , 800cc sanguineous fluid removed  Cytology pending  Monitor Drain        New Afib  iso pericardial eff. with tamponade physiology    ChadsVasc: 7  Has-Bled: 3  Anticoagulation recommendations pending further imaging   B/L compression stockings      HTN  Patient with h/o hypertension and is on lisinopril 20mg BID, amlodipine 5mg BID,   Bashir-Synephrine now Dc'd   Hold Home medications  -permissive HTN up to 220/110 for 24-48h from symptom onset followed by gradual normotension over 2-3 days   DASH diet  Monitor blood pressure      #GI  NPO unless passes dysphagia screen; swallow eval if fails  Consistent carb    HLD    simvastatin 20mg      #    Normal indices      #Endo    -Diabetes  DM2 (diabetes mellitus, type II)  Home medication: Lantus 11 units am and 7 units 6pm  novalog/humalog 2-4 units pre-meal  blood glucose ACHS with ISS  Check HgA1c  Diabetic education  Consistent carbohydrate diet  continue therapy    Heme  BCC, SCC   found to have 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   Currently undergoing treatment w Dr. Plummer at [facility].   Discussed with [facility] Dr plummer:  Please hold Home chemo meds: Mekinist 0.5mg 10pm Tafinlar 75mg BID while inpatient        PPx  Anticoagulation recommendations pending further imaging     Fluids  Electrolytes  Nutrition  Access  prophylaxis (GI and DVT)   CCU Service  Cardiology   Spect: 56470 ([facility])     PATIENT: LIAN BENITEZ, MRN: [id number]    CHIEF COMPLAINT: 72yo F with h/o smoking, HTN, HLD, type 1 DM, thyroidectomy, BCC, SCC found to have 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 Currently undergoing treatment w Dr. Plummer at [facility]. presents as code stroke for MS change CT scan r/o code stroke.     transferred to CCU s/p pericardiocentesis, 800cc sanguineous drainage, now with drain.        INTERVAL HISTORY/OVERNIGHT EVENTS: none, A+O x 4, Denies CP, palp, sob. Expressed concern over insulin mgt, endocrine cx called,     TELEMETRY: sinus rhythm. rare PVC          ALLERGIES: Allergies    latex (Urticaria)  Actonel (Unknown)  Gadavist (Anaphylaxis; Hives)  codeine (Vomiting)    Intolerances        MEDICATIONS:  MEDICATIONS  (STANDING):  chlorhexidine 2% Cloths 1 Application(s) Topical daily  dextrose 5%. 1000 milliLiter(s) (50 mL/Hr) IV Continuous <Continuous>  dextrose 5%. 1000 milliLiter(s) (100 mL/Hr) IV Continuous <Continuous>  dextrose 50% Injectable 12.5 Gram(s) IV Push once  dextrose 50% Injectable 25 Gram(s) IV Push once  dextrose 50% Injectable 25 Gram(s) IV Push once  glucagon  Injectable 1 milliGRAM(s) IntraMuscular once  insulin lispro (ADMELOG) corrective regimen sliding scale   SubCutaneous three times a day before meals  insulin lispro (ADMELOG) corrective regimen sliding scale   SubCutaneous at bedtime  phenylephrine    Infusion 0.5 MICROgram(s)/kG/Min (6.09 mL/Hr) IV Continuous <Continuous>  simvastatin 20 milliGRAM(s) Oral at bedtime    MEDICATIONS  (PRN):  dextrose Oral Gel 15 Gram(s) Oral once PRN Blood Glucose LESS THAN 70 milliGRAM(s)/deciliter        OBJECTIVE:  ICU Vital Signs Last 24 Hrs  T(C): 36.7 (15 [place] 2024 04:00), Max: 36.8 (2024 20:00)  T(F): 98 (15 [place] 2024 04:00), Max: 98.3 (2024 20:00)  HR: 67 (15 [place] 2024 06:00) (61 - 129)  BP: 128/60 (15 [place] 2024 06:00) (79/53 - 130/55)  BP(mean): 79 (15 [place] 2024 06:00) (60 - 97)  ABP: --  ABP(mean): --  RR: 17 (15 [place] 2024 06:00) (13 - 26)  SpO2: 100% (15 [place] 2024 06:00) (95% - 100%)    O2 Parameters below as of 15 [place] 2024 06:00  Patient On (Oxygen Delivery Method): nasal cannula  O2 Flow (L/min): 4          Adult Advanced Hemodynamics Last 24 Hrs  CVP(mm Hg): --  CVP(cm H2O): --  CO: --  CI: --  PA: --  PA(mean): --  PCWP: --  SVR: --  SVRI: --  PVR: --  PVRI: --  CAPILLARY BLOOD GLUCOSE  308 (2024 10:46)      POCT Blood Glucose.: 240 mg/dL (2024 21:44)  POCT Blood Glucose.: 300 mg/dL (2024 16:40)  POCT Blood Glucose.: 291 mg/dL (2024 14:08)  POCT Blood Glucose.: 312 mg/dL (2024 10:05)  POCT Blood Glucose.: 308 mg/dL (2024 09:45)    CAPILLARY BLOOD GLUCOSE  308 (2024 10:46)      POCT Blood Glucose.: 240 mg/dL (2024 21:44)    I&O's Summary    2024 07:01  -  15 [place] 2024 06:49  --------------------------------------------------------  IN: 295.2 mL / OUT: 1410 mL / NET: -1114.8 mL      Daily Height in cm: 165.1 (2024 13:27)    Daily Weight in k.2 (15 [place] 2024 04:00)    REVIEW OF SYSTEMS  See interval history otherwise negative    PHYSICAL EXAMINATION:  General: Comfortable, no acute distress, cooperative with exam.  HEENT: Moist mucous membranes.  Respiratory: CTAB, normal respiratory effort, no coughing, wheezes, crackles, or rales.  CV: RRR, S1S2, no murmurs, rubs or gallops. No JVD. Distal pulses intact.  Abdominal: Soft, nontender, nondistended, no rebound or guarding, normal bowel sounds.  Neurology: AOx3, no focal neuro defects, GALARZA x 4.  Extremities: No pitting edema, + Peripheral pulses.  Cath site:   Tubes:        LABS:                          9.3    15.73 )-----------( 495      ( 2024 17:31 )             27.2     -    123<L>  |  90<L>  |  45<H>  ----------------------------<  289<H>  5.6<H>   |  21<L>  |  0.93    Ca    7.8<L>      2024 17:31  Phos  3.4       Mg     2.40         TPro  5.5<L>  /  Alb  2.5<L>  /  TBili  0.2  /  DBili  x   /  AST  397<H>  /  ALT  349<H>  /  AlkPhos  107      LIVER FUNCTIONS - ( 2024 17:31 )  Alb: 2.5 g/dL / Pro: 5.5 g/dL / ALK PHOS: 107 U/L / ALT: 349 U/L / AST: 397 U/L / GGT: x           PT/INR - ( 2024 10:31 )   PT: 12.5 sec;   INR: 1.12 ratio         PTT - ( 2024 10:31 )  PTT:26.1 sec        Urinalysis Basic - ( 2024 17:31 )    Color: x / Appearance: x / SG: x / pH: x  Gluc: 289 mg/dL / Ketone: x  / Bili: x / Urobili: x   Blood: x / Protein: x / Nitrite: x   Leuk Esterase: x / RBC: x / WBC x   Sq Epi: x / Non Sq Epi: x / Bacteria: x    Assessment:  · Assessment	  72yo F with h/o smoking, HTN, HLD, type 1 DM, thyroidectomy, BCC, SCC found to have 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 Currently undergoing treatment w Dr. Plummer at [facility]. Per pt's , at bedside, pt had been feeling well then awoke this morning disoriented, confused and c/o significant CP and SOB. Brought by EMS to [facility] ER. Due to change in MS,  CT scan r/o code stroke. No infarcts seen.  CT scan C/A/P revealed large pericardial effusion w bilat pleural effusion. Pt  hypotensive, tachycardic. s/p 1Liter IVF.  Discussed with interventional cardiology with plan to  drain pericardial effusion and admit to CCU.    #Neuro  A+O x 3    Code stroke  NEURO:  Impression: Expressive aphasia and right superior quadrantanopia due to left hemispheric dysfunction. Mechanism ischemic stroke. Etiology likely cardioembolic given new afib vs hypercoagulability of malignancy      Pt is not a candidate for tenecteplase due to outside tenecteplase window , cancer with mets to brain  Mechanical thrombectomy candidacy pending MRI/MRA  without contrast(no CTA due to alergy)    CT head:  LimitedNo evidence of a large acute infarction   or large acute hemorrhage. Minimal microvascular disease.        #Respiratory  - Currently in no acute distress  - Satting 98% 2L NC  - bilat pleural effusion.    #Cards    Pericardial effusion  CT scan C/A/P revealed large pericardial effusion w bilat pleural effusion.  Pericardial drain placed for tamponade , 800cc sanguineous fluid removed  Cytology pending  Monitor Drain        New Afib  iso pericardial eff. with tamponade physiology    ChadsVasc: 7  Has-Bled: 3  Anticoagulation recommendations pending further imaging   B/L compression stockings      HTN  Patient with h/o hypertension and is on lisinopril 20mg BID, amlodipine 5mg BID,   Bashir-Synephrine now Dc'd   Hold Home medications  -permissive HTN up to 220/110 for 24-48h from symptom onset followed by gradual normotension over 2-3 days   DASH diet  Monitor blood pressure      #GI  NPO unless passes dysphagia screen; swallow eval if fails  Consistent carb    HLD    simvastatin 20mg      #    Normal indices      #Endo    -Diabetes  DM2 (diabetes mellitus, type II)  Home medication: Lantus 11 units am and 7 units 6pm  novalog/humalog 2-4 units pre-meal  blood glucose ACHS with ISS  Check HgA1c  Diabetic education  Consistent carbohydrate diet  Endo cx, started latus this am 10 units    Heme  BCC, SCC   found to have 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   Currently undergoing treatment w Dr. Plummer at [facility].   Discussed with [facility] Dr plummer:  Please hold Home chemo meds: Mekinist 0.5mg 10pm Tafinlar 75mg BID while inpatient        PPx  Anticoagulation recommendations pending further imaging     Fluids  Electrolytes  Nutrition  Access  prophylaxis (GI and DVT)   CCU Service  Cardiology   Spect: 59778 ([facility])     PATIENT: LIAN BENITEZ, MRN: [id number]    CHIEF COMPLAINT: 74yo F with h/o smoking, HTN, HLD, type 1 DM, thyroidectomy, BCC, SCC found to have 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 Currently undergoing treatment w Dr. Plummer at [facility]. presents as code stroke for MS change CT scan r/o code stroke.     transferred to CCU s/p pericardiocentesis, 800cc sanguineous drainage, now with drain.        INTERVAL HISTORY/OVERNIGHT EVENTS: none, A+O x 4, Denies CP, palp, sob. Expressed concern over insulin mgt, endocrine cx called,     TELEMETRY: sinus rhythm. rare PVC          ALLERGIES: Allergies    latex (Urticaria)  Actonel (Unknown)  Gadavist (Anaphylaxis; Hives)  codeine (Vomiting)    Intolerances        MEDICATIONS:  MEDICATIONS  (STANDING):  chlorhexidine 2% Cloths 1 Application(s) Topical daily  dextrose 5%. 1000 milliLiter(s) (50 mL/Hr) IV Continuous <Continuous>  dextrose 5%. 1000 milliLiter(s) (100 mL/Hr) IV Continuous <Continuous>  dextrose 50% Injectable 12.5 Gram(s) IV Push once  dextrose 50% Injectable 25 Gram(s) IV Push once  dextrose 50% Injectable 25 Gram(s) IV Push once  glucagon  Injectable 1 milliGRAM(s) IntraMuscular once  insulin lispro (ADMELOG) corrective regimen sliding scale   SubCutaneous three times a day before meals  insulin lispro (ADMELOG) corrective regimen sliding scale   SubCutaneous at bedtime  phenylephrine    Infusion 0.5 MICROgram(s)/kG/Min (6.09 mL/Hr) IV Continuous <Continuous>  simvastatin 20 milliGRAM(s) Oral at bedtime    MEDICATIONS  (PRN):  dextrose Oral Gel 15 Gram(s) Oral once PRN Blood Glucose LESS THAN 70 milliGRAM(s)/deciliter        OBJECTIVE:  ICU Vital Signs Last 24 Hrs  T(C): 36.7 (15 [place] 2024 04:00), Max: 36.8 (2024 20:00)  T(F): 98 (15 [place] 2024 04:00), Max: 98.3 (2024 20:00)  HR: 67 (15 [place] 2024 06:00) (61 - 129)  BP: 128/60 (15 [place] 2024 06:00) (79/53 - 130/55)  BP(mean): 79 (15 [place] 2024 06:00) (60 - 97)  ABP: --  ABP(mean): --  RR: 17 (15 [place] 2024 06:00) (13 - 26)  SpO2: 100% (15 [place] 2024 06:00) (95% - 100%)    O2 Parameters below as of 15 [place] 2024 06:00  Patient On (Oxygen Delivery Method): nasal cannula  O2 Flow (L/min): 4          Adult Advanced Hemodynamics Last 24 Hrs  CVP(mm Hg): --  CVP(cm H2O): --  CO: --  CI: --  PA: --  PA(mean): --  PCWP: --  SVR: --  SVRI: --  PVR: --  PVRI: --  CAPILLARY BLOOD GLUCOSE  308 (2024 10:46)      POCT Blood Glucose.: 240 mg/dL (2024 21:44)  POCT Blood Glucose.: 300 mg/dL (2024 16:40)  POCT Blood Glucose.: 291 mg/dL (2024 14:08)  POCT Blood Glucose.: 312 mg/dL (2024 10:05)  POCT Blood Glucose.: 308 mg/dL (2024 09:45)    CAPILLARY BLOOD GLUCOSE  308 (2024 10:46)      POCT Blood Glucose.: 240 mg/dL (2024 21:44)    I&O's Summary    2024 07:01  -  15 [place] 2024 06:49  --------------------------------------------------------  IN: 295.2 mL / OUT: 1410 mL / NET: -1114.8 mL      Daily Height in cm: 165.1 (2024 13:27)    Daily Weight in k.2 (15 [place] 2024 04:00)    REVIEW OF SYSTEMS  See interval history otherwise negative    PHYSICAL EXAMINATION:  General: Comfortable, no acute distress, cooperative with exam.  HEENT: Moist mucous membranes.  Respiratory: CTAB, normal respiratory effort, no coughing, wheezes, crackles, or rales.  CV: RRR, S1S2, no murmurs, rubs or gallops. No JVD. Distal pulses intact.  Abdominal: Soft, nontender, nondistended, no rebound or guarding, normal bowel sounds.  Neurology: AOx3, no focal neuro defects, GALARZA x 4.  Extremities: No pitting edema, + Peripheral pulses.  Cath site:   Tubes:        LABS:                          9.3    15.73 )-----------( 495      ( 2024 17:31 )             27.2     -    123<L>  |  90<L>  |  45<H>  ----------------------------<  289<H>  5.6<H>   |  21<L>  |  0.93    Ca    7.8<L>      2024 17:31  Phos  3.4       Mg     2.40         TPro  5.5<L>  /  Alb  2.5<L>  /  TBili  0.2  /  DBili  x   /  AST  397<H>  /  ALT  349<H>  /  AlkPhos  107      LIVER FUNCTIONS - ( 2024 17:31 )  Alb: 2.5 g/dL / Pro: 5.5 g/dL / ALK PHOS: 107 U/L / ALT: 349 U/L / AST: 397 U/L / GGT: x           PT/INR - ( 2024 10:31 )   PT: 12.5 sec;   INR: 1.12 ratio         PTT - ( 2024 10:31 )  PTT:26.1 sec        Urinalysis Basic - ( 2024 17:31 )    Color: x / Appearance: x / SG: x / pH: x  Gluc: 289 mg/dL / Ketone: x  / Bili: x / Urobili: x   Blood: x / Protein: x / Nitrite: x   Leuk Esterase: x / RBC: x / WBC x   Sq Epi: x / Non Sq Epi: x / Bacteria: x    Assessment:  · Assessment	  74yo F with h/o smoking, HTN, HLD, type 1 DM, thyroidectomy, BCC, SCC found to have 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 Currently undergoing treatment w Dr. Plummer at [facility]. Per pt's , at bedside, pt had been feeling well then awoke this morning disoriented, confused and c/o significant CP and SOB. Brought by EMS to [facility] ER. Due to change in MS,  CT scan r/o code stroke. No infarcts seen.  CT scan C/A/P revealed large pericardial effusion w bilat pleural effusion. Pt  hypotensive, tachycardic. s/p 1Liter IVF.  Discussed with interventional cardiology with plan to  drain pericardial effusion and admit to CCU.    #Neuro  A+O x 3    Code stroke  NEURO:  Impression: Expressive aphasia and right superior quadrantanopia due to left hemispheric dysfunction. Mechanism ischemic stroke. Etiology likely cardioembolic given new afib vs hypercoagulability of malignancy      Pt is not a candidate for tenecteplase due to outside tenecteplase window , cancer with mets to brain  Mechanical thrombectomy candidacy pending MRI/MRA  without contrast(no CTA due to alergy)    CT head:  LimitedNo evidence of a large acute infarction   or large acute hemorrhage. Minimal microvascular disease.        #Respiratory  - Currently in no acute distress  - Satting 98% 2L NC  - bilat pleural effusion.    #Cards    Pericardial effusion  CT scan C/A/P revealed large pericardial effusion w bilat pleural effusion.  Pericardial drain placed for tamponade , 800cc sanguineous fluid removed  Cytology pending  Monitor Drain        New Afib  iso pericardial eff. with tamponade physiology    ChadsVasc: 7  Has-Bled: 3  Anticoagulation recommendations pending further imaging   B/L compression stockings      HTN  Patient with h/o hypertension and is on lisinopril 20mg BID, amlodipine 5mg BID,   Bashir-Synephrine now Dc'd   Hold Home medications  -permissive HTN up to 220/110 for 24-48h from symptom onset followed by gradual normotension over 2-3 days   DASH diet  Monitor blood pressure      #GI  NPO unless passes dysphagia screen; swallow eval if fails  Consistent carb    HLD    simvastatin 20mg      #    Normal indices      #Endo    -Diabetes  DM2 (diabetes mellitus, type II)  Home medication: Lantus 11 units am and 7 units 6pm  novalog/humalog 2-4 units pre-meal  blood glucose ACHS with ISS  Check HgA1c  Diabetic education  Consistent carbohydrate diet  Endo cx, started latus this am 10 units    Heme  BCC, SCC   found to have 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   Currently undergoing treatment w Dr. Plummer at [facility].   Discussed with [facility] Dr plummer:  Please hold Home chemo meds: Mekinist 0.5mg 10pm Tafinlar 75mg BID while inpatient        PPx  Anticoagulation recommendations pending further imaging     Fluids  Electrolytes  Nutrition  Access  prophylaxis (GI and DVT)   CCU Service  Cardiology   Spect: 10250 ([facility])     PATIENT: LIAN BENITEZ, MRN: [id number]    CHIEF COMPLAINT: 72yo F with h/o smoking, HTN, HLD, type 1 DM, thyroidectomy, BCC, SCC found to have 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 Currently undergoing treatment w Dr. Plummer at [facility]. presents as code stroke for MS change CT scan r/o code stroke.     transferred to CCU s/p pericardiocentesis, 800cc sanguineous drainage, now with drain.        INTERVAL HISTORY/OVERNIGHT EVENTS: none, A+O x 4, Denies CP, palp, sob. Expressed concern over insulin mgt, endocrine cx called,     TELEMETRY: sinus rhythm. rare PVC          ALLERGIES: Allergies    latex (Urticaria)  Actonel (Unknown)  Gadavist (Anaphylaxis; Hives)  codeine (Vomiting)    Intolerances        MEDICATIONS:  MEDICATIONS  (STANDING):  chlorhexidine 2% Cloths 1 Application(s) Topical daily  dextrose 5%. 1000 milliLiter(s) (50 mL/Hr) IV Continuous <Continuous>  dextrose 5%. 1000 milliLiter(s) (100 mL/Hr) IV Continuous <Continuous>  dextrose 50% Injectable 12.5 Gram(s) IV Push once  dextrose 50% Injectable 25 Gram(s) IV Push once  dextrose 50% Injectable 25 Gram(s) IV Push once  glucagon  Injectable 1 milliGRAM(s) IntraMuscular once  insulin lispro (ADMELOG) corrective regimen sliding scale   SubCutaneous three times a day before meals  insulin lispro (ADMELOG) corrective regimen sliding scale   SubCutaneous at bedtime  phenylephrine    Infusion 0.5 MICROgram(s)/kG/Min (6.09 mL/Hr) IV Continuous <Continuous>  simvastatin 20 milliGRAM(s) Oral at bedtime    MEDICATIONS  (PRN):  dextrose Oral Gel 15 Gram(s) Oral once PRN Blood Glucose LESS THAN 70 milliGRAM(s)/deciliter        OBJECTIVE:  ICU Vital Signs Last 24 Hrs  T(C): 36.7 (15 [place] 2024 04:00), Max: 36.8 (2024 20:00)  T(F): 98 (15 [place] 2024 04:00), Max: 98.3 (2024 20:00)  HR: 67 (15 [place] 2024 06:00) (61 - 129)  BP: 128/60 (15 [place] 2024 06:00) (79/53 - 130/55)  BP(mean): 79 (15 [place] 2024 06:00) (60 - 97)  ABP: --  ABP(mean): --  RR: 17 (15 [place] 2024 06:00) (13 - 26)  SpO2: 100% (15 [place] 2024 06:00) (95% - 100%)    O2 Parameters below as of 15 [place] 2024 06:00  Patient On (Oxygen Delivery Method): nasal cannula  O2 Flow (L/min): 4          Adult Advanced Hemodynamics Last 24 Hrs  CVP(mm Hg): --  CVP(cm H2O): --  CO: --  CI: --  PA: --  PA(mean): --  PCWP: --  SVR: --  SVRI: --  PVR: --  PVRI: --  CAPILLARY BLOOD GLUCOSE  308 (2024 10:46)      POCT Blood Glucose.: 240 mg/dL (2024 21:44)  POCT Blood Glucose.: 300 mg/dL (2024 16:40)  POCT Blood Glucose.: 291 mg/dL (2024 14:08)  POCT Blood Glucose.: 312 mg/dL (2024 10:05)  POCT Blood Glucose.: 308 mg/dL (2024 09:45)    CAPILLARY BLOOD GLUCOSE  308 (2024 10:46)      POCT Blood Glucose.: 240 mg/dL (2024 21:44)    I&O's Summary    2024 07:01  -  15 [place] 2024 06:49  --------------------------------------------------------  IN: 295.2 mL / OUT: 1410 mL / NET: -1114.8 mL      Daily Height in cm: 165.1 (2024 13:27)    Daily Weight in k.2 (15 [place] 2024 04:00)    REVIEW OF SYSTEMS  See interval history otherwise negative    PHYSICAL EXAMINATION:  General: Comfortable, no acute distress, cooperative with exam.  HEENT: Moist mucous membranes.  Respiratory: CTAB, normal respiratory effort, no coughing, wheezes, crackles, or rales.  CV: RRR, S1S2, no murmurs, rubs or gallops. No JVD. Distal pulses intact.  Abdominal: Soft, nontender, nondistended, no rebound or guarding, normal bowel sounds.  Neurology: AOx3, no focal neuro defects, GALARZA x 4.  Extremities: No pitting edema, + Peripheral pulses.  Cath site:   Tubes:        LABS:                          9.3    15.73 )-----------( 495      ( 2024 17:31 )             27.2     -    123<L>  |  90<L>  |  45<H>  ----------------------------<  289<H>  5.6<H>   |  21<L>  |  0.93    Ca    7.8<L>      2024 17:31  Phos  3.4       Mg     2.40         TPro  5.5<L>  /  Alb  2.5<L>  /  TBili  0.2  /  DBili  x   /  AST  397<H>  /  ALT  349<H>  /  AlkPhos  107      LIVER FUNCTIONS - ( 2024 17:31 )  Alb: 2.5 g/dL / Pro: 5.5 g/dL / ALK PHOS: 107 U/L / ALT: 349 U/L / AST: 397 U/L / GGT: x           PT/INR - ( 2024 10:31 )   PT: 12.5 sec;   INR: 1.12 ratio         PTT - ( 2024 10:31 )  PTT:26.1 sec        Urinalysis Basic - ( 2024 17:31 )    Color: x / Appearance: x / SG: x / pH: x  Gluc: 289 mg/dL / Ketone: x  / Bili: x / Urobili: x   Blood: x / Protein: x / Nitrite: x   Leuk Esterase: x / RBC: x / WBC x   Sq Epi: x / Non Sq Epi: x / Bacteria: x    Assessment:  · Assessment	  72yo F with h/o smoking, HTN, HLD, type 1 DM, thyroidectomy, BCC, SCC found to have 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 Currently undergoing treatment w Dr. Plummer at [facility]. Per pt's , at bedside, pt had been feeling well then awoke this morning disoriented, confused and c/o significant CP and SOB. Brought by EMS to [facility] ER. Due to change in MS,  CT scan r/o code stroke. No infarcts seen.  CT scan C/A/P revealed large pericardial effusion w bilat pleural effusion. Pt  hypotensive, tachycardic. s/p 1Liter IVF.  Discussed with interventional cardiology with plan to  drain pericardial effusion and admit to CCU.    #Neuro  A+O x 3    Code stroke  NEURO:  Impression: Expressive aphasia and right superior quadrantanopia due to left hemispheric dysfunction. Mechanism ischemic stroke. Etiology likely cardioembolic given new afib vs hypercoagulability of malignancy      Pt is not a candidate for tenecteplase due to outside tenecteplase window , cancer with mets to brain  Mechanical thrombectomy candidacy pending MRI/MRA  without contrast(no CTA due to alergy)    CT head:  LimitedNo evidence of a large acute infarction   or large acute hemorrhage. Minimal microvascular disease.        #Respiratory  - Currently in no acute distress  - Satting 98% 2L NC  - bilat pleural effusion.    #Cards    Pericardial effusion  CT scan C/A/P revealed large pericardial effusion w bilat pleural effusion.  Pericardial drain placed for tamponade , 800cc sanguineous fluid removed  Cytology pending  Monitor Drain        New Afib  iso pericardial eff. with tamponade physiology    ChadsVasc: 7  Has-Bled: 3  Anticoagulation recommendations pending further imaging   B/L compression stockings      HTN  Patient with h/o hypertension and is on lisinopril 20mg BID, amlodipine 5mg BID,   Bashir-Synephrine now Dc'd   Hold Home medications  -permissive HTN up to 220/110 for 24-48h from symptom onset followed by gradual normotension over 2-3 days   DASH diet  Monitor blood pressure      #GI  NPO unless passes dysphagia screen; swallow eval if fails  Consistent carb    HLD    simvastatin 20mg      #    Normal indices      #Endo    -Diabetes  DM2 (diabetes mellitus, type II)  Home medication: Lantus 11 units am and 7 units 6pm  novalog/humalog 2-4 units pre-meal  blood glucose ACHS with ISS  Check HgA1c  Diabetic education  Consistent carbohydrate diet  Endo cx, started latus this am 10 units    Heme  BCC, SCC   found to have 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   Currently undergoing treatment w Dr. Plummer at [facility].   Discussed with [facility] Dr plummer:  Please hold Home chemo meds: Mekinist 0.5mg 10pm Tafinlar 75mg BID while inpatient        PPx  Anticoagulation recommendations pending further imaging     Fluids  Electrolytes  Nutrition  Access  prophylaxis (GI and DVT)   CCU Service  Cardiology   Spect: 36841 ([facility])     PATIENT: LIAN BENITEZ, MRN: [id number]    CHIEF COMPLAINT: 74yo F with h/o smoking, HTN, HLD, type 1 DM, thyroidectomy, BCC, SCC found to have 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 Currently undergoing treatment w Dr. Plummer at [facility]. presents as code stroke for MS change CT scan r/o code stroke.     transferred to CCU s/p pericardiocentesis, 800cc sanguineous drainage, now with drain.        INTERVAL HISTORY/OVERNIGHT EVENTS: none, A+O x 4, Denies CP, palp, sob. Expressed concern over insulin mgt, endocrine cx called,   pericardial drain  100cc afternoon and 10cc OVN - sanguineous    TELEMETRY: sinus rhythm. rare PVC          ALLERGIES: Allergies    latex (Urticaria)  Actonel (Unknown)  Gadavist (Anaphylaxis; Hives)  codeine (Vomiting)    Intolerances        MEDICATIONS:  MEDICATIONS  (STANDING):  chlorhexidine 2% Cloths 1 Application(s) Topical daily  dextrose 5%. 1000 milliLiter(s) (50 mL/Hr) IV Continuous <Continuous>  dextrose 5%. 1000 milliLiter(s) (100 mL/Hr) IV Continuous <Continuous>  dextrose 50% Injectable 12.5 Gram(s) IV Push once  dextrose 50% Injectable 25 Gram(s) IV Push once  dextrose 50% Injectable 25 Gram(s) IV Push once  glucagon  Injectable 1 milliGRAM(s) IntraMuscular once  insulin lispro (ADMELOG) corrective regimen sliding scale   SubCutaneous three times a day before meals  insulin lispro (ADMELOG) corrective regimen sliding scale   SubCutaneous at bedtime  phenylephrine    Infusion 0.5 MICROgram(s)/kG/Min (6.09 mL/Hr) IV Continuous <Continuous>  simvastatin 20 milliGRAM(s) Oral at bedtime    MEDICATIONS  (PRN):  dextrose Oral Gel 15 Gram(s) Oral once PRN Blood Glucose LESS THAN 70 milliGRAM(s)/deciliter        OBJECTIVE:  ICU Vital Signs Last 24 Hrs  T(C): 36.7 (15 [place] 2024 04:00), Max: 36.8 (2024 20:00)  T(F): 98 (15 [place] 2024 04:00), Max: 98.3 (2024 20:00)  HR: 67 (15 [place] 2024 06:00) (61 - 129)  BP: 128/60 (15 [place] 2024 06:00) (79/53 - 130/55)  BP(mean): 79 (15 [place] 2024 06:00) (60 - 97)  ABP: --  ABP(mean): --  RR: 17 (15 [place] 2024 06:00) (13 - 26)  SpO2: 100% (15 [place] 2024 06:00) (95% - 100%)    O2 Parameters below as of 15 [place] 2024 06:00  Patient On (Oxygen Delivery Method): nasal cannula  O2 Flow (L/min): 4          Adult Advanced Hemodynamics Last 24 Hrs  CVP(mm Hg): --  CVP(cm H2O): --  CO: --  CI: --  PA: --  PA(mean): --  PCWP: --  SVR: --  SVRI: --  PVR: --  PVRI: --  CAPILLARY BLOOD GLUCOSE  308 (2024 10:46)      POCT Blood Glucose.: 240 mg/dL (2024 21:44)  POCT Blood Glucose.: 300 mg/dL (2024 16:40)  POCT Blood Glucose.: 291 mg/dL (2024 14:08)  POCT Blood Glucose.: 312 mg/dL (2024 10:05)  POCT Blood Glucose.: 308 mg/dL (2024 09:45)    CAPILLARY BLOOD GLUCOSE  308 (2024 10:46)      POCT Blood Glucose.: 240 mg/dL (2024 21:44)    I&O's Summary    2024 07:01  -  15 [place] 2024 06:49  --------------------------------------------------------  IN: 295.2 mL / OUT: 1410 mL / NET: -1114.8 mL      Daily Height in cm: 165.1 (2024 13:27)    Daily Weight in k.2 (15 [place] 2024 04:00)    REVIEW OF SYSTEMS  See interval history otherwise negative    PHYSICAL EXAMINATION:  General: Comfortable, no acute distress, cooperative with exam.  HEENT: Moist mucous membranes.  Respiratory: CTAB, normal respiratory effort, no coughing, wheezes, crackles, or rales.  CV: RRR, S1S2, no murmurs, rubs or gallops. No JVD. Distal pulses intact.  Abdominal: Soft, nontender, nondistended, no rebound or guarding, normal bowel sounds.  Neurology: AOx3, no focal neuro defects, GALARZA x 4.  Extremities: No pitting edema, + Peripheral pulses.  Cath site:   Tubes:        LABS:                          9.3    15.73 )-----------( 495      ( 2024 17:31 )             27.2         123<L>  |  90<L>  |  45<H>  ----------------------------<  289<H>  5.6<H>   |  21<L>  |  0.93    Ca    7.8<L>      2024 17:31  Phos  3.4       Mg     2.40         TPro  5.5<L>  /  Alb  2.5<L>  /  TBili  0.2  /  DBili  x   /  AST  397<H>  /  ALT  349<H>  /  AlkPhos  107      LIVER FUNCTIONS - ( 2024 17:31 )  Alb: 2.5 g/dL / Pro: 5.5 g/dL / ALK PHOS: 107 U/L / ALT: 349 U/L / AST: 397 U/L / GGT: x           PT/INR - ( 2024 10:31 )   PT: 12.5 sec;   INR: 1.12 ratio         PTT - ( 2024 10:31 )  PTT:26.1 sec        Urinalysis Basic - ( 2024 17:31 )    Color: x / Appearance: x / SG: x / pH: x  Gluc: 289 mg/dL / Ketone: x  / Bili: x / Urobili: x   Blood: x / Protein: x / Nitrite: x   Leuk Esterase: x / RBC: x / WBC x   Sq Epi: x / Non Sq Epi: x / Bacteria: x    Assessment:  · Assessment	  74yo F with h/o smoking, HTN, HLD, type 1 DM, thyroidectomy, BCC, SCC found to have 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 Currently undergoing treatment w Dr. Plummer at [facility]. Per pt's , at bedside, pt had been feeling well then awoke this morning disoriented, confused and c/o significant CP and SOB. Brought by EMS to [facility] ER. Due to change in MS,  CT scan r/o code stroke. No infarcts seen.  CT scan C/A/P revealed large pericardial effusion w bilat pleural effusion. Pt  hypotensive, tachycardic. s/p 1Liter IVF.  Discussed with interventional cardiology with plan to  drain pericardial effusion and admit to CCU.    #Neuro  A+O x 3    Code stroke  NEURO:  Impression: Expressive aphasia and right superior quadrantanopia due to left hemispheric dysfunction. Mechanism ischemic stroke. Etiology likely cardioembolic given new afib vs hypercoagulability of malignancy      Pt is not a candidate for tenecteplase due to outside tenecteplase window , cancer with mets to brain  Mechanical thrombectomy candidacy pending MRI/MRA  without contrast(no CTA due to alergy)    CT head:  LimitedNo evidence of a large acute infarction   or large acute hemorrhage. Minimal microvascular disease.        #Respiratory  - Currently in no acute distress  - Satting 98% 2L NC  - bilat pleural effusion.    #Cards    Pericardial effusion  CT scan C/A/P revealed large pericardial effusion w bilat pleural effusion.  Pericardial drain placed for tamponade , 800cc sanguineous fluid removed  Cytology pending  Monitor Drain        New Afib  iso pericardial eff. with tamponade physiology    ChadsVasc: 7  Has-Bled: 3  Anticoagulation recommendations pending further imaging   B/L compression stockings      HTN  Patient with h/o hypertension and is on lisinopril 20mg BID, amlodipine 5mg BID,   Bashir-Synephrine now Dc'd   Hold Home medications  -permissive HTN up to 220/110 for 24-48h from symptom onset followed by gradual normotension over 2-3 days   DASH diet  Monitor blood pressure      #GI  NPO unless passes dysphagia screen; swallow eval if fails  Consistent carb    HLD    simvastatin 20mg      #    Normal indices      #Endo    -Diabetes  DM2 (diabetes mellitus, type II)  Home medication: Lantus 11 units am and 7 units 6pm  novalog/humalog 2-4 units pre-meal  blood glucose ACHS with ISS  Check HgA1c  Diabetic education  Consistent carbohydrate diet  Endo cx, started latus this am 10 units    Heme  BCC, SCC   found to have 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   Currently undergoing treatment w Dr. Plummer at [facility].   Discussed with [facility] Dr plummer:  Please hold Home chemo meds: Mekinist 0.5mg 10pm Tafinlar 75mg BID while inpatient        PPx  Anticoagulation recommendations pending further imaging     Fluids  Electrolytes  Nutrition  Access  prophylaxis (GI and DVT)   CCU Service  Cardiology   Spect: 63785 ([facility])     PATIENT: LIAN BENITEZ, MRN: [id number]    CHIEF COMPLAINT: 72yo F with h/o smoking, HTN, HLD, type 1 DM, thyroidectomy, BCC, SCC found to have 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 Currently undergoing treatment w Dr. Plummer at [facility]. presents as code stroke for MS change CT scan r/o code stroke.     transferred to CCU s/p pericardiocentesis, 800cc sanguineous drainage, now with drain.        INTERVAL HISTORY/OVERNIGHT EVENTS: none, A+O x 4, Denies CP, palp, sob. Expressed concern over insulin mgt, endocrine cx called,   pericardial drain  100cc afternoon and 10cc OVN - sanguineous    TELEMETRY: sinus rhythm. rare PVC          ALLERGIES: Allergies    latex (Urticaria)  Actonel (Unknown)  Gadavist (Anaphylaxis; Hives)  codeine (Vomiting)    Intolerances        MEDICATIONS:  MEDICATIONS  (STANDING):  chlorhexidine 2% Cloths 1 Application(s) Topical daily  dextrose 5%. 1000 milliLiter(s) (50 mL/Hr) IV Continuous <Continuous>  dextrose 5%. 1000 milliLiter(s) (100 mL/Hr) IV Continuous <Continuous>  dextrose 50% Injectable 12.5 Gram(s) IV Push once  dextrose 50% Injectable 25 Gram(s) IV Push once  dextrose 50% Injectable 25 Gram(s) IV Push once  glucagon  Injectable 1 milliGRAM(s) IntraMuscular once  insulin lispro (ADMELOG) corrective regimen sliding scale   SubCutaneous three times a day before meals  insulin lispro (ADMELOG) corrective regimen sliding scale   SubCutaneous at bedtime  phenylephrine    Infusion 0.5 MICROgram(s)/kG/Min (6.09 mL/Hr) IV Continuous <Continuous>  simvastatin 20 milliGRAM(s) Oral at bedtime    MEDICATIONS  (PRN):  dextrose Oral Gel 15 Gram(s) Oral once PRN Blood Glucose LESS THAN 70 milliGRAM(s)/deciliter        OBJECTIVE:  ICU Vital Signs Last 24 Hrs  T(C): 36.7 (15 [place] 2024 04:00), Max: 36.8 (2024 20:00)  T(F): 98 (15 [place] 2024 04:00), Max: 98.3 (2024 20:00)  HR: 67 (15 [place] 2024 06:00) (61 - 129)  BP: 128/60 (15 [place] 2024 06:00) (79/53 - 130/55)  BP(mean): 79 (15 [place] 2024 06:00) (60 - 97)  ABP: --  ABP(mean): --  RR: 17 (15 [place] 2024 06:00) (13 - 26)  SpO2: 100% (15 [place] 2024 06:00) (95% - 100%)    O2 Parameters below as of 15 [place] 2024 06:00  Patient On (Oxygen Delivery Method): nasal cannula  O2 Flow (L/min): 4          Adult Advanced Hemodynamics Last 24 Hrs  CVP(mm Hg): --  CVP(cm H2O): --  CO: --  CI: --  PA: --  PA(mean): --  PCWP: --  SVR: --  SVRI: --  PVR: --  PVRI: --  CAPILLARY BLOOD GLUCOSE  308 (2024 10:46)      POCT Blood Glucose.: 240 mg/dL (2024 21:44)  POCT Blood Glucose.: 300 mg/dL (2024 16:40)  POCT Blood Glucose.: 291 mg/dL (2024 14:08)  POCT Blood Glucose.: 312 mg/dL (2024 10:05)  POCT Blood Glucose.: 308 mg/dL (2024 09:45)    CAPILLARY BLOOD GLUCOSE  308 (2024 10:46)      POCT Blood Glucose.: 240 mg/dL (2024 21:44)    I&O's Summary    2024 07:01  -  15 [place] 2024 06:49  --------------------------------------------------------  IN: 295.2 mL / OUT: 1410 mL / NET: -1114.8 mL      Daily Height in cm: 165.1 (2024 13:27)    Daily Weight in k.2 (15 [place] 2024 04:00)    REVIEW OF SYSTEMS  See interval history otherwise negative    PHYSICAL EXAMINATION:  General: Comfortable, no acute distress, cooperative with exam.  HEENT: Moist mucous membranes.  Respiratory: CTAB, normal respiratory effort, no coughing, wheezes, crackles, or rales.  CV: RRR, S1S2, no murmurs, rubs or gallops. No JVD. Distal pulses intact.  Abdominal: Soft, nontender, nondistended, no rebound or guarding, normal bowel sounds.  Neurology: AOx3, no focal neuro defects, GALARZA x 4.  Extremities: No pitting edema, + Peripheral pulses.  Cath site:   Tubes:        LABS:                          9.3    15.73 )-----------( 495      ( 2024 17:31 )             27.2         123<L>  |  90<L>  |  45<H>  ----------------------------<  289<H>  5.6<H>   |  21<L>  |  0.93    Ca    7.8<L>      2024 17:31  Phos  3.4       Mg     2.40         TPro  5.5<L>  /  Alb  2.5<L>  /  TBili  0.2  /  DBili  x   /  AST  397<H>  /  ALT  349<H>  /  AlkPhos  107      LIVER FUNCTIONS - ( 2024 17:31 )  Alb: 2.5 g/dL / Pro: 5.5 g/dL / ALK PHOS: 107 U/L / ALT: 349 U/L / AST: 397 U/L / GGT: x           PT/INR - ( 2024 10:31 )   PT: 12.5 sec;   INR: 1.12 ratio         PTT - ( 2024 10:31 )  PTT:26.1 sec        Urinalysis Basic - ( 2024 17:31 )    Color: x / Appearance: x / SG: x / pH: x  Gluc: 289 mg/dL / Ketone: x  / Bili: x / Urobili: x   Blood: x / Protein: x / Nitrite: x   Leuk Esterase: x / RBC: x / WBC x   Sq Epi: x / Non Sq Epi: x / Bacteria: x    Assessment:  · Assessment	  72yo F with h/o smoking, HTN, HLD, type 1 DM, thyroidectomy, BCC, SCC found to have 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 Currently undergoing treatment w Dr. Plummer at [facility]. Per pt's , at bedside, pt had been feeling well then awoke this morning disoriented, confused and c/o significant CP and SOB. Brought by EMS to [facility] ER. Due to change in MS,  CT scan r/o code stroke. No infarcts seen.  CT scan C/A/P revealed large pericardial effusion w bilat pleural effusion. Pt  hypotensive, tachycardic. s/p 1Liter IVF.  Discussed with interventional cardiology with plan to  drain pericardial effusion and admit to CCU.    #Neuro  A+O x 3    Code stroke  NEURO:  Impression: Expressive aphasia and right superior quadrantanopia due to left hemispheric dysfunction. Mechanism ischemic stroke. Etiology likely cardioembolic given new afib vs hypercoagulability of malignancy      Pt is not a candidate for tenecteplase due to outside tenecteplase window , cancer with mets to brain  Mechanical thrombectomy candidacy pending MRI/MRA  without contrast(no CTA due to alergy)    CT head:  LimitedNo evidence of a large acute infarction   or large acute hemorrhage. Minimal microvascular disease.        #Respiratory  - Currently in no acute distress  - Satting 98% 2L NC  - bilat pleural effusion.    #Cards    Pericardial effusion  CT scan C/A/P revealed large pericardial effusion w bilat pleural effusion.  Pericardial drain placed for tamponade , 800cc sanguineous fluid removed  Cytology pending  Monitor Drain        New Afib  iso pericardial eff. with tamponade physiology    ChadsVasc: 7  Has-Bled: 3  Anticoagulation recommendations pending further imaging   B/L compression stockings      HTN  Patient with h/o hypertension and is on lisinopril 20mg BID, amlodipine 5mg BID,   Bashir-Synephrine now Dc'd   Hold Home medications  -permissive HTN up to 220/110 for 24-48h from symptom onset followed by gradual normotension over 2-3 days   DASH diet  Monitor blood pressure      #GI  NPO unless passes dysphagia screen; swallow eval if fails  Consistent carb    HLD    simvastatin 20mg      #    Normal indices      #Endo    -Diabetes  DM2 (diabetes mellitus, type II)  Home medication: Lantus 11 units am and 7 units 6pm  novalog/humalog 2-4 units pre-meal  blood glucose ACHS with ISS  Check HgA1c  Diabetic education  Consistent carbohydrate diet  Endo cx, started latus this am 10 units    Heme  BCC, SCC   found to have 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   Currently undergoing treatment w Dr. Plummer at [facility].   Discussed with [facility] Dr plummer:  Please hold Home chemo meds: Mekinist 0.5mg 10pm Tafinlar 75mg BID while inpatient        PPx  Anticoagulation recommendations pending further imaging     Fluids  Electrolytes  Nutrition  Access  prophylaxis (GI and DVT)   CCU Service  Cardiology   Spect: 40806 ([facility])     PATIENT: LIAN BENITEZ, MRN: [id number]    CHIEF COMPLAINT: 74yo F with h/o smoking, HTN, HLD, type 1 DM, thyroidectomy, BCC, SCC found to have 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 Currently undergoing treatment w Dr. Plummer at [facility] [place]. presents as code stroke for MS change CT scan r/o code stroke.     transferred to CCU s/p pericardiocentesis, 800cc sanguineous drainage, now with drain.        INTERVAL HISTORY/OVERNIGHT EVENTS: none, A+O x 4, Denies CP, palp, sob. Expressed concern over insulin mgt, endocrine cx called,   pericardial drain  100cc afternoon and 10cc OVN - sanguineous    TELEMETRY: sinus rhythm. rare PVC          ALLERGIES: Allergies    latex (Urticaria)  Actonel (Unknown)  Gadavist (Anaphylaxis; Hives)  codeine (Vomiting)    Intolerances        MEDICATIONS:  MEDICATIONS  (STANDING):  chlorhexidine 2% Cloths 1 Application(s) Topical daily  dextrose 5%. 1000 milliLiter(s) (50 mL/Hr) IV Continuous <Continuous>  dextrose 5%. 1000 milliLiter(s) (100 mL/Hr) IV Continuous <Continuous>  dextrose 50% Injectable 12.5 Gram(s) IV Push once  dextrose 50% Injectable 25 Gram(s) IV Push once  dextrose 50% Injectable 25 Gram(s) IV Push once  glucagon  Injectable 1 milliGRAM(s) IntraMuscular once  insulin lispro (ADMELOG) corrective regimen sliding scale   SubCutaneous three times a day before meals  insulin lispro (ADMELOG) corrective regimen sliding scale   SubCutaneous at bedtime  phenylephrine    Infusion 0.5 MICROgram(s)/kG/Min (6.09 mL/Hr) IV Continuous <Continuous>  simvastatin 20 milliGRAM(s) Oral at bedtime    MEDICATIONS  (PRN):  dextrose Oral Gel 15 Gram(s) Oral once PRN Blood Glucose LESS THAN 70 milliGRAM(s)/deciliter        OBJECTIVE:  ICU Vital Signs Last 24 Hrs  T(C): 36.7 (15 [place] 2024 04:00), Max: 36.8 (2024 20:00)  T(F): 98 (15 [place] 2024 04:00), Max: 98.3 (2024 20:00)  HR: 67 (15 [place] 2024 06:00) (61 - 129)  BP: 128/60 (15 [place] 2024 06:00) (79/53 - 130/55)  BP(mean): 79 (15 [place] 2024 06:00) (60 - 97)  ABP: --  ABP(mean): --  RR: 17 (15 [place] 2024 06:00) (13 - 26)  SpO2: 100% (15 [place] 2024 06:00) (95% - 100%)    O2 Parameters below as of 15 [place] 2024 06:00  Patient On (Oxygen Delivery Method): nasal cannula  O2 Flow (L/min): 4          Adult Advanced Hemodynamics Last 24 Hrs  CVP(mm Hg): --  CVP(cm H2O): --  CO: --  CI: --  PA: --  PA(mean): --  PCWP: --  SVR: --  SVRI: --  PVR: --  PVRI: --  CAPILLARY BLOOD GLUCOSE  308 (2024 10:46)      POCT Blood Glucose.: 240 mg/dL (2024 21:44)  POCT Blood Glucose.: 300 mg/dL (2024 16:40)  POCT Blood Glucose.: 291 mg/dL (2024 14:08)  POCT Blood Glucose.: 312 mg/dL (2024 10:05)  POCT Blood Glucose.: 308 mg/dL (2024 09:45)    CAPILLARY BLOOD GLUCOSE  308 (2024 10:46)      POCT Blood Glucose.: 240 mg/dL (2024 21:44)    I&O's Summary    2024 07:01  -  15 [place] 2024 06:49  --------------------------------------------------------  IN: 295.2 mL / OUT: 1410 mL / NET: -1114.8 mL      Daily Height in cm: 165.1 (2024 13:27)    Daily Weight in k.2 (15 [place] 2024 04:00)    REVIEW OF SYSTEMS  See interval history otherwise negative    PHYSICAL EXAMINATION:  General: Comfortable, no acute distress, cooperative with exam.  HEENT: Moist mucous membranes.  Respiratory: CTAB, normal respiratory effort, no coughing, wheezes, crackles, or rales.  CV: RRR, S1S2, no murmurs, rubs or gallops. No JVD. Distal pulses intact.  Abdominal: Soft, nontender, nondistended, no rebound or guarding, normal bowel sounds.  Neurology: AOx3, no focal neuro defects, GALARZA x 4.  Extremities: No pitting edema, + Peripheral pulses.  Cath site:   Tubes:        LABS:                          9.3    15.73 )-----------( 495      ( 2024 17:31 )             27.2         123<L>  |  90<L>  |  45<H>  ----------------------------<  289<H>  5.6<H>   |  21<L>  |  0.93    Ca    7.8<L>      2024 17:31  Phos  3.4       Mg     2.40         TPro  5.5<L>  /  Alb  2.5<L>  /  TBili  0.2  /  DBili  x   /  AST  397<H>  /  ALT  349<H>  /  AlkPhos  107      LIVER FUNCTIONS - ( 2024 17:31 )  Alb: 2.5 g/dL / Pro: 5.5 g/dL / ALK PHOS: 107 U/L / ALT: 349 U/L / AST: 397 U/L / GGT: x           PT/INR - ( 2024 10:31 )   PT: 12.5 sec;   INR: 1.12 ratio         PTT - ( 2024 10:31 )  PTT:26.1 sec        Urinalysis Basic - ( 2024 17:31 )    Color: x / Appearance: x / SG: x / pH: x  Gluc: 289 mg/dL / Ketone: x  / Bili: x / Urobili: x   Blood: x / Protein: x / Nitrite: x   Leuk Esterase: x / RBC: x / WBC x   Sq Epi: x / Non Sq Epi: x / Bacteria: x    Assessment:  · Assessment	  74yo F with h/o smoking, HTN, HLD, type 1 DM, thyroidectomy, BCC, SCC found to have 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 Currently undergoing treatment w Dr. Plummer at [facility] [place]. Per pt's , at bedside, pt had been feeling well then awoke this morning disoriented, confused and c/o significant CP and SOB. Brought by EMS to [facility] ER. Due to change in MS,  CT scan r/o code stroke. No infarcts seen.  CT scan C/A/P revealed large pericardial effusion w bilat pleural effusion. Pt  hypotensive, tachycardic. s/p 1Liter IVF.  Discussed with interventional cardiology with plan to  drain pericardial effusion and admit to CCU.    #Neuro  A+O x 3    Code stroke  NEURO:  Impression: Expressive aphasia and right superior quadrantanopia due to left hemispheric dysfunction. Mechanism ischemic stroke. Etiology likely cardioembolic given new afib vs hypercoagulability of malignancy      Pt is not a candidate for tenecteplase due to outside tenecteplase window , cancer with mets to brain  Mechanical thrombectomy candidacy pending MRI/MRA  without contrast(no CTA due to alergy)    CT head:  LimitedNo evidence of a large acute infarction   or large acute hemorrhage. Minimal microvascular disease.        #Respiratory  - Currently in no acute distress  - Satting 98% 2L NC  - bilat pleural effusion.    #Cards    Pericardial effusion  CT scan C/A/P revealed large pericardial effusion w bilat pleural effusion.  Pericardial drain placed for tamponade , 800cc sanguineous fluid removed  Cytology pending  Monitor Drain        New Afib  iso pericardial eff. with tamponade physiology    ChadsVasc: 7  Has-Bled: 3  Anticoagulation recommendations pending further imaging   B/L compression stockings      HTN  Patient with h/o hypertension and is on lisinopril 20mg BID, amlodipine 5mg BID,   Bashir-Synephrine now Dc'd   Hold Home medications  -permissive HTN up to 220/110 for 24-48h from symptom onset followed by gradual normotension over 2-3 days   DASH diet  Monitor blood pressure      #GI  NPO unless passes dysphagia screen; swallow eval if fails  Consistent carb    HLD    simvastatin 20mg      #    Normal indices      #Endo    -Diabetes  DM2 (diabetes mellitus, type II)  Home medication: Lantus 11 units am and 7 units 6pm  novalog/humalog 2-4 units pre-meal  blood glucose ACHS with ISS  Check HgA1c  Diabetic education  Consistent carbohydrate diet  Endo cx, started latus this am 10 units    Heme  BCC, SCC   found to have 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   Currently undergoing treatment w Dr. Plummer at [facility].   Discussed with [facility] [place] Dr plummer:  Please hold Home chemo meds: Mekinist 0.5mg 10pm Tafinlar 75mg BID while inpatient        PPx  Anticoagulation recommendations pending further imaging     Fluids  Electrolytes  Nutrition  Access  prophylaxis (GI and DVT)   CCU Service  Cardiology   Spect: 66623 ([facility])     PATIENT: LIAN BENITEZ, MRN: [id number]    CHIEF COMPLAINT: 74yo F with h/o smoking, HTN, HLD, type 1 DM, thyroidectomy, BCC, SCC found to have 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 Currently undergoing treatment w Dr. Plummer at [facility]. presents as code stroke for MS change CT scan r/o code stroke.     transferred to CCU s/p pericardiocentesis, 800cc sanguineous drainage, now with drain.        INTERVAL HISTORY/OVERNIGHT EVENTS: none, A+O x 4, Denies CP, palp, sob. Expressed concern over insulin mgt, endocrine cx called,   pericardial drain  100cc afternoon and 10cc OVN - sanguineous    TELEMETRY: sinus rhythm. rare PVC          ALLERGIES: Allergies    latex (Urticaria)  Actonel (Unknown)  Gadavist (Anaphylaxis; Hives)  codeine (Vomiting)    Intolerances        MEDICATIONS:  MEDICATIONS  (STANDING):  chlorhexidine 2% Cloths 1 Application(s) Topical daily  dextrose 5%. 1000 milliLiter(s) (50 mL/Hr) IV Continuous <Continuous>  dextrose 5%. 1000 milliLiter(s) (100 mL/Hr) IV Continuous <Continuous>  dextrose 50% Injectable 12.5 Gram(s) IV Push once  dextrose 50% Injectable 25 Gram(s) IV Push once  dextrose 50% Injectable 25 Gram(s) IV Push once  glucagon  Injectable 1 milliGRAM(s) IntraMuscular once  insulin lispro (ADMELOG) corrective regimen sliding scale   SubCutaneous three times a day before meals  insulin lispro (ADMELOG) corrective regimen sliding scale   SubCutaneous at bedtime  phenylephrine    Infusion 0.5 MICROgram(s)/kG/Min (6.09 mL/Hr) IV Continuous <Continuous>  simvastatin 20 milliGRAM(s) Oral at bedtime    MEDICATIONS  (PRN):  dextrose Oral Gel 15 Gram(s) Oral once PRN Blood Glucose LESS THAN 70 milliGRAM(s)/deciliter        OBJECTIVE:  ICU Vital Signs Last 24 Hrs  T(C): 36.7 (15 [place] 2024 04:00), Max: 36.8 (2024 20:00)  T(F): 98 (15 [place] 2024 04:00), Max: 98.3 (2024 20:00)  HR: 67 (15 [place] 2024 06:00) (61 - 129)  BP: 128/60 (15 [place] 2024 06:00) (79/53 - 130/55)  BP(mean): 79 (15 [place] 2024 06:00) (60 - 97)  ABP: --  ABP(mean): --  RR: 17 (15 [place] 2024 06:00) (13 - 26)  SpO2: 100% (15 [place] 2024 06:00) (95% - 100%)    O2 Parameters below as of 15 [place] 2024 06:00  Patient On (Oxygen Delivery Method): nasal cannula  O2 Flow (L/min): 4          Adult Advanced Hemodynamics Last 24 Hrs  CVP(mm Hg): --  CVP(cm H2O): --  CO: --  CI: --  PA: --  PA(mean): --  PCWP: --  SVR: --  SVRI: --  PVR: --  PVRI: --  CAPILLARY BLOOD GLUCOSE  308 (2024 10:46)      POCT Blood Glucose.: 240 mg/dL (2024 21:44)  POCT Blood Glucose.: 300 mg/dL (2024 16:40)  POCT Blood Glucose.: 291 mg/dL (2024 14:08)  POCT Blood Glucose.: 312 mg/dL (2024 10:05)  POCT Blood Glucose.: 308 mg/dL (2024 09:45)    CAPILLARY BLOOD GLUCOSE  308 (2024 10:46)      POCT Blood Glucose.: 240 mg/dL (2024 21:44)    I&O's Summary    2024 07:01  -  15 [place] 2024 06:49  --------------------------------------------------------  IN: 295.2 mL / OUT: 1410 mL / NET: -1114.8 mL      Daily Height in cm: 165.1 (2024 13:27)    Daily Weight in k.2 (15 [place] 2024 04:00)    REVIEW OF SYSTEMS  See interval history otherwise negative    PHYSICAL EXAMINATION:  General: Comfortable, no acute distress, cooperative with exam.  HEENT: Moist mucous membranes.  Respiratory: CTAB, normal respiratory effort, no coughing, wheezes, crackles, or rales.  CV: RRR, S1S2, no murmurs, rubs or gallops. No JVD. Distal pulses intact.  Abdominal: Soft, nontender, nondistended, no rebound or guarding, normal bowel sounds.  Neurology: AOx3, no focal neuro defects, GALARZA x 4.  Extremities: No pitting edema, + Peripheral pulses.  Cath site:   Tubes:        LABS:                          9.3    15.73 )-----------( 495      ( 2024 17:31 )             27.2         123<L>  |  90<L>  |  45<H>  ----------------------------<  289<H>  5.6<H>   |  21<L>  |  0.93    Ca    7.8<L>      2024 17:31  Phos  3.4       Mg     2.40         TPro  5.5<L>  /  Alb  2.5<L>  /  TBili  0.2  /  DBili  x   /  AST  397<H>  /  ALT  349<H>  /  AlkPhos  107      LIVER FUNCTIONS - ( 2024 17:31 )  Alb: 2.5 g/dL / Pro: 5.5 g/dL / ALK PHOS: 107 U/L / ALT: 349 U/L / AST: 397 U/L / GGT: x           PT/INR - ( 2024 10:31 )   PT: 12.5 sec;   INR: 1.12 ratio         PTT - ( 2024 10:31 )  PTT:26.1 sec        Urinalysis Basic - ( 2024 17:31 )    Color: x / Appearance: x / SG: x / pH: x  Gluc: 289 mg/dL / Ketone: x  / Bili: x / Urobili: x   Blood: x / Protein: x / Nitrite: x   Leuk Esterase: x / RBC: x / WBC x   Sq Epi: x / Non Sq Epi: x / Bacteria: x    Assessment:  · Assessment	  74yo F with h/o smoking, HTN, HLD, type 1 DM, thyroidectomy, BCC, SCC found to have 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 Currently undergoing treatment w Dr. Plummer at [facility]. Per pt's , at bedside, pt had been feeling well then awoke this morning disoriented, confused and c/o significant CP and SOB. Brought by EMS to [facility] ER. Due to change in MS,  CT scan r/o code stroke. No infarcts seen.  CT scan C/A/P revealed large pericardial effusion w bilat pleural effusion. Pt  hypotensive, tachycardic. s/p 1Liter IVF.  Discussed with interventional cardiology with plan to  drain pericardial effusion and admit to CCU.    #Neuro  A+O x 3    Code stroke  NEURO:  Impression: Expressive aphasia and right superior quadrantanopia due to left hemispheric dysfunction. Mechanism ischemic stroke. Etiology likely cardioembolic given new afib vs hypercoagulability of malignancy      Pt is not a candidate for tenecteplase due to outside tenecteplase window , cancer with mets to brain  Mechanical thrombectomy candidacy pending MRI/MRA  without contrast(no CTA due to alergy)    CT head:  LimitedNo evidence of a large acute infarction   or large acute hemorrhage. Minimal microvascular disease.        #Respiratory  - Currently in no acute distress  - Satting 98% 2L NC  - bilat pleural effusion.    #Cards    Pericardial effusion  CT scan C/A/P revealed large pericardial effusion w bilat pleural effusion.  Pericardial drain placed for tamponade , 800cc sanguineous fluid removed  Cytology pending  Monitor Drain        New Afib  iso pericardial eff. with tamponade physiology    ChadsVasc: 7  Has-Bled: 3  Anticoagulation recommendations pending further imaging   B/L compression stockings      HTN  Patient with h/o hypertension and is on lisinopril 20mg BID, amlodipine 5mg BID,   Bashir-Synephrine decr to 0.8 mcg/kg/mn   Hold Home medications  -permissive HTN up to 220/110 for 24-48h from symptom onset followed by gradual normotension over 2-3 days   DASH diet  Monitor blood pressure      #GI  NPO unless passes dysphagia screen; swallow eval if fails  Consistent carb    HLD    simvastatin 20mg      #    Normal indices      #Endo    -Diabetes  DM2 (diabetes mellitus, type II)  Home medication: Lantus 11 units am and 7 units 6pm  novalog/humalog 2-4 units pre-meal  blood glucose ACHS with ISS  Check HgA1c  Diabetic education  Consistent carbohydrate diet  Endo cx, started latus this am 10 units    Heme  BCC, SCC   found to have 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   Currently undergoing treatment w Dr. Plummer at [facility].   Discussed with [facility] Dr plummer:  Please hold Home chemo meds: Mekinist 0.5mg 10pm Tafinlar 75mg BID while inpatient        PPx  Anticoagulation recommendations pending further imaging     Fluids  Electrolytes  Nutrition  Access  prophylaxis (GI and DVT)   CCU Service  Cardiology   Spect: 18792 ([facility])     PATIENT: LIAN BENITEZ, MRN: [id number]    CHIEF COMPLAINT: 74yo F with h/o smoking, HTN, HLD, type 1 DM, thyroidectomy, BCC, SCC found to have 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 Currently undergoing treatment w Dr. Plummer at [facility]. presents as code stroke for MS change CT scan r/o code stroke.     transferred to CCU s/p pericardiocentesis, 800cc sanguineous drainage, now with drain.        INTERVAL HISTORY/OVERNIGHT EVENTS: none, A+O x 4, Denies CP, palp, sob. Expressed concern over insulin mgt, endocrine cx called,   pericardial drain  100cc afternoon and 10cc OVN - sanguineous    TELEMETRY: sinus rhythm. rare PVC          ALLERGIES: Allergies    latex (Urticaria)  Actonel (Unknown)  Gadavist (Anaphylaxis; Hives)  codeine (Vomiting)    Intolerances        MEDICATIONS:  MEDICATIONS  (STANDING):  chlorhexidine 2% Cloths 1 Application(s) Topical daily  dextrose 5%. 1000 milliLiter(s) (50 mL/Hr) IV Continuous <Continuous>  dextrose 5%. 1000 milliLiter(s) (100 mL/Hr) IV Continuous <Continuous>  dextrose 50% Injectable 12.5 Gram(s) IV Push once  dextrose 50% Injectable 25 Gram(s) IV Push once  dextrose 50% Injectable 25 Gram(s) IV Push once  glucagon  Injectable 1 milliGRAM(s) IntraMuscular once  insulin lispro (ADMELOG) corrective regimen sliding scale   SubCutaneous three times a day before meals  insulin lispro (ADMELOG) corrective regimen sliding scale   SubCutaneous at bedtime  phenylephrine    Infusion 0.5 MICROgram(s)/kG/Min (6.09 mL/Hr) IV Continuous <Continuous>  simvastatin 20 milliGRAM(s) Oral at bedtime    MEDICATIONS  (PRN):  dextrose Oral Gel 15 Gram(s) Oral once PRN Blood Glucose LESS THAN 70 milliGRAM(s)/deciliter        OBJECTIVE:  ICU Vital Signs Last 24 Hrs  T(C): 36.7 (15 [place] 2024 04:00), Max: 36.8 (2024 20:00)  T(F): 98 (15 [place] 2024 04:00), Max: 98.3 (2024 20:00)  HR: 67 (15 [place] 2024 06:00) (61 - 129)  BP: 128/60 (15 [place] 2024 06:00) (79/53 - 130/55)  BP(mean): 79 (15 [place] 2024 06:00) (60 - 97)  ABP: --  ABP(mean): --  RR: 17 (15 [place] 2024 06:00) (13 - 26)  SpO2: 100% (15 [place] 2024 06:00) (95% - 100%)    O2 Parameters below as of 15 [place] 2024 06:00  Patient On (Oxygen Delivery Method): nasal cannula  O2 Flow (L/min): 4          Adult Advanced Hemodynamics Last 24 Hrs  CVP(mm Hg): --  CVP(cm H2O): --  CO: --  CI: --  PA: --  PA(mean): --  PCWP: --  SVR: --  SVRI: --  PVR: --  PVRI: --  CAPILLARY BLOOD GLUCOSE  308 (2024 10:46)      POCT Blood Glucose.: 240 mg/dL (2024 21:44)  POCT Blood Glucose.: 300 mg/dL (2024 16:40)  POCT Blood Glucose.: 291 mg/dL (2024 14:08)  POCT Blood Glucose.: 312 mg/dL (2024 10:05)  POCT Blood Glucose.: 308 mg/dL (2024 09:45)    CAPILLARY BLOOD GLUCOSE  308 (2024 10:46)      POCT Blood Glucose.: 240 mg/dL (2024 21:44)    I&O's Summary    2024 07:01  -  15 [place] 2024 06:49  --------------------------------------------------------  IN: 295.2 mL / OUT: 1410 mL / NET: -1114.8 mL      Daily Height in cm: 165.1 (2024 13:27)    Daily Weight in k.2 (15 [place] 2024 04:00)    REVIEW OF SYSTEMS  See interval history otherwise negative    PHYSICAL EXAMINATION:  General: Comfortable, no acute distress, cooperative with exam.  HEENT: Moist mucous membranes.  Respiratory: CTAB, normal respiratory effort, no coughing, wheezes, crackles, or rales.  CV: RRR, S1S2, no murmurs, rubs or gallops. No JVD. Distal pulses intact.  Abdominal: Soft, nontender, nondistended, no rebound or guarding, normal bowel sounds.  Neurology: AOx3, no focal neuro defects, GALARZA x 4.  Extremities: No pitting edema, + Peripheral pulses.  Cath site:   Tubes:        LABS:                          9.3    15.73 )-----------( 495      ( 2024 17:31 )             27.2         123<L>  |  90<L>  |  45<H>  ----------------------------<  289<H>  5.6<H>   |  21<L>  |  0.93    Ca    7.8<L>      2024 17:31  Phos  3.4       Mg     2.40         TPro  5.5<L>  /  Alb  2.5<L>  /  TBili  0.2  /  DBili  x   /  AST  397<H>  /  ALT  349<H>  /  AlkPhos  107      LIVER FUNCTIONS - ( 2024 17:31 )  Alb: 2.5 g/dL / Pro: 5.5 g/dL / ALK PHOS: 107 U/L / ALT: 349 U/L / AST: 397 U/L / GGT: x           PT/INR - ( 2024 10:31 )   PT: 12.5 sec;   INR: 1.12 ratio         PTT - ( 2024 10:31 )  PTT:26.1 sec        Urinalysis Basic - ( 2024 17:31 )    Color: x / Appearance: x / SG: x / pH: x  Gluc: 289 mg/dL / Ketone: x  / Bili: x / Urobili: x   Blood: x / Protein: x / Nitrite: x   Leuk Esterase: x / RBC: x / WBC x   Sq Epi: x / Non Sq Epi: x / Bacteria: x    Assessment:  · Assessment	  74yo F with h/o smoking, HTN, HLD, type 1 DM, thyroidectomy, BCC, SCC found to have 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 Currently undergoing treatment w Dr. Plummer at [facility]. Per pt's , at bedside, pt had been feeling well then awoke this morning disoriented, confused and c/o significant CP and SOB. Brought by EMS to [facility] ER. Due to change in MS,  CT scan r/o code stroke. No infarcts seen.  CT scan C/A/P revealed large pericardial effusion w bilat pleural effusion. Pt  hypotensive, tachycardic. s/p 1Liter IVF.  Discussed with interventional cardiology with plan to  drain pericardial effusion and admit to CCU.    #Neuro  A+O x 3    Code stroke  NEURO:  Impression: Expressive aphasia and right superior quadrantanopia due to left hemispheric dysfunction. Mechanism ischemic stroke. Etiology likely cardioembolic given new afib vs hypercoagulability of malignancy      Pt is not a candidate for tenecteplase due to outside tenecteplase window , cancer with mets to brain  Mechanical thrombectomy candidacy pending MRI/MRA  without contrast(no CTA due to alergy)    CT head:  LimitedNo evidence of a large acute infarction   or large acute hemorrhage. Minimal microvascular disease.        #Respiratory  - Currently in no acute distress  - Satting 98% 2L NC  - bilat pleural effusion.    #Cards    Pericardial effusion  CT scan C/A/P revealed large pericardial effusion w bilat pleural effusion.  Pericardial drain placed for tamponade , 800cc sanguineous fluid removed  Cytology pending  Monitor Drain        New Afib  iso pericardial eff. with tamponade physiology    ChadsVasc: 7  Has-Bled: 3  Anticoagulation recommendations pending further imaging   B/L compression stockings      HTN  Patient with h/o hypertension and is on lisinopril 20mg BID, amlodipine 5mg BID,   Bashir-Synephrine decr to 0.8 mcg/kg/mn   Hold Home medications  -permissive HTN up to 220/110 for 24-48h from symptom onset followed by gradual normotension over 2-3 days   DASH diet  Monitor blood pressure      #GI  NPO unless passes dysphagia screen; swallow eval if fails  Consistent carb    HLD    simvastatin 20mg      #    Normal indices      #Endo    -Diabetes  DM2 (diabetes mellitus, type II)  Home medication: Lantus 11 units am and 7 units 6pm  novalog/humalog 2-4 units pre-meal  blood glucose ACHS with ISS  Check HgA1c  Diabetic education  Consistent carbohydrate diet  Endo cx, started latus this am 10 units    Heme  BCC, SCC   found to have 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   Currently undergoing treatment w Dr. Plummer at [facility].   Discussed with [facility] Dr plummer:  Please hold Home chemo meds: Mekinist 0.5mg 10pm Tafinlar 75mg BID while inpatient        PPx  Anticoagulation recommendations pending further imaging     Fluids  Electrolytes  Nutrition  Access  prophylaxis (GI and DVT)   CCU Service  Cardiology   Spect: 06858 ([facility])     PATIENT: LIAN BENITEZ, MRN: [id number]    CHIEF COMPLAINT: 72yo F with h/o smoking, HTN, HLD, type 1 DM, thyroidectomy, BCC, SCC found to have 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 Currently undergoing treatment w Dr. Plummer at [facility]. presents as code stroke for MS change CT scan r/o code stroke.     transferred to CCU s/p pericardiocentesis, 800cc sanguineous drainage, now with drain.        INTERVAL HISTORY/OVERNIGHT EVENTS: none, A+O x 4, Denies CP, palp, sob. Expressed concern over insulin mgt, endocrine cx called,   pericardial drain  100cc afternoon and 10cc OVN - sanguineous    TELEMETRY: sinus rhythm. rare PVC          ALLERGIES: Allergies    latex (Urticaria)  Actonel (Unknown)  Gadavist (Anaphylaxis; Hives)  codeine (Vomiting)    Intolerances        MEDICATIONS:  MEDICATIONS  (STANDING):  chlorhexidine 2% Cloths 1 Application(s) Topical daily  dextrose 5%. 1000 milliLiter(s) (50 mL/Hr) IV Continuous <Continuous>  dextrose 5%. 1000 milliLiter(s) (100 mL/Hr) IV Continuous <Continuous>  dextrose 50% Injectable 12.5 Gram(s) IV Push once  dextrose 50% Injectable 25 Gram(s) IV Push once  dextrose 50% Injectable 25 Gram(s) IV Push once  glucagon  Injectable 1 milliGRAM(s) IntraMuscular once  insulin lispro (ADMELOG) corrective regimen sliding scale   SubCutaneous three times a day before meals  insulin lispro (ADMELOG) corrective regimen sliding scale   SubCutaneous at bedtime  phenylephrine    Infusion 0.5 MICROgram(s)/kG/Min (6.09 mL/Hr) IV Continuous <Continuous>  simvastatin 20 milliGRAM(s) Oral at bedtime    MEDICATIONS  (PRN):  dextrose Oral Gel 15 Gram(s) Oral once PRN Blood Glucose LESS THAN 70 milliGRAM(s)/deciliter        OBJECTIVE:  ICU Vital Signs Last 24 Hrs  T(C): 36.7 (15 [place] 2024 04:00), Max: 36.8 (2024 20:00)  T(F): 98 (15 [place] 2024 04:00), Max: 98.3 (2024 20:00)  HR: 67 (15 [place] 2024 06:00) (61 - 129)  BP: 128/60 (15 [place] 2024 06:00) (79/53 - 130/55)  BP(mean): 79 (15 [place] 2024 06:00) (60 - 97)  ABP: --  ABP(mean): --  RR: 17 (15 [place] 2024 06:00) (13 - 26)  SpO2: 100% (15 [place] 2024 06:00) (95% - 100%)    O2 Parameters below as of 15 [place] 2024 06:00  Patient On (Oxygen Delivery Method): nasal cannula  O2 Flow (L/min): 4          Adult Advanced Hemodynamics Last 24 Hrs  CVP(mm Hg): --  CVP(cm H2O): --  CO: --  CI: --  PA: --  PA(mean): --  PCWP: --  SVR: --  SVRI: --  PVR: --  PVRI: --  CAPILLARY BLOOD GLUCOSE  308 (2024 10:46)      POCT Blood Glucose.: 240 mg/dL (2024 21:44)  POCT Blood Glucose.: 300 mg/dL (2024 16:40)  POCT Blood Glucose.: 291 mg/dL (2024 14:08)  POCT Blood Glucose.: 312 mg/dL (2024 10:05)  POCT Blood Glucose.: 308 mg/dL (2024 09:45)    CAPILLARY BLOOD GLUCOSE  308 (2024 10:46)      POCT Blood Glucose.: 240 mg/dL (2024 21:44)    I&O's Summary    2024 07:01  -  15 [place] 2024 06:49  --------------------------------------------------------  IN: 295.2 mL / OUT: 1410 mL / NET: -1114.8 mL      Daily Height in cm: 165.1 (2024 13:27)    Daily Weight in k.2 (15 [place] 2024 04:00)    REVIEW OF SYSTEMS  See interval history otherwise negative    PHYSICAL EXAMINATION:  General: Comfortable, no acute distress, cooperative with exam.  HEENT: Moist mucous membranes.  Respiratory: CTAB, normal respiratory effort, no coughing, wheezes, crackles, or rales.  CV: RRR, S1S2, no murmurs, rubs or gallops. No JVD. Distal pulses intact.  Abdominal: Soft, nontender, nondistended, no rebound or guarding, normal bowel sounds.  Neurology: AOx3, no focal neuro defects, GALARZA x 4.  Extremities: No pitting edema, + Peripheral pulses.  Cath site:   Tubes:        LABS:                          9.3    15.73 )-----------( 495      ( 2024 17:31 )             27.2         123<L>  |  90<L>  |  45<H>  ----------------------------<  289<H>  5.6<H>   |  21<L>  |  0.93    Ca    7.8<L>      2024 17:31  Phos  3.4       Mg     2.40         TPro  5.5<L>  /  Alb  2.5<L>  /  TBili  0.2  /  DBili  x   /  AST  397<H>  /  ALT  349<H>  /  AlkPhos  107      LIVER FUNCTIONS - ( 2024 17:31 )  Alb: 2.5 g/dL / Pro: 5.5 g/dL / ALK PHOS: 107 U/L / ALT: 349 U/L / AST: 397 U/L / GGT: x           PT/INR - ( 2024 10:31 )   PT: 12.5 sec;   INR: 1.12 ratio         PTT - ( 2024 10:31 )  PTT:26.1 sec        Urinalysis Basic - ( 2024 17:31 )    Color: x / Appearance: x / SG: x / pH: x  Gluc: 289 mg/dL / Ketone: x  / Bili: x / Urobili: x   Blood: x / Protein: x / Nitrite: x   Leuk Esterase: x / RBC: x / WBC x   Sq Epi: x / Non Sq Epi: x / Bacteria: x    Assessment:  · Assessment	  72yo F with h/o smoking, HTN, HLD, type 1 DM, thyroidectomy, BCC, SCC found to have 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 Currently undergoing treatment w Dr. Plummer at [facility]. Per pt's , at bedside, pt had been feeling well then awoke this morning disoriented, confused and c/o significant CP and SOB. Brought by EMS to [facility] ER. Due to change in MS,  CT scan r/o code stroke. No infarcts seen.  CT scan C/A/P revealed large pericardial effusion w bilat pleural effusion. Pt  hypotensive, tachycardic. s/p 1Liter IVF.  Discussed with interventional cardiology with plan to  drain pericardial effusion and admit to CCU.    #Neuro  A+O x 3    Code stroke  NEURO:  Impression: Expressive aphasia and right superior quadrantanopia due to left hemispheric dysfunction. Mechanism ischemic stroke. Etiology likely cardioembolic given new afib vs hypercoagulability of malignancy      Pt is not a candidate for tenecteplase due to outside tenecteplase window , cancer with mets to brain  Mechanical thrombectomy candidacy pending MRI/MRA  without contrast(no CTA due to alergy)    CT head:  LimitedNo evidence of a large acute infarction   or large acute hemorrhage. Minimal microvascular disease.        #Respiratory  - Currently in no acute distress  - Satting 98% 2L NC  - bilat pleural effusion.    #Cards    Pericardial effusion  CT scan C/A/P revealed large pericardial effusion w bilat pleural effusion.  Pericardial drain placed for tamponade , 800cc sanguineous fluid removed  Cytology pending  Monitor Drain        New Afib  iso pericardial eff. with tamponade physiology    ChadsVasc: 7  Has-Bled: 3  Anticoagulation recommendations pending further imaging   B/L compression stockings      HTN  Patient with h/o hypertension and is on lisinopril 20mg BID, amlodipine 5mg BID,   Bashir-Synephrine decr to 0.8 mcg/kg/mn   Hold Home medications  -permissive HTN up to 220/110 for 24-48h from symptom onset followed by gradual normotension over 2-3 days   DASH diet  Monitor blood pressure      #GI  NPO unless passes dysphagia screen; swallow eval if fails  Consistent carb    HLD    simvastatin 20mg      #    Normal indices      #Endo    -Diabetes  DM2 (diabetes mellitus, type II)  Home medication: Lantus 11 units am and 7 units 6pm  novalog/humalog 2-4 units pre-meal  blood glucose ACHS with ISS  Check HgA1c  Diabetic education  Consistent carbohydrate diet  Endo cx, started latus this am 10 units    Heme  BCC, SCC   found to have 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   Currently undergoing treatment w Dr. Plummer at [facility].   Discussed with [facility] Dr plummer:  Please hold Home chemo meds: Mekinist 0.5mg 10pm Tafinlar 75mg BID while inpatient        PPx  Anticoagulation recommendations pending further imaging     Fluids  Electrolytes  Nutrition  Access  prophylaxis (GI and DVT)   CCU Service  Cardiology   Spect: 33311 ([facility])     PATIENT: LIAN BENITEZ, MRN: [id number]    CHIEF COMPLAINT: 74yo F with h/o smoking, HTN, HLD, type 1 DM, thyroidectomy, BCC, SCC found to have 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 Currently undergoing treatment w Dr. Plummer at [facility]. presents as code stroke for MS change CT scan r/o code stroke.     transferred to CCU s/p pericardiocentesis, 800cc sanguineous drainage, now with drain.        INTERVAL HISTORY/OVERNIGHT EVENTS: none, A+O x 4, Denies CP, palp, sob. Expressed concern over insulin mgt, endocrine cx called,   pericardial drain  100cc afternoon and 10cc OVN - sanguineous    TELEMETRY: sinus rhythm. rare PVC          ALLERGIES: Allergies    latex (Urticaria)  Actonel (Unknown)  Gadavist (Anaphylaxis; Hives)  codeine (Vomiting)    Intolerances        MEDICATIONS:  MEDICATIONS  (STANDING):  chlorhexidine 2% Cloths 1 Application(s) Topical daily  dextrose 5%. 1000 milliLiter(s) (50 mL/Hr) IV Continuous <Continuous>  dextrose 5%. 1000 milliLiter(s) (100 mL/Hr) IV Continuous <Continuous>  dextrose 50% Injectable 12.5 Gram(s) IV Push once  dextrose 50% Injectable 25 Gram(s) IV Push once  dextrose 50% Injectable 25 Gram(s) IV Push once  glucagon  Injectable 1 milliGRAM(s) IntraMuscular once  insulin lispro (ADMELOG) corrective regimen sliding scale   SubCutaneous three times a day before meals  insulin lispro (ADMELOG) corrective regimen sliding scale   SubCutaneous at bedtime  phenylephrine    Infusion 0.5 MICROgram(s)/kG/Min (6.09 mL/Hr) IV Continuous <Continuous>  simvastatin 20 milliGRAM(s) Oral at bedtime    MEDICATIONS  (PRN):  dextrose Oral Gel 15 Gram(s) Oral once PRN Blood Glucose LESS THAN 70 milliGRAM(s)/deciliter        OBJECTIVE:  ICU Vital Signs Last 24 Hrs  T(C): 36.7 (15 [place] 2024 04:00), Max: 36.8 (2024 20:00)  T(F): 98 (15 [place] 2024 04:00), Max: 98.3 (2024 20:00)  HR: 67 (15 [place] 2024 06:00) (61 - 129)  BP: 128/60 (15 [place] 2024 06:00) (79/53 - 130/55)  BP(mean): 79 (15 [place] 2024 06:00) (60 - 97)  ABP: --  ABP(mean): --  RR: 17 (15 [place] 2024 06:00) (13 - 26)  SpO2: 100% (15 [place] 2024 06:00) (95% - 100%)    O2 Parameters below as of 15 [place] 2024 06:00  Patient On (Oxygen Delivery Method): nasal cannula  O2 Flow (L/min): 4          Adult Advanced Hemodynamics Last 24 Hrs  CVP(mm Hg): --  CVP(cm H2O): --  CO: --  CI: --  PA: --  PA(mean): --  PCWP: --  SVR: --  SVRI: --  PVR: --  PVRI: --  CAPILLARY BLOOD GLUCOSE  308 (2024 10:46)      POCT Blood Glucose.: 240 mg/dL (2024 21:44)  POCT Blood Glucose.: 300 mg/dL (2024 16:40)  POCT Blood Glucose.: 291 mg/dL (2024 14:08)  POCT Blood Glucose.: 312 mg/dL (2024 10:05)  POCT Blood Glucose.: 308 mg/dL (2024 09:45)    CAPILLARY BLOOD GLUCOSE  308 (2024 10:46)      POCT Blood Glucose.: 240 mg/dL (2024 21:44)    I&O's Summary    2024 07:01  -  15 [place] 2024 06:49  --------------------------------------------------------  IN: 295.2 mL / OUT: 1410 mL / NET: -1114.8 mL      Daily Height in cm: 165.1 (2024 13:27)    Daily Weight in k.2 (15 [place] 2024 04:00)    REVIEW OF SYSTEMS  See interval history otherwise negative    PHYSICAL EXAMINATION:  General: Comfortable, no acute distress, cooperative with exam.  HEENT: Moist mucous membranes.  Respiratory: CTAB, normal respiratory effort, no coughing, wheezes, crackles, or rales.  CV: RRR, S1S2, no murmurs, rubs or gallops. No JVD. Distal pulses intact.  Abdominal: Soft, nontender, nondistended, no rebound or guarding, normal bowel sounds.  Neurology: AOx3, no focal neuro defects, GALARZA x 4.  Extremities: No pitting edema, + Peripheral pulses.  Cath site:   Tubes:        LABS:                          9.3    15.73 )-----------( 495      ( 2024 17:31 )             27.2         123<L>  |  90<L>  |  45<H>  ----------------------------<  289<H>  5.6<H>   |  21<L>  |  0.93    Ca    7.8<L>      2024 17:31  Phos  3.4       Mg     2.40         TPro  5.5<L>  /  Alb  2.5<L>  /  TBili  0.2  /  DBili  x   /  AST  397<H>  /  ALT  349<H>  /  AlkPhos  107      LIVER FUNCTIONS - ( 2024 17:31 )  Alb: 2.5 g/dL / Pro: 5.5 g/dL / ALK PHOS: 107 U/L / ALT: 349 U/L / AST: 397 U/L / GGT: x           PT/INR - ( 2024 10:31 )   PT: 12.5 sec;   INR: 1.12 ratio         PTT - ( 2024 10:31 )  PTT:26.1 sec        Urinalysis Basic - ( 2024 17:31 )    Color: x / Appearance: x / SG: x / pH: x  Gluc: 289 mg/dL / Ketone: x  / Bili: x / Urobili: x   Blood: x / Protein: x / Nitrite: x   Leuk Esterase: x / RBC: x / WBC x   Sq Epi: x / Non Sq Epi: x / Bacteria: x    Assessment:  · Assessment	  74yo F with h/o smoking, HTN, HLD, type 1 DM, thyroidectomy, BCC, SCC found to have 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 Currently undergoing treatment w Dr. Plummer at [facility]. Per pt's , at bedside, pt had been feeling well then awoke this morning disoriented, confused and c/o significant CP and SOB. Brought by EMS to [facility] ER. Due to change in MS,  CT scan r/o code stroke. No infarcts seen.  CT scan C/A/P revealed large pericardial effusion w bilat pleural effusion. Pt  hypotensive, tachycardic. s/p 1Liter IVF.  Discussed with interventional cardiology with plan to  drain pericardial effusion and admit to CCU.    #Neuro  A+O x 3    Code stroke  NEURO:  Impression: Expressive aphasia and right superior quadrantanopia due to left hemispheric dysfunction. Mechanism ischemic stroke. Etiology likely cardioembolic given new afib vs hypercoagulability of malignancy      Pt is not a candidate for tenecteplase due to outside tenecteplase window , cancer with mets to brain  Mechanical thrombectomy candidacy pending MRI/MRA  without contrast(no CTA due to alergy)    CT head:  Limited evaluation. No evidence of a large acute infarction   or large acute hemorrhage. Minimal microvascular disease.        #Respiratory  - Currently in no acute distress  - Satting 98% 2L NC  - bilat pleural effusion.    #Cards    Pericardial effusion  CT scan C/A/P revealed large pericardial effusion w bilat pleural effusion.  Pericardial drain placed for tamponade , 800cc sanguineous fluid removed  Cytology pending  ovn  --> 10cc        New Afib  iso pericardial eff. with tamponade physiology  ChadsVasc: 7  Has-Bled: 3  Anticoagulation recommendations pending further imaging   B/L compression stockings      HTN  Patient with h/o hypertension and is on lisinopril 20mg BID, amlodipine 5mg BID,   Bashir-Synephrine decr to 0.8 mcg/kg/mn   Hold Home medications  -permissive HTN up to 220/110 for 24-48h from symptom onset followed by gradual normotension over 2-3 days   DASH diet  Monitor blood pressure      #GI  NPO unless passes dysphagia screen; swallow eval if fails  Consistent carb  Tolerating diet    HLD    simvastatin 20mg      #  Normal indices    Hyponatremia        #Endo    -Diabetes  DM2 (diabetes mellitus, type II)  Home medication: Lantus 11 units am and 7 units 6pm  novalog/humalog 2-4 units pre-meal  blood glucose ACHS with ISS  Check HgA1c  Diabetic education  Consistent carbohydrate diet  Endo cx, started latus this am 10 units    Heme  BCC, SCC   found to have 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   Currently undergoing treatment w Dr. Plummer at [facility].   Discussed with [facility] Dr plummer:  Please hold Home chemo meds: Mekinist 0.5mg 10pm Tafinlar 75mg BID while inpatient      PPx  Anticoagulation recommendations pending further imaging   (+) compression stockings     CCU Service  Cardiology   Spect: [id number] ([facility])     PATIENT: LIAN BENITEZ, MRN: [id number]    CHIEF COMPLAINT: 74yo F with h/o smoking, HTN, HLD, type 1 DM, thyroidectomy, BCC, SCC found to have 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 Currently undergoing treatment w Dr. Plummer at [facility]. presents as code stroke for MS change CT scan r/o code stroke.     transferred to CCU s/p pericardiocentesis, 800cc sanguineous drainage, now with drain.        INTERVAL HISTORY/OVERNIGHT EVENTS: none, A+O x 4, Denies CP, palp, sob. Expressed concern over insulin mgt, endocrine cx called,   pericardial drain  100cc afternoon and 10cc OVN - sanguineous    TELEMETRY: sinus rhythm. rare PVC          ALLERGIES: Allergies    latex (Urticaria)  Actonel (Unknown)  Gadavist (Anaphylaxis; Hives)  codeine (Vomiting)    Intolerances        MEDICATIONS:  MEDICATIONS  (STANDING):  chlorhexidine 2% Cloths 1 Application(s) Topical daily  dextrose 5%. 1000 milliLiter(s) (50 mL/Hr) IV Continuous <Continuous>  dextrose 5%. 1000 milliLiter(s) (100 mL/Hr) IV Continuous <Continuous>  dextrose 50% Injectable 12.5 Gram(s) IV Push once  dextrose 50% Injectable 25 Gram(s) IV Push once  dextrose 50% Injectable 25 Gram(s) IV Push once  glucagon  Injectable 1 milliGRAM(s) IntraMuscular once  insulin lispro (ADMELOG) corrective regimen sliding scale   SubCutaneous three times a day before meals  insulin lispro (ADMELOG) corrective regimen sliding scale   SubCutaneous at bedtime  phenylephrine    Infusion 0.5 MICROgram(s)/kG/Min (6.09 mL/Hr) IV Continuous <Continuous>  simvastatin 20 milliGRAM(s) Oral at bedtime    MEDICATIONS  (PRN):  dextrose Oral Gel 15 Gram(s) Oral once PRN Blood Glucose LESS THAN 70 milliGRAM(s)/deciliter        OBJECTIVE:  ICU Vital Signs Last 24 Hrs  T(C): 36.7 (15 [place] 2024 04:00), Max: 36.8 (2024 20:00)  T(F): 98 (15 [place] 2024 04:00), Max: 98.3 (2024 20:00)  HR: 67 (15 [place] 2024 06:00) (61 - 129)  BP: 128/60 (15 [place] 2024 06:00) (79/53 - 130/55)  BP(mean): 79 (15 [place] 2024 06:00) (60 - 97)  ABP: --  ABP(mean): --  RR: 17 (15 [place] 2024 06:00) (13 - 26)  SpO2: 100% (15 [place] 2024 06:00) (95% - 100%)    O2 Parameters below as of 15 [place] 2024 06:00  Patient On (Oxygen Delivery Method): nasal cannula  O2 Flow (L/min): 4          Adult Advanced Hemodynamics Last 24 Hrs  CVP(mm Hg): --  CVP(cm H2O): --  CO: --  CI: --  PA: --  PA(mean): --  PCWP: --  SVR: --  SVRI: --  PVR: --  PVRI: --  CAPILLARY BLOOD GLUCOSE  308 (2024 10:46)      POCT Blood Glucose.: 240 mg/dL (2024 21:44)  POCT Blood Glucose.: 300 mg/dL (2024 16:40)  POCT Blood Glucose.: 291 mg/dL (2024 14:08)  POCT Blood Glucose.: 312 mg/dL (2024 10:05)  POCT Blood Glucose.: 308 mg/dL (2024 09:45)    CAPILLARY BLOOD GLUCOSE  308 (2024 10:46)      POCT Blood Glucose.: 240 mg/dL (2024 21:44)    I&O's Summary    2024 07:01  -  15 [place] 2024 06:49  --------------------------------------------------------  IN: 295.2 mL / OUT: 1410 mL / NET: -1114.8 mL      Daily Height in cm: 165.1 (2024 13:27)    Daily Weight in k.2 (15 [place] 2024 04:00)    REVIEW OF SYSTEMS  See interval history otherwise negative    PHYSICAL EXAMINATION:  General: Comfortable, no acute distress, cooperative with exam.  HEENT: Moist mucous membranes.  Respiratory: CTAB, normal respiratory effort, no coughing, wheezes, crackles, or rales.  CV: RRR, S1S2, no murmurs, rubs or gallops. No JVD. Distal pulses intact.  Abdominal: Soft, nontender, nondistended, no rebound or guarding, normal bowel sounds.  Neurology: AOx3, no focal neuro defects, GALARZA x 4.  Extremities: No pitting edema, + Peripheral pulses.  Cath site:   Tubes:        LABS:                          9.3    15.73 )-----------( 495      ( 2024 17:31 )             27.2         123<L>  |  90<L>  |  45<H>  ----------------------------<  289<H>  5.6<H>   |  21<L>  |  0.93    Ca    7.8<L>      2024 17:31  Phos  3.4       Mg     2.40         TPro  5.5<L>  /  Alb  2.5<L>  /  TBili  0.2  /  DBili  x   /  AST  397<H>  /  ALT  349<H>  /  AlkPhos  107      LIVER FUNCTIONS - ( 2024 17:31 )  Alb: 2.5 g/dL / Pro: 5.5 g/dL / ALK PHOS: 107 U/L / ALT: 349 U/L / AST: 397 U/L / GGT: x           PT/INR - ( 2024 10:31 )   PT: 12.5 sec;   INR: 1.12 ratio         PTT - ( 2024 10:31 )  PTT:26.1 sec        Urinalysis Basic - ( 2024 17:31 )    Color: x / Appearance: x / SG: x / pH: x  Gluc: 289 mg/dL / Ketone: x  / Bili: x / Urobili: x   Blood: x / Protein: x / Nitrite: x   Leuk Esterase: x / RBC: x / WBC x   Sq Epi: x / Non Sq Epi: x / Bacteria: x    Assessment:  · Assessment	  74yo F with h/o smoking, HTN, HLD, type 1 DM, thyroidectomy, BCC, SCC found to have 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 Currently undergoing treatment w Dr. Plummer at [facility]. Per pt's , at bedside, pt had been feeling well then awoke this morning disoriented, confused and c/o significant CP and SOB. Brought by EMS to [facility] ER. Due to change in MS,  CT scan r/o code stroke. No infarcts seen.  CT scan C/A/P revealed large pericardial effusion w bilat pleural effusion. Pt  hypotensive, tachycardic. s/p 1Liter IVF.  Discussed with interventional cardiology with plan to  drain pericardial effusion and admit to CCU.    #Neuro  A+O x 3    Code stroke  NEURO:  Impression: Expressive aphasia and right superior quadrantanopia due to left hemispheric dysfunction. Mechanism ischemic stroke. Etiology likely cardioembolic given new afib vs hypercoagulability of malignancy      Pt is not a candidate for tenecteplase due to outside tenecteplase window , cancer with mets to brain  Mechanical thrombectomy candidacy pending MRI/MRA  without contrast(no CTA due to alergy)    CT head:  Limited evaluation. No evidence of a large acute infarction   or large acute hemorrhage. Minimal microvascular disease.        #Respiratory  - Currently in no acute distress  - Satting 98% 2L NC  - bilat pleural effusion.    #Cards    Pericardial effusion  CT scan C/A/P revealed large pericardial effusion w bilat pleural effusion.  Pericardial drain placed for tamponade , 800cc sanguineous fluid removed  Cytology pending  ovn  --> 10cc        New Afib  iso pericardial eff. with tamponade physiology  ChadsVasc: 7  Has-Bled: 3  Anticoagulation recommendations pending further imaging   B/L compression stockings      HTN  Patient with h/o hypertension and is on lisinopril 20mg BID, amlodipine 5mg BID,   Bashir-Synephrine decr to 0.8 mcg/kg/mn   Hold Home medications  -permissive HTN up to 220/110 for 24-48h from symptom onset followed by gradual normotension over 2-3 days   DASH diet  Monitor blood pressure      #GI  NPO unless passes dysphagia screen; swallow eval if fails  Consistent carb  Tolerating diet    HLD    simvastatin 20mg      #  Normal indices    Hyponatremia        #Endo    -Diabetes  DM2 (diabetes mellitus, type II)  Home medication: Lantus 11 units am and 7 units 6pm  novalog/humalog 2-4 units pre-meal  blood glucose ACHS with ISS  Check HgA1c  Diabetic education  Consistent carbohydrate diet  Endo cx, started latus this am 10 units    Heme  BCC, SCC   found to have 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   Currently undergoing treatment w Dr. Plummer at [facility].   Discussed with [facility] Dr plummer:  Please hold Home chemo meds: Mekinist 0.5mg 10pm Tafinlar 75mg BID while inpatient      PPx  Anticoagulation recommendations pending further imaging   (+) compression stockings     CCU Service  Cardiology   Spect: [id number] ([facility])     PATIENT: LIAN BENITEZ, MRN: [id number]    CHIEF COMPLAINT: 72yo F with h/o smoking, HTN, HLD, type 1 DM, thyroidectomy, BCC, SCC found to have 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 Currently undergoing treatment w Dr. Plummer at [facility]. presents as code stroke for MS change CT scan r/o code stroke.     transferred to CCU s/p pericardiocentesis, 800cc sanguineous drainage, now with drain.        INTERVAL HISTORY/OVERNIGHT EVENTS: none, A+O x 4, Denies CP, palp, sob. Expressed concern over insulin mgt, endocrine cx called,   pericardial drain  100cc afternoon and 10cc OVN - sanguineous    TELEMETRY: sinus rhythm. rare PVC          ALLERGIES: Allergies    latex (Urticaria)  Actonel (Unknown)  Gadavist (Anaphylaxis; Hives)  codeine (Vomiting)    Intolerances        MEDICATIONS:  MEDICATIONS  (STANDING):  chlorhexidine 2% Cloths 1 Application(s) Topical daily  dextrose 5%. 1000 milliLiter(s) (50 mL/Hr) IV Continuous <Continuous>  dextrose 5%. 1000 milliLiter(s) (100 mL/Hr) IV Continuous <Continuous>  dextrose 50% Injectable 12.5 Gram(s) IV Push once  dextrose 50% Injectable 25 Gram(s) IV Push once  dextrose 50% Injectable 25 Gram(s) IV Push once  glucagon  Injectable 1 milliGRAM(s) IntraMuscular once  insulin lispro (ADMELOG) corrective regimen sliding scale   SubCutaneous three times a day before meals  insulin lispro (ADMELOG) corrective regimen sliding scale   SubCutaneous at bedtime  phenylephrine    Infusion 0.5 MICROgram(s)/kG/Min (6.09 mL/Hr) IV Continuous <Continuous>  simvastatin 20 milliGRAM(s) Oral at bedtime    MEDICATIONS  (PRN):  dextrose Oral Gel 15 Gram(s) Oral once PRN Blood Glucose LESS THAN 70 milliGRAM(s)/deciliter        OBJECTIVE:  ICU Vital Signs Last 24 Hrs  T(C): 36.7 (15 [place] 2024 04:00), Max: 36.8 (2024 20:00)  T(F): 98 (15 [place] 2024 04:00), Max: 98.3 (2024 20:00)  HR: 67 (15 [place] 2024 06:00) (61 - 129)  BP: 128/60 (15 [place] 2024 06:00) (79/53 - 130/55)  BP(mean): 79 (15 [place] 2024 06:00) (60 - 97)  ABP: --  ABP(mean): --  RR: 17 (15 [place] 2024 06:00) (13 - 26)  SpO2: 100% (15 [place] 2024 06:00) (95% - 100%)    O2 Parameters below as of 15 [place] 2024 06:00  Patient On (Oxygen Delivery Method): nasal cannula  O2 Flow (L/min): 4          Adult Advanced Hemodynamics Last 24 Hrs  CVP(mm Hg): --  CVP(cm H2O): --  CO: --  CI: --  PA: --  PA(mean): --  PCWP: --  SVR: --  SVRI: --  PVR: --  PVRI: --  CAPILLARY BLOOD GLUCOSE  308 (2024 10:46)      POCT Blood Glucose.: 240 mg/dL (2024 21:44)  POCT Blood Glucose.: 300 mg/dL (2024 16:40)  POCT Blood Glucose.: 291 mg/dL (2024 14:08)  POCT Blood Glucose.: 312 mg/dL (2024 10:05)  POCT Blood Glucose.: 308 mg/dL (2024 09:45)    CAPILLARY BLOOD GLUCOSE  308 (2024 10:46)      POCT Blood Glucose.: 240 mg/dL (2024 21:44)    I&O's Summary    2024 07:01  -  15 [place] 2024 06:49  --------------------------------------------------------  IN: 295.2 mL / OUT: 1410 mL / NET: -1114.8 mL      Daily Height in cm: 165.1 (2024 13:27)    Daily Weight in k.2 (15 [place] 2024 04:00)    REVIEW OF SYSTEMS  See interval history otherwise negative    PHYSICAL EXAMINATION:  General: Comfortable, no acute distress, cooperative with exam.  HEENT: Moist mucous membranes.  Respiratory: CTAB, normal respiratory effort, no coughing, wheezes, crackles, or rales.  CV: RRR, S1S2, no murmurs, rubs or gallops. No JVD. Distal pulses intact.  Abdominal: Soft, nontender, nondistended, no rebound or guarding, normal bowel sounds.  Neurology: AOx3, no focal neuro defects, GALARZA x 4.  Extremities: No pitting edema, + Peripheral pulses.  Cath site:   Tubes:        LABS:                          9.3    15.73 )-----------( 495      ( 2024 17:31 )             27.2         123<L>  |  90<L>  |  45<H>  ----------------------------<  289<H>  5.6<H>   |  21<L>  |  0.93    Ca    7.8<L>      2024 17:31  Phos  3.4       Mg     2.40         TPro  5.5<L>  /  Alb  2.5<L>  /  TBili  0.2  /  DBili  x   /  AST  397<H>  /  ALT  349<H>  /  AlkPhos  107      LIVER FUNCTIONS - ( 2024 17:31 )  Alb: 2.5 g/dL / Pro: 5.5 g/dL / ALK PHOS: 107 U/L / ALT: 349 U/L / AST: 397 U/L / GGT: x           PT/INR - ( 2024 10:31 )   PT: 12.5 sec;   INR: 1.12 ratio         PTT - ( 2024 10:31 )  PTT:26.1 sec        Urinalysis Basic - ( 2024 17:31 )    Color: x / Appearance: x / SG: x / pH: x  Gluc: 289 mg/dL / Ketone: x  / Bili: x / Urobili: x   Blood: x / Protein: x / Nitrite: x   Leuk Esterase: x / RBC: x / WBC x   Sq Epi: x / Non Sq Epi: x / Bacteria: x    Assessment:  · Assessment	  72yo F with h/o smoking, HTN, HLD, type 1 DM, thyroidectomy, BCC, SCC found to have 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 Currently undergoing treatment w Dr. Plummer at [facility]. Per pt's , at bedside, pt had been feeling well then awoke this morning disoriented, confused and c/o significant CP and SOB. Brought by EMS to [facility] ER. Due to change in MS,  CT scan r/o code stroke. No infarcts seen.  CT scan C/A/P revealed large pericardial effusion w bilat pleural effusion. Pt  hypotensive, tachycardic. s/p 1Liter IVF.  Discussed with interventional cardiology with plan to  drain pericardial effusion and admit to CCU.    #Neuro  A+O x 3    Code stroke  NEURO:  Impression: Expressive aphasia and right superior quadrantanopia due to left hemispheric dysfunction. Mechanism ischemic stroke. Etiology likely cardioembolic given new afib vs hypercoagulability of malignancy      Pt is not a candidate for tenecteplase due to outside tenecteplase window , cancer with mets to brain  Mechanical thrombectomy candidacy pending MRI/MRA  without contrast(no CTA due to alergy)    CT head:  Limited evaluation. No evidence of a large acute infarction   or large acute hemorrhage. Minimal microvascular disease.        #Respiratory  - Currently in no acute distress  - Satting 98% 2L NC  - bilat pleural effusion.    #Cards    Pericardial effusion  CT scan C/A/P revealed large pericardial effusion w bilat pleural effusion.  Pericardial drain placed for tamponade , 800cc sanguineous fluid removed  Cytology pending  ovn  --> 10cc        New Afib  iso pericardial eff. with tamponade physiology  ChadsVasc: 7  Has-Bled: 3  Anticoagulation recommendations pending further imaging   B/L compression stockings      HTN  Patient with h/o hypertension and is on lisinopril 20mg BID, amlodipine 5mg BID,   Bashir-Synephrine decr to 0.8 mcg/kg/mn   Hold Home medications  -permissive HTN up to 220/110 for 24-48h from symptom onset followed by gradual normotension over 2-3 days   DASH diet  Monitor blood pressure      #GI  NPO unless passes dysphagia screen; swallow eval if fails  Consistent carb  Tolerating diet    HLD    simvastatin 20mg      #  Normal indices    Hyponatremia        #Endo    -Diabetes  DM2 (diabetes mellitus, type II)  Home medication: Lantus 11 units am and 7 units 6pm  novalog/humalog 2-4 units pre-meal  blood glucose ACHS with ISS  Check HgA1c  Diabetic education  Consistent carbohydrate diet  Endo cx, started latus this am 10 units    Heme  BCC, SCC   found to have 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   Currently undergoing treatment w Dr. Plummer at [facility].   Discussed with [facility] Dr plummer:  Please hold Home chemo meds: Mekinist 0.5mg 10pm Tafinlar 75mg BID while inpatient      PPx  Anticoagulation recommendations pending further imaging   (+) compression stockings     CCU Service  Cardiology   Spect: [id number] ([facility])     PATIENT: LIAN BENITEZ, MRN: [id number]    CHIEF COMPLAINT: 72yo F with h/o smoking, HTN, HLD, type 1 DM, thyroidectomy, BCC, SCC found to have 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 Currently undergoing treatment w Dr. Plummer at [facility]. presents as code stroke for MS change CT scan r/o code stroke.     transferred to CCU s/p pericardiocentesis, 800cc sanguineous drainage, now with drain.        INTERVAL HISTORY/OVERNIGHT EVENTS: none, A+O x 4, Denies CP, palp, sob. Expressed concern over insulin mgt, endocrine cx called,   pericardial drain  100cc afternoon and 10cc OVN - sanguineous    TELEMETRY: sinus rhythm. rare PVC          ALLERGIES: Allergies    latex (Urticaria)  Actonel (Unknown)  Gadavist (Anaphylaxis; Hives)  codeine (Vomiting)    Intolerances        MEDICATIONS:  MEDICATIONS  (STANDING):  chlorhexidine 2% Cloths 1 Application(s) Topical daily  dextrose 5%. 1000 milliLiter(s) (50 mL/Hr) IV Continuous <Continuous>  dextrose 5%. 1000 milliLiter(s) (100 mL/Hr) IV Continuous <Continuous>  dextrose 50% Injectable 12.5 Gram(s) IV Push once  dextrose 50% Injectable 25 Gram(s) IV Push once  dextrose 50% Injectable 25 Gram(s) IV Push once  glucagon  Injectable 1 milliGRAM(s) IntraMuscular once  insulin lispro (ADMELOG) corrective regimen sliding scale   SubCutaneous three times a day before meals  insulin lispro (ADMELOG) corrective regimen sliding scale   SubCutaneous at bedtime  phenylephrine    Infusion 0.5 MICROgram(s)/kG/Min (6.09 mL/Hr) IV Continuous <Continuous>  simvastatin 20 milliGRAM(s) Oral at bedtime    MEDICATIONS  (PRN):  dextrose Oral Gel 15 Gram(s) Oral once PRN Blood Glucose LESS THAN 70 milliGRAM(s)/deciliter        OBJECTIVE:  ICU Vital Signs Last 24 Hrs  T(C): 36.7 (15 [place] 2024 04:00), Max: 36.8 (2024 20:00)  T(F): 98 (15 [place] 2024 04:00), Max: 98.3 (2024 20:00)  HR: 67 (15 [place] 2024 06:00) (61 - 129)  BP: 128/60 (15 [place] 2024 06:00) (79/53 - 130/55)  BP(mean): 79 (15 [place] 2024 06:00) (60 - 97)  ABP: --  ABP(mean): --  RR: 17 (15 [place] 2024 06:00) (13 - 26)  SpO2: 100% (15 [place] 2024 06:00) (95% - 100%)    O2 Parameters below as of 15 [place] 2024 06:00  Patient On (Oxygen Delivery Method): nasal cannula  O2 Flow (L/min): 4          Adult Advanced Hemodynamics Last 24 Hrs  CVP(mm Hg): --  CVP(cm H2O): --  CO: --  CI: --  PA: --  PA(mean): --  PCWP: --  SVR: --  SVRI: --  PVR: --  PVRI: --  CAPILLARY BLOOD GLUCOSE  308 (2024 10:46)      POCT Blood Glucose.: 240 mg/dL (2024 21:44)  POCT Blood Glucose.: 300 mg/dL (2024 16:40)  POCT Blood Glucose.: 291 mg/dL (2024 14:08)  POCT Blood Glucose.: 312 mg/dL (2024 10:05)  POCT Blood Glucose.: 308 mg/dL (2024 09:45)    CAPILLARY BLOOD GLUCOSE  308 (2024 10:46)      POCT Blood Glucose.: 240 mg/dL (2024 21:44)    I&O's Summary    2024 07:01  -  15 [place] 2024 06:49  --------------------------------------------------------  IN: 295.2 mL / OUT: 1410 mL / NET: -1114.8 mL      Daily Height in cm: 165.1 (2024 13:27)    Daily Weight in k.2 (15 [place] 2024 04:00)    REVIEW OF SYSTEMS  See interval history otherwise negative    PHYSICAL EXAMINATION:  General: Comfortable, no acute distress, cooperative with exam.  HEENT: Moist mucous membranes.  Respiratory: CTAB, normal respiratory effort, no coughing, wheezes, crackles, or rales.  CV: RRR, S1S2, no murmurs, rubs or gallops. No JVD. Distal pulses intact.  Abdominal: Soft, nontender, nondistended, no rebound or guarding, normal bowel sounds.  Neurology: AOx3, no focal neuro defects, GALARZA x 4.  Extremities: No pitting edema, + Peripheral pulses.  Cath site:   Tubes:        LABS:                          9.3    15.73 )-----------( 495      ( 2024 17:31 )             27.2         123<L>  |  90<L>  |  45<H>  ----------------------------<  289<H>  5.6<H>   |  21<L>  |  0.93    Ca    7.8<L>      2024 17:31  Phos  3.4       Mg     2.40         TPro  5.5<L>  /  Alb  2.5<L>  /  TBili  0.2  /  DBili  x   /  AST  397<H>  /  ALT  349<H>  /  AlkPhos  107      LIVER FUNCTIONS - ( 2024 17:31 )  Alb: 2.5 g/dL / Pro: 5.5 g/dL / ALK PHOS: 107 U/L / ALT: 349 U/L / AST: 397 U/L / GGT: x           PT/INR - ( 2024 10:31 )   PT: 12.5 sec;   INR: 1.12 ratio         PTT - ( 2024 10:31 )  PTT:26.1 sec        Urinalysis Basic - ( 2024 17:31 )    Color: x / Appearance: x / SG: x / pH: x  Gluc: 289 mg/dL / Ketone: x  / Bili: x / Urobili: x   Blood: x / Protein: x / Nitrite: x   Leuk Esterase: x / RBC: x / WBC x   Sq Epi: x / Non Sq Epi: x / Bacteria: x    Assessment:  · Assessment	  72yo F with h/o smoking, HTN, HLD, type 1 DM, thyroidectomy, BCC, SCC found to have 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 Currently undergoing treatment w Dr. Plummer at [facility]. Per pt's , at bedside, pt had been feeling well then awoke this morning disoriented, confused and c/o significant CP and SOB. Brought by EMS to [facility] ER. Due to change in MS,  CT scan r/o code stroke. No infarcts seen.  CT scan C/A/P revealed large pericardial effusion w bilat pleural effusion. Pt  hypotensive, tachycardic. s/p 1Liter IVF.  Discussed with interventional cardiology with plan to  drain pericardial effusion and admit to CCU.    #Neuro  A+O x 3    Code stroke  NEURO:  Impression: Expressive aphasia and right superior quadrantanopia due to left hemispheric dysfunction. Mechanism ischemic stroke. Etiology likely cardioembolic given new afib vs hypercoagulability of malignancy      Pt is not a candidate for tenecteplase due to outside tenecteplase window , cancer with mets to brain  Mechanical thrombectomy candidacy pending MRI/MRA  without contrast(no CTA due to alergy)    CT head:  Limited evaluation. No evidence of a large acute infarction   or large acute hemorrhage. Minimal microvascular disease.        #Respiratory  - Currently in no acute distress  - Satting 98% 2L NC  - bilat pleural effusion.    #Cards    Pericardial effusion  CT scan C/A/P revealed large pericardial effusion w bilat pleural effusion.  Pericardial drain placed for tamponade , 800cc sanguineous fluid removed  Cytology pending  ovn  --> 10cc        New Afib  iso pericardial eff. with tamponade physiology  ChadsVasc: 7  Has-Bled: 3  Anticoagulation recommendations pending further imaging   B/L compression stockings      HTN  Patient with h/o hypertension and is on lisinopril 20mg BID, amlodipine 5mg BID,   Hold Home medications  -permissive HTN up to 220/110 for 24-48h from symptom onset followed by gradual normotension over 2-3 days   DASH diet  1/15 weaning Bashir-Synephrine   1/15 1 liter fluid over 4 hours      #GI  NPO unless passes dysphagia screen; swallow eval if fails  Consistent carb  Tolerating diet    HLD    simvastatin 20mg      #  Normal indices  creatinine 0.6, BUN 24    Hyponatremia  serum: osmolality 271, and sodium 126  Urine:  osmolality , and sodium      #Endo    -Diabetes  DM2 (diabetes mellitus, type II)  Home medication: Lantus 11 units am and 7 units 6pm  novalog/humalog 2-4 units pre-meal  blood glucose ACHS with ISS  Check HgA1c  Diabetic education  Consistent carbohydrate diet  Endo cx, started latus this am 10 units    Heme  BCC, SCC   found to have 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   Currently undergoing treatment w Dr. Plummer at [facility].   Discussed with [facility] Dr plummer:  Please hold Home chemo meds: Mekinist 0.5mg 10pm Tafinlar 75mg BID while inpatient      PPx  Anticoagulation recommendations pending further imaging   (+) compression stockings     CCU Service  Cardiology   Spect: [id number] ([facility])     PATIENT: LIAN BENITEZ, MRN: [id number]    CHIEF COMPLAINT: 72yo F with h/o smoking, HTN, HLD, type 1 DM, thyroidectomy, BCC, SCC found to have 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 Currently undergoing treatment w Dr. Plummer at [facility]. presents as code stroke for MS change CT scan r/o code stroke.     transferred to CCU s/p pericardiocentesis, 800cc sanguineous drainage, now with drain.        INTERVAL HISTORY/OVERNIGHT EVENTS: none, A+O x 4, Denies CP, palp, sob. Expressed concern over insulin mgt, endocrine cx called,   pericardial drain  100cc afternoon and 10cc OVN - sanguineous    TELEMETRY: sinus rhythm. rare PVC          ALLERGIES: Allergies    latex (Urticaria)  Actonel (Unknown)  Gadavist (Anaphylaxis; Hives)  codeine (Vomiting)    Intolerances        MEDICATIONS:  MEDICATIONS  (STANDING):  chlorhexidine 2% Cloths 1 Application(s) Topical daily  dextrose 5%. 1000 milliLiter(s) (50 mL/Hr) IV Continuous <Continuous>  dextrose 5%. 1000 milliLiter(s) (100 mL/Hr) IV Continuous <Continuous>  dextrose 50% Injectable 12.5 Gram(s) IV Push once  dextrose 50% Injectable 25 Gram(s) IV Push once  dextrose 50% Injectable 25 Gram(s) IV Push once  glucagon  Injectable 1 milliGRAM(s) IntraMuscular once  insulin lispro (ADMELOG) corrective regimen sliding scale   SubCutaneous three times a day before meals  insulin lispro (ADMELOG) corrective regimen sliding scale   SubCutaneous at bedtime  phenylephrine    Infusion 0.5 MICROgram(s)/kG/Min (6.09 mL/Hr) IV Continuous <Continuous>  simvastatin 20 milliGRAM(s) Oral at bedtime    MEDICATIONS  (PRN):  dextrose Oral Gel 15 Gram(s) Oral once PRN Blood Glucose LESS THAN 70 milliGRAM(s)/deciliter        OBJECTIVE:  ICU Vital Signs Last 24 Hrs  T(C): 36.7 (15 [place] 2024 04:00), Max: 36.8 (2024 20:00)  T(F): 98 (15 [place] 2024 04:00), Max: 98.3 (2024 20:00)  HR: 67 (15 [place] 2024 06:00) (61 - 129)  BP: 128/60 (15 [place] 2024 06:00) (79/53 - 130/55)  BP(mean): 79 (15 [place] 2024 06:00) (60 - 97)  ABP: --  ABP(mean): --  RR: 17 (15 [place] 2024 06:00) (13 - 26)  SpO2: 100% (15 [place] 2024 06:00) (95% - 100%)    O2 Parameters below as of 15 [place] 2024 06:00  Patient On (Oxygen Delivery Method): nasal cannula  O2 Flow (L/min): 4          Adult Advanced Hemodynamics Last 24 Hrs  CVP(mm Hg): --  CVP(cm H2O): --  CO: --  CI: --  PA: --  PA(mean): --  PCWP: --  SVR: --  SVRI: --  PVR: --  PVRI: --  CAPILLARY BLOOD GLUCOSE  308 (2024 10:46)      POCT Blood Glucose.: 240 mg/dL (2024 21:44)  POCT Blood Glucose.: 300 mg/dL (2024 16:40)  POCT Blood Glucose.: 291 mg/dL (2024 14:08)  POCT Blood Glucose.: 312 mg/dL (2024 10:05)  POCT Blood Glucose.: 308 mg/dL (2024 09:45)    CAPILLARY BLOOD GLUCOSE  308 (2024 10:46)      POCT Blood Glucose.: 240 mg/dL (2024 21:44)    I&O's Summary    2024 07:01  -  15 [place] 2024 06:49  --------------------------------------------------------  IN: 295.2 mL / OUT: 1410 mL / NET: -1114.8 mL      Daily Height in cm: 165.1 (2024 13:27)    Daily Weight in k.2 (15 [place] 2024 04:00)    REVIEW OF SYSTEMS  See interval history otherwise negative    PHYSICAL EXAMINATION:  General: Comfortable, no acute distress, cooperative with exam.  HEENT: Moist mucous membranes.  Respiratory: CTAB, normal respiratory effort, no coughing, wheezes, crackles, or rales.  CV: RRR, S1S2, no murmurs, rubs or gallops. No JVD. Distal pulses intact.  Abdominal: Soft, nontender, nondistended, no rebound or guarding, normal bowel sounds.  Neurology: AOx3, no focal neuro defects, GALARZA x 4.  Extremities: No pitting edema, + Peripheral pulses.  Cath site:   Tubes:        LABS:                          9.3    15.73 )-----------( 495      ( 2024 17:31 )             27.2         123<L>  |  90<L>  |  45<H>  ----------------------------<  289<H>  5.6<H>   |  21<L>  |  0.93    Ca    7.8<L>      2024 17:31  Phos  3.4       Mg     2.40         TPro  5.5<L>  /  Alb  2.5<L>  /  TBili  0.2  /  DBili  x   /  AST  397<H>  /  ALT  349<H>  /  AlkPhos  107      LIVER FUNCTIONS - ( 2024 17:31 )  Alb: 2.5 g/dL / Pro: 5.5 g/dL / ALK PHOS: 107 U/L / ALT: 349 U/L / AST: 397 U/L / GGT: x           PT/INR - ( 2024 10:31 )   PT: 12.5 sec;   INR: 1.12 ratio         PTT - ( 2024 10:31 )  PTT:26.1 sec        Urinalysis Basic - ( 2024 17:31 )    Color: x / Appearance: x / SG: x / pH: x  Gluc: 289 mg/dL / Ketone: x  / Bili: x / Urobili: x   Blood: x / Protein: x / Nitrite: x   Leuk Esterase: x / RBC: x / WBC x   Sq Epi: x / Non Sq Epi: x / Bacteria: x    Assessment:  · Assessment	  72yo F with h/o smoking, HTN, HLD, type 1 DM, thyroidectomy, BCC, SCC found to have 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 Currently undergoing treatment w Dr. Plummer at [facility]. Per pt's , at bedside, pt had been feeling well then awoke this morning disoriented, confused and c/o significant CP and SOB. Brought by EMS to [facility] ER. Due to change in MS,  CT scan r/o code stroke. No infarcts seen.  CT scan C/A/P revealed large pericardial effusion w bilat pleural effusion. Pt  hypotensive, tachycardic. s/p 1Liter IVF.  Discussed with interventional cardiology with plan to  drain pericardial effusion and admit to CCU.    #Neuro  A+O x 3    Code stroke  NEURO:  Impression: Expressive aphasia and right superior quadrantanopia due to left hemispheric dysfunction. Mechanism ischemic stroke. Etiology likely cardioembolic given new afib vs hypercoagulability of malignancy      Pt is not a candidate for tenecteplase due to outside tenecteplase window , cancer with mets to brain  Mechanical thrombectomy candidacy pending MRI/MRA  without contrast(no CTA due to alergy)    CT head:  Limited evaluation. No evidence of a large acute infarction   or large acute hemorrhage. Minimal microvascular disease.        #Respiratory  - Currently in no acute distress  - Satting 98% 2L NC  - bilat pleural effusion.    #Cards    Pericardial effusion  CT scan C/A/P revealed large pericardial effusion w bilat pleural effusion.  Pericardial drain placed for tamponade , 800cc sanguineous fluid removed  Cytology pending  ovn  --> 10cc        New Afib  iso pericardial eff. with tamponade physiology  ChadsVasc: 7  Has-Bled: 3  Anticoagulation recommendations pending further imaging   B/L compression stockings      HTN  Patient with h/o hypertension and is on lisinopril 20mg BID, amlodipine 5mg BID,   Hold Home medications  -permissive HTN up to 220/110 for 24-48h from symptom onset followed by gradual normotension over 2-3 days   DASH diet  1/15 weaning Bashir-Synephrine   1/15 1 liter fluid over 4 hours      #GI  NPO unless passes dysphagia screen; swallow eval if fails  Consistent carb  Tolerating diet    HLD    simvastatin 20mg      #  Normal indices  creatinine 0.6, BUN 24    Hyponatremia  serum: osmolality 271, and sodium 126  Urine:  osmolality , and sodium      #Endo    -Diabetes  DM2 (diabetes mellitus, type II)  Home medication: Lantus 11 units am and 7 units 6pm  novalog/humalog 2-4 units pre-meal  blood glucose ACHS with ISS  Check HgA1c  Diabetic education  Consistent carbohydrate diet  Endo cx, started latus this am 10 units    Heme  BCC, SCC   found to have 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   Currently undergoing treatment w Dr. Plummer at [facility].   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 Currently undergoing treatment w Dr. Plummer at [facility]. presents as code stroke for MS change CT scan r/o code stroke.     transferred to CCU s/p pericardiocentesis, 800cc sanguineous drainage, now with drain.        INTERVAL HISTORY/OVERNIGHT EVENTS: none, A+O x 4, Denies CP, palp, sob. Expressed concern over insulin mgt, endocrine cx called,   pericardial drain  100cc afternoon and 10cc OVN - sanguineous    TELEMETRY: sinus rhythm. rare PVC          ALLERGIES: Allergies    latex (Urticaria)  Actonel (Unknown)  Gadavist (Anaphylaxis; Hives)  codeine (Vomiting)    Intolerances        MEDICATIONS:  MEDICATIONS  (STANDING):  chlorhexidine 2% Cloths 1 Application(s) Topical daily  dextrose 5%. 1000 milliLiter(s) (50 mL/Hr) IV Continuous <Continuous>  dextrose 5%. 1000 milliLiter(s) (100 mL/Hr) IV Continuous <Continuous>  dextrose 50% Injectable 12.5 Gram(s) IV Push once  dextrose 50% Injectable 25 Gram(s) IV Push once  dextrose 50% Injectable 25 Gram(s) IV Push once  glucagon  Injectable 1 milliGRAM(s) IntraMuscular once  insulin lispro (ADMELOG) corrective regimen sliding scale   SubCutaneous three times a day before meals  insulin lispro (ADMELOG) corrective regimen sliding scale   SubCutaneous at bedtime  phenylephrine    Infusion 0.5 MICROgram(s)/kG/Min (6.09 mL/Hr) IV Continuous <Continuous>  simvastatin 20 milliGRAM(s) Oral at bedtime    MEDICATIONS  (PRN):  dextrose Oral Gel 15 Gram(s) Oral once PRN Blood Glucose LESS THAN 70 milliGRAM(s)/deciliter        OBJECTIVE:  ICU Vital Signs Last 24 Hrs  T(C): 36.7 (15 [place] 2024 04:00), Max: 36.8 (2024 20:00)  T(F): 98 (15 [place] 2024 04:00), Max: 98.3 (2024 20:00)  HR: 67 (15 [place] 2024 06:00) (61 - 129)  BP: 128/60 (15 [place] 2024 06:00) (79/53 - 130/55)  BP(mean): 79 (15 [place] 2024 06:00) (60 - 97)  ABP: --  ABP(mean): --  RR: 17 (15 [place] 2024 06:00) (13 - 26)  SpO2: 100% (15 [place] 2024 06:00) (95% - 100%)    O2 Parameters below as of 15 [place] 2024 06:00  Patient On (Oxygen Delivery Method): nasal cannula  O2 Flow (L/min): 4          Adult Advanced Hemodynamics Last 24 Hrs  CVP(mm Hg): --  CVP(cm H2O): --  CO: --  CI: --  PA: --  PA(mean): --  PCWP: --  SVR: --  SVRI: --  PVR: --  PVRI: --  CAPILLARY BLOOD GLUCOSE  308 (2024 10:46)      POCT Blood Glucose.: 240 mg/dL (2024 21:44)  POCT Blood Glucose.: 300 mg/dL (2024 16:40)  POCT Blood Glucose.: 291 mg/dL (2024 14:08)  POCT Blood Glucose.: 312 mg/dL (2024 10:05)  POCT Blood Glucose.: 308 mg/dL (2024 09:45)    CAPILLARY BLOOD GLUCOSE  308 (2024 10:46)      POCT Blood Glucose.: 240 mg/dL (2024 21:44)    I&O's Summary    2024 07:01  -  15 [place] 2024 06:49  --------------------------------------------------------  IN: 295.2 mL / OUT: 1410 mL / NET: -1114.8 mL      Daily Height in cm: 165.1 (2024 13:27)    Daily Weight in k.2 (15 [place] 2024 04:00)    REVIEW OF SYSTEMS  See interval history otherwise negative    PHYSICAL EXAMINATION:  General: Comfortable, no acute distress, cooperative with exam.  HEENT: Moist mucous membranes.  Respiratory: CTAB, normal respiratory effort, no coughing, wheezes, crackles, or rales.  CV: RRR, S1S2, no murmurs, rubs or gallops. No JVD. Distal pulses intact.  Abdominal: Soft, nontender, nondistended, no rebound or guarding, normal bowel sounds.  Neurology: AOx3, no focal neuro defects, GALARZA x 4.  Extremities: No pitting edema, + Peripheral pulses.  Cath site:   Tubes:        LABS:                          9.3    15.73 )-----------( 495      ( 2024 17:31 )             27.2         123<L>  |  90<L>  |  45<H>  ----------------------------<  289<H>  5.6<H>   |  21<L>  |  0.93    Ca    7.8<L>      2024 17:31  Phos  3.4       Mg     2.40         TPro  5.5<L>  /  Alb  2.5<L>  /  TBili  0.2  /  DBili  x   /  AST  397<H>  /  ALT  349<H>  /  AlkPhos  107      LIVER FUNCTIONS - ( 2024 17:31 )  Alb: 2.5 g/dL / Pro: 5.5 g/dL / ALK PHOS: 107 U/L / ALT: 349 U/L / AST: 397 U/L / GGT: x           PT/INR - ( 2024 10:31 )   PT: 12.5 sec;   INR: 1.12 ratio         PTT - ( 2024 10:31 )  PTT:26.1 sec        Urinalysis Basic - ( 2024 17:31 )    Color: x / Appearance: x / SG: x / pH: x  Gluc: 289 mg/dL / Ketone: x  / Bili: x / Urobili: x   Blood: x / Protein: x / Nitrite: x   Leuk Esterase: x / RBC: x / WBC x   Sq Epi: x / Non Sq Epi: x / Bacteria: x    Assessment:  · Assessment	  72yo F with h/o smoking, HTN, HLD, type 1 DM, thyroidectomy, BCC, SCC found to have 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 Currently undergoing treatment w Dr. Plummer at [facility]. Per pt's , at bedside, pt had been feeling well then awoke this morning disoriented, confused and c/o significant CP and SOB. Brought by EMS to [facility] ER. Due to change in MS,  CT scan r/o code stroke. No infarcts seen.  CT scan C/A/P revealed large pericardial effusion w bilat pleural effusion. Pt  hypotensive, tachycardic. s/p 1Liter IVF.  Discussed with interventional cardiology with plan to  drain pericardial effusion and admit to CCU.    #Neuro  A+O x 3    Code stroke  NEURO:  Impression: Expressive aphasia and right superior quadrantanopia due to left hemispheric dysfunction. Mechanism ischemic stroke. Etiology likely cardioembolic given new afib vs hypercoagulability of malignancy      Pt is not a candidate for tenecteplase due to outside tenecteplase window , cancer with mets to brain  Mechanical thrombectomy candidacy pending MRI/MRA  without contrast(no CTA due to alergy)    CT head:  Limited evaluation. No evidence of a large acute infarction   or large acute hemorrhage. Minimal microvascular disease.        #Respiratory  - Currently in no acute distress  - Satting 98% 2L NC  - bilat pleural effusion.    #Cards    Pericardial effusion  CT scan C/A/P revealed large pericardial effusion w bilat pleural effusion.  Pericardial drain placed for tamponade , 800cc sanguineous fluid removed  Cytology pending  ovn  --> 10cc        New Afib  iso pericardial eff. with tamponade physiology  ChadsVasc: 7  Has-Bled: 3  Anticoagulation recommendations pending further imaging   B/L compression stockings      HTN  Patient with h/o hypertension and is on lisinopril 20mg BID, amlodipine 5mg BID,   Hold Home medications  -permissive HTN up to 220/110 for 24-48h from symptom onset followed by gradual normotension over 2-3 days   DASH diet  1/15 weaning Bashir-Synephrine   1/15 1 liter fluid over 4 hours      #GI  NPO unless passes dysphagia screen; swallow eval if fails  Consistent carb  Tolerating diet    HLD    simvastatin 20mg      #  Normal indices  creatinine 0.6, BUN 24    Hyponatremia  serum: osmolality 271, and sodium 126  Urine:  osmolality , and sodium      #Endo    -Diabetes  DM2 (diabetes mellitus, type II)  Home medication: Lantus 11 units am and 7 units 6pm  novalog/humalog 2-4 units pre-meal  blood glucose ACHS with ISS  Check HgA1c  Diabetic education  Consistent carbohydrate diet  Endo cx, started latus this am 10 units    Heme  BCC, SCC   found to have 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   Currently undergoing treatment w Dr. Plummer at [facility].   Discussed with [facility] Dr plummer:  Please hold Home chemo meds: Mekinist 0.5mg 10pm Tafinlar 75mg BID while inpatient      PPx  Anticoagulation recommendations pending further imaging   (+) compression stockings     CCU Service  Cardiology   Spect: [id number] ([facility])     PATIENT: LIAN BENITEZ, MRN: [id number]    CHIEF COMPLAINT: 74yo F with h/o smoking, HTN, HLD, type 1 DM, thyroidectomy, BCC, SCC found to have 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 Currently undergoing treatment w Dr. Plummer at [facility]. presents as code stroke for MS change CT scan r/o code stroke.     transferred to CCU s/p pericardiocentesis, 800cc sanguineous drainage, now with drain.        INTERVAL HISTORY/OVERNIGHT EVENTS: none, A+O x 4, Denies CP, palp, sob. Expressed concern over insulin mgt, endocrine cx called,   pericardial drain  100cc afternoon and 10cc OVN - sanguineous    TELEMETRY: sinus rhythm. rare PVC          ALLERGIES: Allergies    latex (Urticaria)  Actonel (Unknown)  Gadavist (Anaphylaxis; Hives)  codeine (Vomiting)    Intolerances        MEDICATIONS:  MEDICATIONS  (STANDING):  chlorhexidine 2% Cloths 1 Application(s) Topical daily  dextrose 5%. 1000 milliLiter(s) (50 mL/Hr) IV Continuous <Continuous>  dextrose 5%. 1000 milliLiter(s) (100 mL/Hr) IV Continuous <Continuous>  dextrose 50% Injectable 12.5 Gram(s) IV Push once  dextrose 50% Injectable 25 Gram(s) IV Push once  dextrose 50% Injectable 25 Gram(s) IV Push once  glucagon  Injectable 1 milliGRAM(s) IntraMuscular once  insulin lispro (ADMELOG) corrective regimen sliding scale   SubCutaneous three times a day before meals  insulin lispro (ADMELOG) corrective regimen sliding scale   SubCutaneous at bedtime  phenylephrine    Infusion 0.5 MICROgram(s)/kG/Min (6.09 mL/Hr) IV Continuous <Continuous>  simvastatin 20 milliGRAM(s) Oral at bedtime    MEDICATIONS  (PRN):  dextrose Oral Gel 15 Gram(s) Oral once PRN Blood Glucose LESS THAN 70 milliGRAM(s)/deciliter        OBJECTIVE:  ICU Vital Signs Last 24 Hrs  T(C): 36.7 (15 [place] 2024 04:00), Max: 36.8 (2024 20:00)  T(F): 98 (15 [place] 2024 04:00), Max: 98.3 (2024 20:00)  HR: 67 (15 [place] 2024 06:00) (61 - 129)  BP: 128/60 (15 [place] 2024 06:00) (79/53 - 130/55)  BP(mean): 79 (15 [place] 2024 06:00) (60 - 97)  ABP: --  ABP(mean): --  RR: 17 (15 [place] 2024 06:00) (13 - 26)  SpO2: 100% (15 [place] 2024 06:00) (95% - 100%)    O2 Parameters below as of 15 [place] 2024 06:00  Patient On (Oxygen Delivery Method): nasal cannula  O2 Flow (L/min): 4          Adult Advanced Hemodynamics Last 24 Hrs  CVP(mm Hg): --  CVP(cm H2O): --  CO: --  CI: --  PA: --  PA(mean): --  PCWP: --  SVR: --  SVRI: --  PVR: --  PVRI: --  CAPILLARY BLOOD GLUCOSE  308 (2024 10:46)      POCT Blood Glucose.: 240 mg/dL (2024 21:44)  POCT Blood Glucose.: 300 mg/dL (2024 16:40)  POCT Blood Glucose.: 291 mg/dL (2024 14:08)  POCT Blood Glucose.: 312 mg/dL (2024 10:05)  POCT Blood Glucose.: 308 mg/dL (2024 09:45)    CAPILLARY BLOOD GLUCOSE  308 (2024 10:46)      POCT Blood Glucose.: 240 mg/dL (2024 21:44)    I&O's Summary    2024 07:01  -  15 [place] 2024 06:49  --------------------------------------------------------  IN: 295.2 mL / OUT: 1410 mL / NET: -1114.8 mL      Daily Height in cm: 165.1 (2024 13:27)    Daily Weight in k.2 (15 [place] 2024 04:00)    REVIEW OF SYSTEMS  See interval history otherwise negative    PHYSICAL EXAMINATION:  General: Comfortable, no acute distress, cooperative with exam.  HEENT: Moist mucous membranes.  Respiratory: CTAB, normal respiratory effort, no coughing, wheezes, crackles, or rales.  CV: RRR, S1S2, no murmurs, rubs or gallops. No JVD. Distal pulses intact.  Abdominal: Soft, nontender, nondistended, no rebound or guarding, normal bowel sounds.  Neurology: AOx3, no focal neuro defects, GALARZA x 4.  Extremities: No pitting edema, + Peripheral pulses.  Cath site:   Tubes:        LABS:                          9.3    15.73 )-----------( 495      ( 2024 17:31 )             27.2         123<L>  |  90<L>  |  45<H>  ----------------------------<  289<H>  5.6<H>   |  21<L>  |  0.93    Ca    7.8<L>      2024 17:31  Phos  3.4       Mg     2.40         TPro  5.5<L>  /  Alb  2.5<L>  /  TBili  0.2  /  DBili  x   /  AST  397<H>  /  ALT  349<H>  /  AlkPhos  107      LIVER FUNCTIONS - ( 2024 17:31 )  Alb: 2.5 g/dL / Pro: 5.5 g/dL / ALK PHOS: 107 U/L / ALT: 349 U/L / AST: 397 U/L / GGT: x           PT/INR - ( 2024 10:31 )   PT: 12.5 sec;   INR: 1.12 ratio         PTT - ( 2024 10:31 )  PTT:26.1 sec        Urinalysis Basic - ( 2024 17:31 )    Color: x / Appearance: x / SG: x / pH: x  Gluc: 289 mg/dL / Ketone: x  / Bili: x / Urobili: x   Blood: x / Protein: x / Nitrite: x   Leuk Esterase: x / RBC: x / WBC x   Sq Epi: x / Non Sq Epi: x / Bacteria: x    Assessment:  · Assessment	  74yo F with h/o smoking, HTN, HLD, type 1 DM, thyroidectomy, BCC, SCC found to have 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 Currently undergoing treatment w Dr. Plummer at [facility]. Per pt's , at bedside, pt had been feeling well then awoke this morning disoriented, confused and c/o significant CP and SOB. Brought by EMS to [facility] ER. Due to change in MS,  CT scan r/o code stroke. No infarcts seen.  CT scan C/A/P revealed large pericardial effusion w bilat pleural effusion. Pt  hypotensive, tachycardic. s/p 1Liter IVF.  Discussed with interventional cardiology with plan to  drain pericardial effusion and admit to CCU.    #Neuro  A+O x 3    Code stroke  NEURO:  Impression: Expressive aphasia and right superior quadrantanopia due to left hemispheric dysfunction. Mechanism ischemic stroke. Etiology likely cardioembolic given new afib vs hypercoagulability of malignancy      Pt is not a candidate for tenecteplase due to outside tenecteplase window , cancer with mets to brain  Mechanical thrombectomy candidacy pending MRI/MRA  without contrast(no CTA due to alergy)    CT head:  Limited evaluation. No evidence of a large acute infarction   or large acute hemorrhage. Minimal microvascular disease.        #Respiratory  - Currently in no acute distress  - Satting 98% 2L NC  - bilat pleural effusion.    #Cards    Pericardial effusion  CT scan C/A/P revealed large pericardial effusion w bilat pleural effusion.  Pericardial drain placed for tamponade , 800cc sanguineous fluid removed  Cytology pending  ovn  --> 10cc        New Afib  iso pericardial eff. with tamponade physiology  ChadsVasc: 7  Has-Bled: 3  Anticoagulation recommendations pending further imaging   B/L compression stockings      HTN  Patient with h/o hypertension and is on lisinopril 20mg BID, amlodipine 5mg BID,   Hold Home medications  -permissive HTN up to 220/110 for 24-48h from symptom onset followed by gradual normotension over 2-3 days   DASH diet  1/15 weaning Bashir-Synephrine   1/15 1 liter fluid over 4 hours      #GI  NPO unless passes dysphagia screen; swallow eval if fails  Consistent carb  Tolerating diet    HLD    simvastatin 20mg      #  Normal indices  creatinine 0.6, BUN 24    Hyponatremia  iso hyperglycemia     serum: osmolality 271, and sodium 126  Urine:  osmolality  593, and sodium 50   BUN 24    #Endo    -Diabetes  DM2 (diabetes mellitus, type II)  Home medication: Lantus 11 units am and 7 units 6pm  novalog/humalog 2-4 units pre-meal  blood glucose ACHS with ISS  Check HgA1c  Diabetic education  Consistent carbohydrate diet  Endo cx, started latus this am 10 units    Heme  BCC, SCC   found to have 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   Currently undergoing treatment w Dr. Plummer at [facility].   Discussed with [facility] Dr plummer:  Please hold Home chemo meds: Mekinist 0.5mg 10pm Tafinlar 75mg BID while inpatient      PPx  Anticoagulation recommendations pending further imaging   (+) compression stockings     CCU Service  Cardiology   Spect: [id number] ([facility])     PATIENT: LIAN BENITEZ, MRN: [id number]    CHIEF COMPLAINT: 74yo F with h/o smoking, HTN, HLD, type 1 DM, thyroidectomy, BCC, SCC found to have 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 Currently undergoing treatment w Dr. Plummer at [facility]. presents as code stroke for MS change CT scan r/o code stroke.     transferred to CCU s/p pericardiocentesis, 800cc sanguineous drainage, now with drain.        INTERVAL HISTORY/OVERNIGHT EVENTS: none, A+O x 4, Denies CP, palp, sob. Expressed concern over insulin mgt, endocrine cx called,   pericardial drain  100cc afternoon and 10cc OVN - sanguineous    TELEMETRY: sinus rhythm. rare PVC          ALLERGIES: Allergies    latex (Urticaria)  Actonel (Unknown)  Gadavist (Anaphylaxis; Hives)  codeine (Vomiting)    Intolerances        MEDICATIONS:  MEDICATIONS  (STANDING):  chlorhexidine 2% Cloths 1 Application(s) Topical daily  dextrose 5%. 1000 milliLiter(s) (50 mL/Hr) IV Continuous <Continuous>  dextrose 5%. 1000 milliLiter(s) (100 mL/Hr) IV Continuous <Continuous>  dextrose 50% Injectable 12.5 Gram(s) IV Push once  dextrose 50% Injectable 25 Gram(s) IV Push once  dextrose 50% Injectable 25 Gram(s) IV Push once  glucagon  Injectable 1 milliGRAM(s) IntraMuscular once  insulin lispro (ADMELOG) corrective regimen sliding scale   SubCutaneous three times a day before meals  insulin lispro (ADMELOG) corrective regimen sliding scale   SubCutaneous at bedtime  phenylephrine    Infusion 0.5 MICROgram(s)/kG/Min (6.09 mL/Hr) IV Continuous <Continuous>  simvastatin 20 milliGRAM(s) Oral at bedtime    MEDICATIONS  (PRN):  dextrose Oral Gel 15 Gram(s) Oral once PRN Blood Glucose LESS THAN 70 milliGRAM(s)/deciliter        OBJECTIVE:  ICU Vital Signs Last 24 Hrs  T(C): 36.7 (15 [place] 2024 04:00), Max: 36.8 (2024 20:00)  T(F): 98 (15 [place] 2024 04:00), Max: 98.3 (2024 20:00)  HR: 67 (15 [place] 2024 06:00) (61 - 129)  BP: 128/60 (15 [place] 2024 06:00) (79/53 - 130/55)  BP(mean): 79 (15 [place] 2024 06:00) (60 - 97)  ABP: --  ABP(mean): --  RR: 17 (15 [place] 2024 06:00) (13 - 26)  SpO2: 100% (15 [place] 2024 06:00) (95% - 100%)    O2 Parameters below as of 15 [place] 2024 06:00  Patient On (Oxygen Delivery Method): nasal cannula  O2 Flow (L/min): 4          Adult Advanced Hemodynamics Last 24 Hrs  CVP(mm Hg): --  CVP(cm H2O): --  CO: --  CI: --  PA: --  PA(mean): --  PCWP: --  SVR: --  SVRI: --  PVR: --  PVRI: --  CAPILLARY BLOOD GLUCOSE  308 (2024 10:46)      POCT Blood Glucose.: 240 mg/dL (2024 21:44)  POCT Blood Glucose.: 300 mg/dL (2024 16:40)  POCT Blood Glucose.: 291 mg/dL (2024 14:08)  POCT Blood Glucose.: 312 mg/dL (2024 10:05)  POCT Blood Glucose.: 308 mg/dL (2024 09:45)    CAPILLARY BLOOD GLUCOSE  308 (2024 10:46)      POCT Blood Glucose.: 240 mg/dL (2024 21:44)    I&O's Summary    2024 07:01  -  15 [place] 2024 06:49  --------------------------------------------------------  IN: 295.2 mL / OUT: 1410 mL / NET: -1114.8 mL      Daily Height in cm: 165.1 (2024 13:27)    Daily Weight in k.2 (15 [place] 2024 04:00)    REVIEW OF SYSTEMS  See interval history otherwise negative    PHYSICAL EXAMINATION:  General: Comfortable, no acute distress, cooperative with exam.  HEENT: Moist mucous membranes.  Respiratory: CTAB, normal respiratory effort, no coughing, wheezes, crackles, or rales.  CV: RRR, S1S2, no murmurs, rubs or gallops. No JVD. Distal pulses intact.  Abdominal: Soft, nontender, nondistended, no rebound or guarding, normal bowel sounds.  Neurology: AOx3, no focal neuro defects, GALARZA x 4.  Extremities: No pitting edema, + Peripheral pulses.  Cath site:   Tubes:        LABS:                          9.3    15.73 )-----------( 495      ( 2024 17:31 )             27.2         123<L>  |  90<L>  |  45<H>  ----------------------------<  289<H>  5.6<H>   |  21<L>  |  0.93    Ca    7.8<L>      2024 17:31  Phos  3.4       Mg     2.40         TPro  5.5<L>  /  Alb  2.5<L>  /  TBili  0.2  /  DBili  x   /  AST  397<H>  /  ALT  349<H>  /  AlkPhos  107      LIVER FUNCTIONS - ( 2024 17:31 )  Alb: 2.5 g/dL / Pro: 5.5 g/dL / ALK PHOS: 107 U/L / ALT: 349 U/L / AST: 397 U/L / GGT: x           PT/INR - ( 2024 10:31 )   PT: 12.5 sec;   INR: 1.12 ratio         PTT - ( 2024 10:31 )  PTT:26.1 sec        Urinalysis Basic - ( 2024 17:31 )    Color: x / Appearance: x / SG: x / pH: x  Gluc: 289 mg/dL / Ketone: x  / Bili: x / Urobili: x   Blood: x / Protein: x / Nitrite: x   Leuk Esterase: x / RBC: x / WBC x   Sq Epi: x / Non Sq Epi: x / Bacteria: x    Assessment:  · Assessment	  74yo F with h/o smoking, HTN, HLD, type 1 DM, thyroidectomy, BCC, SCC found to have 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 Currently undergoing treatment w Dr. Plummer at [facility]. Per pt's , at bedside, pt had been feeling well then awoke this morning disoriented, confused and c/o significant CP and SOB. Brought by EMS to [facility] ER. Due to change in MS,  CT scan r/o code stroke. No infarcts seen.  CT scan C/A/P revealed large pericardial effusion w bilat pleural effusion. Pt  hypotensive, tachycardic. s/p 1Liter IVF.  Discussed with interventional cardiology with plan to  drain pericardial effusion and admit to CCU.    #Neuro  A+O x 3    Code stroke  NEURO:  Impression: Expressive aphasia and right superior quadrantanopia due to left hemispheric dysfunction. Mechanism ischemic stroke. Etiology likely cardioembolic given new afib vs hypercoagulability of malignancy      Pt is not a candidate for tenecteplase due to outside tenecteplase window , cancer with mets to brain  Mechanical thrombectomy candidacy pending MRI/MRA  without contrast(no CTA due to alergy)    CT head:  Limited evaluation. No evidence of a large acute infarction   or large acute hemorrhage. Minimal microvascular disease.        #Respiratory  - Currently in no acute distress  - Satting 98% 2L NC  - bilat pleural effusion.    #Cards    Pericardial effusion  CT scan C/A/P revealed large pericardial effusion w bilat pleural effusion.  Pericardial drain placed for tamponade , 800cc sanguineous fluid removed  Cytology pending  ovn  --> 10cc        New Afib  iso pericardial eff. with tamponade physiology  ChadsVasc: 7  Has-Bled: 3  Anticoagulation recommendations pending further imaging   B/L compression stockings      HTN  Patient with h/o hypertension and is on lisinopril 20mg BID, amlodipine 5mg BID,   Hold Home medications  -permissive HTN up to 220/110 for 24-48h from symptom onset followed by gradual normotension over 2-3 days   DASH diet  1/15 weaning Bashir-Synephrine   1/15 1 liter fluid over 4 hours      #GI  NPO unless passes dysphagia screen; swallow eval if fails  Consistent carb  Tolerating diet    HLD    simvastatin 20mg      #  Normal indices  creatinine 0.6, BUN 24    Hyponatremia  iso hyperglycemia     serum: osmolality 271, and sodium 126  Urine:  osmolality  593, and sodium 50   BUN 24    #Endo    -Diabetes  DM2 (diabetes mellitus, type II)  Home medication: Lantus 11 units am and 7 units 6pm  novalog/humalog 2-4 units pre-meal  blood glucose ACHS with ISS  Check HgA1c  Diabetic education  Consistent carbohydrate diet  Endo cx, started latus this am 10 units    Heme  BCC, SCC   found to have 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   Currently undergoing treatment w Dr. Plummer at [facility].   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 Currently undergoing treatment w Dr. Plummer at [facility]. presents as code stroke for MS change CT scan r/o code stroke.     transferred to CCU s/p pericardiocentesis, 800cc sanguineous drainage, now with drain.        INTERVAL HISTORY/OVERNIGHT EVENTS: none, A+O x 4, Denies CP, palp, sob. Expressed concern over insulin mgt, endocrine cx called,   pericardial drain  100cc afternoon and 10cc OVN - sanguineous    TELEMETRY: sinus rhythm. rare PVC          ALLERGIES: Allergies    latex (Urticaria)  Actonel (Unknown)  Gadavist (Anaphylaxis; Hives)  codeine (Vomiting)    Intolerances        MEDICATIONS:  MEDICATIONS  (STANDING):  chlorhexidine 2% Cloths 1 Application(s) Topical daily  dextrose 5%. 1000 milliLiter(s) (50 mL/Hr) IV Continuous <Continuous>  dextrose 5%. 1000 milliLiter(s) (100 mL/Hr) IV Continuous <Continuous>  dextrose 50% Injectable 12.5 Gram(s) IV Push once  dextrose 50% Injectable 25 Gram(s) IV Push once  dextrose 50% Injectable 25 Gram(s) IV Push once  glucagon  Injectable 1 milliGRAM(s) IntraMuscular once  insulin lispro (ADMELOG) corrective regimen sliding scale   SubCutaneous three times a day before meals  insulin lispro (ADMELOG) corrective regimen sliding scale   SubCutaneous at bedtime  phenylephrine    Infusion 0.5 MICROgram(s)/kG/Min (6.09 mL/Hr) IV Continuous <Continuous>  simvastatin 20 milliGRAM(s) Oral at bedtime    MEDICATIONS  (PRN):  dextrose Oral Gel 15 Gram(s) Oral once PRN Blood Glucose LESS THAN 70 milliGRAM(s)/deciliter        OBJECTIVE:  ICU Vital Signs Last 24 Hrs  T(C): 36.7 (15 [place] 2024 04:00), Max: 36.8 (2024 20:00)  T(F): 98 (15 [place] 2024 04:00), Max: 98.3 (2024 20:00)  HR: 67 (15 [place] 2024 06:00) (61 - 129)  BP: 128/60 (15 [place] 2024 06:00) (79/53 - 130/55)  BP(mean): 79 (15 [place] 2024 06:00) (60 - 97)  ABP: --  ABP(mean): --  RR: 17 (15 [place] 2024 06:00) (13 - 26)  SpO2: 100% (15 [place] 2024 06:00) (95% - 100%)    O2 Parameters below as of 15 [place] 2024 06:00  Patient On (Oxygen Delivery Method): nasal cannula  O2 Flow (L/min): 4          Adult Advanced Hemodynamics Last 24 Hrs  CVP(mm Hg): --  CVP(cm H2O): --  CO: --  CI: --  PA: --  PA(mean): --  PCWP: --  SVR: --  SVRI: --  PVR: --  PVRI: --  CAPILLARY BLOOD GLUCOSE  308 (2024 10:46)      POCT Blood Glucose.: 240 mg/dL (2024 21:44)  POCT Blood Glucose.: 300 mg/dL (2024 16:40)  POCT Blood Glucose.: 291 mg/dL (2024 14:08)  POCT Blood Glucose.: 312 mg/dL (2024 10:05)  POCT Blood Glucose.: 308 mg/dL (2024 09:45)    CAPILLARY BLOOD GLUCOSE  308 (2024 10:46)      POCT Blood Glucose.: 240 mg/dL (2024 21:44)    I&O's Summary    2024 07:01  -  15 [place] 2024 06:49  --------------------------------------------------------  IN: 295.2 mL / OUT: 1410 mL / NET: -1114.8 mL      Daily Height in cm: 165.1 (2024 13:27)    Daily Weight in k.2 (15 [place] 2024 04:00)    REVIEW OF SYSTEMS  See interval history otherwise negative    PHYSICAL EXAMINATION:  General: Comfortable, no acute distress, cooperative with exam.  HEENT: Moist mucous membranes.  Respiratory: CTAB, normal respiratory effort, no coughing, wheezes, crackles, or rales.  CV: RRR, S1S2, no murmurs, rubs or gallops. No JVD. Distal pulses intact.  Abdominal: Soft, nontender, nondistended, no rebound or guarding, normal bowel sounds.  Neurology: AOx3, no focal neuro defects, GALARZA x 4.  Extremities: No pitting edema, + Peripheral pulses.  Cath site:   Tubes:        LABS:                          9.3    15.73 )-----------( 495      ( 2024 17:31 )             27.2         123<L>  |  90<L>  |  45<H>  ----------------------------<  289<H>  5.6<H>   |  21<L>  |  0.93    Ca    7.8<L>      2024 17:31  Phos  3.4       Mg     2.40         TPro  5.5<L>  /  Alb  2.5<L>  /  TBili  0.2  /  DBili  x   /  AST  397<H>  /  ALT  349<H>  /  AlkPhos  107      LIVER FUNCTIONS - ( 2024 17:31 )  Alb: 2.5 g/dL / Pro: 5.5 g/dL / ALK PHOS: 107 U/L / ALT: 349 U/L / AST: 397 U/L / GGT: x           PT/INR - ( 2024 10:31 )   PT: 12.5 sec;   INR: 1.12 ratio         PTT - ( 2024 10:31 )  PTT:26.1 sec        Urinalysis Basic - ( 2024 17:31 )    Color: x / Appearance: x / SG: x / pH: x  Gluc: 289 mg/dL / Ketone: x  / Bili: x / Urobili: x   Blood: x / Protein: x / Nitrite: x   Leuk Esterase: x / RBC: x / WBC x   Sq Epi: x / Non Sq Epi: x / Bacteria: x    Assessment:  · Assessment	  72yo F with h/o smoking, HTN, HLD, type 1 DM, thyroidectomy, BCC, SCC found to have 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 Currently undergoing treatment w Dr. Plummer at [facility]. Per pt's , at bedside, pt had been feeling well then awoke this morning disoriented, confused and c/o significant CP and SOB. Brought by EMS to [facility] ER. Due to change in MS,  CT scan r/o code stroke. No infarcts seen.  CT scan C/A/P revealed large pericardial effusion w bilat pleural effusion. Pt  hypotensive, tachycardic. s/p 1Liter IVF.  Discussed with interventional cardiology with plan to  drain pericardial effusion and admit to CCU.    #Neuro  A+O x 3    Code stroke  NEURO:  Impression: Expressive aphasia and right superior quadrantanopia due to left hemispheric dysfunction. Mechanism ischemic stroke. Etiology likely cardioembolic given new afib vs hypercoagulability of malignancy      Pt is not a candidate for tenecteplase due to outside tenecteplase window , cancer with mets to brain  Mechanical thrombectomy candidacy pending MRI/MRA  without contrast(no CTA due to alergy)    CT head:  Limited evaluation. No evidence of a large acute infarction   or large acute hemorrhage. Minimal microvascular disease.        #Respiratory  - Currently in no acute distress  - Satting 98% 2L NC  - bilat pleural effusion.    #Cards    Pericardial effusion  CT scan C/A/P revealed large pericardial effusion w bilat pleural effusion.  Pericardial drain placed for tamponade , 800cc sanguineous fluid removed  Cytology pending  ovn  --> 10cc        New Afib  iso pericardial eff. with tamponade physiology  ChadsVasc: 7  Has-Bled: 3  Anticoagulation recommendations pending further imaging   B/L compression stockings      HTN  Patient with h/o hypertension and is on lisinopril 20mg BID, amlodipine 5mg BID,   Hold Home medications  -permissive HTN up to 220/110 for 24-48h from symptom onset followed by gradual normotension over 2-3 days   DASH diet  1/15 weaning Bashir-Synephrine   1/15 1 liter fluid over 4 hours      #GI  NPO unless passes dysphagia screen; swallow eval if fails  Consistent carb  Tolerating diet    HLD    simvastatin 20mg      #  Normal indices  creatinine 0.6, BUN 24    Hyponatremia  iso hyperglycemia     serum: osmolality 271, and sodium 126  Urine:  osmolality  593, and sodium 50   BUN 24    #Endo    -Diabetes  DM2 (diabetes mellitus, type II)  Home medication: Lantus 11 units am and 7 units 6pm  novalog/humalog 2-4 units pre-meal  blood glucose ACHS with ISS  Check HgA1c  Diabetic education  Consistent carbohydrate diet  Endo cx, started latus this am 10 units    Heme  BCC, SCC   found to have 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   Currently undergoing treatment w Dr. Plummer at [facility].   Discussed with [facility] Dr plummer:  Please hold Home chemo meds: Mekinist 0.5mg 10pm Tafinlar 75mg BID while inpatient      PPx  Anticoagulation recommendations pending further imaging   (+) compression stockings

## 2024-01-15 NOTE — DISCHARGE NOTE PROVIDER - NSDCFUSCHEDAPPT_GEN_ALL_CORE_FT
Marcus Rizvi  [facility]  INTMED [address]  Scheduled Appointment: 01/16/2024    Autumn Mathew  [facility]  GENSURG [address]  Scheduled Appointment: 02/05/2024    Génesis Plummer  [facility]  Bg CC Practic  Scheduled Appointment: 02/09/2024    [facility] CC Infusio  Scheduled Appointment: 02/12/2024    Dwayne Strauss  [facility]  CARDIOLOGY [address]   Scheduled Appointment: 02/13/2024    Niranjan Maddox  [facility]  RADMED [address]  Scheduled Appointment: 02/14/2024    Anahi Murphy  [facility]  OBGYNGEN  [address]  Scheduled Appointment: 04/09/2024     Marcus Rizvi  [facility]  INTMED [address]  Scheduled Appointment: 01/16/2024    Autumn Mathew  [facility]  GENSURG [address]  Scheduled Appointment: 02/05/2024    Génesis Plummer  [facility]  Bg CC Practic  Scheduled Appointment: 02/09/2024    [facility] CC Infusio  Scheduled Appointment: 02/12/2024    Dwayne Strauss  [facility]  CARDIOLOGY [address]   Scheduled Appointment: 02/13/2024    Niranjan Maddox  [facility]  RADMED [address]  Scheduled Appointment: 02/14/2024    Anahi Murphy  [facility]  OBGYNGEN  [address]  Scheduled Appointment: 04/09/2024     Autumn Mathew  [facility]  GENSURG [address]  Scheduled Appointment: 02/05/2024    [facility]  [facility]  Scheduled Appointment: 02/09/2024    [facility] CC Infusio  Scheduled Appointment: 02/12/2024    Dwayne Strauss  [facility]  CARDIOLOGY [address]   Scheduled Appointment: 02/13/2024    Niranjan Maddox  [facility]  RADMED [address]  Scheduled Appointment: 02/14/2024    Anahi Murphy  [facility]  OBGYNGEN  [address]  Scheduled Appointment: 04/09/2024     Autumn Mathew  [facility]  GENSURG [address]  Scheduled Appointment: 02/05/2024    G[facility]nesis Plummer  [facility]  Scheduled Appointment: 02/09/2024    [facility] CC Infusio  Scheduled Appointment: 02/12/2024    Dwayne Strauss  [facility]  CARDIOLOGY [address]   Scheduled Appointment: 02/13/2024    Niranjan Maddox  [facility]  RADMED [address]  Scheduled Appointment: 02/14/2024    Anahi Murphy  [facility]  OBGYNGEN  [address]  Scheduled Appointment: 04/09/2024

## 2024-01-15 NOTE — CONSULT NOTE ADULT - SUBJECTIVE AND OBJECTIVE BOX
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 Currently undergoing treatment w Dr. Plummer at [facility]. Per pt's , at bedside, pt had been feeling well then awoke this morning disoriented, confused and c/o significant CP and SOB. Brought by EMS to [facility] ER. Due to change in MS,  Pt hypotensive and tachycardic. CT scan r/o code stroke. No infarcts seen.  CT scan C/A/P revealed large pericardial effusion w bilat pleural effusion. Pt  hypotensive, tachycardic. s/p 1Liter IVF.  Discussed with interventional cardiology. Now s/p drain of pericardial effusion 800 sanguineous fluid   (2024 12:40)      DIABETES HX:  History/diagnosis: T1DM diagnosed age 14  Follows with: Dr. Tian endocrinology  Last hemoglobin A1c: 7.0%  Home regimen: lantus 10 units BID. humalog 2-4 units (depending on size of meal)  Adherence: does not miss doses  Blood sugar: patient checks "10 times per day," typically around 150. She isn't sure about how it changes fasting vs PP  Hypoglycemia episodes: Has a reading <70 1x/week. This happens when "I dont calculate correctly based on my meal"  Microvascular complications: Patient denies hx nephropathy, retinopathy, or neuropathy.  Macrovascular complications: No history of CAD. Being worked up for possible CVA      PAST MEDICAL & SURGICAL HISTORY:  Hypertension      DM type 1 (diabetes mellitus, type 1)  Dx 47 years ago      Basal cell carcinoma  forehead and leg      SCC (squamous cell carcinoma)  on chest      BRCA2 positive      Hyperlipidemia      Latex allergy      S/P  Section x2  ,       Excision of Lipoma of neck        BRCA2 positive  Bilateral Salpingo-oophorectomy : preventative            FAMILY HISTORY:  No pertinent family history in first degree relatives        Social History:    Outpatient Medications:  lantus 10 units BID. humalog 2-4 units (depending on size of meal)    MEDICATIONS  (STANDING):  chlorhexidine 2% Cloths 1 Application(s) Topical daily  dextrose 5%. 1000 milliLiter(s) (100 mL/Hr) IV Continuous <Continuous>  dextrose 5%. 1000 milliLiter(s) (50 mL/Hr) IV Continuous <Continuous>  dextrose 50% Injectable 25 Gram(s) IV Push once  dextrose 50% Injectable 25 Gram(s) IV Push once  dextrose 50% Injectable 12.5 Gram(s) IV Push once  glucagon  Injectable 1 milliGRAM(s) IntraMuscular once  insulin lispro (ADMELOG) corrective regimen sliding scale   SubCutaneous at bedtime  insulin lispro (ADMELOG) corrective regimen sliding scale   SubCutaneous three times a day before meals  phenylephrine    Infusion 0.5 MICROgram(s)/kG/Min (6.09 mL/Hr) IV Continuous <Continuous>  simvastatin 20 milliGRAM(s) Oral at bedtime    MEDICATIONS  (PRN):  dextrose Oral Gel 15 Gram(s) Oral once PRN Blood Glucose LESS THAN 70 milliGRAM(s)/deciliter      Allergies    latex (Urticaria)  Actonel (Unknown)  Gadavist (Anaphylaxis; Hives)  codeine (Vomiting)    Intolerances      Review of Systems:  Constitutional: No fever  Eyes: No blurry vision  Neuro: No tremors  HEENT: No pain  Cardiovascular: No chest pain, palpitations  Respiratory: No SOB, no cough  GI: No nausea, vomiting, abdominal pain  : No dysuria  Skin: no rash  Psych: no depression  Endocrine: no polyuria, polydipsia  Hem/lymph: no swelling  Osteoporosis: no fractures    ALL OTHER SYSTEMS REVIEWED AND NEGATIVE    PHYSICAL EXAM:  VITALS: T(C): 36.7 (01-15-24 @ 08:00)  T(F): 98.1 (01-15-24 @ 08:00), Max: 98.3 (24 @ 20:00)  HR: 62 (01-15-24 @ 11:00) (61 - 92)  BP: 106/56 (01-15-24 @ 11:00) (103/51 - 130/55)  RR:  (13 - 26)  SpO2:  (96% - 100%)  Wt(kg): --  GENERAL: NAD, well-groomed, well-developed  EYES: No proptosis, no lid lag, anicteric  HEENT:  Atraumatic, Normocephalic, moist mucous membranes  THYROID: Normal size, no palpable nodules  RESPIRATORY: Normal respiratory effort; no audible wheezing  CARDIOVASCULAR: Regular rate and rhythm; No murmurs; no peripheral edema  GI: Soft, nontender, non distended, normal bowel sounds  SKIN: Dry, intact, No rashes or lesions  MUSCULOSKELETAL: Full range of motion, normal strength  NEURO: sensation intact, extraocular movements intact, no tremor  PSYCH: Alert and oriented x 3, normal affect, normal mood  CUSHING'S SIGNS: no striae                          9.4    10.56 )-----------( 504      ( 15 [place] 2024 05:20 )             28.2       01-15    126<L>  |  95<L>  |  24<H>  ----------------------------<  201<H>  4.9   |  21<L>  |  0.62    eGFR: 93    Ca    7.5<L>      01-15  Mg     2.30     01-15  Phos  2.3     01-15    TPro  5.1<L>  /  Alb  2.3<L>  /  TBili  0.2  /  DBili  x   /  AST  231<H>  /  ALT  298<H>  /  AlkPhos  101  01-15      A1C with Estimated Average Glucose Result: 7.0 % (01-15-24 @ 05:20)      CAPILLARY BLOOD GLUCOSE      POCT Blood Glucose.: 175 mg/dL (15 [place] 2024 12:38)  POCT Blood Glucose.: 201 mg/dL (15 [place] 2024 07:46)  POCT Blood Glucose.: 240 mg/dL (2024 21:44)  POCT Blood Glucose.: 300 mg/dL (2024 16:40)  POCT Blood Glucose.: 291 mg/dL (2024 14:08)      Thyroid Function Tests:  chlorhexidine 2% Cloths 1 Application(s) Topical daily  dextrose 5%. 1000 milliLiter(s) IV Continuous <Continuous>  dextrose 5%. 1000 milliLiter(s) IV Continuous <Continuous>  dextrose 50% Injectable 25 Gram(s) IV Push once  dextrose 50% Injectable 25 Gram(s) IV Push once  dextrose 50% Injectable 12.5 Gram(s) IV Push once  dextrose Oral Gel 15 Gram(s) Oral once PRN  glucagon  Injectable 1 milliGRAM(s) IntraMuscular once  insulin lispro (ADMELOG) corrective regimen sliding scale   SubCutaneous three times a day before meals  insulin lispro (ADMELOG) corrective regimen sliding scale   SubCutaneous at bedtime  phenylephrine    Infusion 0.5 MICROgram(s)/kG/Min IV Continuous <Continuous>  simvastatin 20 milliGRAM(s) Oral at bedtime          Radiology:          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 Currently undergoing treatment w Dr. Plummer at [facility]. Per pt's , at bedside, pt had been feeling well then awoke this morning disoriented, confused and c/o significant CP and SOB. Brought by EMS to [facility] ER. Due to change in MS,  Pt hypotensive and tachycardic. CT scan r/o code stroke. No infarcts seen.  CT scan C/A/P revealed large pericardial effusion w bilat pleural effusion. Pt  hypotensive, tachycardic. s/p 1Liter IVF.  Discussed with interventional cardiology. Now s/p drain of pericardial effusion 800 sanguineous fluid   (2024 12:40)      DIABETES HX:  History/diagnosis: T1DM diagnosed age 14  Follows with: Dr. Tian endocrinology  Last hemoglobin A1c: 7.0%  Home regimen: lantus 10 units BID. humalog 2-4 units (depending on size of meal)  Adherence: does not miss doses  Blood sugar: patient checks "10 times per day," typically around 150. She isn't sure about how it changes fasting vs PP  Hypoglycemia episodes: Has a reading <70 1x/week. This happens when "I dont calculate correctly based on my meal"  Microvascular complications: Patient denies hx nephropathy, retinopathy, or neuropathy.  Macrovascular complications: No history of CAD. Being worked up for possible CVA      PAST MEDICAL & SURGICAL HISTORY:  Hypertension      DM type 1 (diabetes mellitus, type 1)  Dx 47 years ago      Basal cell carcinoma  forehead and leg      SCC (squamous cell carcinoma)  on chest      BRCA2 positive      Hyperlipidemia      Latex allergy      S/P  Section x2  ,       Excision of Lipoma of neck        BRCA2 positive  Bilateral Salpingo-oophorectomy : preventative            FAMILY HISTORY:  No pertinent family history in first degree relatives        Social History:    Outpatient Medications:  lantus 10 units BID. humalog 2-4 units (depending on size of meal)    MEDICATIONS  (STANDING):  chlorhexidine 2% Cloths 1 Application(s) Topical daily  dextrose 5%. 1000 milliLiter(s) (100 mL/Hr) IV Continuous <Continuous>  dextrose 5%. 1000 milliLiter(s) (50 mL/Hr) IV Continuous <Continuous>  dextrose 50% Injectable 25 Gram(s) IV Push once  dextrose 50% Injectable 25 Gram(s) IV Push once  dextrose 50% Injectable 12.5 Gram(s) IV Push once  glucagon  Injectable 1 milliGRAM(s) IntraMuscular once  insulin lispro (ADMELOG) corrective regimen sliding scale   SubCutaneous at bedtime  insulin lispro (ADMELOG) corrective regimen sliding scale   SubCutaneous three times a day before meals  phenylephrine    Infusion 0.5 MICROgram(s)/kG/Min (6.09 mL/Hr) IV Continuous <Continuous>  simvastatin 20 milliGRAM(s) Oral at bedtime    MEDICATIONS  (PRN):  dextrose Oral Gel 15 Gram(s) Oral once PRN Blood Glucose LESS THAN 70 milliGRAM(s)/deciliter      Allergies    latex (Urticaria)  Actonel (Unknown)  Gadavist (Anaphylaxis; Hives)  codeine (Vomiting)    Intolerances      Review of Systems:  Constitutional: No fever  Eyes: No blurry vision  Neuro: No tremors  HEENT: No pain  Cardiovascular: No chest pain, palpitations  Respiratory: No SOB, no cough  GI: No nausea, vomiting, abdominal pain  : No dysuria  Skin: no rash  Psych: no depression  Endocrine: no polyuria, polydipsia  Hem/lymph: no swelling  Osteoporosis: no fractures    ALL OTHER SYSTEMS REVIEWED AND NEGATIVE    PHYSICAL EXAM:  VITALS: T(C): 36.7 (01-15-24 @ 08:00)  T(F): 98.1 (01-15-24 @ 08:00), Max: 98.3 (24 @ 20:00)  HR: 62 (01-15-24 @ 11:00) (61 - 92)  BP: 106/56 (01-15-24 @ 11:00) (103/51 - 130/55)  RR:  (13 - 26)  SpO2:  (96% - 100%)  Wt(kg): --  GENERAL: NAD, well-groomed, well-developed  EYES: No proptosis, no lid lag, anicteric  HEENT:  Atraumatic, Normocephalic, moist mucous membranes  THYROID: Normal size, no palpable nodules  RESPIRATORY: Normal respiratory effort; no audible wheezing  CARDIOVASCULAR: Regular rate and rhythm; No murmurs; no peripheral edema  GI: Soft, nontender, non distended, normal bowel sounds  SKIN: Dry, intact, No rashes or lesions  MUSCULOSKELETAL: Full range of motion, normal strength  NEURO: sensation intact, extraocular movements intact, no tremor  PSYCH: Alert and oriented x 3, normal affect, normal mood  CUSHING'S SIGNS: no striae                          9.4    10.56 )-----------( 504      ( 15 [place] 2024 05:20 )             28.2       01-15    126<L>  |  95<L>  |  24<H>  ----------------------------<  201<H>  4.9   |  21<L>  |  0.62    eGFR: 93    Ca    7.5<L>      01-15  Mg     2.30     01-15  Phos  2.3     01-15    TPro  5.1<L>  /  Alb  2.3<L>  /  TBili  0.2  /  DBili  x   /  AST  231<H>  /  ALT  298<H>  /  AlkPhos  101  01-15      A1C with Estimated Average Glucose Result: 7.0 % (01-15-24 @ 05:20)      CAPILLARY BLOOD GLUCOSE      POCT Blood Glucose.: 175 mg/dL (15 [place] 2024 12:38)  POCT Blood Glucose.: 201 mg/dL (15 [place] 2024 07:46)  POCT Blood Glucose.: 240 mg/dL (2024 21:44)  POCT Blood Glucose.: 300 mg/dL (2024 16:40)  POCT Blood Glucose.: 291 mg/dL (2024 14:08)      Thyroid Function Tests:  chlorhexidine 2% Cloths 1 Application(s) Topical daily  dextrose 5%. 1000 milliLiter(s) IV Continuous <Continuous>  dextrose 5%. 1000 milliLiter(s) IV Continuous <Continuous>  dextrose 50% Injectable 25 Gram(s) IV Push once  dextrose 50% Injectable 25 Gram(s) IV Push once  dextrose 50% Injectable 12.5 Gram(s) IV Push once  dextrose Oral Gel 15 Gram(s) Oral once PRN  glucagon  Injectable 1 milliGRAM(s) IntraMuscular once  insulin lispro (ADMELOG) corrective regimen sliding scale   SubCutaneous three times a day before meals  insulin lispro (ADMELOG) corrective regimen sliding scale   SubCutaneous at bedtime  phenylephrine    Infusion 0.5 MICROgram(s)/kG/Min IV Continuous <Continuous>  simvastatin 20 milliGRAM(s) Oral at bedtime          Radiology:          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 Currently undergoing treatment w Dr. Plummer at [facility]. Per pt's , at bedside, pt had been feeling well then awoke this morning disoriented, confused and c/o significant CP and SOB. Brought by EMS to [facility] ER. Due to change in MS,  Pt hypotensive and tachycardic. CT scan r/o code stroke. No infarcts seen.  CT scan C/A/P revealed large pericardial effusion w bilat pleural effusion. Pt  hypotensive, tachycardic. s/p 1Liter IVF.  Discussed with interventional cardiology. Now s/p drain of pericardial effusion 800 sanguineous fluid   (2024 12:40)      DIABETES HX:  History/diagnosis: T1DM diagnosed age 14  Follows with: Dr. Tian endocrinology  Last hemoglobin A1c: 7.0%  Home regimen: lantus 10 units BID. humalog 2-4 units (depending on size of meal)  Adherence: does not miss doses  Blood sugar: patient checks "10 times per day," typically around 150. She isn't sure about how it changes fasting vs PP  Hypoglycemia episodes: Has a reading <70 1x/week. This happens when "I dont calculate correctly based on my meal"  Microvascular complications: Patient denies hx nephropathy, retinopathy, or neuropathy.  Macrovascular complications: No history of CAD. Being worked up for possible CVA      PAST MEDICAL & SURGICAL HISTORY:  Hypertension      DM type 1 (diabetes mellitus, type 1)  Dx 47 years ago      Basal cell carcinoma  forehead and leg      SCC (squamous cell carcinoma)  on chest      BRCA2 positive      Hyperlipidemia      Latex allergy      S/P  Section x2  ,       Excision of Lipoma of neck        BRCA2 positive  Bilateral Salpingo-oophorectomy : preventative            FAMILY HISTORY:  No pertinent family history in first degree relatives        Social History:    Outpatient Medications:  lantus 10 units BID. humalog 2-4 units (depending on size of meal)    MEDICATIONS  (STANDING):  chlorhexidine 2% Cloths 1 Application(s) Topical daily  dextrose 5%. 1000 milliLiter(s) (100 mL/Hr) IV Continuous <Continuous>  dextrose 5%. 1000 milliLiter(s) (50 mL/Hr) IV Continuous <Continuous>  dextrose 50% Injectable 25 Gram(s) IV Push once  dextrose 50% Injectable 25 Gram(s) IV Push once  dextrose 50% Injectable 12.5 Gram(s) IV Push once  glucagon  Injectable 1 milliGRAM(s) IntraMuscular once  insulin lispro (ADMELOG) corrective regimen sliding scale   SubCutaneous at bedtime  insulin lispro (ADMELOG) corrective regimen sliding scale   SubCutaneous three times a day before meals  phenylephrine    Infusion 0.5 MICROgram(s)/kG/Min (6.09 mL/Hr) IV Continuous <Continuous>  simvastatin 20 milliGRAM(s) Oral at bedtime    MEDICATIONS  (PRN):  dextrose Oral Gel 15 Gram(s) Oral once PRN Blood Glucose LESS THAN 70 milliGRAM(s)/deciliter      Allergies    latex (Urticaria)  Actonel (Unknown)  Gadavist (Anaphylaxis; Hives)  codeine (Vomiting)    Intolerances      Review of Systems:  Constitutional: No fever  Eyes: No blurry vision  Neuro: No tremors  HEENT: No pain  Cardiovascular: No chest pain, palpitations  Respiratory: No SOB, no cough  GI: No nausea, vomiting, abdominal pain  : No dysuria  Skin: no rash  Psych: no depression  Endocrine: no polyuria, polydipsia  Hem/lymph: no swelling  Osteoporosis: no fractures    ALL OTHER SYSTEMS REVIEWED AND NEGATIVE    PHYSICAL EXAM:  VITALS: T(C): 36.7 (01-15-24 @ 08:00)  T(F): 98.1 (01-15-24 @ 08:00), Max: 98.3 (24 @ 20:00)  HR: 62 (01-15-24 @ 11:00) (61 - 92)  BP: 106/56 (01-15-24 @ 11:00) (103/51 - 130/55)  RR:  (13 - 26)  SpO2:  (96% - 100%)  Wt(kg): --  GENERAL: NAD, well-groomed, well-developed  EYES: No proptosis, no lid lag, anicteric  HEENT:  Atraumatic, Normocephalic, moist mucous membranes  THYROID: Normal size, no palpable nodules  RESPIRATORY: Normal respiratory effort; no audible wheezing  CARDIOVASCULAR: Regular rate and rhythm; No murmurs; no peripheral edema  GI: Soft, nontender, non distended, normal bowel sounds  SKIN: Dry, intact, No rashes or lesions  MUSCULOSKELETAL: Full range of motion, normal strength  NEURO: sensation intact, extraocular movements intact, no tremor  PSYCH: Alert and oriented x 3, normal affect, normal mood  CUSHING'S SIGNS: no striae                          9.4    10.56 )-----------( 504      ( 15 [place] 2024 05:20 )             28.2       01-15    126<L>  |  95<L>  |  24<H>  ----------------------------<  201<H>  4.9   |  21<L>  |  0.62    eGFR: 93    Ca    7.5<L>      01-15  Mg     2.30     01-15  Phos  2.3     01-15    TPro  5.1<L>  /  Alb  2.3<L>  /  TBili  0.2  /  DBili  x   /  AST  231<H>  /  ALT  298<H>  /  AlkPhos  101  01-15      A1C with Estimated Average Glucose Result: 7.0 % (01-15-24 @ 05:20)      CAPILLARY BLOOD GLUCOSE      POCT Blood Glucose.: 175 mg/dL (15 [place] 2024 12:38)  POCT Blood Glucose.: 201 mg/dL (15 [place] 2024 07:46)  POCT Blood Glucose.: 240 mg/dL (2024 21:44)  POCT Blood Glucose.: 300 mg/dL (2024 16:40)  POCT Blood Glucose.: 291 mg/dL (2024 14:08)      Thyroid Function Tests:  chlorhexidine 2% Cloths 1 Application(s) Topical daily  dextrose 5%. 1000 milliLiter(s) IV Continuous <Continuous>  dextrose 5%. 1000 milliLiter(s) IV Continuous <Continuous>  dextrose 50% Injectable 25 Gram(s) IV Push once  dextrose 50% Injectable 25 Gram(s) IV Push once  dextrose 50% Injectable 12.5 Gram(s) IV Push once  dextrose Oral Gel 15 Gram(s) Oral once PRN  glucagon  Injectable 1 milliGRAM(s) IntraMuscular once  insulin lispro (ADMELOG) corrective regimen sliding scale   SubCutaneous three times a day before meals  insulin lispro (ADMELOG) corrective regimen sliding scale   SubCutaneous at bedtime  phenylephrine    Infusion 0.5 MICROgram(s)/kG/Min IV Continuous <Continuous>  simvastatin 20 milliGRAM(s) Oral at bedtime          Radiology:          HPI:  72yo F with h/o smoking, HTN, HLD, type 1 DM, thyroidectomy, BCC, SCC found to have 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 Currently undergoing treatment w Dr. Plummer at [facility]. Per pt's , at bedside, pt had been feeling well then awoke this morning disoriented, confused and c/o significant CP and SOB. Brought by EMS to [facility] ER. Due to change in MS,  Pt hypotensive and tachycardic. CT scan r/o code stroke. No infarcts seen.  CT scan C/A/P revealed large pericardial effusion w bilat pleural effusion. Pt  hypotensive, tachycardic. s/p 1Liter IVF.  Discussed with interventional cardiology. Now s/p drain of pericardial effusion 800 sanguineous fluid   (2024 12:40)      DIABETES HX:  History/diagnosis: T1DM diagnosed age 14  Follows with: Dr. Tian endocrinology  Last hemoglobin A1c: 7.0%  Home regimen: lantus 10 units BID. humalog 2-4 units (depending on size of meal)  Adherence: does not miss doses  Blood sugar: patient checks "10 times per day," typically around 150. She isn't sure about how it changes fasting vs PP  Hypoglycemia episodes: Has a reading <70 1x/week. This happens when "I dont calculate correctly based on my meal"  Microvascular complications: Patient denies hx nephropathy, retinopathy, or neuropathy.  Macrovascular complications: No history of CAD. Being worked up for possible CVA    Also has a hx of partial thyroidectomy 9 years ago for a nodule, pathology was benign. Does not take LT4.      PAST MEDICAL & SURGICAL HISTORY:  Hypertension      DM type 1 (diabetes mellitus, type 1)  Dx 47 years ago      Basal cell carcinoma  forehead and leg      SCC (squamous cell carcinoma)  on chest      BRCA2 positive      Hyperlipidemia      Latex allergy      S/P  Section x2  ,       Excision of Lipoma of neck        BRCA2 positive  Bilateral Salpingo-oophorectomy : preventative  2012          FAMILY HISTORY:  No pertinent family history in first degree relatives        Social History:    Outpatient Medications:  lantus 10 units BID. humalog 2-4 units (depending on size of meal)    MEDICATIONS  (STANDING):  chlorhexidine 2% Cloths 1 Application(s) Topical daily  dextrose 5%. 1000 milliLiter(s) (100 mL/Hr) IV Continuous <Continuous>  dextrose 5%. 1000 milliLiter(s) (50 mL/Hr) IV Continuous <Continuous>  dextrose 50% Injectable 25 Gram(s) IV Push once  dextrose 50% Injectable 25 Gram(s) IV Push once  dextrose 50% Injectable 12.5 Gram(s) IV Push once  glucagon  Injectable 1 milliGRAM(s) IntraMuscular once  insulin lispro (ADMELOG) corrective regimen sliding scale   SubCutaneous at bedtime  insulin lispro (ADMELOG) corrective regimen sliding scale   SubCutaneous three times a day before meals  phenylephrine    Infusion 0.5 MICROgram(s)/kG/Min (6.09 mL/Hr) IV Continuous <Continuous>  simvastatin 20 milliGRAM(s) Oral at bedtime    MEDICATIONS  (PRN):  dextrose Oral Gel 15 Gram(s) Oral once PRN Blood Glucose LESS THAN 70 milliGRAM(s)/deciliter      Allergies    latex (Urticaria)  Actonel (Unknown)  Gadavist (Anaphylaxis; Hives)  codeine (Vomiting)    Intolerances      Review of Systems:  Constitutional: No fever  Eyes: No blurry vision  Neuro: No tremors  HEENT: No pain  Cardiovascular: No chest pain, palpitations  Respiratory: No SOB, no cough  GI: No nausea, vomiting, abdominal pain  : No dysuria  Skin: no rash  Psych: no depression  Endocrine: no polyuria, polydipsia  Hem/lymph: no swelling  Osteoporosis: no fractures    ALL OTHER SYSTEMS REVIEWED AND NEGATIVE    PHYSICAL EXAM:  VITALS: T(C): 36.7 (01-15-24 @ 08:00)  T(F): 98.1 (01-15-24 @ 08:00), Max: 98.3 (24 @ 20:00)  HR: 62 (01-15-24 @ 11:00) (61 - 92)  BP: 106/56 (01-15-24 @ 11:00) (103/51 - 130/55)  RR:  (13 - 26)  SpO2:  (96% - 100%)  Wt(kg): --  GENERAL: NAD, well-groomed, well-developed  EYES: No proptosis, no lid lag, anicteric  HEENT:  Atraumatic, Normocephalic, moist mucous membranes  THYROID: Normal size, no palpable nodules  RESPIRATORY: Normal respiratory effort; no audible wheezing  CARDIOVASCULAR: Regular rate and rhythm; No murmurs; no peripheral edema  GI: Soft, nontender, non distended, normal bowel sounds  SKIN: Dry, intact, No rashes or lesions  MUSCULOSKELETAL: Full range of motion, normal strength  NEURO: sensation intact, extraocular movements intact, no tremor  PSYCH: Alert and oriented x 3, normal affect, normal mood  CUSHING'S SIGNS: no striae                          9.4    10.56 )-----------( 504      ( 15 [place] 2024 05:20 )             28.2       01-15    126<L>  |  95<L>  |  24<H>  ----------------------------<  201<H>  4.9   |  21<L>  |  0.62    eGFR: 93    Ca    7.5<L>      01-15  Mg     2.30     01-15  Phos  2.3     01-15    TPro  5.1<L>  /  Alb  2.3<L>  /  TBili  0.2  /  DBili  x   /  AST  231<H>  /  ALT  298<H>  /  AlkPhos  101  01-15      A1C with Estimated Average Glucose Result: 7.0 % (01-15-24 @ 05:20)      CAPILLARY BLOOD GLUCOSE      POCT Blood Glucose.: 175 mg/dL (15 [place] 2024 12:38)  POCT Blood Glucose.: 201 mg/dL (15 [place] 2024 07:46)  POCT Blood Glucose.: 240 mg/dL (2024 21:44)  POCT Blood Glucose.: 300 mg/dL (2024 16:40)  POCT Blood Glucose.: 291 mg/dL (2024 14:08)      Thyroid Function Tests:  chlorhexidine 2% Cloths 1 Application(s) Topical daily  dextrose 5%. 1000 milliLiter(s) IV Continuous <Continuous>  dextrose 5%. 1000 milliLiter(s) IV Continuous <Continuous>  dextrose 50% Injectable 25 Gram(s) IV Push once  dextrose 50% Injectable 25 Gram(s) IV Push once  dextrose 50% Injectable 12.5 Gram(s) IV Push once  dextrose Oral Gel 15 Gram(s) Oral once PRN  glucagon  Injectable 1 milliGRAM(s) IntraMuscular once  insulin lispro (ADMELOG) corrective regimen sliding scale   SubCutaneous three times a day before meals  insulin lispro (ADMELOG) corrective regimen sliding scale   SubCutaneous at bedtime  phenylephrine    Infusion 0.5 MICROgram(s)/kG/Min IV Continuous <Continuous>  simvastatin 20 milliGRAM(s) Oral at bedtime          Radiology:          HPI:  72yo F with h/o smoking, HTN, HLD, type 1 DM, thyroidectomy, BCC, SCC found to have 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 Currently undergoing treatment w Dr. Plummer at [facility]. Per pt's , at bedside, pt had been feeling well then awoke this morning disoriented, confused and c/o significant CP and SOB. Brought by EMS to [facility] ER. Due to change in MS,  Pt hypotensive and tachycardic. CT scan r/o code stroke. No infarcts seen.  CT scan C/A/P revealed large pericardial effusion w bilat pleural effusion. Pt  hypotensive, tachycardic. s/p 1Liter IVF.  Discussed with interventional cardiology. Now s/p drain of pericardial effusion 800 sanguineous fluid   (2024 12:40)      DIABETES HX:  History/diagnosis: T1DM diagnosed age 14  Follows with: Dr. Tian endocrinology  Last hemoglobin A1c: 7.0%  Home regimen: lantus 10 units BID. humalog 2-4 units (depending on size of meal)  Adherence: does not miss doses  Blood sugar: patient checks "10 times per day," typically around 150. She isn't sure about how it changes fasting vs PP  Hypoglycemia episodes: Has a reading <70 1x/week. This happens when "I dont calculate correctly based on my meal"  Microvascular complications: Patient denies hx nephropathy, retinopathy, or neuropathy.  Macrovascular complications: No history of CAD. Being worked up for possible CVA    Also has a hx of partial thyroidectomy 9 years ago for a nodule, pathology was benign. Does not take LT4.      PAST MEDICAL & SURGICAL HISTORY:  Hypertension      DM type 1 (diabetes mellitus, type 1)  Dx 47 years ago      Basal cell carcinoma  forehead and leg      SCC (squamous cell carcinoma)  on chest      BRCA2 positive      Hyperlipidemia      Latex allergy      S/P  Section x2  ,       Excision of Lipoma of neck        BRCA2 positive  Bilateral Salpingo-oophorectomy : preventative  2012          FAMILY HISTORY:  No pertinent family history in first degree relatives        Social History:    Outpatient Medications:  lantus 10 units BID. humalog 2-4 units (depending on size of meal)    MEDICATIONS  (STANDING):  chlorhexidine 2% Cloths 1 Application(s) Topical daily  dextrose 5%. 1000 milliLiter(s) (100 mL/Hr) IV Continuous <Continuous>  dextrose 5%. 1000 milliLiter(s) (50 mL/Hr) IV Continuous <Continuous>  dextrose 50% Injectable 25 Gram(s) IV Push once  dextrose 50% Injectable 25 Gram(s) IV Push once  dextrose 50% Injectable 12.5 Gram(s) IV Push once  glucagon  Injectable 1 milliGRAM(s) IntraMuscular once  insulin lispro (ADMELOG) corrective regimen sliding scale   SubCutaneous at bedtime  insulin lispro (ADMELOG) corrective regimen sliding scale   SubCutaneous three times a day before meals  phenylephrine    Infusion 0.5 MICROgram(s)/kG/Min (6.09 mL/Hr) IV Continuous <Continuous>  simvastatin 20 milliGRAM(s) Oral at bedtime    MEDICATIONS  (PRN):  dextrose Oral Gel 15 Gram(s) Oral once PRN Blood Glucose LESS THAN 70 milliGRAM(s)/deciliter      Allergies    latex (Urticaria)  Actonel (Unknown)  Gadavist (Anaphylaxis; Hives)  codeine (Vomiting)    Intolerances      Review of Systems:  Constitutional: No fever  Eyes: No blurry vision  Neuro: No tremors  HEENT: No pain  Cardiovascular: No chest pain, palpitations  Respiratory: No SOB, no cough  GI: No nausea, vomiting, abdominal pain  : No dysuria  Skin: no rash  Psych: no depression  Endocrine: no polyuria, polydipsia  Hem/lymph: no swelling  Osteoporosis: no fractures    ALL OTHER SYSTEMS REVIEWED AND NEGATIVE    PHYSICAL EXAM:  VITALS: T(C): 36.7 (01-15-24 @ 08:00)  T(F): 98.1 (01-15-24 @ 08:00), Max: 98.3 (24 @ 20:00)  HR: 62 (01-15-24 @ 11:00) (61 - 92)  BP: 106/56 (01-15-24 @ 11:00) (103/51 - 130/55)  RR:  (13 - 26)  SpO2:  (96% - 100%)  Wt(kg): --  GENERAL: NAD, well-groomed, well-developed  EYES: No proptosis, no lid lag, anicteric  HEENT:  Atraumatic, Normocephalic, moist mucous membranes  THYROID: Normal size, no palpable nodules  RESPIRATORY: Normal respiratory effort; no audible wheezing  CARDIOVASCULAR: Regular rate and rhythm; No murmurs; no peripheral edema  GI: Soft, nontender, non distended, normal bowel sounds  SKIN: Dry, intact, No rashes or lesions  MUSCULOSKELETAL: Full range of motion, normal strength  NEURO: sensation intact, extraocular movements intact, no tremor  PSYCH: Alert and oriented x 3, normal affect, normal mood  CUSHING'S SIGNS: no striae                          9.4    10.56 )-----------( 504      ( 15 [place] 2024 05:20 )             28.2       01-15    126<L>  |  95<L>  |  24<H>  ----------------------------<  201<H>  4.9   |  21<L>  |  0.62    eGFR: 93    Ca    7.5<L>      01-15  Mg     2.30     01-15  Phos  2.3     01-15    TPro  5.1<L>  /  Alb  2.3<L>  /  TBili  0.2  /  DBili  x   /  AST  231<H>  /  ALT  298<H>  /  AlkPhos  101  01-15      A1C with Estimated Average Glucose Result: 7.0 % (01-15-24 @ 05:20)      CAPILLARY BLOOD GLUCOSE      POCT Blood Glucose.: 175 mg/dL (15 [place] 2024 12:38)  POCT Blood Glucose.: 201 mg/dL (15 [place] 2024 07:46)  POCT Blood Glucose.: 240 mg/dL (2024 21:44)  POCT Blood Glucose.: 300 mg/dL (2024 16:40)  POCT Blood Glucose.: 291 mg/dL (2024 14:08)      Thyroid Function Tests:  chlorhexidine 2% Cloths 1 Application(s) Topical daily  dextrose 5%. 1000 milliLiter(s) IV Continuous <Continuous>  dextrose 5%. 1000 milliLiter(s) IV Continuous <Continuous>  dextrose 50% Injectable 25 Gram(s) IV Push once  dextrose 50% Injectable 25 Gram(s) IV Push once  dextrose 50% Injectable 12.5 Gram(s) IV Push once  dextrose Oral Gel 15 Gram(s) Oral once PRN  glucagon  Injectable 1 milliGRAM(s) IntraMuscular once  insulin lispro (ADMELOG) corrective regimen sliding scale   SubCutaneous three times a day before meals  insulin lispro (ADMELOG) corrective regimen sliding scale   SubCutaneous at bedtime  phenylephrine    Infusion 0.5 MICROgram(s)/kG/Min IV Continuous <Continuous>  simvastatin 20 milliGRAM(s) Oral at bedtime          Radiology:          HPI:  72yo F with h/o smoking, HTN, HLD, type 1 DM, thyroidectomy, BCC, SCC found to have 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 Currently undergoing treatment w Dr. Plummer at [facility]. Per pt's , at bedside, pt had been feeling well then awoke this morning disoriented, confused and c/o significant CP and SOB. Brought by EMS to [facility] ER. Due to change in MS,  Pt hypotensive and tachycardic. CT scan r/o code stroke. No infarcts seen.  CT scan C/A/P revealed large pericardial effusion w bilat pleural effusion. Pt  hypotensive, tachycardic. s/p 1Liter IVF.  Discussed with interventional cardiology. Now s/p drain of pericardial effusion 800 sanguineous fluid   (2024 12:40)      DIABETES HX:  History/diagnosis: T1DM diagnosed age 14  Follows with: Dr. Tian endocrinology  Last hemoglobin A1c: 7.0%  Home regimen: lantus 10 units BID. humalog 2-4 units (depending on size of meal)  Adherence: does not miss doses  Blood sugar: patient checks "10 times per day," typically around 150. She isn't sure about how it changes fasting vs PP  Hypoglycemia episodes: Has a reading <70 1x/week. This happens when "I dont calculate correctly based on my meal"  Microvascular complications: Patient denies hx nephropathy, retinopathy, or neuropathy.  Macrovascular complications: No history of CAD. Being worked up for possible CVA    Also has a hx of partial thyroidectomy 9 years ago for a nodule, pathology was benign. Does not take LT4.      PAST MEDICAL & SURGICAL HISTORY:  Hypertension      DM type 1 (diabetes mellitus, type 1)  Dx 47 years ago      Basal cell carcinoma  forehead and leg      SCC (squamous cell carcinoma)  on chest      BRCA2 positive      Hyperlipidemia      Latex allergy      S/P  Section x2  ,       Excision of Lipoma of neck        BRCA2 positive  Bilateral Salpingo-oophorectomy : preventative  2012          FAMILY HISTORY:  No pertinent family history in first degree relatives        Social History:    Outpatient Medications:  lantus 10 units BID. humalog 2-4 units (depending on size of meal)    MEDICATIONS  (STANDING):  chlorhexidine 2% Cloths 1 Application(s) Topical daily  dextrose 5%. 1000 milliLiter(s) (100 mL/Hr) IV Continuous <Continuous>  dextrose 5%. 1000 milliLiter(s) (50 mL/Hr) IV Continuous <Continuous>  dextrose 50% Injectable 25 Gram(s) IV Push once  dextrose 50% Injectable 25 Gram(s) IV Push once  dextrose 50% Injectable 12.5 Gram(s) IV Push once  glucagon  Injectable 1 milliGRAM(s) IntraMuscular once  insulin lispro (ADMELOG) corrective regimen sliding scale   SubCutaneous at bedtime  insulin lispro (ADMELOG) corrective regimen sliding scale   SubCutaneous three times a day before meals  phenylephrine    Infusion 0.5 MICROgram(s)/kG/Min (6.09 mL/Hr) IV Continuous <Continuous>  simvastatin 20 milliGRAM(s) Oral at bedtime    MEDICATIONS  (PRN):  dextrose Oral Gel 15 Gram(s) Oral once PRN Blood Glucose LESS THAN 70 milliGRAM(s)/deciliter      Allergies    latex (Urticaria)  Actonel (Unknown)  Gadavist (Anaphylaxis; Hives)  codeine (Vomiting)    Intolerances      Review of Systems:  Constitutional: No fever  Eyes: No blurry vision  Neuro: No tremors  HEENT: No pain  Cardiovascular: No chest pain, palpitations  Respiratory: No SOB, no cough  GI: No nausea, vomiting, abdominal pain  : No dysuria  Skin: no rash  Psych: no depression  Endocrine: no polyuria, polydipsia  Hem/lymph: no swelling  Osteoporosis: no fractures    ALL OTHER SYSTEMS REVIEWED AND NEGATIVE    PHYSICAL EXAM:  VITALS: T(C): 36.7 (01-15-24 @ 08:00)  T(F): 98.1 (01-15-24 @ 08:00), Max: 98.3 (24 @ 20:00)  HR: 62 (01-15-24 @ 11:00) (61 - 92)  BP: 106/56 (01-15-24 @ 11:00) (103/51 - 130/55)  RR:  (13 - 26)  SpO2:  (96% - 100%)  Wt(kg): --  GENERAL: NAD, well-groomed, well-developed  EYES: No proptosis, no lid lag, anicteric  HEENT:  Atraumatic, Normocephalic, moist mucous membranes  THYROID: Normal size, no palpable nodules  RESPIRATORY: Normal respiratory effort; no audible wheezing  CARDIOVASCULAR: Regular rate and rhythm; No murmurs; no peripheral edema  GI: Soft, nontender, non distended, normal bowel sounds  SKIN: Dry, intact, No rashes or lesions  MUSCULOSKELETAL: Full range of motion, normal strength  NEURO: sensation intact, extraocular movements intact, no tremor  PSYCH: Alert and oriented x 3, normal affect, normal mood  CUSHING'S SIGNS: no striae                          9.4    10.56 )-----------( 504      ( 15 [place] 2024 05:20 )             28.2       01-15    126<L>  |  95<L>  |  24<H>  ----------------------------<  201<H>  4.9   |  21<L>  |  0.62    eGFR: 93    Ca    7.5<L>      01-15  Mg     2.30     01-15  Phos  2.3     01-15    TPro  5.1<L>  /  Alb  2.3<L>  /  TBili  0.2  /  DBili  x   /  AST  231<H>  /  ALT  298<H>  /  AlkPhos  101  01-15      A1C with Estimated Average Glucose Result: 7.0 % (01-15-24 @ 05:20)      CAPILLARY BLOOD GLUCOSE      POCT Blood Glucose.: 175 mg/dL (15 [place] 2024 12:38)  POCT Blood Glucose.: 201 mg/dL (15 [place] 2024 07:46)  POCT Blood Glucose.: 240 mg/dL (2024 21:44)  POCT Blood Glucose.: 300 mg/dL (2024 16:40)  POCT Blood Glucose.: 291 mg/dL (2024 14:08)      Thyroid Function Tests:  chlorhexidine 2% Cloths 1 Application(s) Topical daily  dextrose 5%. 1000 milliLiter(s) IV Continuous <Continuous>  dextrose 5%. 1000 milliLiter(s) IV Continuous <Continuous>  dextrose 50% Injectable 25 Gram(s) IV Push once  dextrose 50% Injectable 25 Gram(s) IV Push once  dextrose 50% Injectable 12.5 Gram(s) IV Push once  dextrose Oral Gel 15 Gram(s) Oral once PRN  glucagon  Injectable 1 milliGRAM(s) IntraMuscular once  insulin lispro (ADMELOG) corrective regimen sliding scale   SubCutaneous three times a day before meals  insulin lispro (ADMELOG) corrective regimen sliding scale   SubCutaneous at bedtime  phenylephrine    Infusion 0.5 MICROgram(s)/kG/Min IV Continuous <Continuous>  simvastatin 20 milliGRAM(s) Oral at bedtime          Radiology:

## 2024-01-15 NOTE — DISCHARGE NOTE PROVIDER - CARE PROVIDER_API CALL
Jt Helms  Thoracic Surgery  [address] [place] [address], [address] [place] [address]  Phone: [phone]  Fax: [phone]  Follow Up Time:    Jt Helms  Thoracic Surgery  [address] [place] [address], [address] [place] [address]  Phone: [phone]  Fax: [phone]  Follow Up Time:    Jt Helms  Thoracic Surgery  [address] [place] [address], Floor 3 [place] [address]  Phone: [phone]  Fax: [phone]  Follow Up Time:     Libman, Richard Benjamin  Neurology  [address] [facility] [address]  [place] [address]  Phone: [phone]  Fax: [phone]  Follow Up Time: 2 weeks   Jt Helms  Thoracic Surgery  [address] [place] [address], [address] 3 [place] [address]  Phone: [phone]  Fax: [phone]  Follow Up Time:     Libman, Richard Benjamin  Neurology  [address] [facility] [address]  [place] [address]  Phone: [phone]  Fax: [phone]  Follow Up Time: 2 weeks

## 2024-01-15 NOTE — DISCHARGE NOTE PROVIDER - CARE PROVIDERS DIRECT ADDRESSES
,[email or web address]

## 2024-01-15 NOTE — DISCHARGE NOTE PROVIDER - HOSPITAL COURSE
72yo F with h/o smoking, HTN, HLD, type 1 DM, thyroidectomy, BCC, SCC found to have Rt. 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 Currently undergoing treatment w Dr. Plummer at [facility]. presents as code stroke for MS change CT scan r/o code stroke. Accepted to CCU s/p pericardiocentesis, 800cc sanguineous drainage, now with drain.  Overnight 1/14 with minimal output 10cc from drain.     TO DOs:   [ ] Mechanical thrombectomy candidacy pending MRI/MRA without contrast (not CTA due to allergy)  [ ] Repeat TTE 01/16/24 to assess for pericardial effusion     74yo F with h/o smoking, HTN, HLD, type 1 DM, thyroidectomy, BCC, SCC found to have Rt. 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 Currently undergoing treatment w Dr. Plummer at [facility]. presents as code stroke for MS change CT scan r/o code stroke. Accepted to CCU s/p pericardiocentesis, 800cc sanguineous drainage, now with drain.  Overnight 1/14 with minimal output 10cc from drain.     TO DOs:   [ ] Mechanical thrombectomy candidacy pending MRI/MRA without contrast (not CTA due to allergy)  [ ] Repeat TTE 01/16/24 to assess for pericardial effusion     74yo F with h/o smoking, HTN, HLD, type 1 DM, thyroidectomy, BCC, SCC found to have Rt. 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 Currently undergoing treatment w Dr. Plummer at [facility]. presents as code stroke for MS change CT scan r/o code stroke. Accepted to CCU s/p pericardiocentesis, 800cc sanguineous drainage, now with drain. Drain with minimal output and pt HD stable, limited echo completed 1/16 and demonstrated trace effusion, pericardial drain removed without complication. Patient off of neosynephrine for pressor support for >24 hours and BP stable.    TO DOs:   [ ] Pericardial effusion- cytology pending  [ ] Neurology following- recommending [facility] today to screen for evolving infarction given the delays in obtaining MRI brain/MRA H/N. Per neuro, patient in fact does not have allergy to iodine contrast and has received in the past without issue. If this is the case, would also suggest adding on CTA H/N w/ IV contrast if no contraindications. Cautious with AC at this time.  [ ] Endocrine following- recommending Lantus 8 units qAM and reduce to Lantus 6 units tonight 1/16 (per endo would normally recommend once a day dosing for lantus but pt feels strongly this is what works for her, would like to maintain some control during this admission). If overnight glucose is too tightly controlled reduced AM lantus to 6 units in the morning . Recommend admelog 1 units TIDAC before meals (hold if not eating). Recommend Low dose admelog correction scale TIDQAC and separate low dose scale QHS  [ ] Heme- pt with Stage IV NSCLC adenocarcinoma with  mets to multiple organs (bone, brain) in August 2023. Currently undergoing treatment w Dr. Plummer at [facility]. Hold Home chemo Tafinlar 75 BID and Mekinist 0.5 m while inpatient as per Heme/onco.      72yo F with h/o smoking, HTN, HLD, type 1 DM, thyroidectomy, BCC, SCC found to have Rt. 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 Currently undergoing treatment w Dr. [facility]um[facility] at [facility]. presents as code stroke for MS change CT scan r/o code stroke. Accepted to CCU s/p pericardiocentesis, 800cc sanguineous drainage, now with drain. Drain with minimal output and pt HD stable, limited echo completed 1/16 and demonstrated trace effusion, pericardial drain removed without complication. Patient off of neosynephrine for pressor support for >24 hours and BP stable.    TO DOs:   [ ] Pericardial effusion- cytology pending  [ ] Neurology following- recommending [facility] today to screen for evolving infarction given the delays in obtaining MRI brain/MRA H/N. Per neuro, patient in fact does not have allergy to iodine contrast and has received in the past without issue. If this is the case, would also suggest adding on CTA H/N w/ IV contrast if no contraindications. Cautious with AC at this time.  [ ] Endocrine following- recommending Lantus 8 units qAM and reduce to Lantus 6 units tonight 1/16 (per endo would normally recommend once a day dosing for lantus but pt feels strongly this is what works for her, would like to maintain some control during this admission). If overnight glucose is too tightly controlled reduced AM lantus to 6 units in the morning . Recommend admelog 1 units TIDAC before meals (hold if not eating). Recommend Low dose admelog correction scale TIDQAC and separate low dose scale QHS  [ ] Heme- pt with Stage IV NSCLC adenocarcinoma with  mets to multiple organs (bone, brain) in August 2023. Currently undergoing treatment w Dr. Plummer at [facility]. Hold Home chemo Tafinlar 75 BID and Mekinist 0.5 m while inpatient as per Heme/onco.      72yo F with h/o smoking, HTN, HLD, type 1 DM, thyroidectomy, BCC, SCC found to have Rt. 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 Currently undergoing treatment w Dr. Plummer at [facility]. presents as code stroke for MS change CT scan r/o code stroke as patient with expressive aphasia and right superior quadrantanopia due to left hemispheric dysfunction. CTH demonstrated no evidence of large acute infarction or hemorrhage, minimal microvascular disease. Per neuro, pt is not a candidate for tenecteplase due to outside tenecteplase window, cancer with mets to brain. CT     CT head:  LimitedNo evidence of a large acute infarction   or large acute hemorrhage. Minimal microvascular disease.Accepted to CCU s/p pericardiocentesis, 800cc sanguineous drainage, now with drain. Drain with minimal output and pt HD stable, limited echo completed 1/16 and demonstrated trace effusion, pericardial drain removed without complication. Patient off of neosynephrine for pressor support for >24 hours and BP stable.    TO DOs:   [ ] Pericardial effusion- cytology pending  [ ] Neurology following- recommending [facility] today to screen for evolving infarction given the delays in obtaining MRI brain/MRA H/N. Per neuro, patient in fact does not have allergy to iodine contrast and has received in the past without issue. If this is the case, would also suggest adding on CTA H/N w/ IV contrast if no contraindications. Cautious with AC at this time.  [ ] Endocrine following- recommending Lantus 8 units qAM and reduce to Lantus 6 units tonight 1/16 (per endo would normally recommend once a day dosing for lantus but pt feels strongly this is what works for her, would like to maintain some control during this admission). If overnight glucose is too tightly controlled reduced AM lantus to 6 units in the morning . Recommend admelog 1 units TIDAC before meals (hold if not eating). Recommend Low dose admelog correction scale TIDQAC and separate low dose scale QHS  [ ] Heme- pt with Stage IV NSCLC adenocarcinoma with  mets to multiple organs (bone, brain) in August 2023. Currently undergoing treatment w Dr. Plummer at [facility]. Hold Home chemo Tafinlar 75 BID and Mekinist 0.5 m while inpatient as per Heme/onco.      72yo F with h/o smoking, HTN, HLD, type 1 DM, thyroidectomy, BCC, SCC found to have Rt. 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 Currently undergoing treatment w Dr. Plummer at [facility]. presents as code stroke for MS change CT scan r/o code stroke as patient with expressive aphasia and right superior quadrantanopia due to left hemispheric dysfunction. CTH demonstrated no evidence of large acute infarction or hemorrhage, minimal microvascular disease. Per neuro, pt is not a candidate for tenecteplase due to outside tenecteplase window, cancer with mets to brain. CT     CT head:  LimitedNo evidence of a large acute infarction   or large acute hemorrhage. Minimal microvascular disease.Accepted to CCU s/p pericardiocentesis, 800cc sanguineous drainage, now with drain. Drain with minimal output and pt HD stable, limited echo completed 1/16 and demonstrated trace effusion, pericardial drain removed without complication. Patient off of neosynephrine for pressor support for >24 hours and BP stable.    TO DOs:   [ ] Pericardial effusion- cytology pending  [ ] Neurology following- recommending [facility] today to screen for evolving infarction given the delays in obtaining MRI brain/MRA H/N. Per neuro, patient in fact does not have allergy to iodine contrast and has received in the past without issue. If this is the case, would also suggest adding on CTA H/N w/ IV contrast if no contraindications. Cautious with AC at this time.  [ ] Endocrine following- recommending Lantus 8 units qAM and reduce to Lantus 6 units tonight 1/16 (per endo would normally recommend once a day dosing for lantus but pt feels strongly this is what works for her, would like to maintain some control during this admission). If overnight glucose is too tightly controlled reduced AM lantus to 6 units in the morning . Recommend admelog 1 units TIDAC before meals (hold if not eating). Recommend Low dose admelog correction scale TIDQAC and separate low dose scale QHS  [ ] Heme- pt with Stage IV NSCLC adenocarcinoma with  mets to multiple organs (bone, brain) in August 2023. Currently undergoing treatment w Dr. Plummer at [facility]. Hold Home chemo Tafinlar 75 BID and Mekinist 0.5 m while inpatient as per Heme/onco.      74yo F with h/o smoking, HTN, HLD, type 1 DM, thyroidectomy, BCC, SCC found to have Rt. 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 Currently undergoing treatment w Dr. Plummer at [facility]. presents as code stroke for MS change CT scan r/o code stroke as patient with expressive aphasia and right superior quadrantanopia due to left hemispheric dysfunction. CTH demonstrated no evidence of large acute infarction or hemorrhage, minimal microvascular disease. Per neuro, pt is not a candidate for tenecteplase due to outside tenecteplase window, cancer with mets to brain. CT     CT head:  LimitedNo evidence of a large acute infarction   or large acute hemorrhage. Minimal microvascular disease.Accepted to CCU s/p pericardiocentesis, 800cc sanguineous drainage, now with drain. Drain with minimal output and pt HD stable, limited echo completed 1/16 and demonstrated trace effusion, pericardial drain removed without complication. Patient off of neosynephrine for pressor support for >24 hours and BP stable.    TO DOs:   [ ] Pericardial effusion- cytology pending  [ ] CV- Eventual   [ ] Neurology following- recommending [facility] today to screen for evolving infarction given the delays in obtaining MRI brain/MRA H/N. Per neuro, patient in fact does not have allergy to iodine contrast and has received in the past without issue. If this is the case, would also suggest adding on CTA H/N w/ IV contrast if no contraindications. Cautious with AC at this time.  [ ] Endocrine following- recommending Lantus 8 units qAM and reduce to Lantus 6 units tonight 1/16 (per endo would normally recommend once a day dosing for lantus but pt feels strongly this is what works for her, would like to maintain some control during this admission). If overnight glucose is too tightly controlled reduced AM lantus to 6 units in the morning . Recommend admelog 1 units TIDAC before meals (hold if not eating). Recommend Low dose admelog correction scale TIDQAC and separate low dose scale QHS  [ ] Heme- pt with Stage IV NSCLC adenocarcinoma with  mets to multiple organs (bone, brain) in August 2023. Currently undergoing treatment w Dr. Plummer at [facility]. Hold Home chemo Tafinlar 75 BID and Mekinist 0.5 m while inpatient as per Heme/onco.      72yo F with h/o smoking, HTN, HLD, type 1 DM, thyroidectomy, BCC, SCC found to have Rt. 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 Currently undergoing treatment w Dr. Plummer at [facility]. presents as code stroke for MS change CT scan r/o code stroke as patient with expressive aphasia and right superior quadrantanopia due to left hemispheric dysfunction. CTH demonstrated no evidence of large acute infarction or hemorrhage, minimal microvascular disease. Per neuro, pt is not a candidate for tenecteplase due to outside tenecteplase window, cancer with mets to brain. CT     CT head:  LimitedNo evidence of a large acute infarction   or large acute hemorrhage. Minimal microvascular disease.Accepted to CCU s/p pericardiocentesis, 800cc sanguineous drainage, now with drain. Drain with minimal output and pt HD stable, limited echo completed 1/16 and demonstrated trace effusion, pericardial drain removed without complication. Patient off of neosynephrine for pressor support for >24 hours and BP stable.    TO DOs:   [ ] Pericardial effusion- cytology pending  [ ] CV- Eventual   [ ] Neurology following- recommending [facility] today to screen for evolving infarction given the delays in obtaining MRI brain/MRA H/N. Per neuro, patient in fact does not have allergy to iodine contrast and has received in the past without issue. If this is the case, would also suggest adding on CTA H/N w/ IV contrast if no contraindications. Cautious with AC at this time.  [ ] Endocrine following- recommending Lantus 8 units qAM and reduce to Lantus 6 units tonight 1/16 (per endo would normally recommend once a day dosing for lantus but pt feels strongly this is what works for her, would like to maintain some control during this admission). If overnight glucose is too tightly controlled reduced AM lantus to 6 units in the morning . Recommend admelog 1 units TIDAC before meals (hold if not eating). Recommend Low dose admelog correction scale TIDQAC and separate low dose scale QHS  [ ] Heme- pt with Stage IV NSCLC adenocarcinoma with  mets to multiple organs (bone, brain) in August 2023. Currently undergoing treatment w Dr. Plummer at [facility]. Hold Home chemo Tafinlar 75 BID and Mekinist 0.5 m while inpatient as per Heme/onco.      72yo F with h/o smoking, HTN, HLD, type 1 DM, thyroidectomy, stage IV NSCLC adenocarcinoma with  mets to multiple organs (bone, brain) in August 2023. currently on Tafinlar 75 BID and Mekinist 0.5 as out patient. Brought by EMS to [facility] ER for  disoriented, confused and compliant of shortness of breath and chest pain . In the ED, the patient was hypothermic, tachycardic, hypotensive  and hypoxic, expressive Aphasic . CTH demonstrated no evidence of large acute infarction or hemorrhage, minimal microvascular disease. Per neuro, pt was not a candidate for tenecteplase due to outside tenecteplase window, cancer with mets to brain. CT.  CT CAP with large pericardial effusion s/p pericardial window. s/p 1Liter IVF and started on IV pressor. Pt went urgently for drainage w interventional cardiology   s/p drain of pericardial effusion 800 sanguineous fluid . Patient was admitted to CCU. Patient off of phenylephrine for pressor support for >24 hours and BP stable. Repeat echo showed trace pericardial effusion. CTsx was consulted for window. No intervention recommended at thi time  given that effusion improved with drain. Pericardial drain was pulled on 1/16 without any complication. Repeat echo on 1/17 showed no effusion. Patient was unable to get non con MRI due to scheduling issue. As per Neuro  CTA head/neck was performed which showed no acute finding. 2 hours EEG performed showed no epileptiform abnormalities or seizures. Patient was also followed by Endocrinology for type I DM. Patient is stable to be discharge with follow up.         74yo F with h/o smoking, HTN, HLD, type 1 DM, thyroidectomy, stage IV NSCLC adenocarcinoma with  mets to multiple organs (bone, brain) in August 2023. currently on Tafinlar 75 BID and Mekinist 0.5 as out patient. Brought by EMS to [facility] ER for  disoriented, confused and compliant of shortness of breath and chest pain . In the ED, the patient was hypothermic, tachycardic, hypotensive  and hypoxic, expressive Aphasic . CTH demonstrated no evidence of large acute infarction or hemorrhage, minimal microvascular disease. Per neuro, pt was not a candidate for tenecteplase due to outside tenecteplase window, cancer with mets to brain. CT.  CT CAP with large pericardial effusion s/p pericardial window. s/p 1Liter IVF and started on IV pressor. Pt went urgently for drainage w interventional cardiology   s/p drain of pericardial effusion 800 sanguineous fluid . Patient was admitted to CCU. Patient off of phenylephrine for pressor support for >24 hours and BP stable. Repeat echo showed trace pericardial effusion. CTsx was consulted for window. No intervention recommended at thi time  given that effusion improved with drain. Pericardial drain was pulled on 1/16 without any complication. Repeat echo on 1/17 showed no effusion. Patient was unable to get non con MRI due to scheduling issue. As per Neuro  CTA head/neck was performed which showed no acute finding. 2 hours EEG performed showed no epileptiform abnormalities or seizures. Patient was also followed by Endocrinology for type I DM. Patient is stable to be discharge with follow up.

## 2024-01-15 NOTE — CHART NOTE - NSCHARTNOTEFT_GEN_A_CORE
Weaning off bashir-synephrine while receiving 1 Liter NS over 4 hours. UA and Bld cx NTD. Tolerating room air. endo recs appreciated, started Lantus and pre meal insulin. Awaiting MRI/MRA brain without contrast. Sitting up in bed without complaint, HD stable. Bashir-synephrine decreased to 1mcg/kg/mn. Will monitor

## 2024-01-15 NOTE — CONSULT NOTE ADULT - ATTENDING COMMENTS
Agree with Dr. Andrade's assessment and plan as outlined above. Reviewed all pertinent labs, and imaging studies. Modifications made as indicated above. 69 M with history of T2D, CVA, HTN, HLD here for confusion, urinary incontinence and difficulty walking. Endocrinology consulted for diabetes management. A1C 14.2. Home DM meds: lantus 20 units BID. Follows with PCP outpatient for diabetes management. Start basal/bolus as above while inpatient to titrate to glucose goal of 100-180 while inpatient. For discharge will need basal insulin+metformin.   Patient is high risk with high level decision making due to uncontrolled diabetes with DKA and A1C>10 which places patient at high risk for cardiovascular and cerebrovascular events. Patient with lability of glucose requiring close monitoring and insulin adjustments. Agree with Dr. Andrade's assessment and plan as outlined above. Reviewed all pertinent labs, and imaging studies. Modifications made as indicated above. 73 F with T1D, HTN, HLD, Right lower lobe mass here for confusion, chest pain and sOB. CT showing large pericardial effusion. Endocrinology consulted for T1D management. A1C 7%. Patient currently on lantus 10 units bID, huamlog 2-4 units depending on the size of the meal. Discussed with the patient about switching to daily lantus, but patient is hesitant. For now due to lower appetite, will decrease lantus to 8 units BID and admelog 1 units TIDAC. Continue with low dose sliding scale TID and QHS. Basal/bolus at discharge. Rest of the plan as above.

## 2024-01-15 NOTE — DISCHARGE NOTE PROVIDER - NSDCCPCAREPLAN_GEN_ALL_CORE_FT
PRINCIPAL DISCHARGE DIAGNOSIS  Diagnosis: Acute pericardial effusion  Assessment and Plan of Treatment:       SECONDARY DISCHARGE DIAGNOSES  Diagnosis: Expressive aphasia  Assessment and Plan of Treatment:      PRINCIPAL DISCHARGE DIAGNOSIS  Diagnosis: Acute pericardial effusion  Assessment and Plan of Treatment: Ultra sound of heart showed blood collection around heart sac . It was drained via catheter by interventional cardiologist. Repeat ultra sound heart showed  trace collection. Pericardial drain was pulled out. Repeat ultra sound of heart showed no effsuion. Thoraic surgery was consulted. No intervention done at this time . You will follow with Thoraic surgeon to monitor the fluid around you heart sac.      SECONDARY DISCHARGE DIAGNOSES  Diagnosis: T1DM (type 1 diabetes mellitus)  Assessment and Plan of Treatment: You were followe by endocrine team in the hospital. Lantus dose was change to 10units twice a day    Diagnosis: Expressive aphasia  Assessment and Plan of Treatment: CT head showed no acute stroke.  CT angio of head and neck showed no acute finding. You will follow with neurologist.    Diagnosis: Lung cancer  Assessment and Plan of Treatment: As per oncologist chemo pills for cancer were held. Ypu will follow with oncologist

## 2024-01-15 NOTE — DISCHARGE NOTE PROVIDER - NSDCMRMEDTOKEN_GEN_ALL_CORE_FT
amLODIPine:  orally   HumaLOG:  subcutaneous   Lantus:  subcutaneous   lisinopril:  orally   Mekinist 0.5 mg oral tablet: 1 tab(s) orally once a day  simvastatin 20 mg oral tablet: 1 tab(s) orally once a day (at bedtime)  Tafinlar 75 mg oral capsule: 2 cap(s) orally 2 times a day   amLODIPine 5 mg oral tablet: 1 tab(s) orally 2 times a day  aspirin 81 mg oral delayed release tablet: 1 tab(s) orally once a day  insulin glargine 100 units/mL subcutaneous solution: 10 unit(s) subcutaneous 2 times a day  lisinopril 20 mg oral tablet: 1 tab(s) orally 2 times a day  simvastatin 20 mg oral tablet: 1 tab(s) orally once a day (at bedtime)   amLODIPine 5 mg oral tablet: 1 tab(s) orally 2 times a day  aspirin 81 mg oral delayed release tablet: 1 tab(s) orally once a day  HumaLOG KwikPen 100 units/mL injectable solution: 2 injectable 3 times a day (after meals)  insulin glargine 100 units/mL subcutaneous solution: 10 unit(s) subcutaneous 2 times a day  lisinopril 20 mg oral tablet: 1 tab(s) orally 2 times a day  simvastatin 20 mg oral tablet: 1 tab(s) orally once a day (at bedtime)   amLODIPine 5 mg oral tablet: 1 tab(s) orally 2 times a day  aspirin 81 mg oral delayed release tablet: 1 tab(s) orally once a day  HumaLOG KwikPen 100 units/mL injectable solution: 2 unit(s) injectable 3 times a day (after meals)  insulin glargine 100 units/mL subcutaneous solution: 10 unit(s) subcutaneous 2 times a day  lisinopril 20 mg oral tablet: 1 tab(s) orally 2 times a day  simvastatin 20 mg oral tablet: 1 tab(s) orally once a day (at bedtime)   amLODIPine 5 mg oral tablet: 1 tab(s) orally 2 times a day  aspirin 81 mg oral delayed release tablet: 1 tab(s) orally once a day  HumaLOG KwikPen 100 units/mL injectable solution: 2 unit(s) injectable 3 times a day (before meals)  insulin glargine 100 units/mL subcutaneous solution: 10 unit(s) subcutaneous 2 times a day  lisinopril 20 mg oral tablet: 1 tab(s) orally 2 times a day  simvastatin 20 mg oral tablet: 1 tab(s) orally once a day (at bedtime)

## 2024-01-15 NOTE — PROGRESS NOTE ADULT - SUBJECTIVE AND OBJECTIVE BOX
pt seen and examined w Dr Helms in CCU  "I'm feeling better"  less chest pain, less shortness of breath      Vital Signs Last 24 Hrs  T(C): 36.8 (15 [place] 2024 12:00), Max: 36.8 (14 Jan 2024 20:00)  T(F): 98.3 (15 [place] 2024 12:00), Max: 98.3 (14 Jan 2024 20:00)  HR: 68 (15 [place] 2024 13:00) (61 - 84)  BP: 120/56 (15 [place] 2024 13:00) (96/62 - 130/55)  BP(mean): 74 (15 [place] 2024 13:00) (65 - 98)  RR: 13 (15 [place] 2024 13:00) (13 - 26)  SpO2: 100% (15 [place] 2024 13:00) (98% - 100%)    Parameters below as of 15 [place] 2024 13:00  Patient On (Oxygen Delivery Method): room air      PHYSICAL EXAM:  Gen: NAD  Resp: Respirations unlabored. Bilateral chest rise.   Card: RRR.   GI: Soft. Nontender. Nondistended.   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 Currently undergoing treatment w Dr. Plummer at [facility]. Per pt's , at bedside, pt had been feeling well then awoke this morning disoriented, confused and c/o significant CP and SOB. Brought by EMS to [facility] ER. Due to MS, sent to CT scan for r/o code stroke. No infarcts seen. Further  W/u with CT scan C/A/P revealed large pericardial effusion w bilat pleural effusion. Pt also HD unstable at the time with hypotension, tachycardia and visible signs of distress consistent with likely tamponade. Thoracic surgery urgently consulted for pericardial effusion  - pt went urgently for drainage w interventional cardiology  -stabilized in CCU  -will continue to follow pt  -possible preop pericardial window   will f/u cytopathology and output  -continue care per primary CCU team    Kimmie MACIAS [id number]   pt seen and examined w Dr Helms in CCU  "I'm feeling better"  less chest pain, less shortness of breath      Vital Signs Last 24 Hrs  T(C): 36.8 (15 [place] 2024 12:00), Max: 36.8 (14 Jan 2024 20:00)  T(F): 98.3 (15 [place] 2024 12:00), Max: 98.3 (14 Jan 2024 20:00)  HR: 68 (15 [place] 2024 13:00) (61 - 84)  BP: 120/56 (15 [place] 2024 13:00) (96/62 - 130/55)  BP(mean): 74 (15 [place] 2024 13:00) (65 - 98)  RR: 13 (15 [place] 2024 13:00) (13 - 26)  SpO2: 100% (15 [place] 2024 13:00) (98% - 100%)    Parameters below as of 15 [place] 2024 13:00  Patient On (Oxygen Delivery Method): room air      PHYSICAL EXAM:  Gen: NAD  Resp: Respirations unlabored. Bilateral chest rise.   Card: RRR.   GI: Soft. Nontender. Nondistended.   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 Currently undergoing treatment w Dr. Plummer at [facility]. Per pt's , at bedside, pt had been feeling well then awoke this morning disoriented, confused and c/o significant CP and SOB. Brought by EMS to [facility] ER. Due to MS, sent to CT scan for r/o code stroke. No infarcts seen. Further  W/u with CT scan C/A/P revealed large pericardial effusion w bilat pleural effusion. Pt also HD unstable at the time with hypotension, tachycardia and visible signs of distress consistent with likely tamponade. Thoracic surgery urgently consulted for pericardial effusion  - pt went urgently for drainage w interventional cardiology  -stabilized in CCU  -will continue to follow pt  -possible preop pericardial window   will f/u cytopathology and output  -continue care per primary CCU team    Kimmie MACIAS [id number]   pt seen and examined w Dr Helms in CCU  "I'm feeling better"  less chest pain, less shortness of breath      Vital Signs Last 24 Hrs  T(C): 36.8 (15 [place] 2024 12:00), Max: 36.8 (14 Jan 2024 20:00)  T(F): 98.3 (15 [place] 2024 12:00), Max: 98.3 (14 Jan 2024 20:00)  HR: 68 (15 [place] 2024 13:00) (61 - 84)  BP: 120/56 (15 [place] 2024 13:00) (96/62 - 130/55)  BP(mean): 74 (15 [place] 2024 13:00) (65 - 98)  RR: 13 (15 [place] 2024 13:00) (13 - 26)  SpO2: 100% (15 [place] 2024 13:00) (98% - 100%)    Parameters below as of 15 [place] 2024 13:00  Patient On (Oxygen Delivery Method): room air      PHYSICAL EXAM:  Gen: NAD  Resp: Respirations unlabored. Bilateral chest rise.   Card: RRR.   GI: Soft. Nontender. Nondistended.   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 Currently undergoing treatment w Dr. Plummer at [facility]. Per pt's , at bedside, pt had been feeling well then awoke this morning disoriented, confused and c/o significant CP and SOB. Brought by EMS to [facility] ER. Due to MS, sent to CT scan for r/o code stroke. No infarcts seen. Further  W/u with CT scan C/A/P revealed large pericardial effusion w bilat pleural effusion. Pt also HD unstable at the time with hypotension, tachycardia and visible signs of distress consistent with likely tamponade. Thoracic surgery urgently consulted for pericardial effusion  - pt went urgently for drainage w interventional cardiology  -stabilized in CCU  -will continue to follow pt  -possible preop pericardial window   will f/u cytopathology and output  -continue care per primary CCU team    Kimmie MACIAS [id number]   pt seen and examined w Dr Helms in CCU  "I'm feeling better"  less chest pain, less shortness of breath      Vital Signs Last 24 Hrs  T(C): 36.8 (15 [place] 2024 12:00), Max: 36.8 (14 Jan 2024 20:00)  T(F): 98.3 (15 [place] 2024 12:00), Max: 98.3 (14 Jan 2024 20:00)  HR: 68 (15 [place] 2024 13:00) (61 - 84)  BP: 120/56 (15 [place] 2024 13:00) (96/62 - 130/55)  BP(mean): 74 (15 [place] 2024 13:00) (65 - 98)  RR: 13 (15 [place] 2024 13:00) (13 - 26)  SpO2: 100% (15 [place] 2024 13:00) (98% - 100%)    Parameters below as of 15 [place] 2024 13:00  Patient On (Oxygen Delivery Method): room air      PHYSICAL EXAM:  Gen: NAD  Resp: Respirations unlabored. Bilateral chest rise.   Card: RRR.   GI: Soft. Nontender. Nondistended.   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 Currently undergoing treatment w Dr. Plummer at [facility]. Per pt's , at bedside, pt had been feeling well then awoke this morning disoriented, confused and c/o significant CP and SOB. Brought by EMS to [facility] ER. Due to MS, sent to CT scan for r/o code stroke. No infarcts seen. Further  W/u with CT scan C/A/P revealed large pericardial effusion w bilat pleural effusion. Pt also HD unstable at the time with hypotension, tachycardia and visible signs of distress consistent with likely tamponade. Thoracic surgery urgently consulted for pericardial effusion  - pt went urgently for drainage w interventional cardiology  -stabilized in CCU  -will continue to follow pt  -possible preop pericardial window   will f/u cytopathology and output  will f/u repeat TTE  -continue care per primary CCU team    Kimmie MACIAS [id number]   pt seen and examined w Dr Helms in CCU  "I'm feeling better"  less chest pain, less shortness of breath      Vital Signs Last 24 Hrs  T(C): 36.8 (15 [place] 2024 12:00), Max: 36.8 (14 Jan 2024 20:00)  T(F): 98.3 (15 [place] 2024 12:00), Max: 98.3 (14 Jan 2024 20:00)  HR: 68 (15 [place] 2024 13:00) (61 - 84)  BP: 120/56 (15 [place] 2024 13:00) (96/62 - 130/55)  BP(mean): 74 (15 [place] 2024 13:00) (65 - 98)  RR: 13 (15 [place] 2024 13:00) (13 - 26)  SpO2: 100% (15 [place] 2024 13:00) (98% - 100%)    Parameters below as of 15 [place] 2024 13:00  Patient On (Oxygen Delivery Method): room air      PHYSICAL EXAM:  Gen: NAD  Resp: Respirations unlabored. Bilateral chest rise.   Card: RRR.   GI: Soft. Nontender. Nondistended.   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 Currently undergoing treatment w Dr. Plummer at [facility]. Per pt's , at bedside, pt had been feeling well then awoke this morning disoriented, confused and c/o significant CP and SOB. Brought by EMS to [facility] ER. Due to MS, sent to CT scan for r/o code stroke. No infarcts seen. Further  W/u with CT scan C/A/P revealed large pericardial effusion w bilat pleural effusion. Pt also HD unstable at the time with hypotension, tachycardia and visible signs of distress consistent with likely tamponade. Thoracic surgery urgently consulted for pericardial effusion  - pt went urgently for drainage w interventional cardiology  -stabilized in CCU  -will continue to follow pt  -possible preop pericardial window   will f/u cytopathology and output  will f/u repeat TTE  -continue care per primary CCU team    Kimmie MACIAS [id number]   pt seen and examined w Dr Helms in CCU  "I'm feeling better"  less chest pain, less shortness of breath      Vital Signs Last 24 Hrs  T(C): 36.8 (15 [place] 2024 12:00), Max: 36.8 (14 Jan 2024 20:00)  T(F): 98.3 (15 [place] 2024 12:00), Max: 98.3 (14 Jan 2024 20:00)  HR: 68 (15 [place] 2024 13:00) (61 - 84)  BP: 120/56 (15 [place] 2024 13:00) (96/62 - 130/55)  BP(mean): 74 (15 [place] 2024 13:00) (65 - 98)  RR: 13 (15 [place] 2024 13:00) (13 - 26)  SpO2: 100% (15 [place] 2024 13:00) (98% - 100%)    Parameters below as of 15 [place] 2024 13:00  Patient On (Oxygen Delivery Method): room air      PHYSICAL EXAM:  Gen: NAD  Resp: Respirations unlabored. Bilateral chest rise.   Card: RRR.   GI: Soft. Nontender. Nondistended.   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 Currently undergoing treatment w Dr. Plummer at [facility]. Per pt's , at bedside, pt had been feeling well then awoke this morning disoriented, confused and c/o significant CP and SOB. Brought by EMS to [facility] ER. Due to MS, sent to CT scan for r/o code stroke. No infarcts seen. Further  W/u with CT scan C/A/P revealed large pericardial effusion w bilat pleural effusion. Pt also HD unstable at the time with hypotension, tachycardia and visible signs of distress consistent with likely tamponade. Thoracic surgery urgently consulted for pericardial effusion  - pt went urgently for drainage w interventional cardiology  -stabilized in CCU  -will continue to follow pt  -possible preop pericardial window   will f/u cytopathology and output  will f/u repeat TTE  -continue care per primary CCU team    Kimmie MACIAS [id number]

## 2024-01-15 NOTE — DISCHARGE NOTE PROVIDER - NSDCFUADDAPPT_GEN_ALL_CORE_FT
Call [phone] to arrange an appointment as outpatient with Dr. Helms.  Call [phone] to arrange an appointment as outpatient with Dr. Helms.

## 2024-01-15 NOTE — DISCHARGE NOTE PROVIDER - DETAILS OF MALNUTRITION DIAGNOSIS/DIAGNOSES
This patient has been assessed with a concern for Malnutrition and was treated during this hospitalization for the following Nutrition diagnosis/diagnoses:     -  01/18/2024: Severe protein-calorie malnutrition   -  01/18/2024: Underweight (BMI < 19)

## 2024-01-15 NOTE — DISCHARGE NOTE PROVIDER - PROVIDER TOKENS
PROVIDER:[TOKEN:[[id number]]] PROVIDER:[TOKEN:[[id number]]] PROVIDER:[TOKEN:[[id number]]],PROVIDER:[TOKEN:[[id number]],FOLLOWUP:[2 weeks]] PROVIDER:[TOKEN:[[id number]]],PROVIDER:[TOKEN:[[id number]],FOLLOWUP:[2 weeks]]

## 2024-01-16 NOTE — PROGRESS NOTE ADULT - ASSESSMENT
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 Endocrinology consulted for T1DM.    #Type 1Diabetes Mellitus   - A1C with Estimated Average Glucose Result: 7.0 % (01-15-24)  - home regimen: lantus 10 units BID. humalog 2-4 units (depending on size of meal)  - eGFR: 93 mL/min/1.73m2 (01-15-24)  - no evidence of DKA on labs  INPATIENT PLAN:  pt with DM since age 14, is aware of insulin titration at home needed for change in diet and illness  am hypoglycemia 1/16 due to Lantus 8 at night. hardly recieving premeal insulin inpt or correction insulin  1/16- no coverage for BF, afternoon glucose 200 (had glucose tabs and may have overcorrected this morning hypo)  long d/w pt to avoid hypoglycemia based on studies in the ICU setting, goal glucose 140-180   - Recommend Lantus 8 units qam and reduce to Lantus 6 units tonight (would normally recommend once a day dosing for lantus but pt feels strongly this is what works for her, would like to maintain some control during this admission)  tomorrow, if overnight glucose is too tightly controlled reduced AM lantus to 6 units in the morning   - Recommend admelog 1 units TIDAC before meals (hold if not eating)  - Recommend Low dose admelog correction scale TIDQAC and separate low dose scale QHS  - Please check FSG before meals and QHS, or q6h while NPO, add in 3 am FSBG check to   - Inpatient glucose goals: 140-180  - consistent carb diet when eating  DISCHARGE PLANNING:  - Discharge recs pending clinical course: likely basal/bolus insulin (dose TBD)  - follow up w/ pt's own endo Dr. Phillips    #Hypertension  - Goal BP <130/80  off BP medicaitons at this time  outpt mc/cr ratio    #Hyperlipidemia  - LDL goal <100  - on simvastatin 20mg daily      d/w NP Rock about plan as above and pt     ation.      Charlene Black MD  Attending Physician   Department of Endocrinology, Diabetes and Metabolism     weekdays: 9am to 5pm: email [facility] or [facility] and or TEAMS     Nights and weekends: [phone]  Please note that this patient may be followed by a different provider tomorrow.   If no answer or after hours, please contact [phone].  For final dc reccomendations, please call [phone] or page the endocrine fellow on call.    complex pt, high level decision making  Spent over 35  minutes providing face to face education which was more than 50% of encounter that also included assessing pt/labs/meds and discussing plan of care with pt/team   review of tests and other providers' notes, counseling and educating the patient/family/caregiver, ordering medications communicating with other health care professionals, documenting clinical information in the EMR, independently interpreting results and communicating results to the patient/family/caregiver, care coordin 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 Endocrinology consulted for T1DM.    #Type 1Diabetes Mellitus   - A1C with Estimated Average Glucose Result: 7.0 % (01-15-24)  - home regimen: lantus 10 units BID. humalog 2-4 units (depending on size of meal)  - eGFR: 93 mL/min/1.73m2 (01-15-24)  - no evidence of DKA on labs  INPATIENT PLAN:  pt with DM since age 14, is aware of insulin titration at home needed for change in diet and illness  am hypoglycemia 1/16 due to Lantus 8 at night. hardly recieving premeal insulin inpt or correction insulin  1/16- no coverage for BF, afternoon glucose 200 (had glucose tabs and may have overcorrected this morning hypo)  long d/w pt to avoid hypoglycemia based on studies in the ICU setting, goal glucose 140-180   - Recommend Lantus 8 units qam and reduce to Lantus 6 units tonight (would normally recommend once a day dosing for lantus but pt feels strongly this is what works for her, would like to maintain some control during this admission)  tomorrow, if overnight glucose is too tightly controlled reduced AM lantus to 6 units in the morning   - Recommend admelog 1 units TIDAC before meals (hold if not eating)  - Recommend Low dose admelog correction scale TIDQAC and separate low dose scale QHS  - Please check FSG before meals and QHS, or q6h while NPO, add in 3 am FSBG check to   - Inpatient glucose goals: 140-180  - consistent carb diet when eating  DISCHARGE PLANNING:  - Discharge recs pending clinical course: likely basal/bolus insulin (dose TBD)  - follow up w/ pt's own endo Dr. Phillips    #Hypertension  - Goal BP <130/80  off BP medicaitons at this time  outpt mc/cr ratio    #Hyperlipidemia  - LDL goal <100  - on simvastatin 20mg daily      d/w NP Rock about plan as above and pt     ation.      Charlene Black MD  Attending Physician   Department of Endocrinology, Diabetes and Metabolism     weekdays: 9am to 5pm: email [facility] or [facility] and or TEAMS     Nights and weekends: [phone]  Please note that this patient may be followed by a different provider tomorrow.   If no answer or after hours, please contact [phone].  For final dc reccomendations, please call [phone] or page the endocrine fellow on call.    complex pt, high level decision making  Spent over 35  minutes providing face to face education which was more than 50% of encounter that also included assessing pt/labs/meds and discussing plan of care with pt/team   review of tests and other providers' notes, counseling and educating the patient/family/caregiver, ordering medications communicating with other health care professionals, documenting clinical information in the EMR, independently interpreting results and communicating results to the patient/family/caregiver, care coordin

## 2024-01-16 NOTE — PROGRESS NOTE ADULT - SUBJECTIVE AND OBJECTIVE BOX
Subjective/Objective: Patient alert      Tele event: sinus mert 58, NSR 70    MEDICATIONS    dextrose 50% Injectable 25 Gram(s) IV Push once  dextrose 50% Injectable 25 Gram(s) IV Push once  dextrose 50% Injectable 12.5 Gram(s) IV Push once  dextrose Oral Gel 15 Gram(s) Oral once PRN  glucagon  Injectable 1 milliGRAM(s) IntraMuscular once  insulin glargine Injectable (LANTUS) 8 Unit(s) SubCutaneous every morning  insulin glargine Injectable (LANTUS) 8 Unit(s) SubCutaneous at bedtime  insulin lispro (ADMELOG) corrective regimen sliding scale   SubCutaneous at bedtime  insulin lispro (ADMELOG) corrective regimen sliding scale   SubCutaneous three times a day before meals  insulin lispro Injectable (ADMELOG) 1 Unit(s) SubCutaneous three times a day before meals  simvastatin 20 milliGRAM(s) Oral at bedtime    chlorhexidine 2% Cloths 1 Application(s) Topical daily  dextrose 5%. 1000 milliLiter(s) IV Continuous <Continuous>  dextrose 5%. 1000 milliLiter(s) IV Continuous <Continuous>            ICU Vital Signs Last 24 Hrs  T(C): 36.6 (16 Jan 2024 04:00), Max: 36.8 (15 [place] 2024 12:00)  T(F): 97.8 (16 Jan 2024 04:00), Max: 98.3 (15 [place] 2024 12:00)  HR: 61 (16 Jan 2024 06:00) (51 - 71)  BP: 109/53 (16 Jan 2024 06:00) (88/53 - 120/56)  BP(mean): 70 (16 Jan 2024 06:00) (57 - 98)  RR: 17 (16 Jan 2024 06:00) (12 - 28)  SpO2: 97% (16 Jan 2024 06:00) (96% - 100%)    O2 Parameters below as of 16 Jan 2024 06:00  Patient On (Oxygen Delivery Method): room air            PHYSICAL EXAMINATION  Appearance: NAD, no distress  HEENT: Moist Mucous Membranes, Anicteric, PERRL, EOMI  Cardiovascular: Regular rate and rhythm, Normal S1 S2, No JVD, No murmurs  Respiratory: Lungs clear to auscultation. No rales, No rhonchi, No wheezing  Gastrointestinal:  Soft, Non-tender, + BS  Neurologic: Non-focal, A&Ox3  Skin: Warm and dry, No rashes, No ecchymosis, No cyanosis  Musculoskeletal: No clubbing, No cyanosis, No edema  Vascular: Peripheral pulses palpable 2+ bilaterally  Psychiatry: Mood & affect appropriate      	    		      I&O's Summary    15 [place] 2024 07:01  -  16 Jan 2024 07:00  --------------------------------------------------------  IN: 1875.4 mL / OUT: 2210 mL / NET: -334.6 mL    	     LABS:	  LABORATORY VALUES	 	                          9.2    9.74  )-----------( 444      ( 16 Jan 2024 04:47 )             27.5       01-16    130<L>  |  100  |  14  ----------------------------<  77  4.2   |  23  |  0.62  01-15    125<L>  |  95<L>  |  20  ----------------------------<  190<H>  4.8   |  22  |  0.53    Ca    7.5<L>      16 Jan 2024 04:47  Ca    7.2<L>      15 [place] 2024 15:42  Phos  1.6     01-16  Phos  1.9     01-15  Mg     2.20     01-16  Mg     2.00     01-15    TPro  5.0<L>  /  Alb  2.0<L>  /  TBili  <0.2  /  DBili  x   /  AST  75<H>  /  ALT  196<H>  /  AlkPhos  86  01-16  TPro  4.8<L>  /  Alb  2.2<L>  /  TBili  <0.2  /  DBili  x   /  AST  116<H>  /  ALT  238<H>  /  AlkPhos  89  01-15    LIVER FUNCTIONS - ( 16 Jan 2024 04:47 )  Alb: 2.0 g/dL / Pro: 5.0 g/dL / ALK PHOS: 86 U/L / ALT: 196 U/L / AST: 75 U/L / GGT: x               CARDIAC MARKERS:            Blood Gas Venous - Lactate: 2.0 mmol/L (01-15 @ 10:45)  Blood Gas Venous - Lactate: 6.7 mmol/L (01-14 @ 10:31)        T4, Serum: 6.77 ug/dL (01-14 @ 17:31)      Urinalysis Basic - ( 16 Jan 2024 04:47 )    Color: x / Appearance: x / SG: x / pH: x  Gluc: 77 mg/dL / Ketone: x  / Bili: x / Urobili: x   Blood: x / Protein: x / Nitrite: x   Leuk Esterase: x / RBC: x / WBC x   Sq Epi: x / Non Sq Epi: x / Bacteria: x      CAPILLARY BLOOD GLUCOSE  308 (14 Jan 2024 10:46)      POCT Blood Glucose.: 166 mg/dL (15 [place] 2024 21:49)          < from: TTE W or WO Ultrasound Enhancing Agent (01.14.24 @ 14:52) >   1. Left ventricular systolic function is hyperdynamic with an ejection fraction of 69 % by Farrell's method of disks.   2. There is moderate (grade 2) left ventricular diastolic dysfunction, with elevated filling pressure.   3. Normal right ventricular cavity size, wall thickness, and normal systolic function.   4. No pericardial effusion seen.   5. Left pleural effusion noted.    < end of copied text >      < from: CT Chest No Cont (01.14.24 @ 10:37) >  < from: CT Abdomen and Pelvis No Cont (01.14.24 @ 10:37) >    New large pericardial effusion. Recommend echocardiogram to exclude   cardiac tamponade.    Multifocal lung parenchymal opacities and interlobular septal thickening.   Pulmonary edema is likely present. Superimposed infection is not   excluded. Pulmonary opacities limit evaluation for previously described   right lower lobe mass and small residual metastatic nodules. New small to   moderate sized bilateral layering pleural effusions. Follow to resolution.    Significant gallbladder wall edema may be due to third spacing. Correlate   with LFTs and ultrasound. Small abdominopelvic ascites.    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 Correlate with priorPET/CT   findings.      < from: CT Brain Stroke Protocol (01.14.24 @ 10:36) >  There is motion artifact that limits this evaluation.    There is no evidence of large acute intracranial hemorrhage or acute   transcortical infarct. There is minimal microvascular disease. The   ventricles are normal in size and caliber.  There is no evidence of mass-effect or midline shift. There is no   evidence of an intra or extra-axial fluid collection.    Visualized paranasal sinuses and bilateral mastoid air cells are clear.    Impression: Limited evaluation. No evidence of a large acute infarction   or large acute hemorrhage. Minimal microvascular disease.    < end of copied text >    < end of copied text >  < from: CT Chest No Cont (08.29.23 @ 19:54) >  Lung mass involving the right lower lobe and right hilum, compatible with   a neoplasm.    Multistation thoracic and left lower neck lymphadenopathy, as well as   multiple lung and skeletal metastases.    < end of copied text >     Subjective/Objective: Patient alert, oriented . Patient denies chest pain, sob, palpitation or dizzines. No focal deficit noted. Patient had an episode of hypoglycemia this morning . pt took glucose tablets       Tele event: sinus mert 58, NSR 70    MEDICATIONS    dextrose 50% Injectable 25 Gram(s) IV Push once  dextrose 50% Injectable 25 Gram(s) IV Push once  dextrose 50% Injectable 12.5 Gram(s) IV Push once  dextrose Oral Gel 15 Gram(s) Oral once PRN  glucagon  Injectable 1 milliGRAM(s) IntraMuscular once  insulin glargine Injectable (LANTUS) 8 Unit(s) SubCutaneous every morning  insulin glargine Injectable (LANTUS) 8 Unit(s) SubCutaneous at bedtime  insulin lispro (ADMELOG) corrective regimen sliding scale   SubCutaneous at bedtime  insulin lispro (ADMELOG) corrective regimen sliding scale   SubCutaneous three times a day before meals  insulin lispro Injectable (ADMELOG) 1 Unit(s) SubCutaneous three times a day before meals  simvastatin 20 milliGRAM(s) Oral at bedtime    chlorhexidine 2% Cloths 1 Application(s) Topical daily  dextrose 5%. 1000 milliLiter(s) IV Continuous <Continuous>  dextrose 5%. 1000 milliLiter(s) IV Continuous <Continuous>            ICU Vital Signs Last 24 Hrs  T(C): 36.6 (16 Jan 2024 04:00), Max: 36.8 (15 [place] 2024 12:00)  T(F): 97.8 (16 Jan 2024 04:00), Max: 98.3 (15 [place] 2024 12:00)  HR: 61 (16 Jan 2024 06:00) (51 - 71)  BP: 109/53 (16 Jan 2024 06:00) (88/53 - 120/56)  BP(mean): 70 (16 Jan 2024 06:00) (57 - 98)  RR: 17 (16 Jan 2024 06:00) (12 - 28)  SpO2: 97% (16 Jan 2024 06:00) (96% - 100%)    O2 Parameters below as of 16 Jan 2024 06:00  Patient On (Oxygen Delivery Method): room air            PHYSICAL EXAMINATION  Appearance: NAD, no distress  HEENT: Moist Mucous Membranes, Anicteric, PERRL, EOMI  Cardiovascular: Regular rate and rhythm, Normal S1 S2, No JVD, No murmurs  Respiratory:  decrease BS on b/l lower and mid lungs to auscultation. No rales, No rhonchi, No wheezing  Gastrointestinal:  Soft, Non-tender, + BS  Neurologic: Non-focal, A&Ox3  Skin: Warm and dry, No rashes, No ecchymosis, No cyanosis  Musculoskeletal: No clubbing, No cyanosis, No edema  Vascular: Peripheral pulses palpable 2+ bilaterally  Psychiatry: Mood & affect appropriate      	    		      I&O's Summary    15 [place] 2024 07:01  -  16 Jan 2024 07:00  --------------------------------------------------------  IN: 1875.4 mL / OUT: 2210 mL / NET: -334.6 mL    	     LABS:	  LABORATORY VALUES	 	                          9.2    9.74  )-----------( 444      ( 16 Jan 2024 04:47 )             27.5       01-16    130<L>  |  100  |  14  ----------------------------<  77  4.2   |  23  |  0.62  01-15    125<L>  |  95<L>  |  20  ----------------------------<  190<H>  4.8   |  22  |  0.53    Ca    7.5<L>      16 Jan 2024 04:47  Ca    7.2<L>      15 [place] 2024 15:42  Phos  1.6     01-16  Phos  1.9     01-15  Mg     2.20     01-16  Mg     2.00     01-15    TPro  5.0<L>  /  Alb  2.0<L>  /  TBili  <0.2  /  DBili  x   /  AST  75<H>  /  ALT  196<H>  /  AlkPhos  86  01-16  TPro  4.8<L>  /  Alb  2.2<L>  /  TBili  <0.2  /  DBili  x   /  AST  116<H>  /  ALT  238<H>  /  AlkPhos  89  01-15    LIVER FUNCTIONS - ( 16 Jan 2024 04:47 )  Alb: 2.0 g/dL / Pro: 5.0 g/dL / ALK PHOS: 86 U/L / ALT: 196 U/L / AST: 75 U/L / GGT: x               CARDIAC MARKERS:            Blood Gas Venous - Lactate: 2.0 mmol/L (01-15 @ 10:45)  Blood Gas Venous - Lactate: 6.7 mmol/L (01-14 @ 10:31)        T4, Serum: 6.77 ug/dL (01-14 @ 17:31)      Urinalysis Basic - ( 16 Jan 2024 04:47 )    Color: x / Appearance: x / SG: x / pH: x  Gluc: 77 mg/dL / Ketone: x  / Bili: x / Urobili: x   Blood: x / Protein: x / Nitrite: x   Leuk Esterase: x / RBC: x / WBC x   Sq Epi: x / Non Sq Epi: x / Bacteria: x      CAPILLARY BLOOD GLUCOSE  308 (14 Jan 2024 10:46)      POCT Blood Glucose.: 166 mg/dL (15 [place] 2024 21:49)          < from: TTE W or WO Ultrasound Enhancing Agent (01.14.24 @ 14:52) >   1. Left ventricular systolic function is hyperdynamic with an ejection fraction of 69 % by Farrell's method of disks.   2. There is moderate (grade 2) left ventricular diastolic dysfunction, with elevated filling pressure.   3. Normal right ventricular cavity size, wall thickness, and normal systolic function.   4. No pericardial effusion seen.   5. Left pleural effusion noted.    < end of copied text >      < from: CT Chest No Cont (01.14.24 @ 10:37) >  < from: CT Abdomen and Pelvis No Cont (01.14.24 @ 10:37) >    New large pericardial effusion. Recommend echocardiogram to exclude   cardiac tamponade.    Multifocal lung parenchymal opacities and interlobular septal thickening.   Pulmonary edema is likely present. Superimposed infection is not   excluded. Pulmonary opacities limit evaluation for previously described   right lower lobe mass and small residual metastatic nodules. New small to   moderate sized bilateral layering pleural effusions. Follow to resolution.    Significant gallbladder wall edema may be due to third spacing. Correlate   with LFTs and ultrasound. Small abdominopelvic ascites.    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 Correlate with priorPET/CT   findings.      < from: CT Brain Stroke Protocol (01.14.24 @ 10:36) >  There is motion artifact that limits this evaluation.    There is no evidence of large acute intracranial hemorrhage or acute   transcortical infarct. There is minimal microvascular disease. The   ventricles are normal in size and caliber.  There is no evidence of mass-effect or midline shift. There is no   evidence of an intra or extra-axial fluid collection.    Visualized paranasal sinuses and bilateral mastoid air cells are clear.    Impression: Limited evaluation. No evidence of a large acute infarction   or large acute hemorrhage. Minimal microvascular disease.    < end of copied text >    < end of copied text >  < from: CT Chest No Cont (08.29.23 @ 19:54) >  Lung mass involving the right lower lobe and right hilum, compatible with   a neoplasm.    Multistation thoracic and left lower neck lymphadenopathy, as well as   multiple lung and skeletal metastases.    < end of copied text >     Subjective/Objective: Patient alert, oriented . Patient denies chest pain, sob, palpitation or dizziness No focal deficit noted. Patient had an episode of hypoglycemia this morning . pt took glucose tablets       Tele event: sinus mert 58, NSR 70    MEDICATIONS    dextrose 50% Injectable 25 Gram(s) IV Push once  dextrose 50% Injectable 25 Gram(s) IV Push once  dextrose 50% Injectable 12.5 Gram(s) IV Push once  dextrose Oral Gel 15 Gram(s) Oral once PRN  glucagon  Injectable 1 milliGRAM(s) IntraMuscular once  insulin glargine Injectable (LANTUS) 8 Unit(s) SubCutaneous every morning  insulin glargine Injectable (LANTUS) 8 Unit(s) SubCutaneous at bedtime  insulin lispro (ADMELOG) corrective regimen sliding scale   SubCutaneous at bedtime  insulin lispro (ADMELOG) corrective regimen sliding scale   SubCutaneous three times a day before meals  insulin lispro Injectable (ADMELOG) 1 Unit(s) SubCutaneous three times a day before meals  simvastatin 20 milliGRAM(s) Oral at bedtime    chlorhexidine 2% Cloths 1 Application(s) Topical daily  dextrose 5%. 1000 milliLiter(s) IV Continuous <Continuous>  dextrose 5%. 1000 milliLiter(s) IV Continuous <Continuous>            ICU Vital Signs Last 24 Hrs  T(C): 36.6 (16 Jan 2024 04:00), Max: 36.8 (15 [place] 2024 12:00)  T(F): 97.8 (16 Jan 2024 04:00), Max: 98.3 (15 [place] 2024 12:00)  HR: 61 (16 Jan 2024 06:00) (51 - 71)  BP: 109/53 (16 Jan 2024 06:00) (88/53 - 120/56)  BP(mean): 70 (16 Jan 2024 06:00) (57 - 98)  RR: 17 (16 Jan 2024 06:00) (12 - 28)  SpO2: 97% (16 Jan 2024 06:00) (96% - 100%)    O2 Parameters below as of 16 Jan 2024 06:00  Patient On (Oxygen Delivery Method): room air            PHYSICAL EXAMINATION  Appearance: NAD, no distress  HEENT: Moist Mucous Membranes, Anicteric, PERRL, EOMI  Cardiovascular: Regular rate and rhythm, Normal S1 S2, No JVD, No murmurs  Respiratory:  decrease BS on b/l lower and mid lungs to auscultation. No rales, No rhonchi, No wheezing  Gastrointestinal:  Soft, Non-tender, + BS  Neurologic: Non-focal, A&Ox3  Skin: Warm and dry, No rashes, No ecchymosis, No cyanosis  Musculoskeletal: No clubbing, No cyanosis, No edema  Vascular: Peripheral pulses palpable 2+ bilaterally  Psychiatry: Mood & affect appropriate      	    		      I&O's Summary    15 [place] 2024 07:01  -  16 Jan 2024 07:00  --------------------------------------------------------  IN: 1875.4 mL / OUT: 2210 mL / NET: -334.6 mL    	     LABS:	  LABORATORY VALUES	 	                          9.2    9.74  )-----------( 444      ( 16 Jan 2024 04:47 )             27.5       01-16    130<L>  |  100  |  14  ----------------------------<  77  4.2   |  23  |  0.62  01-15    125<L>  |  95<L>  |  20  ----------------------------<  190<H>  4.8   |  22  |  0.53    Ca    7.5<L>      16 Jan 2024 04:47  Ca    7.2<L>      15 [place] 2024 15:42  Phos  1.6     01-16  Phos  1.9     01-15  Mg     2.20     01-16  Mg     2.00     01-15    TPro  5.0<L>  /  Alb  2.0<L>  /  TBili  <0.2  /  DBili  x   /  AST  75<H>  /  ALT  196<H>  /  AlkPhos  86  01-16  TPro  4.8<L>  /  Alb  2.2<L>  /  TBili  <0.2  /  DBili  x   /  AST  116<H>  /  ALT  238<H>  /  AlkPhos  89  01-15    LIVER FUNCTIONS - ( 16 Jan 2024 04:47 )  Alb: 2.0 g/dL / Pro: 5.0 g/dL / ALK PHOS: 86 U/L / ALT: 196 U/L / AST: 75 U/L / GGT: x               CARDIAC MARKERS:            Blood Gas Venous - Lactate: 2.0 mmol/L (01-15 @ 10:45)  Blood Gas Venous - Lactate: 6.7 mmol/L (01-14 @ 10:31)        T4, Serum: 6.77 ug/dL (01-14 @ 17:31)      Urinalysis Basic - ( 16 Jan 2024 04:47 )    Color: x / Appearance: x / SG: x / pH: x  Gluc: 77 mg/dL / Ketone: x  / Bili: x / Urobili: x   Blood: x / Protein: x / Nitrite: x   Leuk Esterase: x / RBC: x / WBC x   Sq Epi: x / Non Sq Epi: x / Bacteria: x      CAPILLARY BLOOD GLUCOSE  308 (14 Jan 2024 10:46)      POCT Blood Glucose.: 166 mg/dL (15 [place] 2024 21:49)          < from: TTE W or WO Ultrasound Enhancing Agent (01.14.24 @ 14:52) >   1. Left ventricular systolic function is hyperdynamic with an ejection fraction of 69 % by Farrell's method of disks.   2. There is moderate (grade 2) left ventricular diastolic dysfunction, with elevated filling pressure.   3. Normal right ventricular cavity size, wall thickness, and normal systolic function.   4. No pericardial effusion seen.   5. Left pleural effusion noted.    < end of copied text >      < from: CT Chest No Cont (01.14.24 @ 10:37) >  < from: CT Abdomen and Pelvis No Cont (01.14.24 @ 10:37) >    New large pericardial effusion. Recommend echocardiogram to exclude   cardiac tamponade.    Multifocal lung parenchymal opacities and interlobular septal thickening.   Pulmonary edema is likely present. Superimposed infection is not   excluded. Pulmonary opacities limit evaluation for previously described   right lower lobe mass and small residual metastatic nodules. New small to   moderate sized bilateral layering pleural effusions. Follow to resolution.    Significant gallbladder wall edema may be due to third spacing. Correlate   with LFTs and ultrasound. Small abdominopelvic ascites.    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 Correlate with priorPET/CT   findings.      < from: CT Brain Stroke Protocol (01.14.24 @ 10:36) >  There is motion artifact that limits this evaluation.    There is no evidence of large acute intracranial hemorrhage or acute   transcortical infarct. There is minimal microvascular disease. The   ventricles are normal in size and caliber.  There is no evidence of mass-effect or midline shift. There is no   evidence of an intra or extra-axial fluid collection.    Visualized paranasal sinuses and bilateral mastoid air cells are clear.    Impression: Limited evaluation. No evidence of a large acute infarction   or large acute hemorrhage. Minimal microvascular disease.    < end of copied text >    < end of copied text >  < from: CT Chest No Cont (08.29.23 @ 19:54) >  Lung mass involving the right lower lobe and right hilum, compatible with   a neoplasm.    Multistation thoracic and left lower neck lymphadenopathy, as well as   multiple lung and skeletal metastases.    < end of copied text >

## 2024-01-16 NOTE — CHART NOTE - NSCHARTNOTEFT_GEN_A_CORE
Discussed case with stroke attending, Dr. Libman. Would suggest a rCT today to screen for evolving infarction given the delays in obtaining MRI brain/MRA H/N. Neurology will continue to follow.    Please call with questions: [phone] Discussed case with stroke attending, Dr. Libman. Would suggest a rCT today to screen for evolving infarction given the delays in obtaining MRI brain/MRA H/N. Spoke with CCU NP, Rock Oliveros, that patient in fact does not have allergy to iodine contrast and has received in the past without issue. If this is the case, would also suggest adding on CTA H/N w/ IV contrast if no contraindications. Neurology will continue to follow.    Please call with questions: [phone] Discussed case with stroke attending, Dr. Libman. Would suggest a rCT today to screen for evolving infarction given the delays in obtaining MRI brain/MRA H/N. Spoke with CCU NP, Rock Oliveros, that patient in fact does not have allergy to iodine contrast and has received in the past without issue. If this is the case, would also suggest adding on CTA H/N w/ IV contrast if no contraindications. Neurology will continue to follow.    Please call with questions: [phone]    Stroke attending. Agree with above

## 2024-01-16 NOTE — PROGRESS NOTE ADULT - SUBJECTIVE AND OBJECTIVE BOX
Chief Complaint/Follow-up on: DM    Subjective:    POC blood glucose and insulin use reviewed.  pt feels well   no n/v  tolerating PO   we spoke about inpt DM management goals and monitoring and also the need to fu outpt for DM   extensive d/w pt and her  at bedside regarding DM and avoiding hypoglycemia and titration of her regimen to avoid lows   pt is eating but not as much as she would at home   hypoglycemia this am from overnight lantus as hardly is receiving any premeal or correction insulin     MEDICATIONS  (STANDING):  chlorhexidine 2% Cloths 1 Application(s) Topical daily  dextrose 5%. 1000 milliLiter(s) (50 mL/Hr) IV Continuous <Continuous>  dextrose 5%. 1000 milliLiter(s) (100 mL/Hr) IV Continuous <Continuous>  dextrose 50% Injectable 25 Gram(s) IV Push once  dextrose 50% Injectable 25 Gram(s) IV Push once  dextrose 50% Injectable 12.5 Gram(s) IV Push once  glucagon  Injectable 1 milliGRAM(s) IntraMuscular once  insulin glargine Injectable (LANTUS) 8 Unit(s) SubCutaneous every morning  insulin glargine Injectable (LANTUS) 6 Unit(s) SubCutaneous at bedtime  insulin lispro (ADMELOG) corrective regimen sliding scale   SubCutaneous three times a day before meals  insulin lispro (ADMELOG) corrective regimen sliding scale   SubCutaneous at bedtime  insulin lispro Injectable (ADMELOG) 1 Unit(s) SubCutaneous three times a day before meals  simvastatin 20 milliGRAM(s) Oral at bedtime    MEDICATIONS  (PRN):  dextrose Oral Gel 15 Gram(s) Oral once PRN Blood Glucose LESS THAN 70 milliGRAM(s)/deciliter      PHYSICAL EXAM:  VITALS: T(C): 36.9 (01-16-24 @ 12:01)  T(F): 98.5 (01-16-24 @ 12:01), Max: 98.5 (01-16-24 @ 12:01)  HR: 79 (01-16-24 @ 14:00) (51 - 79)  BP: 104/57 (01-16-24 @ 14:00) (88/53 - 128/58)  RR:  (12 - 28)  SpO2:  (95% - 100%)  Wt(kg): --  GENERAL: NAD, well-groomed, well-developed  EYES: No proptosis, no injection  HEENT:  Atraumatic, Normocephalic, moist mucous membranes  RESPIRATORY: no resp distress   CARDIOVASCULAR: Regular rate and rhythm;; no peripheral edema  psych: alert and oriented X3      POCT Blood Glucose.: 200 mg/dL (01-16-24 @ 11:40)  POCT Blood Glucose.: 145 mg/dL (01-16-24 @ 09:07)  POCT Blood Glucose.: 138 mg/dL (01-16-24 @ 08:40)  POCT Blood Glucose.: 75 mg/dL (01-16-24 @ 08:16)  POCT Blood Glucose.: 66 mg/dL (01-16-24 @ 08:04)  POCT Blood Glucose.: 52 mg/dL (01-16-24 @ 08:00)  POCT Blood Glucose.: 166 mg/dL (01-15-24 @ 21:49)  POCT Blood Glucose.: 144 mg/dL (01-15-24 @ 18:10)  POCT Blood Glucose.: 175 mg/dL (01-15-24 @ 12:38)  POCT Blood Glucose.: 201 mg/dL (01-15-24 @ 07:46)  POCT Blood Glucose.: 240 mg/dL (01-14-24 @ 21:44)  POCT Blood Glucose.: 300 mg/dL (01-14-24 @ 16:40)  POCT Blood Glucose.: 291 mg/dL (01-14-24 @ 14:08)  POCT Blood Glucose.: 312 mg/dL (01-14-24 @ 10:05)  POCT Blood Glucose.: 308 mg/dL (01-14-24 @ 09:45)    01-16    130<L>  |  100  |  14  ----------------------------<  77  4.2   |  23  |  0.62    eGFR: 93    Ca    7.5<L>      01-16  Mg     2.20     01-16  Phos  1.6     01-16    TPro  5.0<L>  /  Alb  2.0<L>  /  TBili  <0.2  /  DBili  x   /  AST  75<H>  /  ALT  196<H>  /  AlkPhos  86  01-16          Thyroid Function Tests:  01-14 @ 17:31 TSH 2.94 FreeT4 1.2 T3 66 Anti TPO -- Anti Thyroglobulin Ab -- TSI --

## 2024-01-16 NOTE — PHYSICAL THERAPY INITIAL EVALUATION ADULT - PATIENT PROFILE REVIEW, REHAB EVAL
PT initial evaluation received and chart review completed. Pt agreeable to participate in PT evaluation. Pt cleared by JOAQUIM Sainz. ACTIVITY: INCREASE AS TOLERATED/yes

## 2024-01-16 NOTE — PHYSICAL THERAPY INITIAL EVALUATION ADULT - PERTINENT HX OF CURRENT PROBLEM, REHAB EVAL
Pt is a 73 year old female presenting with disorientation, confusion, and c/o significant CP and SOB. In ED, pt found to be tachycardiac and hypotensive. CT A/P significant for new large pericardial effusion and multifocal lung parenchymal opacities and interlobular septal thickening. Pt underwent urgent pericardiocentesis and pericardial drain placement.

## 2024-01-16 NOTE — PROGRESS NOTE ADULT - ASSESSMENT
72yo F with h/o smoking, HTN, HLD, type 1 DM, thyroidectomy, BCC, SCC found to have Rt. lower lobe mass with mets to multiple organs (bone, brain) in August 2023.Currently undergoing treatment w Dr. Plummer at [facility].  Brought by EMS to [facility] ER for  disoriented, confused and compliant of shortness of breath and chest pain . In the ED, the patient was hypothermic, tachycardic,hypotensive  and hypoxic, expressive Aphasic . CTH without stroke, CT CAP with large pericardial effusion s/p pericardial window. s/p 1Liter IVF. Pt went urgently for drainage w interventional cardiology.Now s/p drain of pericardial effusion 800 sanguineous fluid.      #Neuro  - presents with confusion and dysarthria  -s/p code stroke for Expressive aphasia and right superior quadrantanopia due to left hemispheric dysfunction. Mechanism ischemic stroke. Etiology likely cardioembolic given new afib vs hypercoagulability of malignancy    -Pre neuro not a candidate of tPA due to outside  window , cancer with mets to brain   - CT Brain Stroke Protocol showed . No evidence of a large acute infarction or large acute hemorrhage. Minimal microvascular disease.  - Mechanical thrombectomy candidacy pending MRI/MRA (no CTA due to alergy)        #Respiratory  - Currently in no acute distress  - Satting 98% 2L NC  - bilat pleural effusion.    #Cards    Pericardial effusion  -CT scan C/A/P revealed large pericardial effusion w bilat pleural effusion.  -Pericardial drain placed for tamponade , 800cc sanguineous fluid removed  -Cytology pending  - pericardial drain 10cc in 24 hrs          New Afib  iso pericardial eff. with tamponade physiology  ChadsVasc: 7  Has-Bled: 3  Anticoagulation recommendations pending further imaging   B/L compression stockings      Hypotension  Patient with h/o hypertension and is on lisinopril 20mg BID, amlodipine 5mg BID,   Hold Home medications  - s/p IVF and IV pressures for permissive HTN up to 220/110 for 24-48h from symptom onset followed by gradual normotension over 2-3 days   - off nosynephrine drip last night  - DASH diet          #GI  Consistent carb  Tolerating diet    HLD    simvastatin 20mg      #  Normal indices  -eGFR: 93 mL/min/1.73m2 (01-15-24      Hyponatremia  iso hyperglycemia   serum: osmolality 271, and sodium 126  Urine:  osmolality  593, and sodium 50   BUN 24      #Endo  -T1DM diagnosed age 14  -Home medication: Lantus 11 units am and 7 units pm,novalog/humalog 2-4 units pre-meal  -blood glucose ACHS with ISS  -HgA1c: : 7.0 %  -Consistent carbohydrate diet  - follow endo recs: Lantus 8 units q12  -Low dose admelog correction scale TID QAC and separate low dose scale QHS    Heme  Stage IV NSCLC adenocarcinoma  found to have Rt. 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   Currently undergoing treatment w Dr. Plummer at [facility].   hold Home chemo Tafinlar 75 BID and Mekinist 0.5 m while inpatient as per Heme/onco      PPx  Anticoagulation recommendations pending further imaging   (+) compression stockings 72yo F with h/o smoking, HTN, HLD, type 1 DM, thyroidectomy, BCC, SCC found to have Rt. lower lobe mass with mets to multiple organs (bone, brain) in August 2023.Currently undergoing treatment w Dr. Plummer at [facility].  Brought by EMS to [facility] ER for  disoriented, confused and compliant of shortness of breath and chest pain . In the ED, the patient was hypothermic, tachycardic,hypotensive  and hypoxic, expressive Aphasic . CTH without stroke, CT CAP with large pericardial effusion s/p pericardial window. s/p 1Liter IVF. Pt went urgently for drainage w interventional cardiology.Now s/p drain of pericardial effusion 800 sanguineous fluid.      #Neuro  - presents with confusion and dysarthria  -s/p code stroke for Expressive aphasia and right superior quadrantanopia due to left hemispheric dysfunction. Mechanism ischemic stroke. Etiology likely cardioembolic given new afib vs hypercoagulability of malignancy    -Pre neuro not a candidate of tPA due to outside  window , cancer with mets to brain   - CT Brain Stroke Protocol showed . No evidence of a large acute infarction or large acute hemorrhage. Minimal microvascular disease.  - Mechanical thrombectomy candidacy pending MRI/MRA (no CTA due to alergy)        #Respiratory  - Currently in no acute distress  - Satting 98% 2L NC  - bilat pleural effusion.    #Cards    Pericardial effusion  -CT scan C/A/P revealed large pericardial effusion w bilat pleural effusion.  -Pericardial drain placed for tamponade , 800cc sanguineous fluid removed  -Cytology pending  - pericardial drain 10cc in 24 hrs          New Afib  iso pericardial eff. with tamponade physiology  ChadsVasc: 7  Has-Bled: 3  Anticoagulation recommendations pending further imaging   B/L compression stockings      Hypotension  Patient with h/o hypertension and is on lisinopril 20mg BID, amlodipine 5mg BID,   Hold Home medications  - s/p IVF and IV pressures for permissive HTN up to 220/110 for 24-48h from symptom onset followed by gradual normotension over 2-3 days   - off nosynephrine drip last night  - DASH diet          #GI  Consistent carb  Tolerating diet    HLD    simvastatin 20mg      #  Normal indices  -eGFR: 93 mL/min/1.73m2 (01-15-24      Hyponatremia  iso hyperglycemia   serum: osmolality 271, and sodium 126  Urine:  osmolality  593, and sodium 50   BUN 24      #Endo  -T1DM diagnosed age 14  -Home medication: Lantus 11 units am and 7 units pm,novalog/humalog 2-4 units pre-meal  -blood glucose ACHS with ISS  -HgA1c: : 7.0 %  -Consistent carbohydrate diet  - follow endo recs: Lantus 8 units q12  -Low dose admelog correction scale TID QAC and separate low dose scale QHS    Heme  Stage IV NSCLC adenocarcinoma  found to have Rt. 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   Currently undergoing treatment w Dr. Plummer at [facility].   hold Home chemo Tafinlar 75 BID and Mekinist 0.5 m while inpatient as per Heme/onco      PPx  Anticoagulation recommendations pending further imaging   (+) compression stockings 72yo F with h/o smoking, HTN, HLD, type 1 DM, thyroidectomy, BCC, SCC found to have Rt. lower lobe mass with mets to multiple organs (bone, brain) in August 2023.Currently undergoing treatment w Dr. Plummer at [facility].  Brought by EMS to [facility] ER for  disoriented, confused and compliant of shortness of breath and chest pain . In the ED, the patient was hypothermic, tachycardic,hypotensive  and hypoxic, expressive Aphasic . CTH without stroke, CT CAP with large pericardial effusion s/p pericardial window. s/p 1Liter IVF. Pt went urgently for drainage w interventional cardiology.Now s/p drain of pericardial effusion 800 sanguineous fluid.      #Neuro  - presents with confusion and dysarthria  -s/p code stroke for Expressive aphasia and right superior quadrantanopia due to left hemispheric dysfunction. Mechanism ischemic stroke. Etiology likely cardioembolic given new afib vs hypercoagulability of malignancy    -Pre neuro not a candidate of tPA due to outside  window , cancer with mets to brain   - CT Brain Stroke Protocol showed . No evidence of a large acute infarction or large acute hemorrhage. Minimal microvascular disease.  - Mechanical thrombectomy candidacy  pending non con  MRI/MRA (no CTA due to alergy)        #Respiratory  - Currently in no acute distress  - Satting 98% 2L NC  - bilat pleural effusion.    #Cards    Pericardial effusion  -CT scan C/A/P revealed large pericardial effusion w bilat pleural effusion.  -Pericardial drain placed for tamponade , 800cc sanguineous fluid removed  -Cytology pending  - pericardial drain 10cc in 24 hrs          New Afib  iso pericardial eff. with tamponade physiology  ChadsVasc: 7  Has-Bled: 3  Anticoagulation recommendations pending further imaging   B/L compression stockings      Hypotension  Patient with h/o hypertension and is on lisinopril 20mg BID, amlodipine 5mg BID,   Hold Home medications  - s/p IVF and IV pressures for permissive HTN up to 220/110 for 24-48h from symptom onset followed by gradual normotension over 2-3 days   - off nosynephrine drip last night  - DASH diet          #GI  Consistent carb  Tolerating diet    HLD    simvastatin 20mg      #  Normal indices  -eGFR: 93 mL/min/1.73m2 (01-15-24      Hyponatremia  iso hyperglycemia   serum: osmolality 271, and sodium 126  Urine:  osmolality  593, and sodium 50   BUN 24      #Endo  -T1DM diagnosed age 14  -Home medication: Lantus 11 units am and 7 units pm,novalog/humalog 2-4 units pre-meal  -blood glucose ACHS with ISS  -HgA1c: : 7.0 %  -Consistent carbohydrate diet  - follow endo recs: Lantus 8 units q12  -Low dose admelog correction scale TID QAC and separate low dose scale QHS    Heme  Stage IV NSCLC adenocarcinoma  found to have Rt. 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   Currently undergoing treatment w Dr. Plummer at [facility].   hold Home chemo Tafinlar 75 BID and Mekinist 0.5 m while inpatient as per Heme/onco      PPx  Anticoagulation recommendations pending further imaging   (+) compression stockings 72yo F with h/o smoking, HTN, HLD, type 1 DM, thyroidectomy, BCC, SCC found to have Rt. lower lobe mass with mets to multiple organs (bone, brain) in August 2023.Currently undergoing treatment w Dr. Plummer at [facility].  Brought by EMS to [facility] ER for  disoriented, confused and compliant of shortness of breath and chest pain . In the ED, the patient was hypothermic, tachycardic,hypotensive  and hypoxic, expressive Aphasic . CTH without stroke, CT CAP with large pericardial effusion s/p pericardial window. s/p 1Liter IVF. Pt went urgently for drainage w interventional cardiology.Now s/p drain of pericardial effusion 800 sanguineous fluid.      #Neuro  - presents with confusion and dysarthria  -s/p code stroke for Expressive aphasia and right superior quadrantanopia due to left hemispheric dysfunction. Mechanism ischemic stroke. Etiology likely cardioembolic given new afib vs hypercoagulability of malignancy    -Pre neuro not a candidate of tPA due to outside  window , cancer with mets to brain   - CT Brain Stroke Protocol showed . No evidence of a large acute infarction or large acute hemorrhage. Minimal microvascular disease.  - Mechanical thrombectomy candidacy  pending non con  MRI/MRA (no CTA due to alergy)        #Respiratory  - Currently in no acute distress  - Satting 98% 2L NC  - bilat pleural effusion.    #Cards    Pericardial effusion  -CT scan C/A/P revealed large pericardial effusion w bilat pleural effusion.  -Pericardial drain placed for tamponade , 800cc sanguineous fluid removed  -Cytology pending  - pericardial drain 10cc in 24 hrs          New Afib  iso pericardial eff. with tamponade physiology  ChadsVasc: 7  Has-Bled: 3  Anticoagulation recommendations pending further imaging   B/L compression stockings      Hypotension  Patient with h/o hypertension and is on lisinopril 20mg BID, amlodipine 5mg BID,   Hold Home medications  - s/p IVF and IV pressures for permissive HTN up to 220/110 for 24-48h from symptom onset followed by gradual normotension over 2-3 days   - off nosynephrine drip last night  - DASH diet          #GI  Consistent carb  Tolerating diet    HLD    simvastatin 20mg      #  Normal indices  -eGFR: 93 mL/min/1.73m2 (01-15-24      Hyponatremia  iso hyperglycemia   serum: osmolality 271, and sodium 126  Urine:  osmolality  593, and sodium 50   BUN 24      #Endo  -T1DM diagnosed age 14  -Home medication: Lantus 11 units am and 7 units pm,novalog/humalog 2-4 units pre-meal  -blood glucose ACHS with ISS  -HgA1c: : 7.0 %  -Consistent carbohydrate diet  - follow endo recs: Lantus 8 units q12  -Low dose admelog correction scale TID QAC and separate low dose scale QHS    Heme  Stage IV NSCLC adenocarcinoma  found to have Rt. 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   Currently undergoing treatment w Dr. Plummer at [facility].   hold Home chemo Tafinlar 75 BID and Mekinist 0.5 m while inpatient as per Heme/onco      PPx  Anticoagulation recommendations pending further imaging   (+) compression stockings 72yo F with h/o smoking, HTN, HLD, type 1 DM, thyroidectomy, BCC, SCC found to have Rt. lower lobe mass with mets to multiple organs (bone, brain) in August 2023.Currently undergoing treatment w Dr. Plummer at [facility].  Brought by EMS to [facility] ER for  disoriented, confused and compliant of shortness of breath and chest pain . In the ED, the patient was hypothermic, tachycardic,hypotensive  and hypoxic, expressive Aphasic . CTH without stroke, CT CAP with large pericardial effusion s/p pericardial window. s/p 1Liter IVF. Pt went urgently for drainage w interventional cardiology.Now s/p drain of pericardial effusion 800 sanguineous fluid.      #Neuro  - presents with confusion and dysarthria  -s/p code stroke for Expressive aphasia and right superior quadrantanopia due to left hemispheric dysfunction. Mechanism ischemic stroke. Etiology likely cardioembolic given new afib vs hypercoagulability of malignancy    -Pre neuro not a candidate of tPA due to outside  window , cancer with mets to brain   - CT Brain Stroke Protocol showed . No evidence of a large acute infarction or large acute hemorrhage. Minimal microvascular disease.  - Mechanical thrombectomy candidacy  pending non con  MRI/MRA (no CTA due to alergy)        #Respiratory  - Currently in no acute distress  - bilat pleural effusion.  - Juan R lanza  RA      #Cards  #Pericardial effusion  -CT scan C/A/P revealed large pericardial effusion w bilat pleural effusion.  -Pericardial drain placed for tamponade , 800cc sanguineous fluid removed  -Cytology pending  - pericardial drain 10cc in 24 hrs  - limited echo today          New Afib  iso pericardial eff. with tamponade physiology  ChadsVasc: 7  Has-Bled: 3  Anticoagulation recommendations pending further imaging   B/L compression stockings      Hypotension  Patient with h/o hypertension and is on lisinopril 20mg BID, amlodipine 5mg BID,   Hold Home medications  - s/p IVF and IV pressures for permissive HTN up to 220/110 for 24-48h from symptom onset followed by gradual normotension over 2-3 days   - off nosynephrine drip last night  - DASH diet          #GI  Consistent carb  Tolerating diet    HLD    simvastatin 20mg      #  Normal indices  -eGFR: 93 mL/min/1.73m2 (01-15-24      Hyponatremia  iso hyperglycemia   serum: osmolality 271, and sodium 126  Urine:  osmolality  593, and sodium 50   BUN 24      #Endo  -T1DM diagnosed age 14  -Home medication: Lantus 11 units am and 7 units pm,novalog/humalog 2-4 units pre-meal  -blood glucose ACHS with ISS  -HgA1c: : 7.0 %  -Consistent carbohydrate diet  - follow endo recs: Lantus 8 units q12  -Low dose admelog correction scale TID QAC and separate low dose scale QHS    Heme  Stage IV NSCLC adenocarcinoma  found to have Rt. 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   Currently undergoing treatment w Dr. Plummer at [facility].   hold Home chemo Tafinlar 75 BID and Mekinist 0.5 m while inpatient as per Heme/onco      PPx  Anticoagulation recommendations pending further imaging   (+) compression stockings 74yo F with h/o smoking, HTN, HLD, type 1 DM, thyroidectomy, BCC, SCC found to have Rt. lower lobe mass with mets to multiple organs (bone, brain) in August 2023.Currently undergoing treatment w Dr. Plummer at [facility].  Brought by EMS to [facility] ER for  disoriented, confused and compliant of shortness of breath and chest pain . In the ED, the patient was hypothermic, tachycardic,hypotensive  and hypoxic, expressive Aphasic . CTH without stroke, CT CAP with large pericardial effusion s/p pericardial window. s/p 1Liter IVF. Pt went urgently for drainage w interventional cardiology.Now s/p drain of pericardial effusion 800 sanguineous fluid.      #Neuro  - presents with confusion and dysarthria  -s/p code stroke for Expressive aphasia and right superior quadrantanopia due to left hemispheric dysfunction. Mechanism ischemic stroke. Etiology likely cardioembolic given new afib vs hypercoagulability of malignancy    -Pre neuro not a candidate of tPA due to outside  window , cancer with mets to brain   - CT Brain Stroke Protocol showed . No evidence of a large acute infarction or large acute hemorrhage. Minimal microvascular disease.  - Mechanical thrombectomy candidacy  pending non con  MRI/MRA (no CTA due to alergy)        #Respiratory  - Currently in no acute distress  - bilat pleural effusion.  - Juan R lanza  RA      #Cards  #Pericardial effusion  -CT scan C/A/P revealed large pericardial effusion w bilat pleural effusion.  -Pericardial drain placed for tamponade , 800cc sanguineous fluid removed  -Cytology pending  - pericardial drain 10cc in 24 hrs  - limited echo today          New Afib  iso pericardial eff. with tamponade physiology  ChadsVasc: 7  Has-Bled: 3  Anticoagulation recommendations pending further imaging   B/L compression stockings      Hypotension  Patient with h/o hypertension and is on lisinopril 20mg BID, amlodipine 5mg BID,   Hold Home medications  - s/p IVF and IV pressures for permissive HTN up to 220/110 for 24-48h from symptom onset followed by gradual normotension over 2-3 days   - off nosynephrine drip last night  - DASH diet          #GI  Consistent carb  Tolerating diet    HLD    simvastatin 20mg      #  Normal indices  -eGFR: 93 mL/min/1.73m2 (01-15-24      Hyponatremia  iso hyperglycemia   serum: osmolality 271, and sodium 126  Urine:  osmolality  593, and sodium 50   BUN 24      #Endo  -T1DM diagnosed age 14  -Home medication: Lantus 11 units am and 7 units pm,novalog/humalog 2-4 units pre-meal  -blood glucose ACHS with ISS  -HgA1c: : 7.0 %  -Consistent carbohydrate diet  - follow endo recs: Lantus 8 units q12  -Low dose admelog correction scale TID QAC and separate low dose scale QHS    Heme  Stage IV NSCLC adenocarcinoma  found to have Rt. 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   Currently undergoing treatment w Dr. Plummer at [facility].   hold Home chemo Tafinlar 75 BID and Mekinist 0.5 m while inpatient as per Heme/onco      PPx  Anticoagulation recommendations pending further imaging   (+) compression stockings 74yo F with h/o smoking, HTN, HLD, type 1 DM, thyroidectomy, BCC, SCC found to have Rt. lower lobe mass with mets to multiple organs (bone, brain) in August 2023.Currently undergoing treatment w Dr. Plummer at [facility].  Brought by EMS to [facility] ER for  disoriented, confused and compliant of shortness of breath and chest pain . In the ED, the patient was hypothermic, tachycardic,hypotensive  and hypoxic, expressive Aphasic . CTH without stroke, CT CAP with large pericardial effusion s/p pericardial window. s/p 1Liter IVF. Pt went urgently for drainage w interventional cardiology.Now s/p drain of pericardial effusion 800 sanguineous fluid.      #Neuro  - presents with confusion and dysarthria  -s/p code stroke for Expressive aphasia and right superior quadrantanopia due to left hemispheric dysfunction. Mechanism ischemic stroke. Etiology likely cardioembolic given new afib vs hypercoagulability of malignancy    -Pre neuro not a candidate of tPA due to outside  window , cancer with mets to brain   - CT Brain Stroke Protocol showed . No evidence of a large acute infarction or large acute hemorrhage. Minimal microvascular disease.  - A per Neuro, Mechanical thrombectomy candidacy  pending non con  MRI/MRA (no CTA due to alergy)- neuro called        #Respiratory  - Currently in no acute distress  - bilat pleural effusion.  - Juan R lanza  RA      #Cards  #Pericardial effusion  -CT scan C/A/P revealed large pericardial effusion w bilat pleural effusion.  -Pericardial drain placed for tamponade , 800cc sanguineous fluid removed  -Cytology pending  - pericardial drain 10cc in 24 hrs  - limited echo today          New Afib  iso pericardial eff. with tamponade physiology  ChadsVasc: 7  Has-Bled: 3  Anticoagulation recommendations pending further imaging   B/L compression stockings      Hypotension  Patient with h/o hypertension and is on lisinopril 20mg BID, amlodipine 5mg BID,   Hold Home medications  - s/p 3L IVF and IV phenylephrine  drip  for permissive HTN up to 220/110 for 24-48h from symptom onset followed by gradual normotension over 2-3 days   - off  IV phenylephrine  drip on 1/15  - DASH diet          #GI  Consistent carb  Tolerating diet    HLD    simvastatin 20mg      #  Normal indices  -eGFR: 93 mL/min/1.73m2 (01-15-24      Hyponatremia  iso hyperglycemia   serum: osmolality 271, and sodium 126  Urine:  osmolality  593, and sodium 50   BUN 24      #Endo  -T1DM diagnosed age 14  -Home medication: Lantus 11 units am and 7 units pm,novalog/humalog 2-4 units pre-meal  -blood glucose ACHS with ISS  -HgA1c: : 7.0 %  -Consistent carbohydrate diet  - follow endo recs: Lantus 8 units q12  -Low dose admelog correction scale TID QAC and separate low dose scale QHS    Heme  Stage IV NSCLC adenocarcinoma  found to have Rt. 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   Currently undergoing treatment w Dr. Plummer at [facility].   hold Home chemo Tafinlar 75 BID and Mekinist 0.5 m while inpatient as per Heme/onco      PPx  Anticoagulation recommendations pending further imaging   (+) compression stockings 72yo F with h/o smoking, HTN, HLD, type 1 DM, thyroidectomy, BCC, SCC found to have Rt. lower lobe mass with mets to multiple organs (bone, brain) in August 2023.Currently undergoing treatment w Dr. Plummer at [facility].  Brought by EMS to [facility] ER for  disoriented, confused and compliant of shortness of breath and chest pain . In the ED, the patient was hypothermic, tachycardic,hypotensive  and hypoxic, expressive Aphasic . CTH without stroke, CT CAP with large pericardial effusion s/p pericardial window. s/p 1Liter IVF. Pt went urgently for drainage w interventional cardiology.Now s/p drain of pericardial effusion 800 sanguineous fluid.      #Neuro  - presents with confusion and dysarthria  -s/p code stroke for Expressive aphasia and right superior quadrantanopia due to left hemispheric dysfunction. Mechanism ischemic stroke. Etiology likely cardioembolic given new afib vs hypercoagulability of malignancy    -Pre neuro not a candidate of tPA due to outside  window , cancer with mets to brain   - CT Brain Stroke Protocol showed . No evidence of a large acute infarction or large acute hemorrhage. Minimal microvascular disease.  - A per Neuro, Mechanical thrombectomy candidacy  pending non con  MRI/MRA (no CTA due to alergy)- neuro called        #Respiratory  - Currently in no acute distress  - bilat pleural effusion.  - Juan R lanza  RA      #Cards  #Pericardial effusion  -CT scan C/A/P revealed large pericardial effusion w bilat pleural effusion.  -Pericardial drain placed for tamponade , 800cc sanguineous fluid removed  -Cytology pending  - pericardial drain 10cc in 24 hrs  - limited echo today          New Afib  iso pericardial eff. with tamponade physiology  ChadsVasc: 7  Has-Bled: 3  Anticoagulation recommendations pending further imaging   B/L compression stockings      Hypotension  Patient with h/o hypertension and is on lisinopril 20mg BID, amlodipine 5mg BID,   Hold Home medications  - s/p 3L IVF and IV phenylephrine  drip  for permissive HTN up to 220/110 for 24-48h from symptom onset followed by gradual normotension over 2-3 days   - off  IV phenylephrine  drip on 1/15  - DASH diet          #GI  Consistent carb  Tolerating diet    HLD    simvastatin 20mg      #  Normal indices  -eGFR: 93 mL/min/1.73m2 (01-15-24      Hyponatremia  iso hyperglycemia   serum: osmolality 271, and sodium 126  Urine:  osmolality  593, and sodium 50   BUN 24      #Endo  -T1DM diagnosed age 14  -Home medication: Lantus 11 units am and 7 units pm,novalog/humalog 2-4 units pre-meal  -blood glucose ACHS with ISS  -HgA1c: : 7.0 %  -Consistent carbohydrate diet  - follow endo recs: Lantus 8 units q12  -Low dose admelog correction scale TID QAC and separate low dose scale QHS    Heme  Stage IV NSCLC adenocarcinoma  found to have Rt. 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   Currently undergoing treatment w Dr. Plummer at [facility].   hold Home chemo Tafinlar 75 BID and Mekinist 0.5 m while inpatient as per Heme/onco      PPx  Anticoagulation recommendations pending further imaging   (+) compression stockings 74yo F with h/o smoking, HTN, HLD, type 1 DM, thyroidectomy, BCC, SCC found to have Rt. lower lobe mass with mets to multiple organs (bone, brain) in August 2023.Currently undergoing treatment w Dr. Plummer at [facility].  Brought by EMS to [facility] ER for  disoriented, confused and compliant of shortness of breath and chest pain . In the ED, the patient was hypothermic, tachycardic,hypotensive  and hypoxic, expressive Aphasic . CTH without stroke, CT CAP with large pericardial effusion s/p pericardial window. s/p 1Liter IVF. Pt went urgently for drainage w interventional cardiology.Now s/p drain of pericardial effusion 800 sanguineous fluid.      #Neuro  - presents with confusion and dysarthria  -s/p code stroke for Expressive aphasia and right superior quadrantanopia due to left hemispheric dysfunction. Mechanism ischemic stroke. Etiology likely cardioembolic given new afib vs hypercoagulability of malignancy    -Pre neuro not a candidate of tPA due to outside  window , cancer with mets to brain   - CT Brain Stroke Protocol showed . No evidence of a large acute infarction or large acute hemorrhage. Minimal microvascular disease.  - A per Neuro, Mechanical thrombectomy candidacy  pending non con  MRI/MRA (no CTA due to alergy)  -Neuro symptoms resolved  - neuro called for the need of images        Respiratory  # SOB/hypoxia on presentation  - Currently in no acute distress  - bilat pleural effusion.  - [employer]  RA      Cards    #Pericardial effusion  -CT scan C/A/P revealed large pericardial effusion w bilat pleural effusion.  -Pericardial drain placed for tamponade , 800cc sanguineous fluid removed on 1/14   - Thoracic Sx following for possible pericardial window- called to clarified  -Cytology pending  - pericardial drain 10cc in 24 hrs on 1/15  - limited echo today            #New Afib  -iso pericardial effusion  with tamponade physiology  -ChadsVasc: 7  -Has-Bled: 3  -Anticoagulation recommendations pending further imaging and heme recs  -B/L compression stockings      #Hypotension  -Patient with h/o hypertension and is on lisinopril 20mg BID, amlodipine 5mg BID,   -Hold Home medications  - s/p 3L IVF and IV phenylephrine  drip  for permissive HTN up to 220/110 for 24-48h from symptom onset followed by gradual normotension over 2-3 days   - off  IV phenylephrine  drip on 1/15  - DASH diet          #GI  -Consistent carb  -Tolerating diet          #  Normal indices  -eGFR: 93 mL/min/1.73m2 (01-15-24      #Hyponatremia  iso hyperglycemia   serum: osmolality 271, and sodium 126  Urine:  osmolality  593, and sodium 50         #Endo  -T1DM diagnosed age 14  -Home medication: Lantus 11 units am and 7 units pm,novalog/humalog 2-4 units pre-meal  -blood glucose ACHS with ISS  -HgA1c: : 7.0 %  -Consistent carbohydrate diet  - follow endo recs: Lantus 8 units q12  -Low dose admelog correction scale TID QAC and separate low dose scale QHS    #HLD    -simvastatin 20mg      Heme  #Stage IV NSCLC adenocarcinoma with with mets to multiple organs (bone, brain) in August 2023.   -Currently undergoing treatment w Dr. Plummer at [facility].   -hold Home chemo Tafinlar 75 BID and Mekinist 0.5 m while inpatient as per Heme/onco      PPx  -Anticoagulation recommendations pending further imaging   (+) compression stockings 74yo F with h/o smoking, HTN, HLD, type 1 DM, thyroidectomy, BCC, SCC found to have Rt. lower lobe mass with mets to multiple organs (bone, brain) in August 2023.Currently undergoing treatment w Dr. Plummer at [facility].  Brought by EMS to [facility] ER for  disoriented, confused and compliant of shortness of breath and chest pain . In the ED, the patient was hypothermic, tachycardic,hypotensive  and hypoxic, expressive Aphasic . CTH without stroke, CT CAP with large pericardial effusion s/p pericardial window. s/p 1Liter IVF. Pt went urgently for drainage w interventional cardiology.Now s/p drain of pericardial effusion 800 sanguineous fluid.      #Neuro  - presents with confusion and dysarthria  -s/p code stroke for Expressive aphasia and right superior quadrantanopia due to left hemispheric dysfunction. Mechanism ischemic stroke. Etiology likely cardioembolic given new afib vs hypercoagulability of malignancy    -Pre neuro not a candidate of tPA due to outside  window , cancer with mets to brain   - CT Brain Stroke Protocol showed . No evidence of a large acute infarction or large acute hemorrhage. Minimal microvascular disease.  - A per Neuro, Mechanical thrombectomy candidacy  pending non con  MRI/MRA (no CTA due to alergy)  -Neuro symptoms resolved  - neuro called for the need of images        Respiratory  # SOB/hypoxia on presentation  - Currently in no acute distress  - bilat pleural effusion.  - [employer]  RA      Cards    #Pericardial effusion  -CT scan C/A/P revealed large pericardial effusion w bilat pleural effusion.  -Pericardial drain placed for tamponade , 800cc sanguineous fluid removed on 1/14   - Thoracic Sx following for possible pericardial window- called to clarified  -Cytology pending  - pericardial drain 10cc in 24 hrs on 1/15  - limited echo today            #New Afib  -iso pericardial effusion  with tamponade physiology  -ChadsVasc: 7  -Has-Bled: 3  -Anticoagulation recommendations pending further imaging and heme recs  -B/L compression stockings      #Hypotension  -Patient with h/o hypertension and is on lisinopril 20mg BID, amlodipine 5mg BID,   -Hold Home medications  - s/p 3L IVF and IV phenylephrine  drip  for permissive HTN up to 220/110 for 24-48h from symptom onset followed by gradual normotension over 2-3 days   - off  IV phenylephrine  drip on 1/15  - DASH diet          #GI  -Consistent carb  -Tolerating diet          #  Normal indices  -eGFR: 93 mL/min/1.73m2 (01-15-24      #Hyponatremia  iso hyperglycemia   serum: osmolality 271, and sodium 126  Urine:  osmolality  593, and sodium 50         #Endo  -T1DM diagnosed age 14  -Home medication: Lantus 11 units am and 7 units pm,novalog/humalog 2-4 units pre-meal  -blood glucose ACHS with ISS  -HgA1c: : 7.0 %  -Consistent carbohydrate diet  - follow endo recs: Lantus 8 units q12  -Low dose admelog correction scale TID QAC and separate low dose scale QHS    #HLD    -simvastatin 20mg      Heme  #Stage IV NSCLC adenocarcinoma with with mets to multiple organs (bone, brain) in August 2023.   -Currently undergoing treatment w Dr. Plummer at [facility].   -hold Home chemo Tafinlar 75 BID and Mekinist 0.5 m while inpatient as per Heme/onco      PPx  -Anticoagulation recommendations pending further imaging   (+) compression stockings 74yo F with h/o smoking, HTN, HLD, type 1 DM, thyroidectomy, BCC, SCC found to have Rt. lower lobe mass with mets to multiple organs (bone, brain) in August 2023.Currently undergoing treatment w Dr. Plummer at [facility].  Brought by EMS to [facility] ER for  disoriented, confused and compliant of shortness of breath and chest pain . In the ED, the patient was hypothermic, tachycardic,hypotensive  and hypoxic, expressive Aphasic . CTH without stroke, CT CAP with large pericardial effusion s/p pericardial window. s/p 1Liter IVF. Pt went urgently for drainage w interventional cardiology.Now s/p drain of pericardial effusion 800 sanguineous fluid.      #Neuro  - presents with confusion and dysarthria  -s/p code stroke for Expressive aphasia and right superior quadrantanopia due to left hemispheric dysfunction. Mechanism ischemic stroke. Etiology likely cardioembolic given new afib vs hypercoagulability of malignancy    -Pre neuro not a candidate of tPA due to outside  window , cancer with mets to brain   - CT Brain Stroke Protocol showed . No evidence of a large acute infarction or large acute hemorrhage. Minimal microvascular disease.  - pending non con  MRI/ MRA (no CTA due to allergy pt claustrophobic , need light sedation  -Neuro symptoms resolved  - neuro called for the need of images        Respiratory  # SOB/hypoxia on presentation  - Currently in no acute distress  - bilat pleural effusion.  - Satting [employer]  RA      Cards    #Pericardial effusion  -CT scan C/A/P revealed large pericardial effusion w bilat pleural effusion.  -Pericardial drain placed for tamponade , 800cc sanguineous fluid removed on 1/14   - Thoracic Sx following for possible pericardial window- called to clarified  -Cytology pending  - pericardial drain 10cc in 24 hrs on 1/15  - limited echo today            #New Afib  -iso pericardial effusion  with tamponade physiology  -ChadsVasc: 7  -Has-Bled: 3  -Anticoagulation recommendations pending further imaging and heme recs  -B/L compression stockings      #Hypotension  -Patient with h/o hypertension and is on lisinopril 20mg BID, amlodipine 5mg BID,   -Hold Home medications  - s/p 3L IVF and IV phenylephrine  drip  for permissive HTN up to 220/110 for 24-48h from symptom onset followed by gradual normotension over 2-3 days   - off  IV phenylephrine  drip on 1/15  - DASH diet          #GI  -Consistent carb  -Tolerating diet          #  Normal indices  -eGFR: 93 mL/min/1.73m2 (01-15-24      #Hyponatremia  iso hyperglycemia   serum: osmolality 271, and sodium 126  Urine:  osmolality  593, and sodium 50         #Endo  -T1DM diagnosed age 14  -Home medication: Lantus 11 units am and 7 units pm,novalog/humalog 2-4 units pre-meal  -blood glucose ACHS with ISS  -HgA1c: : 7.0 %  -Consistent carbohydrate diet  - follow endo recs: Lantus 8 units q12  -Low dose admelog correction scale TID QAC and separate low dose scale QHS    #HLD    -simvastatin 20mg      Heme  #Stage IV NSCLC adenocarcinoma with with mets to multiple organs (bone, brain) in August 2023.   -Currently undergoing treatment w Dr. Plummer at [facility].   -hold Home chemo Tafinlar 75 BID and Mekinist 0.5 m while inpatient as per Heme/onco      PPx  -Anticoagulation recommendations pending further imaging   (+) compression stockings 72yo F with h/o smoking, HTN, HLD, type 1 DM, thyroidectomy, BCC, SCC found to have Rt. lower lobe mass with mets to multiple organs (bone, brain) in August 2023.Currently undergoing treatment w Dr. Plummer at [facility].  Brought by EMS to [facility] ER for  disoriented, confused and compliant of shortness of breath and chest pain . In the ED, the patient was hypothermic, tachycardic,hypotensive  and hypoxic, expressive Aphasic . CTH without stroke, CT CAP with large pericardial effusion s/p pericardial window. s/p 1Liter IVF. Pt went urgently for drainage w interventional cardiology.Now s/p drain of pericardial effusion 800 sanguineous fluid.      #Neuro  - presents with confusion and dysarthria  -s/p code stroke for Expressive aphasia and right superior quadrantanopia due to left hemispheric dysfunction. Mechanism ischemic stroke. Etiology likely cardioembolic given new afib vs hypercoagulability of malignancy    -Pre neuro not a candidate of tPA due to outside  window , cancer with mets to brain   - CT Brain Stroke Protocol showed . No evidence of a large acute infarction or large acute hemorrhage. Minimal microvascular disease.  - pending non con  MRI/ MRA (no CTA due to allergy pt claustrophobic , need light sedation  -Neuro symptoms resolved  - neuro called for the need of images        Respiratory  # SOB/hypoxia on presentation  - Currently in no acute distress  - bilat pleural effusion.  - Satting [employer]  RA      Cards    #Pericardial effusion  -CT scan C/A/P revealed large pericardial effusion w bilat pleural effusion.  -Pericardial drain placed for tamponade , 800cc sanguineous fluid removed on 1/14   - Thoracic Sx following for possible pericardial window- called to clarified  -Cytology pending  - pericardial drain 10cc in 24 hrs on 1/15  - limited echo today            #New Afib  -iso pericardial effusion  with tamponade physiology  -ChadsVasc: 7  -Has-Bled: 3  -Anticoagulation recommendations pending further imaging and heme recs  -B/L compression stockings      #Hypotension  -Patient with h/o hypertension and is on lisinopril 20mg BID, amlodipine 5mg BID,   -Hold Home medications  - s/p 3L IVF and IV phenylephrine  drip  for permissive HTN up to 220/110 for 24-48h from symptom onset followed by gradual normotension over 2-3 days   - off  IV phenylephrine  drip on 1/15  - DASH diet          #GI  -Consistent carb  -Tolerating diet          #  Normal indices  -eGFR: 93 mL/min/1.73m2 (01-15-24      #Hyponatremia  iso hyperglycemia   serum: osmolality 271, and sodium 126  Urine:  osmolality  593, and sodium 50         #Endo  -T1DM diagnosed age 14  -Home medication: Lantus 11 units am and 7 units pm,novalog/humalog 2-4 units pre-meal  -blood glucose ACHS with ISS  -HgA1c: : 7.0 %  -Consistent carbohydrate diet  - follow endo recs: Lantus 8 units q12  -Low dose admelog correction scale TID QAC and separate low dose scale QHS    #HLD    -simvastatin 20mg      Heme  #Stage IV NSCLC adenocarcinoma with with mets to multiple organs (bone, brain) in August 2023.   -Currently undergoing treatment w Dr. Plummer at [facility].   -hold Home chemo Tafinlar 75 BID and Mekinist 0.5 m while inpatient as per Heme/onco      PPx  -Anticoagulation recommendations pending further imaging   (+) compression stockings 72yo F with h/o smoking, HTN, HLD, type 1 DM, thyroidectomy, BCC, SCC found to have Rt. lower lobe mass with mets to multiple organs (bone, brain) in August 2023.Currently undergoing treatment w Dr. Plummer at [facility].  Brought by EMS to [facility] ER for  disoriented, confused and compliant of shortness of breath and chest pain . In the ED, the patient was hypothermic, tachycardic,hypotensive  and hypoxic, expressive Aphasic . CTH without stroke, CT CAP with large pericardial effusion s/p pericardial window. s/p 1Liter IVF. Pt went urgently for drainage w interventional cardiology. Now s/p drain of pericardial effusion 800 sanguineous fluid.      #Neuro  - presents with confusion and dysarthria  -s/p code stroke for Expressive aphasia and right superior quadrantanopia due to left hemispheric dysfunction. Mechanism ischemic stroke. Etiology likely cardioembolic given new afib vs hypercoagulability of malignancy    -Pre neuro not a candidate of tPA due to outside  window , cancer with mets to brain   - CT Brain Stroke Protocol showed . No evidence of a large acute infarction or large acute hemorrhage. Minimal microvascular disease.  -Neuro symptoms resolved  - pending non con  MRI/ MRA (no CTA due to allergy pt claustrophobic , need light sedation            Respiratory  # SOB/hypoxia on presentation  - Currently in no acute distress  - bilat pleural effusion.  - Satting [employer]  RA      Cards    #Pericardial effusion  -CT scan C/A/P revealed large pericardial effusion w bilat pleural effusion.  -Pericardial drain placed for tamponade , 800cc sanguineous fluid removed on 1/14   --Cytology pending  - Thoracic Sx following for possible pericardial window- no intervention at this time as per thoracic team  - pericardial drain 10cc in 24 hrs on 1/15  - limited echo showed trace pericardial effusion            #New Afib  -iso pericardial effusion  with tamponade physiology  -ChadsVasc: 7  -Has-Bled: 3  -  awaiting cytology result   - as per Heme will be cautions with AC, If pericardial drain is malignant  -B/L compression stockings      #Hypotension  -Patient with h/o hypertension and is on lisinopril 20mg BID, amlodipine 5mg BID,   -Hold Home medications  - s/p 3L IVF and IV phenylephrine  drip  for permissive HTN up to 220/110 for 24-48h from symptom onset followed by gradual normotension over 2-3 days   - off  IV phenylephrine  drip on 1/15  - DASH diet          #GI  -Consistent carb  -Tolerating diet          #  Normal indices  -eGFR: 93 mL/min/1.73m2 (01-15-24      #Hyponatremia  iso hyperglycemia   serum: osmolality 271, and sodium 126  Urine:  osmolality  593, and sodium 50         #Endo  -T1DM diagnosed age 14  -Home medication: Lantus 11 units am and 7 units pm,novalog/humalog 2-4 units pre-meal  -blood glucose ACHS with ISS  -HgA1c: 7.0 %  -Consistent carbohydrate diet  - follow endo recs: Lantus 8 units q12  -Low dose admelog correction scale TID QAC and separate low dose scale QHS    #HLD    -simvastatin 20mg      Heme  #Stage IV NSCLC adenocarcinoma with  mets to multiple organs (bone, brain) in August 2023.   -Currently undergoing treatment w Dr. Plummer at [facility].   -hold Home chemo Tafinlar 75 BID and Mekinist 0.5 m while inpatient as per Heme/onco      PPx  -Anticoagulation recommendations pending further imaging   (+) compression stockings 74yo F with h/o smoking, HTN, HLD, type 1 DM, thyroidectomy, BCC, SCC found to have Rt. lower lobe mass with mets to multiple organs (bone, brain) in August 2023.Currently undergoing treatment w Dr. Plummer at [facility].  Brought by EMS to [facility] ER for  disoriented, confused and compliant of shortness of breath and chest pain . In the ED, the patient was hypothermic, tachycardic,hypotensive  and hypoxic, expressive Aphasic . CTH without stroke, CT CAP with large pericardial effusion s/p pericardial window. s/p 1Liter IVF. Pt went urgently for drainage w interventional cardiology. Now s/p drain of pericardial effusion 800 sanguineous fluid.      #Neuro  - presents with confusion and dysarthria  -s/p code stroke for Expressive aphasia and right superior quadrantanopia due to left hemispheric dysfunction. Mechanism ischemic stroke. Etiology likely cardioembolic given new afib vs hypercoagulability of malignancy    -Pre neuro not a candidate of tPA due to outside  window , cancer with mets to brain   - CT Brain Stroke Protocol showed . No evidence of a large acute infarction or large acute hemorrhage. Minimal microvascular disease.  -Neuro symptoms resolved  - pending non con  MRI/ MRA (no CTA due to allergy pt claustrophobic , need light sedation            Respiratory  # SOB/hypoxia on presentation  - Currently in no acute distress  - bilat pleural effusion.  - Satting [employer]  RA      Cards    #Pericardial effusion  -CT scan C/A/P revealed large pericardial effusion w bilat pleural effusion.  -Pericardial drain placed for tamponade , 800cc sanguineous fluid removed on 1/14   --Cytology pending  - Thoracic Sx following for possible pericardial window- no intervention at this time as per thoracic team  - pericardial drain 10cc in 24 hrs on 1/15  - limited echo showed trace pericardial effusion            #New Afib  -iso pericardial effusion  with tamponade physiology  -ChadsVasc: 7  -Has-Bled: 3  -  awaiting cytology result   - as per Heme will be cautions with AC, If pericardial drain is malignant  -B/L compression stockings      #Hypotension  -Patient with h/o hypertension and is on lisinopril 20mg BID, amlodipine 5mg BID,   -Hold Home medications  - s/p 3L IVF and IV phenylephrine  drip  for permissive HTN up to 220/110 for 24-48h from symptom onset followed by gradual normotension over 2-3 days   - off  IV phenylephrine  drip on 1/15  - DASH diet          #GI  -Consistent carb  -Tolerating diet          #  Normal indices  -eGFR: 93 mL/min/1.73m2 (01-15-24      #Hyponatremia  iso hyperglycemia   serum: osmolality 271, and sodium 126  Urine:  osmolality  593, and sodium 50         #Endo  -T1DM diagnosed age 14  -Home medication: Lantus 11 units am and 7 units pm,novalog/humalog 2-4 units pre-meal  -blood glucose ACHS with ISS  -HgA1c: 7.0 %  -Consistent carbohydrate diet  - follow endo recs: Lantus 8 units q12  -Low dose admelog correction scale TID QAC and separate low dose scale QHS    #HLD    -simvastatin 20mg      Heme  #Stage IV NSCLC adenocarcinoma with  mets to multiple organs (bone, brain) in August 2023.   -Currently undergoing treatment w Dr. Plummer at [facility].   -hold Home chemo Tafinlar 75 BID and Mekinist 0.5 m while inpatient as per Heme/onco      PPx  -Anticoagulation recommendations pending further imaging   (+) compression stockings 72yo F with h/o smoking, HTN, HLD, type 1 DM, thyroidectomy, BCC, SCC found to have Rt. lower lobe mass with mets to multiple organs (bone, brain) in August 2023.Currently undergoing treatment w Dr. Plummer at [facility].  Brought by EMS to [facility] ER for  disoriented, confused and compliant of shortness of breath and chest pain . In the ED, the patient was hypothermic, tachycardic, hypotensive  and hypoxic, expressive Aphasic . CTH without stroke, CT CAP with large pericardial effusion s/p pericardial window. s/p 1Liter IVF. Pt went urgently for drainage w interventional cardiology. Now s/p drain of pericardial effusion 800 sanguineous fluid.      #Neuro  - presents with confusion and dysarthria  -s/p code stroke for Expressive aphasia and right superior quadrantanopia due to left hemispheric dysfunction. Mechanism ischemic stroke. Etiology likely cardioembolic given new afib vs hypercoagulability of malignancy    -Pre neuro not a candidate of tPA due to outside  window , cancer with mets to brain   - CT Brain Stroke Protocol showed . No evidence of a large acute infarction or large acute hemorrhage. Minimal microvascular disease.  -Neuro symptoms resolved  - pending non con  MRI/ MRA (no CTA due to allergy pt claustrophobic , need light sedation            Respiratory  # SOB/hypoxia on presentation  - Currently in no acute distress  - bilat pleural effusion.  - Satting [employer]  RA      Cards    #Pericardial effusion  -CT scan C/A/P revealed large pericardial effusion w bilat pleural effusion.  -Pericardial drain placed for tamponade , 800cc sanguineous fluid removed on 1/14   --Cytology pending  - Thoracic Sx following for possible pericardial window- no intervention at this time as per thoracic team  - pericardial drain 10cc in 24 hrs on 1/15  - limited echo showed trace pericardial effusion            #New Afib  -iso pericardial effusion  with tamponade physiology  -ChadsVasc: 7  -Has-Bled: 3  -  awaiting cytology result   - as per Heme will be cautions with AC, If pericardial drain is malignant  -B/L compression stockings      #Hypotension  -Patient with h/o hypertension and is on lisinopril 20mg BID, amlodipine 5mg BID,   -Hold Home medications  - s/p 3L IVF and IV phenylephrine  drip  for permissive HTN up to 220/110 for 24-48h from symptom onset followed by gradual normotension over 2-3 days   - off  IV phenylephrine  drip on 1/15  - DASH diet          #GI  -Consistent carb  -Tolerating diet          #  Normal indices  -eGFR: 93 mL/min/1.73m2 (01-15-24      #Hyponatremia  iso hyperglycemia   serum: osmolality 271, and sodium 126  Urine:  osmolality  593, and sodium 50         #Endo  -T1DM diagnosed age 14  -Home medication: Lantus 11 units am and 7 units pm, novalog/ humalog 2-4 units pre-meal  -blood glucose AC and HS with  AC ISS coverage   -HgA1c: 7.0 %  -Consistent carbohydrate diet  - follow endo recs: Lantus 8 units q12  -Low dose admelog correction scale TID QAC and separate low dose scale QHS    #HLD    -simvastatin 20mg      Heme  #Stage IV NSCLC adenocarcinoma with  mets to multiple organs (bone, brain) in August 2023.   -Currently undergoing treatment w Dr. Plummer at [facility].   -hold Home chemo Tafinlar 75 BID and Mekinist 0.5 m while inpatient as per Heme/onco      PPx  -Anticoagulation recommendations pending further imaging   (+) compression stockings 74yo F with h/o smoking, HTN, HLD, type 1 DM, thyroidectomy, BCC, SCC found to have Rt. lower lobe mass with mets to multiple organs (bone, brain) in August 2023.Currently undergoing treatment w Dr. Plummer at [facility].  Brought by EMS to [facility] ER for  disoriented, confused and compliant of shortness of breath and chest pain . In the ED, the patient was hypothermic, tachycardic, hypotensive  and hypoxic, expressive Aphasic . CTH without stroke, CT CAP with large pericardial effusion s/p pericardial window. s/p 1Liter IVF. Pt went urgently for drainage w interventional cardiology. Now s/p drain of pericardial effusion 800 sanguineous fluid.      #Neuro  - presents with confusion and dysarthria  -s/p code stroke for Expressive aphasia and right superior quadrantanopia due to left hemispheric dysfunction. Mechanism ischemic stroke. Etiology likely cardioembolic given new afib vs hypercoagulability of malignancy    -Pre neuro not a candidate of tPA due to outside  window , cancer with mets to brain   - CT Brain Stroke Protocol showed . No evidence of a large acute infarction or large acute hemorrhage. Minimal microvascular disease.  -Neuro symptoms resolved  - pending non con  MRI/ MRA (no CTA due to allergy pt claustrophobic , need light sedation            Respiratory  # SOB/hypoxia on presentation  - Currently in no acute distress  - bilat pleural effusion.  - Satting CEL-SCI  RA      Cards    #Pericardial effusion  -CT scan C/A/P revealed large pericardial effusion w bilat pleural effusion.  -Pericardial drain placed for tamponade , 800cc sanguineous fluid removed on 1/14   --Cytology pending  - Thoracic Sx following for possible pericardial window- no intervention at this time as per thoracic team  - pericardial drain 10cc in 24 hrs on 1/15  - limited echo showed trace pericardial effusion            #New Afib  -iso pericardial effusion  with tamponade physiology  -ChadsVasc: 7  -Has-Bled: 3  -  awaiting cytology result   - as per Heme will be cautions with AC, If pericardial drain is malignant  -B/L compression stockings      #Hypotension  -Patient with h/o hypertension and is on lisinopril 20mg BID, amlodipine 5mg BID,   -Hold Home medications  - s/p 3L IVF and IV phenylephrine  drip  for permissive HTN up to 220/110 for 24-48h from symptom onset followed by gradual normotension over 2-3 days   - off  IV phenylephrine  drip on 1/15  - DASH diet          #GI  -Consistent carb  -Tolerating diet          #  Normal indices  -eGFR: 93 mL/min/1.73m2 (01-15-24      #Hyponatremia  iso hyperglycemia   serum: osmolality 271, and sodium 126  Urine:  osmolality  593, and sodium 50         #Endo  -T1DM diagnosed age 14  -Home medication: Lantus 11 units am and 7 units pm, novalog/ humalog 2-4 units pre-meal  -blood glucose AC and HS with  AC ISS coverage   -HgA1c: 7.0 %  -Consistent carbohydrate diet  - follow endo recs: Lantus 8 units q12  -Low dose admelog correction scale TID QAC and separate low dose scale QHS    #HLD    -simvastatin 20mg      Heme  #Stage IV NSCLC adenocarcinoma with  mets to multiple organs (bone, brain) in August 2023.   -Currently undergoing treatment w Dr. Plummer at [facility].   -hold Home chemo Tafinlar 75 BID and Mekinist 0.5 m while inpatient as per Heme/onco      PPx  -Anticoagulation recommendations pending further imaging   (+) compression stockings 74yo F with h/o smoking, HTN, HLD, type 1 DM, thyroidectomy, BCC, SCC found to have Rt. lower lobe mass with mets to multiple organs (bone, brain) in August 2023.Currently undergoing treatment w Dr. Plummer at [facility].  Brought by EMS to [facility] ER for  disoriented, confused and compliant of shortness of breath and chest pain . In the ED, the patient was hypothermic, tachycardic, hypotensive  and hypoxic, expressive Aphasic . CTH without stroke, CT CAP with large pericardial effusion s/p pericardial window. s/p 1Liter IVF. Pt went urgently for drainage w interventional cardiology. Now s/p drain of pericardial effusion 800 sanguineous fluid.      #Neuro  - presents with confusion and dysarthria  -s/p code stroke for Expressive aphasia and right superior quadrantanopia due to left hemispheric dysfunction. Mechanism ischemic stroke. Etiology likely cardioembolic given new afib vs hypercoagulability of malignancy    -Pre neuro not a candidate of tPA due to outside  window , cancer with mets to brain   - CT Brain Stroke Protocol showed . No evidence of a large acute infarction or large acute hemorrhage. Minimal microvascular disease.  -Neuro symptoms resolved  - pending non con  MRI/ MRA ot CTA H/Neck with IV contrast            Respiratory  # SOB/hypoxia on presentation  - Currently in no acute distress  - bilat pleural effusion.  - Stockr  RA      Cards    #Pericardial effusion  -CT scan C/A/P revealed large pericardial effusion w bilat pleural effusion.  -Pericardial drain placed for tamponade , 800cc sanguineous fluid removed on 1/14   --Cytology pending  - Thoracic Sx following for possible pericardial window- no intervention at this time as per thoracic team  - pericardial drain 10cc in 24 hrs on 1/15  - limited echo showed trace pericardial effusion            #New Afib  -iso pericardial effusion  with tamponade physiology  -ChadsVasc: 7  -Has-Bled: 3  -  awaiting cytology result   - as per Heme will be cautions with AC, If pericardial drain is malignant  -B/L compression stockings      #Hypotension  -Patient with h/o hypertension and is on lisinopril 20mg BID, amlodipine 5mg BID,   -Hold Home medications  - s/p 3L IVF and IV phenylephrine  drip  for permissive HTN up to 220/110 for 24-48h from symptom onset followed by gradual normotension over 2-3 days   - off  IV phenylephrine  drip on 1/15  - DASH diet          #GI  -Consistent carb  -Tolerating diet          #  Normal indices  -eGFR: 93 mL/min/1.73m2 (01-15-24      #Hyponatremia  iso hyperglycemia   serum: osmolality 271, and sodium 126  Urine:  osmolality  593, and sodium 50         #Endo  -T1DM diagnosed age 14  -Home medication: Lantus 11 units am and 7 units pm, novalog/ humalog 2-4 units pre-meal  -blood glucose AC and HS with  AC ISS coverage   -HgA1c: 7.0 %  -Consistent carbohydrate diet  - follow endo recs: Lantus 8 units q12  -Low dose admelog correction scale TID QAC and separate low dose scale QHS    #HLD    -simvastatin 20mg      Heme  #Stage IV NSCLC adenocarcinoma with  mets to multiple organs (bone, brain) in August 2023.   -Currently undergoing treatment w Dr. Plummer at [facility].   -hold Home chemo Tafinlar 75 BID and Mekinist 0.5 m while inpatient as per Heme/onco      PPx  -Anticoagulation recommendations pending further imaging   (+) compression stockings 72yo F with h/o smoking, HTN, HLD, type 1 DM, thyroidectomy, BCC, SCC found to have Rt. lower lobe mass with mets to multiple organs (bone, brain) in August 2023.Currently undergoing treatment w Dr. Plummer at [facility].  Brought by EMS to [facility] ER for  disoriented, confused and compliant of shortness of breath and chest pain . In the ED, the patient was hypothermic, tachycardic, hypotensive  and hypoxic, expressive Aphasic . CTH without stroke, CT CAP with large pericardial effusion s/p pericardial window. s/p 1Liter IVF. Pt went urgently for drainage w interventional cardiology. Now s/p drain of pericardial effusion 800 sanguineous fluid.      #Neuro  - presents with confusion and dysarthria  -s/p code stroke for Expressive aphasia and right superior quadrantanopia due to left hemispheric dysfunction. Mechanism ischemic stroke. Etiology likely cardioembolic given new afib vs hypercoagulability of malignancy    -Pre neuro not a candidate of tPA due to outside  window , cancer with mets to brain   - CT Brain Stroke Protocol showed . No evidence of a large acute infarction or large acute hemorrhage. Minimal microvascular disease.  -Neuro symptoms resolved  - pending non con  MRI/ MRA ot CTA H/Neck with IV contrast            Respiratory  # SOB/hypoxia on presentation  - Currently in no acute distress  - bilat pleural effusion.  - Interventional Imaging  RA      Cards    #Pericardial effusion  -CT scan C/A/P revealed large pericardial effusion w bilat pleural effusion.  -Pericardial drain placed for tamponade , 800cc sanguineous fluid removed on 1/14   --Cytology pending  - Thoracic Sx following for possible pericardial window- no intervention at this time as per thoracic team  - pericardial drain 10cc in 24 hrs on 1/15  - limited echo showed trace pericardial effusion            #New Afib  -iso pericardial effusion  with tamponade physiology  -ChadsVasc: 7  -Has-Bled: 3  -  awaiting cytology result   - as per Heme will be cautions with AC, If pericardial drain is malignant  -B/L compression stockings      #Hypotension  -Patient with h/o hypertension and is on lisinopril 20mg BID, amlodipine 5mg BID,   -Hold Home medications  - s/p 3L IVF and IV phenylephrine  drip  for permissive HTN up to 220/110 for 24-48h from symptom onset followed by gradual normotension over 2-3 days   - off  IV phenylephrine  drip on 1/15  - DASH diet          #GI  -Consistent carb  -Tolerating diet          #  Normal indices  -eGFR: 93 mL/min/1.73m2 (01-15-24      #Hyponatremia  iso hyperglycemia   serum: osmolality 271, and sodium 126  Urine:  osmolality  593, and sodium 50         #Endo  -T1DM diagnosed age 14  -Home medication: Lantus 11 units am and 7 units pm, novalog/ humalog 2-4 units pre-meal  -blood glucose AC and HS with  AC ISS coverage   -HgA1c: 7.0 %  -Consistent carbohydrate diet  - follow endo recs: Lantus 8 units q12  -Low dose admelog correction scale TID QAC and separate low dose scale QHS    #HLD    -simvastatin 20mg      Heme  #Stage IV NSCLC adenocarcinoma with  mets to multiple organs (bone, brain) in August 2023.   -Currently undergoing treatment w Dr. Plummer at [facility].   -hold Home chemo Tafinlar 75 BID and Mekinist 0.5 m while inpatient as per Heme/onco      PPx  -Anticoagulation recommendations pending further imaging   (+) compression stockings

## 2024-01-16 NOTE — PROGRESS NOTE ADULT - SUBJECTIVE AND OBJECTIVE BOX
Subjective & objective:  PT was seen and examined by thoracic surgery.   Pt is sitting on chair, not in acute distress, on room air.   Denies chest pain, SOB, abdominal pain, nausea, vomiting, dizziness.    Vital Signs:  Vital Signs Last 24 Hrs  T(C): 36.9 (01-16-24 @ 12:01), Max: 36.9 (01-16-24 @ 12:01)  T(F): 98.5 (01-16-24 @ 12:01), Max: 98.5 (01-16-24 @ 12:01)  HR: 75 (01-16-24 @ 15:00) (51 - 79)  BP: 91/79 (01-16-24 @ 15:00) (88/53 - 128/58)  RR: 20 (01-16-24 @ 15:00) (12 - 28)  SpO2: 95% (01-16-24 @ 15:00) (95% - 100%) on (O2)    PE:   Gen: NAD  Resp: Respirations unlabored. Bilateral chest rise.   Card: RRR.   GI: Soft. Nontender. Nondistended.   Skin: Warm, well perfused, no masses or organomegaly  EXT: No clubbing, cyanosis, or edema  pericardial drain to bag, output minimal/last 24 hrs   Relevant labs, radiology and Medications reviewed                        9.2    9.74  )-----------( 444      ( 16 Jan 2024 04:47 )             27.5     01-16    130<L>  |  100  |  14  ----------------------------<  77  4.2   |  23  |  0.62    Ca    7.5<L>      16 Jan 2024 04:47  Phos  1.6     01-16  Mg     2.20     01-16    TPro  5.0<L>  /  Alb  2.0<L>  /  TBili  <0.2  /  DBili  x   /  AST  75<H>  /  ALT  196<H>  /  AlkPhos  86  01-16    MEDICATIONS  (STANDING):  chlorhexidine 2% Cloths 1 Application(s) Topical daily  dextrose 5%. 1000 milliLiter(s) (100 mL/Hr) IV Continuous <Continuous>  dextrose 5%. 1000 milliLiter(s) (50 mL/Hr) IV Continuous <Continuous>  dextrose 50% Injectable 12.5 Gram(s) IV Push once  dextrose 50% Injectable 25 Gram(s) IV Push once  dextrose 50% Injectable 25 Gram(s) IV Push once  glucagon  Injectable 1 milliGRAM(s) IntraMuscular once  insulin glargine Injectable (LANTUS) 6 Unit(s) SubCutaneous at bedtime  insulin glargine Injectable (LANTUS) 8 Unit(s) SubCutaneous every morning  insulin lispro (ADMELOG) corrective regimen sliding scale   SubCutaneous three times a day before meals  insulin lispro Injectable (ADMELOG) 1 Unit(s) SubCutaneous three times a day before meals  simvastatin 20 milliGRAM(s) Oral at bedtime    MEDICATIONS  (PRN):  dextrose Oral Gel 15 Gram(s) Oral once PRN Blood Glucose LESS THAN 70 milliGRAM(s)/deciliter    Pertinent Physical Exam  I&O's Summary    15 [place] 2024 07:01  -  16 Jan 2024 07:00  --------------------------------------------------------  IN: 1875.4 mL / OUT: 2210 mL / NET: -334.6 mL    16 Jan 2024 07:01  -  16 Jan 2024 15:59  --------------------------------------------------------  IN: 350 mL / OUT: 1050 mL / NET: -700 mL    Assessment  72yo F with h/o smoking, HTN, HLD, type 1 DM, thyroidectomy, BCC, SCC found to have Rt. 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 Currently undergoing treatment w Dr. Plummer at [facility]. Per pt's , at bedside, pt had been feeling well then awoke this morning disoriented, confused and c/o significant CP and SOB. Brought by EMS to [facility] ER. Due to MS, sent to CT scan for r/o code stroke. No infarcts seen. Further  W/u with CT scan C/A/P revealed large pericardial effusion w bilat pleural effusion. Pt also HD unstable at the time with hypotension, tachycardia and visible signs of distress consistent with likely tamponade. Thoracic surgery consulted for pericardial effusion s/p drainage by cardiology in cath lab on 1/14/24.     PLAN  -Case discussed with Dr. Helms  -We are waiting for TTE results, will follow with further recommendations  -Will continue to follow patient  -Continue care as per primary team  -Drain output minimal   -Primary team aware of plan Subjective & objective:  PT was seen and examined by thoracic surgery.   Pt is sitting on chair, not in acute distress, on room air.   Denies chest pain, SOB, abdominal pain, nausea, vomiting, dizziness.    Vital Signs:  Vital Signs Last 24 Hrs  T(C): 36.9 (01-16-24 @ 12:01), Max: 36.9 (01-16-24 @ 12:01)  T(F): 98.5 (01-16-24 @ 12:01), Max: 98.5 (01-16-24 @ 12:01)  HR: 75 (01-16-24 @ 15:00) (51 - 79)  BP: 91/79 (01-16-24 @ 15:00) (88/53 - 128/58)  RR: 20 (01-16-24 @ 15:00) (12 - 28)  SpO2: 95% (01-16-24 @ 15:00) (95% - 100%) on (O2)    PE:   Gen: NAD  Resp: Respirations unlabored. Bilateral chest rise.   Card: RRR.   GI: Soft. Nontender. Nondistended.   Skin: Warm, well perfused, no masses or organomegaly  EXT: No clubbing, cyanosis, or edema  pericardial drain to bag, output minimal/last 24 hrs   Relevant labs, radiology and Medications reviewed                        9.2    9.74  )-----------( 444      ( 16 Jan 2024 04:47 )             27.5     01-16    130<L>  |  100  |  14  ----------------------------<  77  4.2   |  23  |  0.62    Ca    7.5<L>      16 Jan 2024 04:47  Phos  1.6     01-16  Mg     2.20     01-16    TPro  5.0<L>  /  Alb  2.0<L>  /  TBili  <0.2  /  DBili  x   /  AST  75<H>  /  ALT  196<H>  /  AlkPhos  86  01-16    MEDICATIONS  (STANDING):  chlorhexidine 2% Cloths 1 Application(s) Topical daily  dextrose 5%. 1000 milliLiter(s) (100 mL/Hr) IV Continuous <Continuous>  dextrose 5%. 1000 milliLiter(s) (50 mL/Hr) IV Continuous <Continuous>  dextrose 50% Injectable 12.5 Gram(s) IV Push once  dextrose 50% Injectable 25 Gram(s) IV Push once  dextrose 50% Injectable 25 Gram(s) IV Push once  glucagon  Injectable 1 milliGRAM(s) IntraMuscular once  insulin glargine Injectable (LANTUS) 6 Unit(s) SubCutaneous at bedtime  insulin glargine Injectable (LANTUS) 8 Unit(s) SubCutaneous every morning  insulin lispro (ADMELOG) corrective regimen sliding scale   SubCutaneous three times a day before meals  insulin lispro Injectable (ADMELOG) 1 Unit(s) SubCutaneous three times a day before meals  simvastatin 20 milliGRAM(s) Oral at bedtime    MEDICATIONS  (PRN):  dextrose Oral Gel 15 Gram(s) Oral once PRN Blood Glucose LESS THAN 70 milliGRAM(s)/deciliter    Pertinent Physical Exam  I&O's Summary    15 [place] 2024 07:01  -  16 Jan 2024 07:00  --------------------------------------------------------  IN: 1875.4 mL / OUT: 2210 mL / NET: -334.6 mL    16 Jan 2024 07:01  -  16 Jan 2024 15:59  --------------------------------------------------------  IN: 350 mL / OUT: 1050 mL / NET: -700 mL    Assessment  74yo F with h/o smoking, HTN, HLD, type 1 DM, thyroidectomy, BCC, SCC found to have Rt. 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 Currently undergoing treatment w Dr. Plummer at [facility]. Per pt's , at bedside, pt had been feeling well then awoke this morning disoriented, confused and c/o significant CP and SOB. Brought by EMS to [facility] ER. Due to MS, sent to CT scan for r/o code stroke. No infarcts seen. Further  W/u with CT scan C/A/P revealed large pericardial effusion w bilat pleural effusion. Pt also HD unstable at the time with hypotension, tachycardia and visible signs of distress consistent with likely tamponade. Thoracic surgery consulted for pericardial effusion s/p drainage by cardiology in cath lab on 1/14/24.     PLAN  -Case discussed with Dr. Helms  -We are waiting for TTE results, will follow with further recommendations  -Will continue to follow patient  -Continue care as per primary team  -Drain output minimal   -Primary team aware of plan

## 2024-01-16 NOTE — PROGRESS NOTE ADULT - CRITICAL CARE ATTENDING COMMENT
73 year old woman with history of NSLC with mets. Presented with dyspnea and aphasia. Initially code stroke called- CT head negative, CT chest showed large pericardial effusion-sp drain with 800 cc of serosanguinous fluid.     -continue to monitor drainage  -followup repeat tte  -heme followup regarding CA prognosis  -?need for AC

## 2024-01-16 NOTE — CHART NOTE - NSCHARTNOTEFT_GEN_A_CORE
Pericardial drain discontinued with aseptic technique without any complication. 4x4 guaze dsg applied Echo showed trace pericardial effusion. Spoke to interventional cardiology and thoracic team. Okay to d/c pericardial  drain .Pericardial drain pulled with aseptic technique without any complication. 4x4 guaze dsg applied

## 2024-01-16 NOTE — CHART NOTE - NSCHARTNOTEFT_GEN_A_CORE
Patient awaiting non con  MRA Head/neck and MR head. Spoke to MR frank Mas today. Unable to accommodate pt today due to emergency cases.  May be able to perform   MRI in tomorrow

## 2024-01-16 NOTE — PROVIDER CONTACT NOTE (HYPOGLYCEMIA EVENT) - NS PROVIDER CONTACT BACKGROUND-HYPO
Age: 73y    Gender: Female    POCT Blood Glucose:  145 mg/dL (01-16-24 @ 09:07)  138 mg/dL (01-16-24 @ 08:40)  75 mg/dL (01-16-24 @ 08:16)  66 mg/dL (01-16-24 @ 08:04)  52 mg/dL (01-16-24 @ 08:00)  166 mg/dL (01-15-24 @ 21:49)  144 mg/dL (01-15-24 @ 18:10)  175 mg/dL (01-15-24 @ 12:38)      eMAR:  insulin glargine Injectable (LANTUS)   8 Unit(s) SubCutaneous (01-15-24 @ 21:52)    insulin lispro (ADMELOG) corrective regimen sliding scale   1 Unit(s) SubCutaneous (01-15-24 @ 12:48)    simvastatin   20 milliGRAM(s) Oral (01-15-24 @ 21:54)

## 2024-01-16 NOTE — PHYSICAL THERAPY INITIAL EVALUATION ADULT - GENERAL OBSERVATIONS, REHAB EVAL
Upon entry, pt semi-supine in bed in NAD; + CCU monitoring, + pericardial drain. /53 HR 66 SpO2 97% on RA.  present at bedside. Pt left seated in recliner with all tubes/lines intact, call bell in reach and in NAD.

## 2024-01-16 NOTE — PROVIDER CONTACT NOTE (HYPOGLYCEMIA EVENT) - NS PROVIDER CONTACT RECOMMEND-HYPO
pt has  DM I which she self manages for law sugar she wanted to take her own sugar tablet X4. Rechecked blood sugar as per protocol

## 2024-01-16 NOTE — PHYSICAL THERAPY INITIAL EVALUATION ADULT - GAIT DEVIATIONS NOTED, PT EVAL
lack of reciprocal arm swing/decreased ozzie/decreased velocity of limb motion/decreased step length/decreased stride length/decreased weight-shifting ability

## 2024-01-17 NOTE — PROGRESS NOTE ADULT - SUBJECTIVE AND OBJECTIVE BOX
CCU Service  Cardiology   Spect: [phone] ([facility])     PATIENT: LIAN BENITEZ, MRN: [id number]    CHIEF COMPLAINT: 74yo F with h/o smoking, HTN, HLD, type 1 DM, thyroidectomy, BCC, SCC found to have 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Currently undergoing treatment w Dr. Plummer at [facility].  Brought by EMS to [facility] ER for  disoriented, confused and compliant of shortness of breath and chest pain . In the ED, the patient was hypothermic, tachycardic, hypotensive  and hypoxic, expressive Aphasic . CTH without stroke, CT CAP with large pericardial effusion s/p pericardial window. s/p 1Liter IVF.     transferred to CCU for s/p drainage w interventional cardiology. Now s/p drain of pericardial effusion 800 sanguineous fluid.      INTERVAL HISTORY/OVERNIGHT EVENTS: Sitting up in bed on RA eating breakfast, NAD, Denies cp, sob  -Drain Dc'd   -repeat tte   Compared to the transthoracic echocardiogram performed on 2024, there have been no significant interval changes.  Pericardium:  There is a trace pericardial effusion.  Pleura:  Left pleural effusion noted.      TELEMETRY: SR, PVC, NSVT          ALLERGIES: Allergies    latex (Urticaria)  Actonel (Unknown)  Gadavist (Anaphylaxis; Hives)  codeine (Vomiting)    Intolerances        MEDICATIONS:  MEDICATIONS  (STANDING):  chlorhexidine 2% Cloths 1 Application(s) Topical daily  dextrose 5%. 1000 milliLiter(s) (100 mL/Hr) IV Continuous <Continuous>  dextrose 5%. 1000 milliLiter(s) (50 mL/Hr) IV Continuous <Continuous>  dextrose 50% Injectable 12.5 Gram(s) IV Push once  dextrose 50% Injectable 25 Gram(s) IV Push once  dextrose 50% Injectable 25 Gram(s) IV Push once  glucagon  Injectable 1 milliGRAM(s) IntraMuscular once  insulin glargine Injectable (LANTUS) 8 Unit(s) SubCutaneous every morning  insulin glargine Injectable (LANTUS) 6 Unit(s) SubCutaneous at bedtime  insulin lispro (ADMELOG) corrective regimen sliding scale   SubCutaneous three times a day before meals  insulin lispro Injectable (ADMELOG) 1 Unit(s) SubCutaneous three times a day before meals  simvastatin 20 milliGRAM(s) Oral at bedtime    MEDICATIONS  (PRN):  dextrose Oral Gel 15 Gram(s) Oral once PRN Blood Glucose LESS THAN 70 milliGRAM(s)/deciliter        OBJECTIVE:  ICU Vital Signs Last 24 Hrs  T(C): 36.6 (2024 08:00), Max: 36.9 (2024 12:01)  T(F): 97.9 (2024 08:00), Max: 98.5 (2024 12:01)  HR: 80 (2024 09:00) (56 - 89)  BP: 125/70 (2024 09:00) (86/55 - 138/71)  BP(mean): 84 (2024 09:00) (62 - 89)  ABP: --  ABP(mean): --  RR: 14 (2024 09:00) (11 - 28)  SpO2: 98% (2024 09:00) (95% - 100%)    O2 Parameters below as of 2024 09:00  Patient On (Oxygen Delivery Method): room air            Adult Advanced Hemodynamics Last 24 Hrs  CVP(mm Hg): --  CVP(cm H2O): --  CO: --  CI: --  PA: --  PA(mean): --  PCWP: --  SVR: --  SVRI: --  PVR: --  PVRI: --  CAPILLARY BLOOD GLUCOSE      POCT Blood Glucose.: 180 mg/dL (2024 08:02)  POCT Blood Glucose.: 285 mg/dL (2024 22:32)  POCT Blood Glucose.: 244 mg/dL (2024 17:00)  POCT Blood Glucose.: 200 mg/dL (2024 11:40)    CAPILLARY BLOOD GLUCOSE      POCT Blood Glucose.: 180 mg/dL (2024 08:02)    I&O's Summary    2024 07:01  -  2024 07:00  --------------------------------------------------------  IN: 450 mL / OUT: 1752 mL / NET: -1302 mL    2024 07:01  -  2024 09:10  --------------------------------------------------------  IN: 400 mL / OUT: 0 mL / NET: 400 mL      Daily     Daily Weight in k.1 (2024 05:00)    REVIEW OF SYSTEMS  See interval history otherwise negative    PHYSICAL EXAMINATION:  General: Comfortable, no acute distress, cooperative with exam.  HEENT: Moist mucous membranes.  Respiratory: CTAB, normal respiratory effort, no coughing, wheezes, crackles, or rales.  CV: RRR, S1S2, no murmurs, rubs or gallops. No JVD. Distal pulses intact.  Abdominal: Soft, nontender, nondistended, no rebound or guarding, normal bowel sounds.  Neurology: AOx3, no focal neuro defects, GALARZA x 4.  Extremities: No pitting edema, + Peripheral pulses.  Cath site:   Tubes:        LABS:                          8.3    7.62  )-----------( 384      ( 2024 06:15 )             24.7     -17    132<L>  |  102  |  12  ----------------------------<  192<H>  4.8   |  23  |  0.62    Ca    7.2<L>      2024 06:15  Phos  1.8       Mg     2.10     -    TPro  4.6<L>  /  Alb  2.0<L>  /  TBili  <0.2  /  DBili  x   /  AST  27  /  ALT  110<H>  /  AlkPhos  72  -17    LIVER FUNCTIONS - ( 2024 06:15 )  Alb: 2.0 g/dL / Pro: 4.6 g/dL / ALK PHOS: 72 U/L / ALT: 110 U/L / AST: 27 U/L / GGT: x                   Urinalysis Basic - ( 2024 06:15 )    Color: x / Appearance: x / SG: x / pH: x  Gluc: 192 mg/dL / Ketone: x  / Bili: x / Urobili: x   Blood: x / Protein: x / Nitrite: x   Leuk Esterase: x / RBC: x / WBC x   Sq Epi: x / Non Sq Epi: x / Bacteria: x    CONCLUSIONS:      1. Limited repeat study to re-evaluate pericardial effusion.   2. Trace pericardial effusion.   3. Left pleural effusion noted.   4. Compared to the transthoracic echocardiogram performed on 2024, there have been no significant interval changes.    ________________________________________________________________________________________  FINDINGS:     Pericardium:  There is a trace pericardial effusion.     Pleura:  Left pleural effusion noted.  ____________________________________________________________________  QUANTITATIVE DATA:  Left Ventricle Measurements: (Indexed to BSA)     MV E Vmax: 0.54 m/s  MV A Vmax: 0.72 m/s  MV E/A:    0.74  MV DT:     222 msec       Mitral Valve Measurements:     MV E Vmax: 0.5 m/s  MV A Vmax: 0.7 m/s  MV E/A:    0.7        Assessment and Plan:   · Assessment	  74yo F with h/o smoking, HTN, HLD, type 1 DM, thyroidectomy, BCC, SCC found to have 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Currently undergoing treatment w Dr. Plummer at [facility].  Brought by EMS to [facility] ER for  disoriented, confused and compliant of shortness of breath and chest pain . In the ED, the patient was hypothermic, tachycardic, hypotensive  and hypoxic, expressive Aphasic . CTH without stroke, CT CAP with large pericardial effusion s/p pericardial window. s/p 1Liter IVF. Pt went urgently for drainage w interventional cardiology. Now s/p drain of pericardial effusion 800 sanguineous fluid.      #Neuro  - presents with confusion and dysarthria  -s/p code stroke for Expressive aphasia and right superior quadrantanopia due to left hemispheric dysfunction. Mechanism ischemic stroke. Etiology likely cardioembolic given new afib vs hypercoagulability of malignancy    -Pre neuro not a candidate of tPA due to outside  window , cancer with mets to brain   - CT Brain Stroke Protocol showed . No evidence of a large acute infarction or large acute hemorrhage. Minimal microvascular disease.  -Neuro symptoms resolved  - pending non con  MRI/ MRA ot CTA H/Neck with IV contrast            Respiratory  # SOB/hypoxia on presentation  - Currently in no acute distress  - bilat pleural effusion.  - Sqoot  RA      Cards    #Pericardial effusion  -CT scan C/A/P revealed large pericardial effusion w bilat pleural effusion.  -Pericardial drain placed for tamponade , 800cc sanguineous fluid removed on    --Cytology pending  - Thoracic Sx following for possible pericardial window- no intervention at this time as per thoracic team  - pericardial drain 10cc in 24 hrs on 1/15  - limited echo showed trace pericardial effusion            #New Afib  -iso pericardial effusion  with tamponade physiology  -ChadsVasc: 7  -Has-Bled: 3  -  awaiting cytology result   - as per Heme will be cautions with AC, If pericardial drain is malignant  -B/L compression stockings      #Hypotension  -Patient with h/o hypertension and is on lisinopril 20mg BID, amlodipine 5mg BID,   -Hold Home medications  - s/p 3L IVF and IV phenylephrine  drip  for permissive HTN up to 220/110 for 24-48h from symptom onset followed by gradual normotension over 2-3 days   - off  IV phenylephrine  drip on 1/15  - DASH diet          #GI  -Consistent carb  -Tolerating diet          #  Normal indices  -eGFR: 93 mL/min/1.73m2 (01-15-24      #Hyponatremia  iso hyperglycemia   serum: osmolality 271, and sodium 126  Urine:  osmolality  593, and sodium 50         #Endo  -T1DM diagnosed age 14  -Home medication: Lantus 11 units am and 7 units pm, novalog/ humalog 2-4 units pre-meal  -blood glucose AC and HS with  AC ISS coverage   -HgA1c: 7.0 %  -Consistent carbohydrate diet  - follow endo recs: Lantus 8 units q12  -Low dose admelog correction scale TID QAC and separate low dose scale QHS    #HLD    -simvastatin 20mg      Heme  #Stage IV NSCLC adenocarcinoma with  mets to multiple organs (bone, brain) in 2023.   -Currently undergoing treatment w Dr. Plummer at [facility].   -hold Home chemo Tafinlar 75 BID and Mekinist 0.5 m while inpatient as per Heme/onco      PPx  -Anticoagulation recommendations pending further imaging   (+) compression stockings

## 2024-01-17 NOTE — PROGRESS NOTE ADULT - ASSESSMENT
73F PMHx significant for smoking, HTN, HLD, type 1 DM, thyroidectomy, BCC, SCC, RLL mass mets to brain and bone (8/23). Awoke 6am with sob, abdominal pain, and dysarthria, EMS arrives 7:30, around that time also had shortness of breath. Reportedly hypoxic to 70s. ED course: Code stroke, no tnk due to being out of window. VS: HR up to 120s, hypothermic 35.6, hypotensive 81/56. requiring 4L NC. CT CAP with large pericardial and b/l pleural effusions. Repeat CTH and CTA H/N performed on 1/16/24 w/o acute findings.     LKN: Wake up stroke however awoke 0600 and , 1/13/24 pm  NIHSS: 10 (Questions +2, Right superior Quadrantanopia +1, Severe aphasia +2, Dysarthria +1, b/l leg drift +4)  preMRS: 0    Impression: Resolved expressive aphasia and right superior quadrantanopia due to unclear etiology but possibly 2/2 brain metastases in the setting of known malignancy vs. focal non-motor seizures w/ impaired awareness. Low suspicion for acute ischemic stroke.       Recommendations:  [] routine EEG (Please call [phone] for EEG tech)  [] MRI brain can be performed outpatient - should not delay discharge  [] Pain regimen for neck pain   [] BP management per CCU team  [] Neurochecks and vitals per routine  [] Patient can follow up with general neurology at [address] [place] [address] 1-2 weeks after discharge. Please instruct the patient to call [phone] to schedule this appointment.    Case seen and d/w stroke attending.       73F PMHx significant for smoking, HTN, HLD, type 1 DM, thyroidectomy, BCC, SCC, RLL mass mets to brain and bone (8/23). Awoke 6am with sob, abdominal pain, and dysarthria, EMS arrives 7:30, around that time also had shortness of breath. Reportedly hypoxic to 70s. ED course: Code stroke, no tnk due to being out of window. VS: HR up to 120s, hypothermic 35.6, hypotensive 81/56. requiring 4L NC. CT CAP with large pericardial and b/l pleural effusions. Repeat CTH and CTA H/N performed on 1/16/24 w/o acute findings.     LKN: Wake up stroke however awoke 0600 and , 1/13/24 pm  NIHSS: 10 (Questions +2, Right superior Quadrantanopia +1, Severe aphasia +2, Dysarthria +1, b/l leg drift +4)  preMRS: 0    Impression: Resolved presumed expressive aphasia and right superior quadrantanopia due to unclear etiology but possibly 2/2 brain metastases in the setting of known malignancy and/or focal non-motor seizures w/ impaired awareness. Low suspicion for acute ischemic stroke.       Recommendations:  [] routine EEG (Please call [phone] for EEG tech)  [] MRI brain can be performed outpatient - should not delay discharge; if MRI is done inpatient, probably no contrast due to gadolinium allergy; will need PO sedation  [] Treat with ASA 81 mg/day for now if no contraindication if MRI (inpatient or outpatient) shows no acute infarct, then ASA can be stopped.   [] Pain regimen for neck pain   [] BP management per CCU team  [] Neurochecks and vitals per routine  [] Patient can follow up with general neurology at [address] [place] [address] 1-2 weeks after discharge. Please instruct the patient to call [phone] to schedule this appointment.    Case seen and d/w stroke attending.

## 2024-01-17 NOTE — CHART NOTE - NSCHARTNOTEFT_GEN_A_CORE
Following pt s/p pericardial drainage  pericardial drain removed by CCU team  will sign off at this time  please reconsult w concerns    Kimmie MACIAS [id number]

## 2024-01-17 NOTE — PROGRESS NOTE ADULT - SUBJECTIVE AND OBJECTIVE BOX
Neurology Progress Note    SUBJECTIVE/OBJECTIVE/INTERVAL EVENTS: Patient seen and examined at bedside w/ neuro attending and team.     INTERVAL HISTORY: No acute events overnight. Pt states that she has Left-sided neck and arm pain, but otherwise, no complaints.     REVIEW OF SYSTEMS: Otherwise denies headaches, vision changes, nausea, vomiting, focal weakness, focal numbness. Few questions of a 10-system ROS was performed and is negative except for those items noted above and/or in the HPI.    VITALS & EXAMINATION:  Vital Signs Last 24 Hrs  T(C): 36.7 (17 Jan 2024 12:00), Max: 36.8 (16 Jan 2024 16:00)  T(F): 98.1 (17 Jan 2024 12:00), Max: 98.2 (16 Jan 2024 16:00)  HR: 104 (17 Jan 2024 10:00) (56 - 104)  BP: 138/67 (17 Jan 2024 10:00) (86/55 - 138/71)  BP(mean): 85 (17 Jan 2024 10:00) (62 - 89)  RR: 20 (17 Jan 2024 10:00) (11 - 28)  SpO2: 98% (17 Jan 2024 10:00) (95% - 100%)    Parameters below as of 17 Jan 2024 10:00  Patient On (Oxygen Delivery Method): room air    General:  Constitutional: Female, appears stated age, nontoxic, not in distress  Head: Normocephalic, atraumatic   Eyes: clear sclera  Extremities: No cyanosis  Resp: breathing comfortably     Neurological (>12):  MS: Eyes open and responds to verbal stimuli. Oriented person, place, and time. Follows simple and 2-step commands.  Language: Speech is clear and fluent. No dysarthria.   CNs: VFF. EOMI. No disconjugate gaze or nystagmus. No facial asymmetry. Hearing grossly normal b/l. Tongue midline.   Motor - Normal bulk and tone throughout. No drift in bilateral upper and lower extremities. bilateral postural tremors of upper extremities.   Sensation: Intact to LT b/l, no extinction  Coordination: No dysmetria to FTN b/l UE    LABORATORY:  CBC                       8.3    7.62  )-----------( 384      ( 17 Jan 2024 06:15 )             24.7     Chem 01-17    132<L>  |  102  |  12  ----------------------------<  192<H>  4.8   |  23  |  0.62    Ca    7.2<L>      17 Jan 2024 06:15  Phos  1.8     01-17  Mg     2.10     01-17    TPro  4.6<L>  /  Alb  2.0<L>  /  TBili  <0.2  /  DBili  x   /  AST  27  /  ALT  110<H>  /  AlkPhos  72  01-17    LFTs LIVER FUNCTIONS - ( 17 Jan 2024 06:15 )  Alb: 2.0 g/dL / Pro: 4.6 g/dL / ALK PHOS: 72 U/L / ALT: 110 U/L / AST: 27 U/L / GGT: x           Coagulopathy   Lipid Panel   A1c   Cardiac enzymes     U/A Urinalysis Basic - ( 17 Jan 2024 06:15 )    Color: x / Appearance: x / SG: x / pH: x  Gluc: 192 mg/dL / Ketone: x  / Bili: x / Urobili: x   Blood: x / Protein: x / Nitrite: x   Leuk Esterase: x / RBC: x / WBC x   Sq Epi: x / Non Sq Epi: x / Bacteria: x    CSF  Immunological  Other    STUDIES & IMAGING: (EEG, CT, MR, U/S, TTE/АННА):  < from: CT Angio Neck w/ IV Cont (01.16.24 @ 21:02) >  FINDINGS:    CT HEAD WITHOUT AND WITH IV CONTRAST:  No apparent acute infarct, hemorrhage or mass. No hydrocephalus. Chronic   appearing white matter hypodensities and volume loss. The small enhancing   lesions seen on the 10/23/2023 CT head are not visible.    CTA HEAD/NECK:    AORTIC ARCH: Left-sided aortic arch. Great vessel origins and visualized   subclavian arteries have normal caliber.    RIGHT ANTERIOR CIRCULATION:  CCA: Normal course and caliber. No dissection.  ICA: Normal caliber. Minimal calcific plaque intracranial carotid with no   luminal narrowing. Loops medially at the level of C1. No anuerysm.  MCA: Normal course and caliber. No anuerysm.  ARINA: Normal course and caliber. No anuerysm.    LEFT ANTERIOR CIRCULATION:  CCA: Normal course and caliber. No dissection.  ICA: Normal caliber. Minimal calcific plaque intracranial carotid with no   luminal narrowing. Loops medially at the level of C1. No anuerysm.  MCA: Normal course and caliber. No anuerysm.  ARINA: Normal course and caliber. No anuerysm.    POSTERIOR CIRCULATION:  VERTEBRALS: Normal course and caliber. No dissection. Left dominant.  BASILAR: Normal course and caliber. No dissection. No anuerysm.  PCA:Normal course and caliber. No dissection. No anuerysm. Right fed by   a large posterior communicating artery. On the left, the P1 segment is   dominant.    VEINS: No intracranial DVT.    OTHERS:  Large layering bilateral pleural effusions partially visible.  Status post left thyroidectomy. Multinodular right lobe of thyroid.  Degenerative changes cervical spine.    IMPRESSION:    CT HEAD:  No acute intracranial findings. The small enhancing lesions seen on the   10/23/2023 CT head are not visible.    CTA HEAD:  No flow-limiting stenosis, occlusion or aneurysm.    CTA NECK:  No flow-limiting stenosis, occlusion or dissection.    < end of copied text >   Neurology Progress Note    SUBJECTIVE/OBJECTIVE/INTERVAL EVENTS: Patient seen and examined at bedside w/ neuro attending and team.     INTERVAL HISTORY: No acute events overnight. Pt states that she has Left-sided neck and arm pain, but otherwise, no complaints.     REVIEW OF SYSTEMS: Otherwise denies headaches, vision changes, nausea, vomiting, focal weakness, focal numbness. Few questions of a 10-system ROS was performed and is negative except for those items noted above and/or in the HPI.    VITALS & EXAMINATION:  Vital Signs Last 24 Hrs  T(C): 36.7 (17 Jan 2024 12:00), Max: 36.8 (16 Jan 2024 16:00)  T(F): 98.1 (17 Jan 2024 12:00), Max: 98.2 (16 Jan 2024 16:00)  HR: 104 (17 Jan 2024 10:00) (56 - 104)  BP: 138/67 (17 Jan 2024 10:00) (86/55 - 138/71)  BP(mean): 85 (17 Jan 2024 10:00) (62 - 89)  RR: 20 (17 Jan 2024 10:00) (11 - 28)  SpO2: 98% (17 Jan 2024 10:00) (95% - 100%)    Parameters below as of 17 Jan 2024 10:00  Patient On (Oxygen Delivery Method): room air    General:  Constitutional: Female, appears stated age, nontoxic, not in distress  Head: Normocephalic, atraumatic   Eyes: clear sclera  Extremities: No cyanosis  Resp: breathing comfortably     Neurological (>12):  MS: Eyes open and responds to verbal stimuli. Oriented person, place, and time. Follows simple and 2-step commands.  Language: Speech is clear and fluent. No dysarthria.   CNs: VFF. EOMI. No disconjugate gaze or nystagmus. No facial asymmetry. Hearing grossly normal b/l. Tongue midline.   Motor - Normal bulk and tone throughout. No drift in bilateral upper and lower extremities. bilateral mild postural tremors of upper extremities.   Sensation: Intact to LT b/l, no extinction  Coordination: No dysmetria to FTN b/l UE    LABORATORY:  CBC                       8.3    7.62  )-----------( 384      ( 17 Jan 2024 06:15 )             24.7     Chem 01-17    132<L>  |  102  |  12  ----------------------------<  192<H>  4.8   |  23  |  0.62    Ca    7.2<L>      17 Jan 2024 06:15  Phos  1.8     01-17  Mg     2.10     01-17    TPro  4.6<L>  /  Alb  2.0<L>  /  TBili  <0.2  /  DBili  x   /  AST  27  /  ALT  110<H>  /  AlkPhos  72  01-17    LFTs LIVER FUNCTIONS - ( 17 Jan 2024 06:15 )  Alb: 2.0 g/dL / Pro: 4.6 g/dL / ALK PHOS: 72 U/L / ALT: 110 U/L / AST: 27 U/L / GGT: x           Coagulopathy   Lipid Panel   A1c   Cardiac enzymes     U/A Urinalysis Basic - ( 17 Jan 2024 06:15 )    Color: x / Appearance: x / SG: x / pH: x  Gluc: 192 mg/dL / Ketone: x  / Bili: x / Urobili: x   Blood: x / Protein: x / Nitrite: x   Leuk Esterase: x / RBC: x / WBC x   Sq Epi: x / Non Sq Epi: x / Bacteria: x    CSF  Immunological  Other    STUDIES & IMAGING: (EEG, CT, MR, U/S, TTE/АННА):  < from: CT Angio Neck w/ IV Cont (01.16.24 @ 21:02) >  FINDINGS:    CT HEAD WITHOUT AND WITH IV CONTRAST:  No apparent acute infarct, hemorrhage or mass. No hydrocephalus. Chronic   appearing white matter hypodensities and volume loss. The small enhancing   lesions seen on the 10/23/2023 CT head are not visible.    CTA HEAD/NECK:    AORTIC ARCH: Left-sided aortic arch. Great vessel origins and visualized   subclavian arteries have normal caliber.    RIGHT ANTERIOR CIRCULATION:  CCA: Normal course and caliber. No dissection.  ICA: Normal caliber. Minimal calcific plaque intracranial carotid with no   luminal narrowing. Loops medially at the level of C1. No anuerysm.  MCA: Normal course and caliber. No anuerysm.  ARINA: Normal course and caliber. No anuerysm.    LEFT ANTERIOR CIRCULATION:  CCA: Normal course and caliber. No dissection.  ICA: Normal caliber. Minimal calcific plaque intracranial carotid with no   luminal narrowing. Loops medially at the level of C1. No anuerysm.  MCA: Normal course and caliber. No anuerysm.  ARINA: Normal course and caliber. No anuerysm.    POSTERIOR CIRCULATION:  VERTEBRALS: Normal course and caliber. No dissection. Left dominant.  BASILAR: Normal course and caliber. No dissection. No anuerysm.  PCA:Normal course and caliber. No dissection. No anuerysm. Right fed by   a large posterior communicating artery. On the left, the P1 segment is   dominant.    VEINS: No intracranial DVT.    OTHERS:  Large layering bilateral pleural effusions partially visible.  Status post left thyroidectomy. Multinodular right lobe of thyroid.  Degenerative changes cervical spine.    IMPRESSION:    CT HEAD:  No acute intracranial findings. The small enhancing lesions seen on the   10/23/2023 CT head are not visible.    CTA HEAD:  No flow-limiting stenosis, occlusion or aneurysm.    CTA NECK:  No flow-limiting stenosis, occlusion or dissection.    < end of copied text >

## 2024-01-17 NOTE — CHART NOTE - NSCHARTNOTEFT_GEN_A_CORE
EEG preliminary read (not final) on the initial recording hour(s) = x 2    No seizures recorded.    Final report to follow tomorrow morning after completion of study.    [facility] EEG Reading Room Ph#: [phone]  Epilepsy Answering Service after 5PM and before 8:30AM: Ph#: [phone]

## 2024-01-17 NOTE — PROGRESS NOTE ADULT - ATTENDING COMMENTS
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 Endocrinology consulted for T1DM.  labile BG due to insulin administration in relation to meal times and pt also self administering glucose tabs when she is feeling low or feels that she may go low.  d/w pt we will speak to staff regarding insulin prior to meals as pt will than take glucose tabs if she doesn't eat meal but has received insulin  team spoke to pt not to take own glucose tabs without informing nurse  unable to make any major changes as no consistent pattern especially in the setting of multiple glucose tabs at various times.  increase admelog to 2 units premeals  will follow closely

## 2024-01-17 NOTE — PROGRESS NOTE ADULT - SUBJECTIVE AND OBJECTIVE BOX
Chief Complaint/Follow-up on: DM    Subjective:    POC blood glucose and insulin use reviewed.  pt feels well   no n/v  tolerating PO   we spoke about inpt DM management goals and monitoring and also the need to fu outpt for DM   extensive d/w pt at bedside regarding DM and avoiding hypoglycemia and titration of her regimen to avoid lows   pt is eating but not as much as she would at home   1/16 pt was hypoglycemic in am to 52 she took her own glucose tabs (4). pt also verbalized that at dinner time she was given insulin coverage but noticed her turkey sandwich had chisholm on it so she did not eat it and she stated it took 2 hours for another sandwich to come up and in that time she took 4 more glucose tablets and she ate a roll, then her daughter brought her dinner and she ate at 730pm, so her glucose at bedtime was 285.   1/17 she stated that at 1045am she took her own FS because she felt low and it was 89 so she took 4 of her own glucose tabs, still felt low so took 2 more. then RN checked her FS prior to lunch and around 1130am and it was 193. d/w pt that it is important for her to inform us when she feels low so we are able to check her FS and treat accordingly.     MEDICATIONS  (STANDING):  aspirin enteric coated 81 milliGRAM(s) Oral daily  chlorhexidine 2% Cloths 1 Application(s) Topical daily  dextrose 5%. 1000 milliLiter(s) (100 mL/Hr) IV Continuous <Continuous>  dextrose 5%. 1000 milliLiter(s) (50 mL/Hr) IV Continuous <Continuous>  dextrose 50% Injectable 12.5 Gram(s) IV Push once  dextrose 50% Injectable 25 Gram(s) IV Push once  dextrose 50% Injectable 25 Gram(s) IV Push once  glucagon  Injectable 1 milliGRAM(s) IntraMuscular once  insulin glargine Injectable (LANTUS) 6 Unit(s) SubCutaneous at bedtime  insulin glargine Injectable (LANTUS) 8 Unit(s) SubCutaneous every morning  insulin lispro (ADMELOG) corrective regimen sliding scale   SubCutaneous at bedtime  insulin lispro (ADMELOG) corrective regimen sliding scale   SubCutaneous three times a day before meals  insulin lispro Injectable (ADMELOG) 2 Unit(s) SubCutaneous three times a day before meals  simvastatin 20 milliGRAM(s) Oral at bedtime    MEDICATIONS  (PRN):  dextrose Oral Gel 15 Gram(s) Oral once PRN Blood Glucose LESS THAN 70 milliGRAM(s)/deciliter      PHYSICAL EXAM:  Vital Signs Last 24 Hrs  T(C): 36.7 (17 Jan 2024 12:00), Max: 36.8 (16 Jan 2024 16:00)  T(F): 98.1 (17 Jan 2024 12:00), Max: 98.2 (16 Jan 2024 16:00)  HR: 91 (17 Jan 2024 13:00) (56 - 104)  BP: 119/53 (17 Jan 2024 13:00) (86/55 - 138/71)  BP(mean): 71 (17 Jan 2024 13:00) (63 - 89)  RR: 18 (17 Jan 2024 13:00) (11 - 20)  SpO2: 98% (17 Jan 2024 13:00) (95% - 100%)  Parameters below as of 17 Jan 2024 13:00  Patient On (Oxygen Delivery Method): room air    GENERAL: NAD, well-groomed, well-developed  EYES: No proptosis, no injection  HEENT:  Atraumatic, Normocephalic, moist mucous membranes  RESPIRATORY: no resp distress   CARDIOVASCULAR: Regular rate and rhythm;; no peripheral edema  psych: alert and oriented X3      CAPILLARY BLOOD GLUCOSE      POCT Blood Glucose.: 215 mg/dL (17 Jan 2024 11:59)  POCT Blood Glucose.: 180 mg/dL (17 Jan 2024 08:02)  POCT Blood Glucose.: 285 mg/dL (16 Jan 2024 22:32)  POCT Blood Glucose.: 244 mg/dL (16 Jan 2024 17:00)                          8.3    7.62  )-----------( 384      ( 17 Jan 2024 06:15 )             24.7   01-17    132<L>  |  102  |  12  ----------------------------<  192<H>  4.8   |  23  |  0.62    Ca    7.2<L>      17 Jan 2024 06:15  Phos  1.8     01-17  Mg     2.10     01-17    TPro  4.6<L>  /  Alb  2.0<L>  /  TBili  <0.2  /  DBili  x   /  AST  27  /  ALT  110<H>  /  AlkPhos  72  01-17            Thyroid Function Tests:  01-14 @ 17:31 TSH 2.94 FreeT4 1.2 T3 66 Anti TPO -- Anti Thyroglobulin Ab -- TSI --

## 2024-01-17 NOTE — CHART NOTE - NSCHARTNOTESELECT_GEN_ALL_CORE
CCU/Event Note
Event Note
Preliminary EEG report
Thoracic surgery/Off Service Note
Event Note
Neurology
Patient took her own glucose tablets/Event Note
Event Note

## 2024-01-17 NOTE — CHART NOTE - NSCHARTNOTEFT_GEN_A_CORE
-Patient reported to the RN that she took 4 glucose tablets at 3pm because she  felt shaky and irritable.  -Patient instructed not to take her own medications and reassured she is being closely monitored.  -patient instructed to alert RN if she feels shaky

## 2024-01-17 NOTE — PROGRESS NOTE ADULT - ASSESSMENT
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 Endocrinology consulted for T1DM.    #Type 1Diabetes Mellitus   - A1C with Estimated Average Glucose Result: 7.0 % (01-15-24)  - home regimen: lantus 10 units BID. humalog 2-4 units (depending on size of meal)  - eGFR: 93 mL/min/1.73m2 (01-15-24)  - no evidence of DKA on labs  INPATIENT PLAN:  pt with DM since age 14, is aware of insulin titration at home needed for change in diet and illness  am hypoglycemia 1/16 due to Lantus 8 at night. hardly receiving premeal insulin inpt or correction insulin  1/16- no coverage for BF, afternoon glucose 200 (had glucose tabs and may have overcorrected this morning hypo)  long d/w pt to avoid hypoglycemia based on studies in the ICU setting, goal glucose 140-180   1/16 pt was hypoglycemic in am to 52 she took her own glucose tabs (4). pt also verbalized that at dinner time she was given insulin coverage but noticed her turkey sandwich had chisholm on it so she did not eat it and she stated it took 2 hours for another sandwich to come up and in that time she took 4 more glucose tablets and she ate a roll, then her daughter brought her dinner and she ate at 730pm, so her glucose at bedtime was 285.   1/17 she stated that at 1045am she took her own FS because she felt low and it was 89 so she took 4 of her own glucose tabs, still felt low so took 2 more. then RN checked her FS prior to lunch and around 1130am and it was 193. d/w pt that it is important for her to inform us when she feels low so we are able to check her FS and treat accordingly.   -It is hard to be able to titrate insulin when pt continues to take own glucose tablets despite long conversations with her let us monitor her glucose and treat accordingly.   - Continue Lantus 8 units qam and continue Lantus 6 units tonight (would normally recommend once a day dosing for lantus but pt feels strongly this is what works for her, would like to maintain some control during this admission)  - Increase admelog to 2 units TIDAC before meals (hold if not eating)  - Recommend Low dose admelog correction scale TIDQAC and separate low dose scale QHS  - Please check FSG before meals and QHS, or q6h while NPO, add in 3 am FSBG check to   - Inpatient glucose goals: 140-180  - consistent carb diet when eating  DISCHARGE PLANNING:  - Discharge recs pending clinical course: likely basal/bolus insulin (dose TBD)  - follow up w/ pt's own endo Dr. Phillips    #Hypertension  - Goal BP <130/80  off BP medicaitons at this time  outpt mc/cr ratio    #Hyperlipidemia  - LDL goal <100  - on simvastatin 20mg daily      d/w NP Rock about plan as above and pt     ation.    d/w CCU team.    JOSE MARIA Pearce-BC  Nurse Practitioner  Division of Endocrinology & Diabetes  pager [phone]     weekdays: 9am to 5pm: [facility] or [facility] and or TEAMS     Nights and weekends: [phone]  Please note that this patient may be followed by a different provider tomorrow.   If no answer or after hours, please contact [phone].  For final dc reccomendations, please call [phone] or page the endocrine fellow on call.   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 Endocrinology consulted for T1DM.    #Type 1Diabetes Mellitus   - A1C with Estimated Average Glucose Result: 7.0 % (01-15-24)  - home regimen: lantus 10 units BID. humalog 2-4 units (depending on size of meal)  - eGFR: 93 mL/min/1.73m2 (01-15-24)  - no evidence of DKA on labs  INPATIENT PLAN:  pt with DM since age 14, is aware of insulin titration at home needed for change in diet and illness  am hypoglycemia 1/16 due to Lantus 8 at night. hardly receiving premeal insulin inpt or correction insulin  1/16- no coverage for BF, afternoon glucose 200 (had glucose tabs and may have overcorrected this morning hypo)  long d/w pt to avoid hypoglycemia based on studies in the ICU setting, goal glucose 140-180   1/16 pt was hypoglycemic in am to 52 she took her own glucose tabs (4). pt also verbalized that at dinner time she was given insulin coverage but noticed her turkey sandwich had chisholm on it so she did not eat it and she stated it took 2 hours for another sandwich to come up and in that time she took 4 more glucose tablets and she ate a roll, then her daughter brought her dinner and she ate at 730pm, so her glucose at bedtime was 285.   1/17 she stated that at 1045am she took her own FS because she felt low and it was 89 so she took 4 of her own glucose tabs, still felt low so took 2 more. then RN checked her FS prior to lunch and around 1130am and it was 193. d/w pt that it is important for her to inform us when she feels low so we are able to check her FS and treat accordingly.   -It is hard to be able to titrate insulin when pt continues to take own glucose tablets despite long conversations with her let us monitor her glucose and treat accordingly.   - Continue Lantus 8 units qam and continue Lantus 6 units tonight (would normally recommend once a day dosing for lantus but pt feels strongly this is what works for her, would like to maintain some control during this admission)  - Increase admelog to 2 units TIDAC before meals (hold if not eating)  - Recommend Low dose admelog correction scale TIDQAC and separate low dose scale QHS  - Please check FSG before meals and QHS, or q6h while NPO, add in 3 am FSBG check to   - Inpatient glucose goals: 140-180  - consistent carb diet when eating  DISCHARGE PLANNING:  - Discharge recs pending clinical course: likely basal/bolus insulin (dose TBD)  - follow up w/ pt's own endo Dr. Phillips    #Hypertension  - Goal BP <130/80  off BP medicaitons at this time  outpt mc/cr ratio    #Hyperlipidemia  - LDL goal <100  - on simvastatin 20mg daily    d/w CCU team.    JOSE MARIA Pearce-BC  Nurse Practitioner  Division of Endocrinology & Diabetes  pager [phone]     weekdays: 9am to 5pm: [facility] or [facility] and or TEAMS     Nights and weekends: [phone]  Please note that this patient may be followed by a different provider tomorrow.   If no answer or after hours, please contact [phone].  For final dc reccomendations, please call [phone] or page the endocrine fellow on call.

## 2024-01-18 NOTE — PROGRESS NOTE ADULT - SUBJECTIVE AND OBJECTIVE BOX
Subjective/Objective: patient alert,orient     Overnight event: Patient had 2 hours EEG  Tele event: NSR 70-90    MEDICATIONS    aspirin enteric coated 81 milliGRAM(s) Oral daily  dextrose 50% Injectable 25 Gram(s) IV Push once  dextrose 50% Injectable 12.5 Gram(s) IV Push once  insulin glargine Injectable (LANTUS) 8 Unit(s) SubCutaneous <User Schedule>  insulin glargine Injectable (LANTUS) 8 Unit(s) SubCutaneous every morning  insulin lispro (ADMELOG) corrective regimen sliding scale   SubCutaneous at bedtime  insulin lispro (ADMELOG) corrective regimen sliding scale   SubCutaneous three times a day before meals  insulin lispro Injectable (ADMELOG) 2 Unit(s) SubCutaneous three times a day before meals  simvastatin 20 milliGRAM(s) Oral at bedtime    chlorhexidine 2% Cloths 1 Application(s) Topical daily                ICU Vital Signs Last 24 Hrs  T(C): 36.4 (18 Jan 2024 08:00), Max: 36.8 (17 Jan 2024 16:00)  T(F): 97.5 (18 Jan 2024 08:00), Max: 98.3 (17 Jan 2024 16:00)  HR: 72 (18 Jan 2024 08:00) (57 - 104)  BP: 120/52 (18 Jan 2024 08:00) (101/56 - 138/67)  BP(mean): 72 (18 Jan 2024 08:00) (68 - 91)  RR: 13 (18 Jan 2024 08:00) (12 - 23)  SpO2: 95% (18 Jan 2024 08:00) (92% - 99%)    O2 Parameters below as of 18 Jan 2024 08:00  Patient On (Oxygen Delivery Method): room air            PHYSICAL EXAMINATION  Appearance: NAD, no distress  HEENT: Moist Mucous Membranes, Anicteric, PERRL, EOMI  Cardiovascular: Regular rate and rhythm, Normal S1 S2, No JVD, No murmurs  Respiratory: Lungs clear to auscultation. No rales, No rhonchi, No wheezing.  Gastrointestinal:  Soft, Non-tender, + BS  Neurologic: Non-focal, A&Ox3  Skin: Warm and dry, No rashes, No ecchymosis, No cyanosis  Musculoskeletal: No clubbing, No cyanosis, No edema  Vascular: Peripheral pulses palpable 2+ bilaterally  Psychiatry: Mood & affect appropriate      	    		      I&O's Summary    17 Jan 2024 07:01  -  18 Jan 2024 07:00  --------------------------------------------------------  IN: 400 mL / OUT: 0 mL / NET: 400 mL    	     LABS:	  LABORATORY VALUES	 	                          8.9    7.18  )-----------( 397      ( 18 Jan 2024 04:55 )             27.6       01-18    133<L>  |  102  |  9   ----------------------------<  153<H>  4.1   |  27  |  0.61  01-17    132<L>  |  102  |  12  ----------------------------<  192<H>  4.8   |  23  |  0.62    Ca    7.2<L>      18 Jan 2024 04:55  Ca    7.2<L>      17 Jan 2024 06:15  Phos  1.9     01-18  Phos  1.8     01-17  Mg     2.10     01-18  Mg     2.10     01-17    TPro  5.0<L>  /  Alb  2.2<L>  /  TBili  <0.2  /  DBili  x   /  AST  21  /  ALT  86<H>  /  AlkPhos  71  01-18  TPro  4.6<L>  /  Alb  2.0<L>  /  TBili  <0.2  /  DBili  x   /  AST  27  /  ALT  110<H>  /  AlkPhos  72  01-17    LIVER FUNCTIONS - ( 18 Jan 2024 04:55 )  Alb: 2.2 g/dL / Pro: 5.0 g/dL / ALK PHOS: 71 U/L / ALT: 86 U/L / AST: 21 U/L / GGT: x               CARDIAC MARKERS:            Blood Gas Venous - Lactate: 2.0 mmol/L (01-15 @ 10:45)        T4, Serum: 6.77 ug/dL (01-14 @ 17:31)      Urinalysis Basic - ( 18 Jan 2024 04:55 )    Color: x / Appearance: x / SG: x / pH: x  Gluc: 153 mg/dL / Ketone: x  / Bili: x / Urobili: x   Blood: x / Protein: x / Nitrite: x   Leuk Esterase: x / RBC: x / WBC x   Sq Epi: x / Non Sq Epi: x / Bacteria: x      CAPILLARY BLOOD GLUCOSE      POCT Blood Glucose.: 159 mg/dL (18 Jan 2024 07:43)          < from: TTE W or WO Ultrasound Enhancing Agent (01.14.24 @ 14:52) >   1. Left ventricular systolic function is hyperdynamic with an ejection fraction of 69 % by Farrell's method of disks.   2. There is moderate (grade 2) left ventricular diastolic dysfunction, with elevated filling pressure.   3. Normal right ventricular cavity size, wall thickness, and normal systolic function.   4. No pericardial effusion seen.   5. Left pleural effusion noted.    < end of copied text >      < from: CT Chest No Cont (01.14.24 @ 10:37) >  < from: CT Abdomen and Pelvis No Cont (01.14.24 @ 10:37) >    New large pericardial effusion. Recommend echocardiogram to exclude   cardiac tamponade.    Multifocal lung parenchymal opacities and interlobular septal thickening.   Pulmonary edema is likely present. Superimposed infection is not   excluded. Pulmonary opacities limit evaluation for previously described   right lower lobe mass and small residual metastatic nodules. New small to   moderate sized bilateral layering pleural effusions. Follow to resolution.    Significant gallbladder wall edema may be due to third spacing. Correlate   with LFTs and ultrasound. Small abdominopelvic ascites.    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 Correlate with priorPET/CT   findings.      < from: CT Brain Stroke Protocol (01.14.24 @ 10:36) >  There is motion artifact that limits this evaluation.    There is no evidence of large acute intracranial hemorrhage or acute   transcortical infarct. There is minimal microvascular disease. The   ventricles are normal in size and caliber.  There is no evidence of mass-effect or midline shift. There is no   evidence of an intra or extra-axial fluid collection.    Visualized paranasal sinuses and bilateral mastoid air cells are clear.    Impression: Limited evaluation. No evidence of a large acute infarction   or large acute hemorrhage. Minimal microvascular disease.    < end of copied text >    < end of copied text >  < from: CT Chest No Cont (08.29.23 @ 19:54) >  Lung mass involving the right lower lobe and right hilum, compatible with   a neoplasm.Multistation thoracic and left lower neck lymphadenopathy, as well as   multiple lung and skeletal metastases.    < end of copied text >      < from: CT Angio Head/neck  w/ IV Cont (01.16.24 @ 21:02) >    CT HEAD:  No acute intracranial findings. The small enhancing lesions seen on the   10/23/2023 CT head are not visible.    CTA HEAD:  No flow-limiting stenosis, occlusion or aneurysm.    CTA NECK:  No flow-limiting stenosis, occlusion or dissection.      < from: CT Angio Head w/ IV Cont (01.16.24 @ 21:02) >  Large layering bilateral pleural effusions partially visible.  Status post left thyroidectomy. Multinodular right lobe of thyroid.  Degenerative changes cervical spine.           Subjective/Objective: patient alert, oriented x3 . pt denies chest pain or sob or heahache     Overnight event: Patient had 2 hours EEG  Tele event: NSR 70-90    MEDICATIONS    aspirin enteric coated 81 milliGRAM(s) Oral daily  dextrose 50% Injectable 25 Gram(s) IV Push once  dextrose 50% Injectable 12.5 Gram(s) IV Push once  insulin glargine Injectable (LANTUS) 8 Unit(s) SubCutaneous <User Schedule>  insulin glargine Injectable (LANTUS) 8 Unit(s) SubCutaneous every morning  insulin lispro (ADMELOG) corrective regimen sliding scale   SubCutaneous at bedtime  insulin lispro (ADMELOG) corrective regimen sliding scale   SubCutaneous three times a day before meals  insulin lispro Injectable (ADMELOG) 2 Unit(s) SubCutaneous three times a day before meals  simvastatin 20 milliGRAM(s) Oral at bedtime    chlorhexidine 2% Cloths 1 Application(s) Topical daily                ICU Vital Signs Last 24 Hrs  T(C): 36.4 (18 Jan 2024 08:00), Max: 36.8 (17 Jan 2024 16:00)  T(F): 97.5 (18 Jan 2024 08:00), Max: 98.3 (17 Jan 2024 16:00)  HR: 72 (18 Jan 2024 08:00) (57 - 104)  BP: 120/52 (18 Jan 2024 08:00) (101/56 - 138/67)  BP(mean): 72 (18 Jan 2024 08:00) (68 - 91)  RR: 13 (18 Jan 2024 08:00) (12 - 23)  SpO2: 95% (18 Jan 2024 08:00) (92% - 99%)    O2 Parameters below as of 18 Jan 2024 08:00  Patient On (Oxygen Delivery Method): room air            PHYSICAL EXAMINATION  Appearance: NAD, no distress  HEENT: Moist Mucous Membranes, Anicteric, PERRL, EOMI  Cardiovascular: Regular rate and rhythm, Normal S1 S2, No JVD, No murmurs  Respiratory: Lungs clear to auscultation. No rales, No rhonchi, No wheezing.  Gastrointestinal:  Soft, Non-tender, + BS  Neurologic: Non-focal, A&Ox3  Skin: Warm and dry, No rashes, No ecchymosis, No cyanosis  Musculoskeletal: No clubbing, No cyanosis, No edema  Vascular: Peripheral pulses palpable 2+ bilaterally  Psychiatry: Mood & affect appropriate      	    		      I&O's Summary    17 Jan 2024 07:01  -  18 Jan 2024 07:00  --------------------------------------------------------  IN: 400 mL / OUT: 0 mL / NET: 400 mL    	     LABS:	  LABORATORY VALUES	 	                          8.9    7.18  )-----------( 397      ( 18 Jan 2024 04:55 )             27.6       01-18    133<L>  |  102  |  9   ----------------------------<  153<H>  4.1   |  27  |  0.61  01-17    132<L>  |  102  |  12  ----------------------------<  192<H>  4.8   |  23  |  0.62    Ca    7.2<L>      18 Jan 2024 04:55  Ca    7.2<L>      17 Jan 2024 06:15  Phos  1.9     01-18  Phos  1.8     01-17  Mg     2.10     01-18  Mg     2.10     01-17    TPro  5.0<L>  /  Alb  2.2<L>  /  TBili  <0.2  /  DBili  x   /  AST  21  /  ALT  86<H>  /  AlkPhos  71  01-18  TPro  4.6<L>  /  Alb  2.0<L>  /  TBili  <0.2  /  DBili  x   /  AST  27  /  ALT  110<H>  /  AlkPhos  72  01-17    LIVER FUNCTIONS - ( 18 Jan 2024 04:55 )  Alb: 2.2 g/dL / Pro: 5.0 g/dL / ALK PHOS: 71 U/L / ALT: 86 U/L / AST: 21 U/L / GGT: x               CARDIAC MARKERS:            Blood Gas Venous - Lactate: 2.0 mmol/L (01-15 @ 10:45)        T4, Serum: 6.77 ug/dL (01-14 @ 17:31)      Urinalysis Basic - ( 18 Jan 2024 04:55 )    Color: x / Appearance: x / SG: x / pH: x  Gluc: 153 mg/dL / Ketone: x  / Bili: x / Urobili: x   Blood: x / Protein: x / Nitrite: x   Leuk Esterase: x / RBC: x / WBC x   Sq Epi: x / Non Sq Epi: x / Bacteria: x      CAPILLARY BLOOD GLUCOSE      POCT Blood Glucose.: 159 mg/dL (18 Jan 2024 07:43)          < from: TTE W or WO Ultrasound Enhancing Agent (01.14.24 @ 14:52) >   1. Left ventricular systolic function is hyperdynamic with an ejection fraction of 69 % by Farrell's method of disks.   2. There is moderate (grade 2) left ventricular diastolic dysfunction, with elevated filling pressure.   3. Normal right ventricular cavity size, wall thickness, and normal systolic function.   4. No pericardial effusion seen.   5. Left pleural effusion noted.    < end of copied text >      < from: CT Chest No Cont (01.14.24 @ 10:37) >  < from: CT Abdomen and Pelvis No Cont (01.14.24 @ 10:37) >    New large pericardial effusion. Recommend echocardiogram to exclude   cardiac tamponade.    Multifocal lung parenchymal opacities and interlobular septal thickening.   Pulmonary edema is likely present. Superimposed infection is not   excluded. Pulmonary opacities limit evaluation for previously described   right lower lobe mass and small residual metastatic nodules. New small to   moderate sized bilateral layering pleural effusions. Follow to resolution.    Significant gallbladder wall edema may be due to third spacing. Correlate   with LFTs and ultrasound. Small abdominopelvic ascites.    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 Correlate with priorPET/CT   findings.      < from: CT Brain Stroke Protocol (01.14.24 @ 10:36) >  There is motion artifact that limits this evaluation.    There is no evidence of large acute intracranial hemorrhage or acute   transcortical infarct. There is minimal microvascular disease. The   ventricles are normal in size and caliber.  There is no evidence of mass-effect or midline shift. There is no   evidence of an intra or extra-axial fluid collection.    Visualized paranasal sinuses and bilateral mastoid air cells are clear.    Impression: Limited evaluation. No evidence of a large acute infarction   or large acute hemorrhage. Minimal microvascular disease.    < end of copied text >    < end of copied text >  < from: CT Chest No Cont (08.29.23 @ 19:54) >  Lung mass involving the right lower lobe and right hilum, compatible with   a neoplasm.Multistation thoracic and left lower neck lymphadenopathy, as well as   multiple lung and skeletal metastases.    < end of copied text >      < from: CT Angio Head/neck  w/ IV Cont (01.16.24 @ 21:02) >    CT HEAD:  No acute intracranial findings. The small enhancing lesions seen on the   10/23/2023 CT head are not visible.    CTA HEAD:  No flow-limiting stenosis, occlusion or aneurysm.    CTA NECK:  No flow-limiting stenosis, occlusion or dissection.      < from: CT Angio Head w/ IV Cont (01.16.24 @ 21:02) >  Large layering bilateral pleural effusions partially visible.  Status post left thyroidectomy. Multinodular right lobe of thyroid.  Degenerative changes cervical spine.

## 2024-01-18 NOTE — DISCHARGE NOTE NURSING/CASE MANAGEMENT/SOCIAL WORK - NSSCNAMETXT_GEN_ALL_CORE
[facility] [phone] Nurse to visit on the day following discharge. Other appropriate services to be arranged thereafter. Please contact the home care agency at the above phone number if you have not heard from them by approximately 12 noon on the day after your hospital discharge.

## 2024-01-18 NOTE — PROGRESS NOTE ADULT - ASSESSMENT
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 Endocrinology consulted for T1DM.    #Type 1Diabetes Mellitus   - A1C with Estimated Average Glucose Result: 7.0 % (01-15-24)  - home regimen: lantus 10 units BID. humalog 2-4 units (depending on size of meal)  - eGFR: 93 mL/min/1.73m2 (01-15-24)  - no evidence of DKA on labs  INPATIENT PLAN:  pt with DM since age 14, is aware of insulin titration at home needed for change in diet and illness  am hypoglycemia 1/16 due to Lantus 8 at night. hardly receiving premeal insulin inpt or correction insulin  1/16- no coverage for BF, afternoon glucose 200 (had glucose tabs and may have overcorrected this morning hypo)  long d/w pt to avoid hypoglycemia based on studies in the ICU setting, goal glucose 140-180   1/16 pt was hypoglycemic in am to 52 she took her own glucose tabs (4). pt also verbalized that at dinner time she was given insulin coverage but noticed her turkey sandwich had chisholm on it so she did not eat it and she stated it took 2 hours for another sandwich to come up and in that time she took 4 more glucose tablets and she ate a roll, then her daughter brought her dinner and she ate at 730pm, so her glucose at bedtime was 285.   1/17 she stated that at 1045am she took her own FS because she felt low and it was 89 so she took 4 of her own glucose tabs, still felt low so took 2 more. then RN checked her FS prior to lunch and around 1130am and it was 193. d/w pt that it is important for her to inform us when she feels low so we are able to check her FS and treat accordingly.   -It is hard to be able to titrate insulin when pt continues to take own glucose tablets despite long conversations with her let us monitor her glucose and treat accordingly.   1/18 no events overnight, no complaints from pt, plan is for discharge today.   - Continue Lantus 8 units qam and Lantus 8 units tonight (would normally recommend once a day dosing for lantus but pt feels strongly this is what works for her, would like to maintain some control during this admission)  - Continue admelog to 2 units TIDAC before meals (hold if not eating)  - Recommend Low dose admelog correction scale TIDQAC and separate low dose scale QHS  - Please check FSG before meals and QHS, or q6h while NPO, add in 3 am FSBG check to   - Inpatient glucose goals: 140-180  - consistent carb diet when eating  DISCHARGE PLANNING:  - Discharge recommendations: discharge home insulin regimen. Lantus 10 units BID and humalog 2-4 units depending on size of pt meal.  -d/w CGM with pt, she stated she had in the past but her skin had reaction to the adhesive so she declined script for it and wants to stick with glucometer.   - follow up w/ pt's own endo Dr. Phillips    #Hypertension  - Goal BP <130/80  off BP medicaitons at this time  outpt mc/cr ratio    #Hyperlipidemia  - LDL goal <100  - on simvastatin 20mg daily    d/w CCU ACP [place].    JOSE MARIA Pearce-BC  Nurse Practitioner  Division of Endocrinology & Diabetes  pager [phone]     weekdays: 9am to 5pm: email [facility] or [facility] and or TEAMS     Nights and weekends: [phone]  Please note that this patient may be followed by a different provider tomorrow.   If no answer or after hours, please contact [phone].  For final dc reccomendations, please call [phone] or page the endocrine fellow on call.

## 2024-01-18 NOTE — PROGRESS NOTE ADULT - TIME BILLING
I was present with the Resident/ACP during the key portions of the history and exam. I discussed the case with the Resident/ACP and agree with the findings and plan as documented in the Resident's/ACP's note, unless noted below.   ROS otherwise negative
I was present with the Resident/ACP during the key portions of the history and exam. I discussed the case with the Resident/ACP and agree with the findings and plan as documented in the Resident's/ACP's note, unless noted below.   ROS otherwise negative

## 2024-01-18 NOTE — DIETITIAN INITIAL EVALUATION ADULT - PERTINENT LABORATORY DATA
01-18    133<L>  |  102  |  9   ----------------------------<  153<H>  4.1   |  27  |  0.61    Ca    7.2<L>      18 Jan 2024 04:55  Phos  1.9     01-18  Mg     2.10     01-18    TPro  5.0<L>  /  Alb  2.2<L>  /  TBili  <0.2  /  DBili  x   /  AST  21  /  ALT  86<H>  /  AlkPhos  71  01-18  POCT Blood Glucose.: 165 mg/dL (01-18-24 @ 11:39)  A1C with Estimated Average Glucose Result: 7.0 % (01-15-24 @ 05:20)

## 2024-01-18 NOTE — PROGRESS NOTE ADULT - ASSESSMENT
73F PMHx significant for smoking, HTN, HLD, type 1 DM, thyroidectomy, BCC, SCC, RLL mass mets to brain and bone (8/23). Awoke 6am with sob, abdominal pain, and dysarthria, EMS arrives 7:30, around that time also had shortness of breath. Reportedly hypoxic to 70s. ED course: Code stroke, no tnk due to being out of window. VS: HR up to 120s, hypothermic 35.6, hypotensive 81/56. requiring 4L NC. CT CAP with large pericardial and b/l pleural effusions. Repeat CTH and CTA H/N performed on 1/16/24 w/o acute findings.     LKN: Wake up stroke however awoke 0600 and , 1/13/24 pm  NIHSS: 10 (Questions +2, Right superior Quadrantanopia +1, Severe aphasia +2, Dysarthria +1, b/l leg drift +4)  preMRS: 0    Impression: Resolved presumed expressive aphasia and right superior quadrantanopia due to diffuse brain dysfunction due to toxic metabolic encephalopathy, no evidence for acute ischemic stroke or seizure.     Recommendations:  [] routine EEG, follow up official report   [] MRI brain can be performed outpatient - should not delay discharge; if MRI is done inpatient, probably no contrast due to gadolinium allergy; will need PO sedation  [] Treat with ASA 81 mg/day for now if no contraindication if MRI (inpatient or outpatient) shows no acute infarct, then ASA can be stopped.   [] Defer to CCU team for AC decision   [] Pain regimen for neck pain   [] BP management per CCU team  [] Neurochecks and vitals q4 hours   [] Patient can follow up with general neurology at [address] [place] [address] 1-2 weeks after discharge. Please instruct the patient to call [phone] to schedule this appointment.  [] From neurovascular standpoint patient is cleared for discharge     Case seen and d/w stroke attending.    Please call with questions: [phone]

## 2024-01-18 NOTE — DISCHARGE NOTE NURSING/CASE MANAGEMENT/SOCIAL WORK - NSDCPEFALRISK_GEN_ALL_CORE
For information on Fall & Injury Prevention, visit: [email or web address] OR  [email or web address] OR  https://www.cdc.gov/steadi/patient.html

## 2024-01-18 NOTE — PROGRESS NOTE ADULT - ASSESSMENT
72yo F with h/o smoking, HTN, HLD, type 1 DM, thyroidectomy, BCC, SCC found to have Rt. lower lobe mass with mets to multiple organs (bone, brain) in August 2023.Currently undergoing treatment w Dr. Plummer at [facility].  Brought by EMS to [facility] ER for  disoriented, confused and compliant of shortness of breath and chest pain . In the ED, the patient was hypothermic, tachycardic, hypotensive  and hypoxic, expressive Aphasic . CTH without stroke, CT CAP with large pericardial effusion s/p pericardial window. s/p 1Liter IVF. Pt went urgently for drainage w interventional cardiology. Now s/p drain of pericardial effusion 800 sanguineous fluid.      #Neuro  - presents with confusion and dysarthria  -s/p code stroke for Expressive aphasia and right superior quadrantanopia due to left hemispheric dysfunction. Mechanism ischemic stroke. Etiology likely cardioembolic given new afib vs hypercoagulability of malignancy    -Pre neuro not a candidate of tPA due to outside  window , cancer with mets to brain   - CT Brain Stroke Protocol showed . No evidence of a large acute infarction or large acute hemorrhage. Minimal microvascular disease.  -Neuro symptoms resolved  - CTA H/Neck with IV contrast: No acute intracranial findings. The small enhancing lesions seen on the 10/23/2023 CT head are not visible.      - 2 hrs EEG on 1/17 : prelim showed no Sz activity  - Neuro checks and vitals per routine  -non con MRI (inpatient or outpatient)          Respiratory  # SOB/hypoxia on presentation  - Currently in no acute distress  - bilat pleural effusion.  - [employer]  RA      Cards    #Pericardial effusion  -CT scan C/A/P revealed large pericardial effusion w bilat pleural effusion.  -Pericardial drain placed for tamponade , 800cc sanguineous fluid removed on 1/14   --Cytology pending  - Thoracic Sx following for possible pericardial window- no intervention at this time as per thoracic team  - pericardial drain 10cc in 24 hrs on 1/15  - limited echo showed trace pericardial effusion            #New Afib  -iso pericardial effusion  with tamponade physiology  -ChadsVasc: 7  -Has-Bled: 3  -  awaiting cytology result   - as per Heme will be cautions with AC, If pericardial drain is malignant  -B/L compression stockings      #Hypotension  -Patient with h/o hypertension and is on lisinopril 20mg BID, amlodipine 5mg BID,   -Hold Home medications  - s/p 3L IVF and IV phenylephrine  drip  for permissive HTN up to 220/110 for 24-48h from symptom onset followed by gradual normotension over 2-3 days   - off  IV phenylephrine  drip on 1/15  - DASH diet          #GI  -Consistent carb  -Tolerating diet          #  Normal indices  -eGFR: 93 mL/min/1.73m2 (01-15-24      #Hyponatremia  iso hyperglycemia   serum: osmolality 271, and sodium 126  Urine:  osmolality  593, and sodium 50         #Endo  -T1DM diagnosed age 14  -Home medication: Lantus 11 units am and 7 units pm, novalog/ humalog 2-4 units pre-meal  -blood glucose AC and HS with  AC ISS coverage   -HgA1c: 7.0 %  -Consistent carbohydrate diet  - follow endo recs: Lantus 8 units q12  -Low dose admelog correction scale TID QAC and separate low dose scale QHS    #HLD    -simvastatin 20mg      Heme  #Stage IV NSCLC adenocarcinoma with  mets to multiple organs (bone, brain) in August 2023.   -Currently undergoing treatment w Dr. Plummer at [facility].   -hold Home chemo Tafinlar 75 BID and Mekinist 0.5 m while inpatient as per Heme/onco      PPx  -Anticoagulation recommendations pending further imaging   (+) compression stockings 74yo F with h/o smoking, HTN, HLD, type 1 DM, thyroidectomy, BCC, SCC found to have Rt. lower lobe mass with mets to multiple organs (bone, brain) in August 2023.Currently undergoing treatment w Dr. Plummer at [facility].  Brought by EMS to [facility] ER for  disoriented, confused and compliant of shortness of breath and chest pain . In the ED, the patient was hypothermic, tachycardic, hypotensive  and hypoxic, expressive Aphasic . CTH without stroke, CT CAP with large pericardial effusion s/p pericardial window. s/p 1Liter IVF. Pt went urgently for drainage w interventional cardiology. Now s/p drain of pericardial effusion 800 sanguineous fluid.      #Neuro  - presents with confusion and dysarthria  -s/p code stroke for Expressive aphasia and right superior quadrantanopia due to left hemispheric dysfunction. Mechanism ischemic stroke. Etiology likely cardioembolic given new afib vs hypercoagulability of malignancy    -Pre neuro not a candidate of tPA due to outside  window , cancer with mets to brain   - CT Brain Stroke Protocol showed . No evidence of a large acute infarction or large acute hemorrhage. Minimal microvascular disease.  -Neuro symptoms resolved  - CTA H/Neck with IV contrast: No acute intracranial findings. The small enhancing lesions seen on the 10/23/2023 CT head are not visible.      - 2 hrs EEG on 1/17 : prelim showed no Sz activity  - Neuro checks and vitals per routine  -non con MRI (inpatient or outpatient)          Respiratory  # SOB/hypoxia on presentation  - Currently in no acute distress  - bilat pleural effusion.  - [employer]  RA      Cards    #Pericardial effusion  -CT scan C/A/P revealed large pericardial effusion w bilat pleural effusion.  -Pericardial drain placed for tamponade , 800cc sanguineous fluid removed on 1/14   --Cytology pending  - Thoracic Sx following for possible pericardial window- no intervention at this time as per thoracic team  - pericardial drain 10cc in 24 hrs on 1/15  - limited echo showed trace pericardial effusion            # brief episode of New Afib  -iso pericardial effusion  with tamponade physiology  -ChadsVasc: 7  -Has-Bled: 3  -  awaiting cytology result   - as per Heme will be cautions with AC, If pericardial drain is malignant  -B/L compression stockings      #Hypotension  -Patient with h/o hypertension and is on lisinopril 20mg BID, amlodipine 5mg BID,   -Hold Home medications  - s/p 3L IVF and IV phenylephrine  drip  for permissive HTN up to 220/110 for 24-48h from symptom onset followed by gradual normotension over 2-3 days   - off  IV phenylephrine  drip on 1/15  - DASH diet          #GI  -Consistent carb  -Tolerating diet          #  Normal indices  -eGFR: 93 mL/min/1.73m2 (01-15-24      #Hyponatremia  iso hyperglycemia   serum: osmolality 271, and sodium 126  Urine:  osmolality  593, and sodium 50         #Endo  -T1DM diagnosed age 14  -Home medication: Lantus 11 units am and 7 units pm, novalog/ humalog 2-4 units pre-meal  -blood glucose AC and HS with  AC ISS coverage   -HgA1c: 7.0 %  -Consistent carbohydrate diet  - follow endo recs:  - Lantus 8 units qam and qpm  -admelog  2 units  before meals (hold if not eating)  -Low dose ADMELOG correction scale TID QAC   -labile BG due to insulin administration in relation to meal times and pt also self administering glucose tabs when she is feeling low or feels that she may go low.Educated pt to inform the hypoglycemia symptoms, so staff  able to check her FS and treat accordingly.     #HLD    -simvastatin 20mg      Heme  #Stage IV NSCLC adenocarcinoma with  mets to multiple organs (bone, brain) in August 2023.   -Currently undergoing treatment w Dr. Plummer at [facility].   -hold Home chemo Tafinlar 75 BID and Mekinist 0.5 m while inpatient as per Heme/onco      PPx  -Anticoagulation recommendations pending further imaging   (+) compression stockings 74yo F with h/o smoking, HTN, HLD, type 1 DM, thyroidectomy, BCC, SCC found to have Rt. lower lobe mass with mets to multiple organs (bone, brain) in August 2023.Currently undergoing treatment w Dr. Plummer at [facility].  Brought by EMS to [facility] ER for  disoriented, confused and compliant of shortness of breath and chest pain . In the ED, the patient was hypothermic, tachycardic, hypotensive  and hypoxic, expressive Aphasic . CTH without stroke, CT CAP with large pericardial effusion s/p pericardial window. s/p 1Liter IVF. Pt went urgently for drainage w interventional cardiology. Now s/p drain of pericardial effusion 800 sanguineous fluid.      #Neuro  - presents with confusion and dysarthria  -s/p code stroke for Expressive aphasia and right superior quadrantanopia due to left hemispheric dysfunction. Mechanism ischemic stroke. Etiology likely cardioembolic given new afib vs hypercoagulability of malignancy    -Pre neuro not a candidate of tPA due to outside  window , cancer with mets to brain   - CT Brain Stroke Protocol showed . No evidence of a large acute infarction or large acute hemorrhage. Minimal microvascular disease.  -Neuro symptoms resolved  - CTA H/Neck with IV contrast: No acute intracranial findings. The small enhancing lesions seen on the 10/23/2023 CT head are not visible.  - 2 hrs EEG on 1/17 : prelim showed no Sz activity  - Neuro checks and vitals per routine  -non con MRI (inpatient or outpatient)          Respiratory  # SOB/hypoxia on presentation  - Currently in no acute distress  - bilat large pleural effusion.  - [employer]  RA      Cards    #Pericardial effusion  -CT scan C/A/P revealed large pericardial effusion w bilat pleural effusion.  -Pericardial drain placed for tamponade , 800cc sanguineous fluid removed on 1/14   --Cytology pending  - Thoracic Sx following for possible pericardial window- no intervention at this time as per thoracic team  - pericardial drain 10cc in 24 hrs on 1/15  - limited echo showed trace pericardial effusion on 1/6  - repeat echo on 1/17 showed no pericardial effsuion            # Brief episode of New Afib  -iso pericardial effusion  with tamponade physiology  -ChadsVasc: 7  -Has-Bled: 3  -  awaiting cytology result   - as per Heme will be cautions with AC, If pericardial drain is malignant  -B/L compression stockings      #Hypotension  -Patient with h/o hypertension and is on lisinopril 20mg BID, amlodipine 5mg BID,   -Hold Home medications  - s/p 3L IVF and IV phenylephrine  drip  for permissive HTN up to 220/110 for 24-48h from symptom onset followed by gradual normotension over 2-3 days   - off  IV phenylephrine  drip on 1/15  - DASH diet          #GI  -Consistent carb  -Tolerating diet          #  Normal indices  -eGFR: 93 mL/min/1.73m2 (01-15-24      #Hyponatremia  iso hyperglycemia   serum: osmolality 271, and sodium 126  Urine:  osmolality  593, and sodium 50         #Endo  -T1DM diagnosed age 14  -Home medication: Lantus 11 units am and 7 units pm, novalog/ humalog 2-4 units pre-meal  -blood glucose AC and HS with  AC ISS coverage   -HgA1c: 7.0 %  -Consistent carbohydrate diet  - follow endo recs:  - Lantus 8 units qam and qpm  -admelog  2 units  before meals (hold if not eating)  -Low dose ADMELOG correction scale TID QAC   -labile BG due to insulin administration in relation to meal times and pt also self administering glucose tabs when she is feeling low or feels that she may go low.Educated pt to inform the hypoglycemia symptoms, so staff  able to check her FS and treat accordingly.     #HLD    -simvastatin 20mg      Heme  #Stage IV NSCLC adenocarcinoma with  mets to multiple organs (bone, brain) in August 2023.   -Currently undergoing treatment w Dr. Plummer at [facility].   -hold Home chemo Tafinlar 75 BID and Mekinist 0.5 m while inpatient as per Heme/onco      PPx  -Anticoagulation recommendations pending further imaging   (+) compression stockings

## 2024-01-18 NOTE — DIETITIAN INITIAL EVALUATION ADULT - PERTINENT MEDS FT
MEDICATIONS  (STANDING):  aspirin enteric coated 81 milliGRAM(s) Oral daily  chlorhexidine 2% Cloths 1 Application(s) Topical daily  dextrose 50% Injectable 25 Gram(s) IV Push once  dextrose 50% Injectable 12.5 Gram(s) IV Push once  insulin glargine Injectable (LANTUS) 8 Unit(s) SubCutaneous every morning  insulin glargine Injectable (LANTUS) 8 Unit(s) SubCutaneous <User Schedule>  insulin lispro (ADMELOG) corrective regimen sliding scale   SubCutaneous at bedtime  insulin lispro (ADMELOG) corrective regimen sliding scale   SubCutaneous three times a day before meals  insulin lispro Injectable (ADMELOG) 2 Unit(s) SubCutaneous three times a day before meals  simvastatin 20 milliGRAM(s) Oral at bedtime    MEDICATIONS  (PRN):

## 2024-01-18 NOTE — PROGRESS NOTE ADULT - NS ATTEND AMEND GEN_ALL_CORE FT
Effusion resolved, cytology pending. Will defer to Oncology if will need pericardial window in future.
73F Type 1 DM managed with basal bolus insulin.  Ok to resume home insulin regimen on discharge.    Feliciano Sifuentes MD  Division of Endocrinology  Pager: [phone]    If after 6PM or before 9AM, or on weekends/holidays, please call endocrine answering service for assistance ([phone]).  For nonurgent matters email [email or web address] for assistance.

## 2024-01-18 NOTE — DIETITIAN INITIAL EVALUATION ADULT - FLUID ACCUMULATION
No edema noted High Dose Vitamin A Pregnancy And Lactation Text: High dose vitamin A therapy is contraindicated during pregnancy and breast feeding.

## 2024-01-18 NOTE — EEG REPORT - NS EEG TEXT BOX
LIAN BENITEZ [id number]     Study Date: 01-17-24 16:41 - 21:45  Duration: 5hr 4min  --------------------------------------------------------------------------------------------------  History:  CC/ HPI Patient is a 73y old  Female who presents with a chief complaint of Mental status change (15 [place] 2024 06:48)    MEDICATIONS  (STANDING):  aspirin enteric coated 81 milliGRAM(s) Oral daily  chlorhexidine 2% Cloths 1 Application(s) Topical daily  dextrose 50% Injectable 25 Gram(s) IV Push once  dextrose 50% Injectable 12.5 Gram(s) IV Push once  insulin glargine Injectable (LANTUS) 8 Unit(s) SubCutaneous <User Schedule>  insulin glargine Injectable (LANTUS) 8 Unit(s) SubCutaneous every morning  insulin lispro (ADMELOG) corrective regimen sliding scale   SubCutaneous at bedtime  insulin lispro (ADMELOG) corrective regimen sliding scale   SubCutaneous three times a day before meals  insulin lispro Injectable (ADMELOG) 2 Unit(s) SubCutaneous three times a day before meals  simvastatin 20 milliGRAM(s) Oral at bedtime    --------------------------------------------------------------------------------------------------  Study Interpretation:    [[[Abbreviation Key:  PDR=alpha rhythm/posterior dominant rhythm. A-P=anterior posterior.  Amplitude: ‘very low’:<20; ‘low’:20-49; ‘medium’:; ‘high’:>150uV.  Persistence for periodic/rhythmic patterns (% of epoch) ‘rare’:<1%; ‘occasional’:1-10%; ‘frequent’:10-50%; ‘abundant’:50-90%; ‘continuous’:>90%.  Persistence for sporadic discharges: ‘rare’:<1/hr; ‘occasional’:1/min-1/hr; ‘frequent’:>1/min; ‘abundant’:>1/10 sec.  RPP=rhythmic and periodic patterns; GRDA=generalized rhythmic delta activity; FIRDA=frontal intermittent GRDA; LRDA=lateralized rhythmic delta activity; TIRDA=temporal intermittent rhythmic delta activity;  LPD=PLED=lateralized periodic discharges; GPD=generalized periodic discharges; BIPDs =bilateral independent periodic discharges; Mf=multifocal; SIRPDs=stimulus induced rhythmic, periodic, or ictal appearing discharges; BIRDs=brief potentially ictal rhythmic discharges >4 Hz, lasting .5-10s; PFA (paroxysmal bursts >13 Hz or =8 Hz <10s).  Modifiers: +F=with fast component; +S=with spike component; +R=with rhythmic component.  S-B=burst suppression pattern.  Max=maximal. N1-drowsy; N2-stage II sleep; N3-slow wave sleep. SSS/BETS=small sharp spikes/benign epileptiform transients of sleep. HV=hyperventilation; PS=photic stimulation]]]    Daily EEG Visual Analysis    FINDINGS:      Background:  Continuity: Continuous  Symmetry: Symmetric  Posterior dominant rhythm (PDR): 9 Hz, reactive to eye closure. Symmetric low-amplitude frontal beta in wakefulness.  State Change: Present  Voltage: Normal  Anterior-Posterior Gradient: Present  Breach: Absent    Background Slowing:  Generalized slowing: Occasional generalized frontal delta activity.  Focal slowing: None    State Changes:   Drowsiness is only briefly captured and consists of background slowing. Sleep is not captured.    Interictal Findings:  None    Electrographic and Electroclinical seizures:  None    Other Clinical Events:  None    Activation Procedures:   Hyperventilation is not performed.    Photic stimulation is not performed.    Artifacts:  Prominent and nearly continuous myogenic artifacts are present. At 21:38, these artifact obscure interpretation of the EEG and the leads are removed at 21:45. The subsequent recording is completely artifactual.    EKG:  Single-lead EKG shows regular rhythm    EEG Classification / Summary:  Abnormal EEG in the awake and drowsy states.   Occasional generalized frontal delta activity.  No focal or epileptiform abnormalities are captured.     Clinical Impression:  Occasional generalized slowing is nonspecific in etiology.  No epileptiform abnormalities or seizures.        -------------------------------------------------------------------------------------------------------  Darrion Rolle MD, PhD  Neurocritical Care    READ IS PRELIMINARY    [facility] EEG Reading Room Ph#: [phone]  Epilepsy Answering Service after 5PM and before 8:30AM: Ph#: [phone]     LIAN BENITEZ [id number]     Study Date: 01-17-24 16:41 - 21:37  Duration: 5 hr  --------------------------------------------------------------------------------------------------  History:  CC/ HPI Patient is a 73y old  Female who presents with a chief complaint of Mental status change (15 [place] 2024 06:48)    MEDICATIONS  (STANDING):  aspirin enteric coated 81 milliGRAM(s) Oral daily  chlorhexidine 2% Cloths 1 Application(s) Topical daily  dextrose 50% Injectable 25 Gram(s) IV Push once  dextrose 50% Injectable 12.5 Gram(s) IV Push once  insulin glargine Injectable (LANTUS) 8 Unit(s) SubCutaneous <User Schedule>  insulin glargine Injectable (LANTUS) 8 Unit(s) SubCutaneous every morning  insulin lispro (ADMELOG) corrective regimen sliding scale   SubCutaneous at bedtime  insulin lispro (ADMELOG) corrective regimen sliding scale   SubCutaneous three times a day before meals  insulin lispro Injectable (ADMELOG) 2 Unit(s) SubCutaneous three times a day before meals  simvastatin 20 milliGRAM(s) Oral at bedtime    --------------------------------------------------------------------------------------------------  Study Interpretation:    [[[Abbreviation Key:  PDR=alpha rhythm/posterior dominant rhythm. A-P=anterior posterior.  Amplitude: ‘very low’:<20; ‘low’:20-49; ‘medium’:; ‘high’:>150uV.  Persistence for periodic/rhythmic patterns (% of epoch) ‘rare’:<1%; ‘occasional’:1-10%; ‘frequent’:10-50%; ‘abundant’:50-90%; ‘continuous’:>90%.  Persistence for sporadic discharges: ‘rare’:<1/hr; ‘occasional’:1/min-1/hr; ‘frequent’:>1/min; ‘abundant’:>1/10 sec.  RPP=rhythmic and periodic patterns; GRDA=generalized rhythmic delta activity; FIRDA=frontal intermittent GRDA; LRDA=lateralized rhythmic delta activity; TIRDA=temporal intermittent rhythmic delta activity;  LPD=PLED=lateralized periodic discharges; GPD=generalized periodic discharges; BIPDs =bilateral independent periodic discharges; Mf=multifocal; SIRPDs=stimulus induced rhythmic, periodic, or ictal appearing discharges; BIRDs=brief potentially ictal rhythmic discharges >4 Hz, lasting .5-10s; PFA (paroxysmal bursts >13 Hz or =8 Hz <10s).  Modifiers: +F=with fast component; +S=with spike component; +R=with rhythmic component.  S-B=burst suppression pattern.  Max=maximal. N1-drowsy; N2-stage II sleep; N3-slow wave sleep. SSS/BETS=small sharp spikes/benign epileptiform transients of sleep. HV=hyperventilation; PS=photic stimulation]]]    Daily EEG Visual Analysis    FINDINGS:      Background:  Continuity: Continuous  Symmetry: Symmetric  Posterior dominant rhythm (PDR): 8.5-9 Hz, reactive to eye closure. Symmetric low-amplitude frontal beta in wakefulness.  State Change: Present  Voltage: Normal  Anterior-Posterior Gradient: Present  Breach: Absent    Background Slowing:  Generalized slowing: Very rare diffuse polymorphic delta slowing in wakefulness  Focal slowing: Occasional bilateral frontotemporal focal polymorphic delta slowing    State Changes:   Drowsiness and stage 2 sleep are not captured.    Interictal Findings:  None    Electrographic and Electroclinical seizures:  None    Other Clinical Events:  None    Activation Procedures:   Hyperventilation is not performed.    Photic stimulation is not performed.    Artifacts:  Nearly continuous myogenic artifacts and poor electrode are present, somewhat limiting interpretation. At 21:37, multiple electrodes are removed, and the remaining electrodes are removed ny 21:45.    EKG:  Single-lead EKG shows regular rhythm    EEG Classification / Summary:  Abnormal 5-hour video-EEG in the awake state.  Bilateral frontotemporal focal slowing.  Minimal diffuse slowing.  No epileptiform abnormalities are captured.     Clinical Impression:  Bilateral frontotemporal focal cerebral dysfunction can be structural or functional in etiology.  Minimal diffuse cerebral dysfunction is nonspecific in etiology.   No epileptiform abnormalities or seizures.        -------------------------------------------------------------------------------------------------------  Darrion Rolle MD, PhD  Neurocritical Care    Ana Chicas MD  Attending Physician, [facility]     [facility] EEG Reading Room Ph#: [phone]  Epilepsy Answering Service after 5PM and before 8:30AM: Ph#: [phone]

## 2024-01-18 NOTE — PROGRESS NOTE ADULT - SUBJECTIVE AND OBJECTIVE BOX
Chief Complaint/Follow-up on: DM    Subjective:    POC blood glucose and insulin use reviewed.  pt feels well   no n/v  tolerating PO   we spoke about inpt DM management goals and monitoring and also the need to fu outpt for DM   extensive d/w pt at bedside regarding DM and avoiding hypoglycemia and titration of her regimen to avoid lows   pt is eating but not as much as she would at home   1/16 pt was hypoglycemic in am to 52 she took her own glucose tabs (4). pt also verbalized that at dinner time she was given insulin coverage but noticed her turkey sandwich had chisholm on it so she did not eat it and she stated it took 2 hours for another sandwich to come up and in that time she took 4 more glucose tablets and she ate a roll, then her daughter brought her dinner and she ate at 730pm, so her glucose at bedtime was 285.   1/17 she stated that at 1045am she took her own FS because she felt low and it was 89 so she took 4 of her own glucose tabs, still felt low so took 2 more. then RN checked her FS prior to lunch and around 1130am and it was 193. d/w pt that it is important for her to inform us when she feels low so we are able to check her FS and treat accordingly.   1/18 no events overnight, no complaints from pt, plan is for discharge today.     MEDICATIONS  (STANDING):  aspirin enteric coated 81 milliGRAM(s) Oral daily  chlorhexidine 2% Cloths 1 Application(s) Topical daily  dextrose 50% Injectable 25 Gram(s) IV Push once  dextrose 50% Injectable 12.5 Gram(s) IV Push once  insulin glargine Injectable (LANTUS) 8 Unit(s) SubCutaneous <User Schedule>  insulin glargine Injectable (LANTUS) 8 Unit(s) SubCutaneous every morning  insulin lispro (ADMELOG) corrective regimen sliding scale   SubCutaneous at bedtime  insulin lispro (ADMELOG) corrective regimen sliding scale   SubCutaneous three times a day before meals  insulin lispro Injectable (ADMELOG) 2 Unit(s) SubCutaneous three times a day before meals  simvastatin 20 milliGRAM(s) Oral at bedtime    PHYSICAL EXAM:  Vital Signs Last 24 Hrs  T(C): 36.6 (18 Jan 2024 12:00), Max: 36.8 (17 Jan 2024 16:00)  T(F): 97.9 (18 Jan 2024 12:00), Max: 98.3 (17 Jan 2024 16:00)  HR: 105 (18 Jan 2024 10:00) (57 - 105)  BP: 122/55 (18 Jan 2024 09:00) (101/56 - 133/56)  BP(mean): 73 (18 Jan 2024 09:00) (68 - 91)  RR: 18 (18 Jan 2024 10:00) (12 - 23)  SpO2: 95% (18 Jan 2024 10:00) (92% - 99%)  Parameters below as of 18 Jan 2024 10:00  Patient On (Oxygen Delivery Method): room air    GENERAL: NAD, well-groomed, well-developed  EYES: No proptosis, no injection  HEENT:  Atraumatic, Normocephalic, moist mucous membranes  RESPIRATORY: no resp distress   CARDIOVASCULAR: Regular rate and rhythm;; no peripheral edema  psych: alert and oriented X3    CAPILLARY BLOOD GLUCOSE      POCT Blood Glucose.: 165 mg/dL (18 Jan 2024 11:39)  POCT Blood Glucose.: 159 mg/dL (18 Jan 2024 07:43)  POCT Blood Glucose.: 166 mg/dL (18 Jan 2024 03:07)  POCT Blood Glucose.: 250 mg/dL (17 Jan 2024 21:20)  POCT Blood Glucose.: 186 mg/dL (17 Jan 2024 17:43)                                       8.9    7.18  )-----------( 397      ( 18 Jan 2024 04:55 )             27.6   01-18    133<L>  |  102  |  9   ----------------------------<  153<H>  4.1   |  27  |  0.61    Ca    7.2<L>      18 Jan 2024 04:55  Phos  1.9     01-18  Mg     2.10     01-18    TPro  5.0<L>  /  Alb  2.2<L>  /  TBili  <0.2  /  DBili  x   /  AST  21  /  ALT  86<H>  /  AlkPhos  71  01-18            Thyroid Function Tests:  01-14 @ 17:31 TSH 2.94 FreeT4 1.2 T3 66 Anti TPO -- Anti Thyroglobulin Ab -- TSI --

## 2024-01-18 NOTE — PROGRESS NOTE ADULT - SUBJECTIVE AND OBJECTIVE BOX
SUBJECTIVE/INTERVAL HISTORY: Feeling better, EEG done overnight, 5 hours. No acute events overnight.     PAST MEDICAL & SURGICAL HISTORY:  Hypertension  DM type 1 (diabetes mellitus, type 1)  Dx 47 years ago  Basal cell carcinoma  forehead and leg  SCC (squamous cell carcinoma)  on chest  BRCA2 positive  Hyperlipidemia  Latex allergy  S/P  Section x2  ,   Excision of Lipoma of neck    BRCA2 positive  Bilateral Salpingo-oophorectomy : preventative  2012    FAMILY HISTORY:  No pertinent family history in first degree relatives    SOCIAL HISTORY:   T/E/D:   Occupation:   Lives with:     MEDICATIONS (HOME):  Home Medications:  amLODIPine 5 mg oral tablet: 1 tab(s) orally (2024 12:10)  aspirin 81 mg oral delayed release tablet: 1 tab(s) orally once a day (2024 12:10)  lisinopril 20 mg oral tablet: 1 tab(s) orally 2 times a day (2024 12:10)  simvastatin 20 mg oral tablet: 1 tab(s) orally once a day (at bedtime) (2024 15:37)    MEDICATIONS  (STANDING):  amLODIPine   Tablet 5 milliGRAM(s) Oral <User Schedule>  aspirin enteric coated 81 milliGRAM(s) Oral daily  chlorhexidine 2% Cloths 1 Application(s) Topical daily  dextrose 50% Injectable 12.5 Gram(s) IV Push once  dextrose 50% Injectable 25 Gram(s) IV Push once  insulin glargine Injectable (LANTUS) 8 Unit(s) SubCutaneous <User Schedule>  insulin glargine Injectable (LANTUS) 8 Unit(s) SubCutaneous every morning  insulin lispro (ADMELOG) corrective regimen sliding scale   SubCutaneous at bedtime  insulin lispro (ADMELOG) corrective regimen sliding scale   SubCutaneous three times a day before meals  insulin lispro Injectable (ADMELOG) 2 Unit(s) SubCutaneous three times a day before meals  lisinopril 20 milliGRAM(s) Oral two times a day  simvastatin 20 milliGRAM(s) Oral at bedtime    MEDICATIONS  (PRN):    ALLERGIES/INTOLERANCES:  Allergies  latex (Urticaria)  Actonel (Unknown)  Gadavist (Anaphylaxis; Hives)  codeine (Vomiting)    Intolerances    VITALS & EXAMINATION:  Vital Signs Last 24 Hrs  T(C): 36.6 (2024 12:00), Max: 36.8 (2024 16:00)  T(F): 97.9 (2024 12:00), Max: 98.3 (2024 16:00)  HR: 105 (2024 10:00) (57 - 105)  BP: 122/55 (2024 09:00) (101/56 - 133/56)  BP(mean): 73 (2024 09:00) (68 - 91)  RR: 18 (2024 10:00) (12 - 23)  SpO2: 95% (2024 10:00) (92% - 99%)    Parameters below as of 2024 10:00  Patient On (Oxygen Delivery Method): room air    General:  Constitutional: Female, appears stated age, nontoxic, not in distress  Head: Normocephalic, atraumatic   Eyes: clear sclera  Extremities: No cyanosis  Resp: breathing comfortably     Neurological (>12):  MS: Eyes open and responds to verbal stimuli. Oriented person, place, and time. Follows simple and 2-step commands.  Language: Speech is clear and fluent. No dysarthria.   CNs: VFF. EOMI. No disconjugate gaze or nystagmus. No facial asymmetry. Hearing grossly normal b/l. Tongue midline.   Motor - Normal bulk and tone throughout. No drift in bilateral upper and lower extremities. bilateral mild postural tremors of upper extremities.   Sensation: Intact to LT b/l, no extinction  Coordination: No dysmetria to FTN b/l UE    LABORATORY:  CBC                       8.9    7.18  )-----------( 397      ( 2024 04:55 )             27.6     Chem -18    133<L>  |  102  |  9   ----------------------------<  153<H>  4.1   |  27  |  0.61    Ca    7.2<L>      2024 04:55  Phos  1.9     18  Mg     2.10     18    TPro  5.0<L>  /  Alb  2.2<L>  /  TBili  <0.2  /  DBili  x   /  AST  21  /  ALT  86<H>  /  AlkPhos  71  -18    LFTs LIVER FUNCTIONS - ( 2024 04:55 )  Alb: 2.2 g/dL / Pro: 5.0 g/dL / ALK PHOS: 71 U/L / ALT: 86 U/L / AST: 21 U/L / GGT: x           Coagulopathy   Lipid Panel   A1c   Cardiac enzymes     U/A Urinalysis Basic - ( 2024 04:55 )    Color: x / Appearance: x / SG: x / pH: x  Gluc: 153 mg/dL / Ketone: x  / Bili: x / Urobili: x   Blood: x / Protein: x / Nitrite: x   Leuk Esterase: x / RBC: x / WBC x   Sq Epi: x / Non Sq Epi: x / Bacteria: x      Radiology (XR, CT, MR, U/S, TTE/АННА):    rCTH negative     CTA H/N unremarkable     [facility] : Impression: Limited evaluation. No evidence of a large acute infarction or large acute hemorrhage. Minimal microvascular disease.    CT-CAP  New large pericardial effusion.  Multifocal lung parenchymal opacities and interlobular septal thickening.   Pulmonary edema is likely present. Superimposed infection is not excluded. Pulmonary opacities limit evaluation for previously described right lower lobe mass and small residual metastatic nodules. New small to moderate sized bilateral layering pleural effusions. Follow to resolution.Significant gallbladder wall edema may be due to third spacing. Correlate with LFTs and ultrasound. 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 Correlate with priorPET/CT findings.    TTE:    1. Left ventricular systolic function is hyperdynamic with an ejection fraction of 69 % by Farrell's method of disks.   2. There is moderate (grade 2) left ventricular diastolic dysfunction, with elevated filling pressure.   3. Normal right ventricular cavity size, wall thickness, and normal systolic function.   4. No pericardial effusion seen.   5. Left pleural effusion noted.

## 2024-01-18 NOTE — PROGRESS NOTE ADULT - PROVIDER SPECIALTY LIST ADULT
CCU
Thoracic Surgery
Thoracic Surgery
Endocrinology
Neurology
Neurology
Cardiology

## 2024-01-23 PROBLEM — R59.1 LYMPHADENOPATHY: Status: ACTIVE | Noted: 2023-01-01

## 2024-01-23 NOTE — PHYSICAL EXAM
[Fully active, able to carry on all pre-disease performance without restriction] : Status 0 - Fully active, able to carry on all pre-disease performance without restriction [Thin] : thin [Normal] : affect appropriate [de-identified] : clear [de-identified] : cervicsl LAD resolved

## 2024-01-23 NOTE — ASSESSMENT
[FreeTextEntry1] : Ms. 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 Recently contracted COVId and not treated due to not being severelt symptomatic. She did have a prolonged URI/LRI  CT chest and abdomen imaging on 12/29 did reveal interval marked decrease of right lower lobe mass and bilateral metastatic nodule. Interval resolution of enlarged left axillary, mediastinal, bilateral hilar and retroperitoneal lymph nodes. Stable lytic bone metastases. Recent hospital notes reviewed. Pt admitted with tamponade which is likely post-viral rather than cancer med-related.  Post-viral pericardial effusion has been well-documented. Reference url below.  Reviewed images and labs from inpatient From cancer perspective, there is no indication of POD cytology from pericardial fluid is pending and will follow that While MEK/BRAFi can cause QTc prolongation and LVH failure, they are not known to cause pericardial effusions.  Based on all of these inferences, I recommended restarting the meds cautiously. Pt was educated about signs and symptoms of tamponade and to reach out ASAP if she has any AEs.  Pt had received Xgeva a few days prior to hospitalization. Despite the temporal coincidence, there is no suggestion that that the effusions are related to Xgeva. Ok to continue Q month  BW sent today including CBC CMP LDH and CEA CT head reviewed: ? new sub centimeter lesion. Scans in the hospital suggested infarcts- pt presented with aphasia. She has neuro work up pending, but per neurology, the event is unlikely to be neurological.  Repeat scans in 3 months extensive discussion regarding recent events conducted and emotional support was provided  OV in 3 weeks.    https://www.ncbi.nlm.nih.gov/pmc/articles/AZL7218001/

## 2024-01-23 NOTE — HISTORY OF PRESENT ILLNESS
[Disease: _____________________] : Disease: [unfilled] [M: ___] : M[unfilled] [AJCC Stage: ____] : AJCC Stage: [unfilled] [de-identified] : adeno ca [de-identified] : Ms. 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 She was followed up by endocrine and noted to have low aldosterone.  A few days later, pt noted two left neck lumps while taking a shower. Additional work up imaging revealed pulmonary mass, adenopathy, and bone lesions suspicious for malignancy.  CT Chest on 08/29/2023: - Right lower lobe mass with inferior 4.5 cm nodular component (2-119), and elongated component that tracks centrally to the right hilum, occluding the medial basal segmental bronchus. - Patent airways through the lobar bronchi. - Several solid nodules in all lobes, for instance, largest including 1.5 cm in the superior segment of the left lower lobe and the lingula. - Trace right pleural effusion. - Bilateral mediastinal, left supraclavicular, left axillary, and left subpectoral lymphadenopathy, largest including 2.7 cm in the left supraclavicular region. - Multiple lytic bone lesions including the manubrium, inferior sternum and left 11th rib. - Small pericardial effusion. Coronary artery calcifications.  Seen on 09/13/2023: CT Chest revealing RLL mass with lymphadenopathy, suspicious for advanced malignancy.  PFTs on 09/18/2023: FVC 2.01, 68%; FEV1 1.41, 63%; DLCO 10.9, 53%  Established care with Dr. Armen Christensen on 09/27/2023 for hoarseness of voice:  However, this was all suspected to be related to lung and med findings.  09/28/2023: S/p IR bx of left supraclavicular lymph node with Dr. Clement Santos. Path level 4 LN: Metastatic lung adenocarcinoma PDL1 70%  PET/CT on 10/07/2023: 1. Right lower lobe mass not well delineated due to new right pleural effusion but appears more prominent than the on the most recent CT chest with increased activity. Multiple hypermetabolic lung nodules in both lung fields. Findings compatible with likely primary right lower lobe mass with multiple metastatic lung nodules. 2. Hypermetabolic nodes in the left neck, thorax and below the diaphragm compatible with biopsied konrad i. Extent of retroperitoneal and mesenteric konrad involvement is more extensive than usually seen with lung malignancy; may need further tissue evaluation. 3. Multiple FDG avid bone lesions and a hypermetabolic left gluteal muscle lesion compatible with metastases. Focus in the left side of the skull base is difficult to assess on this PET/CT and can be further evaluated with CT head or MRI. 4. Multiple foci of increased activity in the liver without corresponding low-dose CT abnormality most likely due to disease involvement.  10/20/23: Very fatigued, hypoxic, now on home O2.   11/7/23: Has not been using O2. No longer on pain meds. Over-exerted herself and starting ambulating a lot few days ago, but that seems to have resulted in foot sprain, She rested a little and this seems to have improved  12/8/23: Pt states she is feeling much better. She has been able to do much more than she used to. She is not using O2 anymore. She does endorse some LE pain which seems to have improved a little   1/5/24: Pt seen in follow up. She is feeling well. Tolerating Mekanist and tafinlar. States would like to change dosing schedule to be better suited to her lifestyle. She had recent CT imaging and CT head. CT head revealed new small subcm lesion R. ant frontal cortex. Remaining lesions improved or resolved. Pt saw Dr. Maddox yesterday who will monitor new lesion on subsequent short interval CT scan. Pt developed acne like rash which seems to be related to Glucerna rather than mek/taf.   1/23/24: pt was recently admitted with severe dyspnea, noted to have large pericardial effusion with tamponade physiology. After having a drain placed and removal of about 800 cc of serosanguineous fluid, she felt much better, Hospital stay was c/w poor glycemic control related to timing of insulin and food supply at the hospital. Tafinlar and Mekinist were held and pt continues to be off of it at this time. of note, 3 weeks prior to the hospitalization, pt tested pos for COVID. She was referred to [facility] and since she was asymtomatic and potential drug-drug interaction with cancer meds and Paxlovid, she was not treated.

## 2024-01-23 NOTE — REVIEW OF SYSTEMS
[Fatigue] : fatigue [Recent Change In Weight] : ~T recent weight change [Shortness Of Breath] : shortness of breath [Cough] : cough [SOB on Exertion] : shortness of breath during exertion [Diarrhea: Grade 0] : Diarrhea: Grade 0 [Negative] : Allergic/Immunologic [FreeTextEntry2] : gained weight [FreeTextEntry4] : nodes in neck have resolved [FreeTextEntry5] : as above, feeling well now [FreeTextEntry6] : improved [FreeTextEntry9] : foot pain still persistent

## 2024-01-31 NOTE — DIETITIAN INITIAL EVALUATION ADULT - WEIGHT (LBS)
Urinalysis was normal, blood testing all seem to be stable, kidney function is a little bit worse but not much different from before. 107

## 2024-02-07 NOTE — PHYSICAL EXAM
Bed: 47  Expected date:   Expected time:   Means of arrival:   Comments:  [place]: Fall/back pain [General Appearance - Well Developed] : well developed [] : no respiratory distress [No Focal Deficits] : no focal deficits [Oriented To Time, Place, And Person] : oriented to person, place, and time

## 2024-02-09 PROBLEM — C34.90 NSCLC METASTATIC TO BONE: Status: ACTIVE | Noted: 2023-01-01

## 2024-02-15 NOTE — HISTORY OF PRESENT ILLNESS
[FreeTextEntry1] : This is a 72F with IDDM, nonsmoker (1/2 pack per week x 2 years, 50 years ago) PMH follicular adenoma of thyroid (s/p partial thyroidectomy), BCC, SCC, positive for BRCA, noted two left neck lumps while taking a shower. Saw her internist Dr. Rizvi who worked her up and ref to Dr. Ding. Additional work up imaging revealed pulmonary mass c/w BRAF [id number] NSCLC with adenopathy, and bone lesions suspicious for malignancy. She was found to have 4 brain lesions on CT and started on systemic therapy tafinlar/mekinist 10/23/2023. She is allergic to contrast.  Diagnosis: Brain metastases   Thoracic: Dr. Ding MEDICAL ONCOLOGIST: Dr. Plummer ** gaddollinium contrast allergy, needs premedication prednisone and benadryl; claustraphobia**   Recent Oncologic History:  CT Chest on 08/29/2023: - Right lower lobe mass with inferior 4.5 cm nodular component (2-119), and elongated component that tracks centrally to the right hilum, occluding the medial basal segmental bronchus. - Patent airways through the lobar bronchi. - Several solid nodules in all lobes, for instance, largest including 1.5 cm in the superior segment of the left lower lobe and the lingula. - Trace right pleural effusion. - Bilateral mediastinal, left supraclavicular, left axillary, and left subpectoral lymphadenopathy, largest including 2.7 cm in the left supraclavicular region. - Multiple lytic bone lesions including the manubrium, inferior sternum and left 11th rib.  09/28/2023: LYMPH NODE, Neck LEVEL 4, LEFT, US GUIDED CORE BIOPSY AND FNA POSITIVE FOR MALIGNANT CELLS. Metastatic lung adenocarcinoma PDL1 70%  PET/CT on 10/07/2023: 1. Right lower lobe mass not well delineated due to new right pleural effusion but appears more prominent than the on the most recent CT chest with increased activity. Multiple hypermetabolic lung nodules in both lung fields. Findings compatible with likely primary right lower lobe mass with multiple metastatic lung nodules. 2. Hypermetabolic nodes in the left neck, thorax and below the diaphragm compatible with biopsied konrad i. Extent of retroperitoneal and mesenteric konrad involvement is more extensive than usually seen with lung malignancy; may need further tissue evaluation. 3. Multiple FDG avid bone lesions and a hypermetabolic left gluteal muscle lesion compatible with metastases. Focus in the left side of the skull base is difficult to assess on this PET/CT and can be further evaluated with CT head or MRI. 4. Multiple foci of increased activity in the liver without corresponding low-dose CT abnormality most likely due to disease involvement.  CT 10/18 has 4 new brain mets.    No MRI  tumor genetics report? BRAF [id number]  She states she has felt improved significantly on systemic treatment and was bedbound at first with no appetite with O2 most of the day. States that in 72h she radically improved, no longer needs O2, fully functional, with a strong appetite. She has no pain associated with her bone metastases.   Visit dated 1/4/2024 Patient returns with completed cranial images for review to guide the next steps in her care, Denies N/V, HA/unilateral extremity weakness/memory changes/gait disturbance/bowel/bladder dysfunction or other neurologic symptoms. No issues with speech or comprehension. Now able to participate in most activities' w/o difficulty.  Continues to follow with Dr. Plummer on Tafinlar/Mekinist per patient tolerating well.  CT head 12/29/2023 IMPRESSION: new 8mm the static lesion in the RIGHT anterior frontal cortex at the convexity. Decrease in the size of the previously noted RIGHT lateral frontal metastatic lesion now measuring 3 mm and inferior LEFT frontal region metastatic lesion now measuring 6.6 mm. Resolution of previously noted LEFT parietal metastatic lesion.   CT C/A/P 12/29/2023 IMPRESSION: In comparison with 10/7/2023, interval marked decrease of right lower lobe mass and bilateral metastatic nodule. Interval resolution of enlarged left axillary, mediastinal, bilateral hilar and retroperitoneal lymph nodes. Stable lytic bone metastases.  Visit dated 2/14/2024 Patient returns with short interval cranial images for review d/t a new 8mm the static lesion in the RIGHT anterior frontal cortex at the convexity. In the interim she presented to the hospital seeking medical care for confusion, aphasia, also had some difficulty with breathing at which time she needed supplemental O2 at home she was admitted for evaluation/treatment on 1/14/2024. Reports she was not feeling well prior to her admission and tested positive for COVID. Today she is back to her baseline neurologically. Still is a bit fatigued and seems deconditioned completed in home PT with good results. No need for supplemental O2 at home since and she has returned to ADLs w/o difficulty  Continues to follow with Dr. Plummer on Tafinlar/Mekinist per patient tolerating well.  CT head 1/16/2024 IMPRESSION: CT HEAD: No acute intracranial findings. The small enhancing lesions seen on the 10/23/2023 CT head are not visible.

## 2024-02-22 NOTE — CONSULT LETTER
[FreeTextEntry2] : Dr. Marcus Rizvi (PCP) [FreeTextEntry3] : Louis Ding MD, FACS , Division of Thoracic Surgery [facility] Thoracic Surgery [facility] Department of Cardiovascular & Thoracic Surgery  [employer] [facility]

## 2024-02-22 NOTE — HISTORY OF PRESENT ILLNESS
[FreeTextEntry1] : Ms. 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   CT Chest on 08/29/2023: - Right lower lobe mass with inferior 4.5 cm nodular component (2-119), and elongated component that tracks centrally to the right hilum, occluding the medial basal segmental bronchus.  - Patent airways through the lobar bronchi.  - Several solid nodules in all lobes, for instance, largest including 1.5 cm in the superior segment of the left lower lobe and the lingula.  - Trace right pleural effusion. - Bilateral mediastinal, left supraclavicular, left axillary, and left subpectoral lymphadenopathy, largest including 2.7 cm in the left supraclavicular region. - Multiple lytic bone lesions including the manubrium, inferior sternum and left 11th rib. - Small pericardial effusion. Coronary artery calcifications.  Seen on 09/13/2023: CT Chest revealing RLL mass with lymphadenopathy, suspicious for advanced malignancy. I recommend she obtains PET/CT for further evaluation. I also discussed she needs CT guided bx of the LN for definite diagnosis, referred to Dr. Clement Santos. Additionally, I discussed consultation with medical oncology to discuss treatment plan, referred to Dr. Plummer. RTC after biopsy and PET/CT.  PFTs on 09/18/2023: FVC 2.01, 68%; FEV1 1.41, 63%; DLCO 10.9, 53%  Established care with Dr. Armen Christensen on 09/27/2023: Patient with L paratracheal and supraclavicular masses in context R lung and hilar mass and other bony findings. I highly suspect a primary lung CA w met disease and not a primary neck CA process. I will f/u on path w recs from managing to follow.  09/28/2023: S/p IR bx of left supraclavicular lymph node with Dr. Clement Santos. Path level 4 LN: Metastatic lung adenocarcinoma  PET/CT on 10/07/2023: 1. Right lower lobe mass not well delineated due to new right pleural effusion but appears more prominent than the on the most recent CT chest with increased activity. Multiple hypermetabolic lung nodules in both lung fields. Findings compatible with likely primary right lower lobe mass with multiple metastatic lung nodules. 2. Hypermetabolic nodes in the left neck, thorax and below the diaphragm compatible with biopsied konrad i. Extent of retroperitoneal and mesenteric konrad involvement is more extensive than usually seen with lung malignancy; may need further tissue evaluation. 3. Multiple FDG avid bone lesions and a hypermetabolic left gluteal muscle lesion compatible with metastases. Focus in the left side of the skull base is difficult to assess on this PET/CT and can be further evaluated with CT head or MRI. 4. Multiple foci of increased activity in the liver without corresponding low-dose CT abnormality most likely due to disease involvement.  Last seen on 10/11/2023: Path consistent + Tumor involvement in level 4 LN. Discussed necessity of following up with Heme Onc for further evaluation and treatment. PET/CT reviewed- Right pleural effusion demonstrated. Discussed IR guided Thoracentesis for diagnostic and therapeutic purposes. Patient agrees to proceed. Has stage 4 disease and is not candidate for resectional surgery.   CT Head w/wo IV Contrast on 10/23/2023: - At least 4 enhancing nodules, largest within the inferomedial aspect of the left frontal lobe measuring 1.4 x 1.3 x 1.2 cm, compatible with intracranial metastatic disease.  CT CA/P w/IV Contrast on 12/29/2023: - Interval marked decrease of right lower lobe mass and near resolution of bilateral metastatic nodules.  - The residual 2.7 cm right lower lobe nodule previously measured 5 cm. - Scattered bone metastases are unchanged for example 3 cm lytic lesion within the manubrium (series 3 image 19) and lytic lesion within posterior left 10th rib (image 66). - Interval resolution of enlarged left axillary, mediastinal, bilateral hilar and retroperitoneal lymph nodes.  01/14-01/18/2024: Admitted to [facility] with c/o confusion, SOB, chest pain, hypothermia, tachycardia, hypotension, and expressive aphasia. CT C/A/P with large pericardial effusion s/p pericardial window. S/p 1Liter IVF and started on IV pressor. Pt went urgently for drainage with IR, s/p drain of pericardial effusion 800 sanguineous fluid. She was admitted to CCU. Repeat echo showed trace pericardial effusion. CT Sx was consulted for window. No intervention recommended at the time given that effusion improved with drain. Pericardial drain was pulled on 1/16 without any complication. Repeat echo on 1/17 showed no effusion.  CT C/A/P on 01/14/2024: - New large pericardial effusion. - New small to moderate sized bilateral layering pleural effusions.  - Bilateral distal airways impaction.  - Multifocal pulmonary opacities predominantly of the lower lobes and interlobular septal thickening are noted.  - Pulmonary opacities limit evaluation for previously described right lower lobe mass and small residual metastatic nodules. - Significant gallbladder wall edema may be due to third spacing.  - Small abdominopelvic ascites. 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  01/22/2024: Seen by heme/onc- Dr. Plummer. Currently on systemic therapy since 11/2023 and started on Xgeva in 01/2024. Plan to continue therapy.   02/14/2024: Seen by rad/onc- Dr. Maddox, CT shows excellent cytoreduction of all previously seen lesions. There appears to be a questionable new subcm lesion. Believe this can be followed. Plan for 6 weeks f/u with CT Head.    Patient is here today for follow up for recently noted pericardial effusion during hospitalization in 01/2024. Patient reports that she is feels better after the hospitalization, denies any CP, cough or SOB.

## 2024-02-22 NOTE — DATA REVIEWED
[FreeTextEntry1] : I have independently reviewed the following: CT Head w/wo IV Contrast on 10/23/2023 CT CA/P w/IV Contrast on 12/29/2023 CT C/A/P on 01/14/2024

## 2024-02-22 NOTE — ASSESSMENT
[FreeTextEntry1] : Ms. 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   S/p IR bx of left supraclavicular lymph node with Dr. Clement Santos. Path level 4 LN: Metastatic lung adenocarcinoma.  01/14-01/18/2024: Admitted to [facility] with c/o confusion, SOB, chest pain, hypothermia, tachycardia, hypotension, and expressive aphasia. CT C/A/P with large pericardial effusion s/p pericardial window. S/p 1Liter IVF and started on IV pressor. Pt went urgently for drainage with IR, s/p drain of pericardial effusion 800 sanguineous fluid. She was admitted to CCU. Repeat echo showed trace pericardial effusion. CT Sx was consulted for window. No intervention recommended at the time given that effusion improved with drain. Pericardial drain was pulled on 1/16 without any complication. Repeat echo on 1/17 showed no effusion.  Currently on systemic therapy since 11/2023 and started on Xgeva in 01/2024 with Dr. Plummer. Plan to continue therapy.   Patient is here today for follow up with imaging.   I have independently reviewed the medical records and imaging at the time of this office consultation.  1. Discussed with patient that she needs to repeat echo to assess for pericardial effusion. As per patient she has an appointment with her cardiologist next Monday. RTC PRN.   Recommendations reviewed with patient during this office visit, and all questions answered; Patient instructed on the importance of follow up and verbalizes understanding.   I, ERIC Marie, personally performed the evaluation and management (E/M) services for this established patient who presents today with (a) new problem(s)/exacerbation of (an) existing condition(s).  That E/M includes conducting the examination, assessing all new/exacerbated conditions, and establishing a new plan of care.  Today, my ACP, TABITHA FaganC/GIOVANNA Espino, was here to observe my evaluation and management services for this new problem/exacerbated condition to be followed going forward.

## 2024-02-25 PROBLEM — I31.39 PERICARDIAL EFFUSION: Status: ACTIVE | Noted: 2024-01-01

## 2024-02-26 NOTE — ASSESSMENT
[FreeTextEntry1] : ====================== Pericardial effusion in setting of dx. of lung ca with mets; s/p drainage.  Hypertension, with "white coat" component.   Insulin-dependent diabetes for more than 40 years  Hyperlipidemia, on medication  Cardiac CT angiography and 2007 did not demonstrate coronary artery disease.  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

## 2024-02-26 NOTE — DISCUSSION/SUMMARY
[FreeTextEntry1] : EKG today shows:  Sinus Rhythm at 78 bpm.  Normal intervals and axis.  Borderline voltage, NS ST/T abn; no significant change.  PLAN: 1.  Pericardial effusion - will return for TTE tomorrow to re-assess the pericardial space.  2.  Hypertension, with "white coat" component  - continue the current meds.    3.  HLD - continue statin.  Blood work is monitored by Dr. SULAIMAN Phillips, her endocrinologist.    33 minutes spent on today's office visit.  Will speak to her after TTE tomorrow. [EKG obtained to assist in diagnosis and management of assessed problem(s)] : EKG obtained to assist in diagnosis and management of assessed problem(s)

## 2024-02-26 NOTE — REASON FOR VISIT
[FreeTextEntry1] :  Keisha Roe returns today for follow-up visit regarding hypertension, hyperlipidemia, and mild mitral valve insufficiency.  She was recently hospitalized at [facility] presenting with acute neurological deficits.  She required drainage of a large pericardial effusion.

## 2024-02-26 NOTE — PHYSICAL EXAM
[General Appearance - Well Developed] : well developed [Normal Appearance] : normal appearance [Well Groomed] : well groomed [Normal Conjunctiva] : the conjunctiva exhibited no abnormalities [General Appearance - In No Acute Distress] : no acute distress [General Appearance - Well Nourished] : well nourished [Eyelids - No Xanthelasma] : the eyelids demonstrated no xanthelasmas [Normal Jugular Venous V Waves Present] : normal jugular venous V waves present [Normal Jugular Venous A Waves Present] : normal jugular venous A waves present [Respiration, Rhythm And Depth] : normal respiratory rhythm and effort [Heart Rate And Rhythm] : heart rate and rhythm were normal [Auscultation Breath Sounds / Voice Sounds] : lungs were clear to auscultation bilaterally [Bowel Sounds] : normal bowel sounds [Abdomen Soft] : soft [Abdomen Tenderness] : non-tender [Abnormal Walk] : normal gait [Nail Clubbing] : no clubbing of the fingernails [Cyanosis, Localized] : no localized cyanosis [Skin Color & Pigmentation] : normal skin color and pigmentation [] : no rash [No Venous Stasis] : no venous stasis [Affect] : the affect was normal [Mood] : the mood was normal [Impaired Insight] : insight and judgment were intact [FreeTextEntry1] : No bruit. Liver and spleen not palpable; aortic pulsation is normal.

## 2024-02-26 NOTE — HISTORY OF PRESENT ILLNESS
[FreeTextEntry1] : Since I saw her last, pulmonary mass was seen with lytic lesions seen in chest bones. Bx of a supraclavicular node was c/w lung adenoca. W/U has also raised concern of liver and brain involvement.   IN Jan., she was admitted to [facility] and was found to have a large pericardial effusion requiring drainage by IR.  She has been started on chemotherapy with Tafinlar and Mekinist.  At present, she remains free of cardiac symptoms.  There have been no episodes of exertional chest discomfort or ALVARES.  She describes no palpitations or episodes of lightheadedness.  She reports no orthopnea or PND.  She was discharged on low-dose aspirin at the direction of the neurologists because of her presentation.  She will be following up with a neurologist within the next few weeks.

## 2024-03-12 NOTE — HISTORY OF PRESENT ILLNESS
[Disease: _____________________] : Disease: [unfilled] [M: ___] : M[unfilled] [AJCC Stage: ____] : AJCC Stage: [unfilled] [de-identified] : Ms. 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 She was followed up by endocrine and noted to have low aldosterone.  A few days later, pt noted two left neck lumps while taking a shower. Additional work up imaging revealed pulmonary mass, adenopathy, and bone lesions suspicious for malignancy.  CT Chest on 08/29/2023: - Right lower lobe mass with inferior 4.5 cm nodular component (2-119), and elongated component that tracks centrally to the right hilum, occluding the medial basal segmental bronchus. - Patent airways through the lobar bronchi. - Several solid nodules in all lobes, for instance, largest including 1.5 cm in the superior segment of the left lower lobe and the lingula. - Trace right pleural effusion. - Bilateral mediastinal, left supraclavicular, left axillary, and left subpectoral lymphadenopathy, largest including 2.7 cm in the left supraclavicular region. - Multiple lytic bone lesions including the manubrium, inferior sternum and left 11th rib. - Small pericardial effusion. Coronary artery calcifications.  Seen on 09/13/2023: CT Chest revealing RLL mass with lymphadenopathy, suspicious for advanced malignancy.  PFTs on 09/18/2023: FVC 2.01, 68%; FEV1 1.41, 63%; DLCO 10.9, 53%  Established care with Dr. Armen Christensen on 09/27/2023 for hoarseness of voice:  However, this was all suspected to be related to lung and med findings.  09/28/2023: S/p IR bx of left supraclavicular lymph node with Dr. Clement Santos. Path level 4 LN: Metastatic lung adenocarcinoma PDL1 70%  PET/CT on 10/07/2023: 1. Right lower lobe mass not well delineated due to new right pleural effusion but appears more prominent than the on the most recent CT chest with increased activity. Multiple hypermetabolic lung nodules in both lung fields. Findings compatible with likely primary right lower lobe mass with multiple metastatic lung nodules. 2. Hypermetabolic nodes in the left neck, thorax and below the diaphragm compatible with biopsied konrad i. Extent of retroperitoneal and mesenteric konrad involvement is more extensive than usually seen with lung malignancy; may need further tissue evaluation. 3. Multiple FDG avid bone lesions and a hypermetabolic left gluteal muscle lesion compatible with metastases. Focus in the left side of the skull base is difficult to assess on this PET/CT and can be further evaluated with CT head or MRI. 4. Multiple foci of increased activity in the liver without corresponding low-dose CT abnormality most likely due to disease involvement.  10/20/23: Very fatigued, hypoxic, now on home O2.   11/7/23: Has not been using O2. No longer on pain meds. Over-exerted herself and starting ambulating a lot few days ago, but that seems to have resulted in foot sprain, She rested a little and this seems to have improved  12/8/23: Pt states she is feeling much better. She has been able to do much more than she used to. She is not using O2 anymore. She does endorse some LE pain which seems to have improved a little   1/5/24: Pt seen in follow up. She is feeling well. Tolerating Mekanist and tafinlar. States would like to change dosing schedule to be better suited to her lifestyle. She had recent CT imaging and CT head. CT head revealed new small subcm lesion R. ant frontal cortex. Remaining lesions improved or resolved. Pt saw Dr. Maddox yesterday who will monitor new lesion on subsequent short interval CT scan. Pt developed acne like rash which seems to be related to Glucerna rather than mek/taf.   1/23/24: pt was recently admitted with severe dyspnea, noted to have large pericardial effusion with tamponade physiology. After having a drain placed and removal of about 800 cc of serosanguineous fluid, she felt much better, Hospital stay was c/w poor glycemic control related to timing of insulin and food supply at the hospital. Tafinlar and Mekinist were held and pt continues to be off of it at this time. of note, 3 weeks prior to the hospitalization, pt tested pos for COVID. She was referred to [facility] and since she was asymtomatic and potential drug-drug interaction with cancer meds and Paxlovid, she was not treated.   3/12/24: Pt returns for follow up. She reports she is feeling well. Has had follow up with cardiology. Had repeat TTE and was good (per patient). She will have it repeated again in 6 weeks. SHe is tolerating Mekanist and Tafinlar well. No new complaints. Had Xgeva injection yesterday.  [de-identified] : adeno ca

## 2024-03-12 NOTE — REVIEW OF SYSTEMS
[Recent Change In Weight] : ~T recent weight change [Fatigue] : fatigue [Shortness Of Breath] : shortness of breath [Cough] : cough [SOB on Exertion] : shortness of breath during exertion [Diarrhea: Grade 0] : Diarrhea: Grade 0 [Negative] : Allergic/Immunologic [FreeTextEntry2] : gained weight [FreeTextEntry4] : nodes in neck have resolved [FreeTextEntry5] : as above, feeling well now [FreeTextEntry6] : improved [FreeTextEntry9] : foot pain still persistent

## 2024-03-12 NOTE — ASSESSMENT
[FreeTextEntry1] : Ms. Roe is a 72 yo w with IDDM, BRCA carrier, mild former smoker with adeno ca of lung, mets to multiple organs  PDL1 high- 70% NGS from tissue reviewed in detail BRAF V600E BRCA2 [id number]*22 (germ line likely) MSH6 G749fs*11 NFKBIA amplification NKX2-1 amplification  Given NGS results, recommend Tafinlar 75 BID and Mekinist 0.5 mg once daily CT chest and abdomen imaging on 12/29 did reveal interval marked decrease of right lower lobe mass and bilateral metastatic nodule. Interval resolution of enlarged left axillary, mediastinal, bilateral hilar and retroperitoneal lymph nodes. Stable lytic bone metastases. Recent hospital notes reviewed. Pt admitted with tamponade which is likely post-viral rather than cancer med-related. Post-viral pericardial effusion has been well-documented. Reference url below.   From cancer perspective, there is no indication of POD cytology from pericardial fluid is pending and will follow that While MEK/BRAFi can cause QTc prolongation and LVH failure, they are not known to cause pericardial effusions.  Based on all of these inferences, I recommended restarting the meds cautiously. Pt was educated about signs and symptoms of tamponade and to reach out ASAP if she has any AEs. She follows closely with Cardiology. She just had a follow up TTE and is scheduled for another in 6 weeks.  continue Xgeva Q month  BW sent today including CBC CMP LDH and CEA CT head reviewed: ? new sub centimeter lesion. Scans in the hospital suggested infarcts- pt presented with aphasia. Neuro work up negative. Pt has follow up scheduled with Dr. Libman later this week Repeat scans in 3 months (April). Will place orders now.  extensive discussion regarding recent events conducted and emotional support was provided  OV in 3 weeks.    https://www.ncbi.nlm.nih.gov/pmc/articles/GUQ0200689/

## 2024-03-27 PROBLEM — D64.9 ANEMIA, UNSPECIFIED TYPE: Status: ACTIVE | Noted: 2023-01-01

## 2024-03-27 PROBLEM — U07.1 COVID-19 VIRUS INFECTION: Status: ACTIVE | Noted: 2023-01-01

## 2024-03-27 NOTE — PHYSICAL EXAM
[Normal] : no joint swelling and grossly normal strength and tone [de-identified] : 115/60, thin [de-identified] : adelso SANDERS/L

## 2024-03-27 NOTE — ASSESSMENT
[FreeTextEntry1] : Recent medical events reviewed.  She is now stable and feels ok.  She is eating ok.  She sees her oncologist.  Blood tests reviewed- TFTs ok, sodium 128 old.    Medications updated.  She had a number of questions which were answered.    S/p flu vaccine and new COVID-19 vaccine.  Prevnar 20 next time.    She will see her cardiologist next week.    Neurology follow-up pending.

## 2024-03-27 NOTE — HISTORY OF PRESENT ILLNESS
[de-identified] : She was recently hospitalized with a malignant pericardial effusion from 1/14/24-1/18/24.  She is now home and feeling better.  She denies chest pain or dyspnea.    She is on Fludrocortisone now.  She had COVID-19 recently and is now better from that.    Her appetite is ok.

## 2024-03-29 PROBLEM — I34.0 MITRAL INSUFFICIENCY: Status: ACTIVE | Noted: 2017-09-18

## 2024-03-29 PROBLEM — R06.02 SOB (SHORTNESS OF BREATH): Status: ACTIVE | Noted: 2024-01-01

## 2024-03-29 PROBLEM — R06.2 WHEEZE: Status: ACTIVE | Noted: 2024-01-01

## 2024-03-29 NOTE — HISTORY OF PRESENT ILLNESS
[FreeTextEntry1] : When I saw her last, a pulmonary mass was seen with lytic lesions seen on chest films. Bx of a supraclavicular node was c/w lung adenoca. W/U has also raised concern of liver and brain involvement.     In Jan., she was admitted to [facility] and was found to have a large pericardial effusion requiring drainage by IR.  She continues chemotherapy with Tafinlar and Mekinist.    As she returns today, she recounts that the week before last, she suffered upper respiratory tract infection with rhinorrhea and cough.  She had low-grade fevers at the time.  While she feels better at present, she is concerned that she still exhibits mild tachycardia with heart rates up to 100 and higher than normal temperatures, up to 99.  She is aware of intermittent wheezing, although does not describe orthopnea or PND.  Cough does seem to be better overall.  She also describes mild dependent edema, but currently is on Florinef to help treat hyponatremia and does drink additional fluid as part of her chemotherapy routine.  She does not describe exertional chest discomfort.  There have been no episodes of lightheadedness.  Follow-up echocardiogram 1 month ago showed only trace pericardial effusion after her drainage procedure and initiation of treatment for the lung cancer.

## 2024-03-29 NOTE — REASON FOR VISIT
[FreeTextEntry1] :   Keisha Roe returns today for follow-up visit recent URI sxs. and mild tachycardia.  She has a hx. of hypertension, hyperlipidemia, and mild mitral valve insufficiency as well as recently diagnosed lung Ca.

## 2024-03-29 NOTE — DISCUSSION/SUMMARY
[EKG obtained to assist in diagnosis and management of assessed problem(s)] : EKG obtained to assist in diagnosis and management of assessed problem(s) [FreeTextEntry1] : EKG today shows:   Sinus Rhythm at 99 bpm.  Normal intervals and axis.  Possible LAE, borderline voltage, NS ST/T abnormality; no significant change.  PLAN: 1.  Recent URI.  Persistent symptoms and elevated heart rate on exam today with inspiratory wheeze. -   Will send out blood work to check a CBC and comprehensive profile. -   Ordered a PA and lateral chest film to be done later today at [facility] in [place].  2.  Pericardial effusion s/p drainage  - f/u TTE one month ago showed only trace pericardial effusion.  3.  Hypertension, with "white coat" component  -  Blood pressure remains in good range; continue the current meds.    4.  HLD - continue statin.     41 minutes spent on today's office visit.  Testing is unremarkable,  the patient will return for a visit in May.

## 2024-03-29 NOTE — PHYSICAL EXAM
[General Appearance - Well Developed] : well developed [Well Groomed] : well groomed [Normal Appearance] : normal appearance [General Appearance - In No Acute Distress] : no acute distress [General Appearance - Well Nourished] : well nourished [Eyelids - No Xanthelasma] : the eyelids demonstrated no xanthelasmas [Normal Conjunctiva] : the conjunctiva exhibited no abnormalities [Normal Jugular Venous A Waves Present] : normal jugular venous A waves present [Normal Jugular Venous V Waves Present] : normal jugular venous V waves present [Respiration, Rhythm And Depth] : normal respiratory rhythm and effort [Heart Rate And Rhythm] : heart rate and rhythm were normal [Auscultation Breath Sounds / Voice Sounds] : lungs were clear to auscultation bilaterally [Abdomen Soft] : soft [Bowel Sounds] : normal bowel sounds [Abdomen Tenderness] : non-tender [Nail Clubbing] : no clubbing of the fingernails [Abnormal Walk] : normal gait [Cyanosis, Localized] : no localized cyanosis [Skin Color & Pigmentation] : normal skin color and pigmentation [] : no rash [No Venous Stasis] : no venous stasis [Impaired Insight] : insight and judgment were intact [Affect] : the affect was normal [Mood] : the mood was normal [FreeTextEntry1] : 2+ pulses in the upper and lower extremities. No edema.

## 2024-04-01 NOTE — ED PROVIDER NOTE - CLINICAL SUMMARY MEDICAL DECISION MAKING FREE TEXT BOX
73 year old woman with history of lung cancer, HLD, HTN, T1DM, follicular adenoma of thyroid s/p thyroidectomy, BCC, SCC, anemia, chronic hyponatremia on fludrocortisone presenting with fatigue and shortness of breath on exertion with noted pleural effusion on outpatient CXR on 3/29. Shortness of breath/fatigue likely related to larger than prior pleural effusion on left side. Possible component from reported URI symptoms. Thoracic consulted. CBC, CMP, Coags, type and screen sent to lab. Plan for CXR after thora

## 2024-04-01 NOTE — ED PROVIDER NOTE - PHYSICAL EXAMINATION
CONSTITUTIONAL: NAD  HEENT: Moist oral mucosa, no pharyngeal injection or exudates  RESPIRATORY: Normal respiratory effort, Expiratory wheeze on auscultation bilaterally. Diminished breath sounds on left side towards base. Diminished tactile fremitus on left side towards base  CARDIOVASCULAR: Regular rate and rhythm, normal S1 and S2, no murmur/rub/gallop, peripheral pulses are palpable bilaterally  ABDOMEN: Soft, nondistended, nontender to palpation, normoactive bowel sounds, no rebound/guarding  EXTREMITIES: 2+ pitting lower extremity edema (chronic per patient in setting of fludrocortisone use)  PSYCH: A+O to person, place, and time  NEUROLOGY: Facial expression symmetric, no gross sensory deficits appreciated, moves all extremities spontaneously  SKIN: No rashes, no palpable lesions

## 2024-04-01 NOTE — ED ADULT NURSE NOTE - NSFALLUNIVINTERV_ED_ALL_ED
Bed/Stretcher in lowest position, wheels locked, appropriate side rails in place/Call bell, personal items and telephone in reach/Instruct patient to call for assistance before getting out of bed/chair/stretcher/Non-slip footwear applied when patient is off stretcher/[place] to call system/Physically safe environment - no spills, clutter or unnecessary equipment/Purposeful proactive rounding/Room/bathroom lighting operational, light cord in reach

## 2024-04-01 NOTE — ED PROVIDER NOTE - OBJECTIVE STATEMENT
73 year old woman with history of lung cancer, HLD, HTN, T1DM, follicular adenoma of thyroid s/p thyroidectomy, BCC, SCC, anemia, chronic hyponatremia on fludrocortisone presenting with fatigue and shortness of breath on exertion with noted pleural effusion on outpatient CXR on 3/29. Patient endorses that her lung cancer diagnosis happened 6 months ago, had recent hospitalization in January for malignant pericardial effusion with CCU stay. Now, patient for past few weeks has been having a feeling of being more tired. Thought that she had a respiratory illness last week, but when she still wasn't improving she went to her cardiologist and got a CXR. She saw report over the weekend that stated large left pleural effusion and contacted her CT surgeon who sent her in for thoracentesis. She also endorses this dyspnea on exertion that will happen when walking or when she climbs the stairs which has been worse over the last three days. No chest pain endorsed and at rest her breathing is endorsed to be fine. No fevers or chills at home. No abdominal pain, no nausea or vomiting. Having bowel movements still and no endorsed dysuria.

## 2024-04-01 NOTE — CONSULT NOTE ADULT - SUBJECTIVE AND OBJECTIVE BOX
HPI: 73 year old woman with history of stage 4 lung cancer, HLD, HTN, T1DM, follicular adenoma of thyroid s/p thyroidectomy, BCC, SCC, anemia, chronic hyponatremia on fludrocortisone presenting with fatigue and shortness of breath on exertion with noted pleural effusion on outpatient CXR on 3/29. Patient endorses that her lung cancer diagnosis happened 6 months ago, had recent hospitalization in January for malignant pericardial effusion with CCU stay. Now, patient for past few weeks has been having a feeling of being more tired. Thought that she had a respiratory illness last week, but when she still wasn't improving she went to her cardiologist and got a CXR. She saw report over the weekend that stated large left pleural effusion and contacted her CT surgeon who sent her in for thoracentesis.      PAST MEDICAL & SURGICAL HISTORY:  Hypertension      DM type 1 (diabetes mellitus, type 1)  Dx 47 years ago      Basal cell carcinoma  forehead and leg      SCC (squamous cell carcinoma)  on chest      BRCA2 positive      Hyperlipidemia      Latex allergy      S/P  Section x2  ,       Excision of Lipoma of neck        BRCA2 positive  Bilateral Salpingo-oophorectomy : preventative  2012          REVIEW OF SYSTEMS      General:No Weight change/ Fatigue/ HA/Dizzy	  Skin/Breast: No Rashes/ Lesions/ Masses  Ophthalmologic: No Blurry vision/ Glaucoma/ Blindness	  ENMT: No Hearing loss/ Drainage/ Lesions	  Respiratory and Thorax: ALVARES No Cough/ Wheezing/ SOB/ Hemoptysis/ Sputum production  Cardiovascular: No Chest pain/ Palpitations/ Diaphoresis	  Gastrointestinal: No Nausea/ Vomiting/ Constipation/ Appetite Change	  Genitourinary: No Heamturia/ Dysuria/ Frequency change/ Impotence	  Musculoskeletal: No Pain/ Weakness/ Claudication	  Neurological: No Seizures/ TIA/CVA/ Parastesias	  Psychiatric: No Dementia/ Depression/ SI/HI	  Hematology/Lymphatics: No hx of bleeding/ Edema	  Endocrine:	No Hyperglycemia/ Hypoglycemia  Allergic/Immunologic:	 No Anaphylaxis/ Intolerance/ Recent illnesses    MEDICATIONS  (STANDING):    MEDICATIONS  (PRN):      Allergies    latex (Urticaria)  Actonel (Unknown)  Gadavist (Anaphylaxis; Hives)  codeine (Vomiting)    Intolerances        SOCIAL HISTORY:  Occupation:  Smoking Hx: denies  Etoh Hx: denies  IVDA Hx: denies    FAMILY HISTORY:  No pertinent family history in first degree relatives      unless noted, no significant family hx with Mother, Father, Siblings    Vital Signs Last 24 Hrs  T(C): 36.4 (2024 17:07), Max: 36.4 (2024 16:14)  T(F): 97.6 (2024 17:07), Max: 97.6 (2024 16:14)  HR: 96 (2024 17:07) (96 - 104)  BP: 127/60 (2024 17:07) (127/60 - 148/72)  BP(mean): --  RR: 16 (2024 17:07) (16 - 17)  SpO2: 100% (2024 17:07) (95% - 100%)    Parameters below as of 2024 17:07  Patient On (Oxygen Delivery Method): room air        General: WN/WD NAD  Neurology: Awake, nonfocal, GALARZA x 4  Eyes: Scleras clear, PERRLA/ EOMI, Gross vision intact  ENT:Gross hearing intact, grossly patent pharynx, no stridor  Neck: Neck supple, trachea midline, No JVD,   Respiratory: Decreased BS on L  CV: RRR, S1S2, no murmurs, rubs or gallops  Abdominal: Soft, NT, ND +BS,   Extremities: No edema, + peripheral pulses  Skin: No Rashes, Hematoma, Ecchymosis  Lymphatic: No Neck, axilla, groin LAD  Psych: Oriented x 3, normal affect      LABS:                        9.1    17.93 )-----------( 685      ( 2024 17:30 )             29.3     04-    125<L>  |  95<L>  |  27<H>  ----------------------------<  154<H>  5.5<H>   |  19<L>  |  0.60    Ca    8.8      2024 17:02    TPro  7.4  /  Alb  2.4<L>  /  TBili  <0.2  /  DBili  x   /  AST  44<H>  /  ALT  15  /  AlkPhos  91  04-    PT/INR - ( 2024 17:30 )   PT: 12.4 sec;   INR: 1.11 ratio         PTT - ( 2024 17:30 )  PTT:32.3 sec  Urinalysis Basic - ( 2024 17:02 )    Color: x / Appearance: x / SG: x / pH: x  Gluc: 154 mg/dL / Ketone: x  / Bili: x / Urobili: x   Blood: x / Protein: x / Nitrite: x   Leuk Esterase: x / RBC: x / WBC x   Sq Epi: x / Non Sq Epi: x / Bacteria: x        RADIOLOGY & ADDITIONAL STUDIES:

## 2024-04-01 NOTE — ED ADULT NURSE NOTE - CCCP TRG CHIEF CMPLNT
Occupational Therapy  Visit Type: initial evaluation  Co-treat with: Physical therapist  Precautions:  Medical precautions:  fall risk; standard precautions.  Pt is a 65 year old male status post Right Hip Hemiarthroplasty - Bipolar following  Right Hip Femoral Neck Fracture. Pt is allowed WBAT R LE & posterior hip precautions with abductor pillow in bed  to be maintained per dr orders.    PTA, pt lived in an apartment with elevator access with caregiver coming in 4 x /week to assist with self care & household ADLs.Pt was ambulatory with [facility] & was able to drive. Pt reports of 1 fall in the past year (due to abnormal blood glucose levels) which led to hospitalization followed by discharge home.   Lines:     Basic: capped IV      Lines in chart and on patient reviewed, cautions maintained throughout session.  Lower Extremity:    Right:  weight bearing: as tolerated.    Posterior hip precautions.   Safety Measures: bed alarm and bed rails    SUBJECTIVE  Patient agreed to participate in therapy this date.  Patient agreeable to session, in chair upon arrival.   Patient seen with PT to safely progress functional mobility due to last session patient requiring max assist x2 persons to complete bed mobility and LE AROM exercises.   Patient / Family Goal: return to previous functional status    Pain     At onset of session (out of 10): 3    OBJECTIVE   Pulse Ox: 98  Blood Pressure: 175/88  Position: sitting  Heart Rate: 97    Oriented to person, place, time and situation     Affect/Behavior: alert, appropriate, calm, cooperative and pleasant  Functional Communication/Cognition    Overall status:  Within functional limits  Range of Motion (measured in degrees unless otherwise indicated)  WFL: LUE RUE  Strength (out of 5 unless otherwise indicated)  WFL: LUE, RUE  Balance  Sitting:    Static:  independent     Dynamic:  independent  Standing - Firm Surface - Eyes Open:     Static: minimal assist    Dynamic:  minimal  assist    Bed mobility:      Sit to supine: moderate assist (for RLE advancement )  Training completed:      Education details: body mechanics and patient safety  Transfers:    Assistive devices: gait belt, 2-wheeled walker and 1 person    Sit to stand: moderate assist    Stand to sit: moderate assist    Training completed:    Tasks: sit to stand and stand to sit    Education details: body mechanics and patient safety  Functional Ambulation:    Assistance:minimal assist   Assistive device:1 person and 2-wheeled walker    Distance (ft): 12;12;12    Surface: even      Education details: body mechanics and patient safety  Details/Comments: Patient ambulating 6 feet forward and back x3 this date  Activities of Daily Living (ADLs):  Activities of daily living training:   Declining ADLs at this time  LB dressing attempt deferred as patient states he is hoping to go to [facility] at time of discharge, will continue to monitor discharge plan    Reviewed posterior precautions x3 this date and provided visual demonstration                ASSESSMENT    Impairments: activity tolerance, balance, bed mobility, pain, strength, range of motion and safety awareness  Functional Limitations: bed mobility, functional mobility, grooming, bathing, toileting, functional transfers and dressing  Personal Occupations Profile Affected: bathing/showering, functional mobility/transfers, toileting/toilet hygiene, lower body dressing  Patient seen for evaluation s/p Right Hip Hemiarthroplasty - Bipolar following  Right Hip Femoral Neck Fracture. Pt is allowed WBAT R LE & posterior hip precautions with abductor pillow in bed  to be maintained per dr orders.    Patient this date with significant progress compared to PT session prior. Patient needing overall moderate assist x2 for transfers and functional mobility this date; previous PT session patient requiring max assist x2 and was unable to stand.   Skilled therapy is required to address these  limitations in attempt to maximize the patient's independence.  Clinical decision making: Low - Patient has few limitations (1-3), comorbidities and/or complexities, as noted in problem focused assessment noted above, that impact their occupational profile.  Resulting in few treatment options and no task modification consistent with low clinical decision making complexity.    End of Session:   Location: in bed  Safety measures: alarm system in place/re-engaged, call light within reach and lines intact  Handoff to: nurse    PLAN  Suggestions for next session as indicated: OT Frequency: 3 days/week  Frequency Comments: 0/3 last seen 7/11 (R1)      Interventions: activity tolerance training, ADL retraining, balance, compensatory techniques, compensatory technique education, coordination, functional transfer training, HEP training, therapeutic exercise, therapeutic activity, safety training, patient education, patient/family training, transfer training and energy conservation  Agreement to plan and goals: patient agrees with goals and treatment plan      GOALS  Long Term Goals: (to be met by time of discharge from hospital)  Feeding: Patient will complete feeding tasks modified independent.  Grooming: Patient will complete grooming tasks in standing and at sink modified independent.  Upper body dressing: Patient will complete upper body dressing at bedside modified independent.  Lower body dressing: Patient will complete lower body dressing at bedside modified independent.  Toileting: Patient will complete toileting modified independent.  Bathing: Patient will complete bathingmodified independent   Documented in the chart in the following areas: Assessment. Plan. Vitals.      Therapy procedure time and total treatment time can be found documented on the Time Entry flowsheet   sent by MD

## 2024-04-01 NOTE — ED PROVIDER NOTE - NS ED ROS FT
Constitutional/General: ( ) Acute distress, (YES) fatigue  Eyes: ( ) Changes in vision, ( ) double vision, ( ) blurry vision  ENT: ( ) Nasal congestion or rhinorrhea, ( ) changes in hearing, ( ) odynophagia or dysphagia   Skin: ( ) Rashes  Cardiovascular: ( ) Chest pain, ( ) heart palpitations, ( ) orthopnea/PND  Pulmonary: (YES) Shortness of breath, ( ) cough, ( ) pleuritic chest pain, ( ) hemoptysis   Gastrointestinal: ( ) Nausea, ( ) vomiting, ( ) diarrhea, ( ) bloating, ( ) constipation, ( ) abdominal pain  Genitourinary: ( ) Dysuria, ( ) frequency, ( ) change in urine odor/appearance   Musculoskeletal: ( ) Changes in strength, ( ) joint tenderness or swelling  Neurologic: ( ) Changes in memory, ( ) headache, ( ) weakness, ( ) paresthesias, ( ) imbalance   Endocrine: ( ) Heat/cold intolerance, ( ) weight change, ( ) excessive sweating, ( ) polydipsia/polyuria  Psychology: ( ) Changes in mood, ( ) anxiety, ( ) depression.  Heme/Lymph: ( ) Easy bruising    ROS otherwise negative unless noted in the HPI

## 2024-04-01 NOTE — ED ADULT TRIAGE NOTE - CHIEF COMPLAINT QUOTE
patient had a chest XR on friday that showed a build up of fluid on her lungs and was told to come to the ER to have it drained by Dr Ding who she has been seeing s/p being recently diagnosed with Lung Cancer x6 months. past medical history of lung cancer, diabetes mellitus type 1 (does not have an insulin pump)

## 2024-04-01 NOTE — ED PROVIDER NOTE - PATIENT PORTAL LINK FT
You can access the FollowMyHealth Patient Portal offered by [facility] by registering at the following website: [email or web address]. By joining [employer]’s FollowMyHealth portal, you will also be able to view your health information using other applications (apps) compatible with our system.

## 2024-04-01 NOTE — ED PROVIDER NOTE - ATTENDING CONTRIBUTION TO CARE
73-year-old female past medical history hypertension, hypokalemia, diabetes type 1, thyroid cancer status post thyroidectomy, remote history of basal cell carcinoma/status post treatment cell carcinoma, anemia, chronic hyponatremia, lung cancer sent to ED for fatigue and shortness of breath with outpatient chest x-ray noting significant pleural effusion.  Patient had recent admission for malignant pericardial effusion several months ago.  Patient had respiratory illness last week, her doctor for outpatient chest x-ray demonstrating the large left pleural effusion, CT surgery contacted and recommended coming in for admission and thoracentesis..  She denies chest pain, fevers, chills.  She denies abdominal pain, nausea, vomiting, diarrhea, dysuria, hematuria.  Exam as above and: Labs, CT surgery consult, reassess.  Please see my progress note as above.

## 2024-04-01 NOTE — ED ADULT NURSE REASSESSMENT NOTE - NS ED NURSE REASSESS COMMENT FT1
PT is resting in stretcher, easily arousable to verbal stimuli. no apparent distress noted.  pt dressing on left posterior chest clean dry and intact.  pt denies complaints at this time. pt plan of care on going.

## 2024-04-01 NOTE — ED PROVIDER NOTE - PROGRESS NOTE DETAILS
Enio Santizo DO: Patient status post thoracentesis with CT surgery.  Post procedure x-ray shows improvement of left pleural effusion without evidence of postprocedural pneumothorax.  Still has small right pleural effusion.  There is no evidence of pneumonia per chest x-ray read.  Patient SpO2 100% on room air.  Patient reports improvement of her shortness of breath.  She denies any chest pain or any fevers.  Denies any cough.  Blood work notable for leukocytosis of 17.9 thousand.  Patient denies abdominal pain, urinary symptoms.  Denies any infectious symptoms.  Also noted to have sodium 125 which patient notes has chronically happened and is noted in prior lab work as well.  Discussed considering admission for observation for infection but patient currently declining and desires discharge.  Has outpatient follow-up.  Per recommendation from CT surgery will discharge with Augmentin.  Strict return precautions discussed with patient at bedside and she expressed understanding.

## 2024-04-01 NOTE — ED ADULT NURSE NOTE - OBJECTIVE STATEMENT
pt received to rm 19  , a&ox4 , ambulatory , phx of lung Ca, skin Ca, DM 1  , p/w pt had respiratory infection last week that has not improved and saw cardiologist. pt was found to have fluid in lungs and was told to come the ER  and have it drained  . Pt breathing even and unlabored on room air. NSR on cardiac monitor. Denies fever, chills, cough, SOB, chest pain, palpitations, dizziness, nausea, vomiting, diarrhea, constipation, numbness, tingling. IV placed. Labs collected and sent. EKG in chart. pending . pt complains of pain  on a scale of 1-10 a  located at  . pt educated on fall precautions and confirms understanding via teach back method. Stretcher locked in lowest position with siderails up x2. Call bell and personal items within reach.

## 2024-04-01 NOTE — ED PROVIDER NOTE - NSFOLLOWUPINSTRUCTIONS_ED_ALL_ED_FT
1) Please follow-up with Dr. Ding [phone] in 2 weeks. The want to get a chest x-ray beforehand and we will help arrange that with you.    Please follow-up with your primary care doctor within the next 2-3 days.  Please call today for an appointment.   2) If you have any worsening of symptoms, Worsening shortness of breath, chest pain, fevers, chills, cough, abdominal pain, urinary symptoms, diarrhea or any other concerns please return to the ED immediately.  3) Please take the medication you were prescribed today and continue taking your home medications as directed.  4) You may have been given a copy of your labs and/or imaging.  Please go over these with your primary care doctor.    Pleural Effusion  Pleural effusion is an abnormal buildup of fluid in the layers of tissue between the lungs and the inside of the chest. This space is called the pleural space. The two layers of tissue that line the lungs and the inside of the chest are called pleura. Usually, there is no air in the space between the pleura, only a thin layer of fluid. Some conditions can cause a large amount of fluid to build up, which can cause the lung to collapse if untreated. A pleural effusion is usually caused by another disease that requires treatment.    What are the causes?  Pleural effusion can be caused by:  Conditions that change the pressure in blood vessels, which causes blood and fluid to flow outside their normal areas. These conditions include:  Partially collapsed lung tissue (atelectasis).  Heart failure.  Nephrotic syndrome. This is a kidney condition that causes the body to pass too much protein in urine.  Peritoneal dialysis. This is a procedure in which the lining of the stomach is used to filter blood as a treatment for kidney failure.  Liver disease (cirrhosis).  Fluid buildup caused by damaged tissue or cells in the body. This fluid buildup can be caused by:  Fluid in the abdomen (ascites).  Connective tissue diseases, such as lupus or rheumatoid arthritis.  Certain infections, such as pneumonia or tuberculosis.  Cancer.  What are the signs or symptoms?  In some cases, pleural effusion may cause no symptoms. If symptoms are present, they may include:  Shortness of breath, especially when lying down.  Chest pain. This may get worse when taking a deep breath.  Fever.  Dry, long-lasting (chronic) cough.  Fatigue.  Rapid breathing.  Night sweats.  An underlying condition that is causing the pleural effusion may also cause other symptoms. These conditions include heart failure, pneumonia, blood clots, tuberculosis, or cancer.    How is this diagnosed?  This condition may be diagnosed based on:  Your symptoms and medical history.  A physical exam.  A chest X-ray.  A procedure to use a needle to remove fluid from the pleural space (thoracentesis). This fluid is tested.  Other imaging studies of the chest, such as an ultrasound or a CT scan.  How is this treated?  Outline of an adult body, showing the use of a pleural catheter to remove fluid from the pleural space.  Depending on the cause of your condition, treatment may include:  Treating the underlying condition that is causing the pleural effusion. When that condition improves, the effusion will also improve. Examples of treatment for underlying conditions include:  Antibiotic medicines to treat an infection.  Diuretics or other heart medicines to treat heart failure.  Thoracentesis.  Placing a thin, flexible tube under your skin and into your chest to continuously drain the effusion (indwelling pleural catheter).  Surgery to remove the outer layer of tissue from the pleural space (decortication).  A procedure to put medicine into the chest cavity to seal the pleural space and prevent fluid buildup (pleurodesis).  Chemotherapy and radiation therapy, if your pleural effusion is caused by cancer. These treatments are typically used to kill certain cells in the body.  Follow these instructions at home:  Take over-the-counter and prescription medicines only as told by your health care provider.  Keep track of how long you are able to do mild exercise, such as walking, before you get short of breath. Write down this information to share with your health care provider. Your ability to exercise should improve over time.  Do not use any products that contain nicotine or tobacco. These products include cigarettes, chewing tobacco, and vaping devices, such as e-cigarettes. If you need help quitting, ask your health care provider.  Return to your normal activities as told by your health care provider. Ask your health care provider what activities are safe for you.  Keep all follow-up visits. This is important.  Contact a health care provider if:  The amount of time that you are able to do mild exercise decreases or does not improve with time.  You have a fever.  Get help right away if:  You are short of breath.  You develop chest pain.  You develop a new cough.  These symptoms may be an emergency. Get help right away. Call 911.  Do not wait to see if the symptoms will go away.  Do not drive yourself to the hospital.  Summary  Pleural effusion is an abnormal buildup of fluid in the layers of tissue between the lungs and the inside of the chest.  Pleural effusion can have many causes, including heart failure, pulmonary embolism, infections, or cancer.  Symptoms of pleural effusion can include shortness of breath, chest pain, fever, long-lasting (chronic) cough, hiccups, or rapid breathing.  Diagnosis often involves making images of the chest (such as with ultrasound or X-ray) and removing fluid (thoracentesis) to send for testing.  Treatment for pleural effusion depends on the underlying condition that is causing it.  This information is not intended to replace advice given to you by your health care provider. Make sure you discuss any questions you have with your health care provider.

## 2024-04-01 NOTE — ED PROVIDER NOTE - NSDCPRINTRESULTS_ED_ALL_ED
right lower extremity findings Patient requests all Lab, Cardiology, and Radiology Results on their Discharge Instructions

## 2024-04-01 NOTE — CONSULT NOTE ADULT - ASSESSMENT
ASSESSMENT/PLAN:   73 year old woman with history of stage 4 lung cancer, HLD, HTN, T1DM, follicular adenoma of thyroid s/p thyroidectomy, BCC, SCC, anemia, chronic hyponatremia was sent to the ED by her Thoracic Surgeon Dr. Ding for large Left plerual effusion.    - s/p Thoracentesis. Drained 1.4L of serosanguinous fluid. Sent fluid for cyto, chem and micro  - Dispo per ER. Pt did voice she did not want to stay, if patient is to leave please have patient follow up with Dr. Ding [phone] in 2 weeks with a CXR prior. The office will help set up the patient with a CXR prior  - Case d/w Dr. Timur Morley [facility] PAC [id number]

## 2024-04-02 NOTE — ED POST DISCHARGE NOTE - RESULT SUMMARY
Dr. Santizo request to see how patient was feeling as she had procedure performed and she had Leukocytosis to 18. Patient feels well, tired, normally needs a day to recover post procedure. IS currently on Augmentin. No fevers. Would appreciate a call tomorrow. Is awaiting a call from Dr. Ding office.

## 2024-04-10 NOTE — DATA REVIEWED
[FreeTextEntry1] : I have independently reviewed the following: TTE on 02/27/2024 CXR on 03/29/2024 cytopathology from 04/01/2024 CXR on 04/01/2024 CT Chest on 04/08/2024

## 2024-04-10 NOTE — HISTORY OF PRESENT ILLNESS
[FreeTextEntry1] : Ms. 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   CT Chest on 08/29/2023: - Right lower lobe mass with inferior 4.5 cm nodular component (2-119), and elongated component that tracks centrally to the right hilum, occluding the medial basal segmental bronchus.  - Patent airways through the lobar bronchi.  - Several solid nodules in all lobes, for instance, largest including 1.5 cm in the superior segment of the left lower lobe and the lingula.  - Trace right pleural effusion. - Bilateral mediastinal, left supraclavicular, left axillary, and left subpectoral lymphadenopathy, largest including 2.7 cm in the left supraclavicular region. - Multiple lytic bone lesions including the manubrium, inferior sternum and left 11th rib. - Small pericardial effusion. Coronary artery calcifications.  PFTs on 09/18/2023: FVC 2.01, 68%; FEV1 1.41, 63%; DLCO 10.9, 53%  Established care with Dr. Armen Christensen on 09/27/2023: Patient with L paratracheal and supraclavicular masses in context R lung and hilar mass and other bony findings. I highly suspect a primary lung CA w met disease and not a primary neck CA process. I will f/u on path w recs from managing to follow.  09/28/2023: S/p IR bx of left supraclavicular lymph node with Dr. Clement Santos. Path level 4 LN: Metastatic lung adenocarcinoma  PET/CT on 10/07/2023: 1. Right lower lobe mass not well delineated due to new right pleural effusion but appears more prominent than the on the most recent CT chest with increased activity. Multiple hypermetabolic lung nodules in both lung fields. Findings compatible with likely primary right lower lobe mass with multiple metastatic lung nodules. 2. Hypermetabolic nodes in the left neck, thorax and below the diaphragm compatible with biopsied konrad i. Extent of retroperitoneal and mesenteric konrad involvement is more extensive than usually seen with lung malignancy; may need further tissue evaluation. 3. Multiple FDG avid bone lesions and a hypermetabolic left gluteal muscle lesion compatible with metastases. Focus in the left side of the skull base is difficult to assess on this PET/CT and can be further evaluated with CT head or MRI. 4. Multiple foci of increased activity in the liver without corresponding low-dose CT abnormality most likely due to disease involvement.  10/11/2023: Path consistent + Tumor involvement in level 4 LN. Discussed necessity of following up with Heme Onc for further evaluation and treatment. PET/CT reviewed- Right pleural effusion demonstrated. Discussed IR guided Thoracentesis for diagnostic and therapeutic purposes. Patient agrees to proceed. Has stage 4 disease and is not candidate for resectional surgery.   CT Head w/wo IV Contrast on 10/23/2023: - At least 4 enhancing nodules, largest within the inferomedial aspect of the left frontal lobe measuring 1.4 x 1.3 x 1.2 cm, compatible with intracranial metastatic disease.  CT CA/P w/IV Contrast on 12/29/2023: - Interval marked decrease of right lower lobe mass and near resolution of bilateral metastatic nodules.  - The residual 2.7 cm right lower lobe nodule previously measured 5 cm. - Scattered bone metastases are unchanged for example 3 cm lytic lesion within the manubrium (series 3 image 19) and lytic lesion within posterior left 10th rib (image 66). - Interval resolution of enlarged left axillary, mediastinal, bilateral hilar and retroperitoneal lymph nodes.  01/14-01/18/2024: Admitted to [facility] with c/o confusion, SOB, chest pain, hypothermia, tachycardia, hypotension, and expressive aphasia. CT C/A/P with large pericardial effusion s/p pericardial window. S/p 1Liter IVF and started on IV pressor. Pt went urgently for drainage with IR, s/p drain of pericardial effusion 800 sanguineous fluid. She was admitted to CCU. Repeat echo showed trace pericardial effusion. CT Sx was consulted for window. No intervention recommended at the time given that effusion improved with drain. Pericardial drain was pulled on 1/16 without any complication. Repeat echo on 1/17 showed no effusion.  CT C/A/P on 01/14/2024: - New large pericardial effusion. - New small to moderate sized bilateral layering pleural effusions.  - Bilateral distal airways impaction.  - Multifocal pulmonary opacities predominantly of the lower lobes and interlobular septal thickening are noted.  - Pulmonary opacities limit evaluation for previously described right lower lobe mass and small residual metastatic nodules. - Significant gallbladder wall edema may be due to third spacing.  - Small abdominopelvic ascites. 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  02/14/2024: Seen by rad/onc- Dr. Maddox, CT shows excellent cytoreduction of all previously seen lesions. There appears to be a questionable new subcm lesion. Believe this can be followed. Plan for 6 weeks f/u with CT Head.  Last seen on 02/21/2024: For follow up for recently noted pericardial effusion during hospitalization in 01/2024. Discussed with patient that she needs to repeat echo to assess for pericardial effusion. As per patient she has an appointment with her cardiologist next Monday. RTC PRN.   TTE on 02/27/2024:  - EF 71% - Mild mitral regurgitation. - Trace pericardial effusion seen  03/12/2024: Seen by Dr. Plummer (heme/onc). Plan to continue Xgeva every month. CT head with ? new subcm lesion., patient presented with aphasis (following Dr. Libman- Neuro). Plan to repeat scan in 04/2024.   CXR on 03/29/2024: - Large left pleural effusion, increased from previous. - Stable smaller right pleural effusion.  04/01/2024: She presented to [facility] ED with c/o left pleural effusion noted on CXR done by cardiology. Endorsed this dyspnea on exertion. Now s/p left thoracentesis completed by CT Sx, Drained 1400cc serosanguineous fluid. GMS and culture are negative. Cytopathology is positive for malignant cells, metastatic adenocarcinoma.   - She was discharged home to follow up outpatient in 2 weeks with CXR.  CXR on 04/01/2024: - Interval decrease in size left pleural effusion status post left thoracentesis without procedural pneumothorax. - Small right pleural effusion is similar to prior.  CT Head w/IV Head on 04/08/2024: - 6 mm metastatic lesion in the LEFT gyrus rectus unchanged.  - Previously noted RIGHT frontal metastatic lesions are no longer visible.  CT Chest on 04/08/2024: Pending final read.  04/09/2024: Spoke with patient, she is s/p left thoracentesis on 4/1/24, 1400 cc removed at the time. She reports she feels winded, O2 at 94%. Had CT done yesterday, appears to have B/L effusions L>R. TTM visit for tomorrow to discuss possible left pleurX catheter placement.  Patient is here today for follow up via tele visit. She reports shortness of breath with activity. Denies any cough, fever, or chills.

## 2024-04-10 NOTE — ASSESSMENT
[FreeTextEntry1] : Ms. 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   S/p IR bx of left supraclavicular lymph node with Dr. Clement Santos. Path level 4 LN: Metastatic lung adenocarcinoma.  01/14-01/18/2024: Admitted to [facility] with c/o confusion, SOB, chest pain, hypothermia, tachycardia, hypotension, and expressive aphasia. CT C/A/P with large pericardial effusion s/p pericardial window. S/p 1Liter IVF and started on IV pressor. Pt went urgently for drainage with IR, s/p drain of pericardial effusion 800 sanguineous fluid. She was admitted to CCU. Repeat echo showed trace pericardial effusion. CT Sx was consulted for window. No intervention recommended at the time given that effusion improved with drain. Pericardial drain was pulled on 1/16 without any complication. Repeat echo on 1/17 showed no effusion.  Currently on systemic therapy since 11/2023 and started on Xgeva in 01/2024 with Dr. Plummer. Plan to continue therapy.   04/01/2024: She presented to [facility] ED with c/o left pleural effusion noted on CXR done by cardiology. Endorsed this dyspnea on exertion. Now s/p left thoracentesis completed by CT Sx, Drained 1400cc serosanguineous fluid. GMS and culture- negative, cytopathology pending.  - She was discharged home to follow up outpatient in 2 weeks with CXR.  Patient is here today for follow up with imaging.   I have independently reviewed the medical records and imaging at the time of this office consultation. Recent CT chest reviewed with patient, with recurrent pleural effusion L>R. Discussed placement of left pleurX catheter. Surgical approach reviewed with patient. Discussed risks in details, patient is agreeable to proceed. Will schedule for posisble Left VATS, left pleurX catheter insertion. She will need PST on admit. She can continue taking ASA 81mg.   Recommendations reviewed with patient during this office visit, and all questions answered; Patient instructed on the importance of follow up and verbalizes understanding.    I, ERIC Marie, personally performed the evaluation and management (E/M) services for this established patient. That E/M includes conducting the examination, assessing all new/exacerbated conditions, and establishing a new plan of care. Today, my ACP, Wood Wolff NP, was here to observe my evaluation and management services for this new problem/exacerbated condition to be followed going forward.

## 2024-04-10 NOTE — REASON FOR VISIT
[Home] : at home, [unfilled] , at the time of the visit. [Medical Office: ([facility])___] : at the medical office located in  [Verbal consent obtained from patient] : the patient, [unfilled]

## 2024-04-10 NOTE — CONSULT LETTER
[FreeTextEntry2] : Dr. Marcus Rizvi (PCP) [FreeTextEntry3] : Louis Ding MD, FACS , Division of Thoracic Surgery [facility] Thoracic Surgery [facility] Department of Cardiovascular & Thoracic Surgery  [employer] [facility] [employer]

## 2024-04-14 NOTE — ED PROVIDER NOTE - ATTENDING CONTRIBUTION TO CARE
73-year-old female past medical history of lung cancer diabetes presenting to emergency department due to increasing shortness of breath that started today  PE: Well appearing, RRR, CTA BL lungs, abd soft NTND. A/P Labs, imaging, medicate, admi

## 2024-04-14 NOTE — ED PROVIDER NOTE - CLINICAL SUMMARY MEDICAL DECISION MAKING FREE TEXT BOX
73-year-old female past medical history of lung cancer diabetes presenting to emergency department due to increasing shortness of breath that started today.  Patient has had pleural effusion drainage in the past is scheduled for Pleur-evac's tomorrow.  Patient is afebrile emergency department.  On room air patient desats to 80%.  Patient placed on 6 L oxygen and improved to 95%.  No wheezes, crackles, rales on all stational lungs.  Concern for pneumonia versus URI versus worsening pleural effusion.  Shoulder x-ray, CBC, CMP, VBG, flu/COVID panel.

## 2024-04-14 NOTE — ED PROVIDER NOTE - OBJECTIVE STATEMENT
73-year-old female past medical history of lung cancer diabetes presenting to emergency department due to increasing shortness of breath that started today.  Patient has had pleural effusion drainage in the past is scheduled for Pleur-evac's tomorrow.  Patient was on 3 L nasal cannula at home but still feeling short of breath.  EMS placed her on 6 L nonrebreather saturating at 100% on this.  Patient denies any fevers, chills, body aches, nausea, vomiting, chest pain.  She states she is not able to get up or walk across room without feeling short of breath.

## 2024-04-14 NOTE — ED ADULT TRIAGE NOTE - CHIEF COMPLAINT QUOTE
Patient is brought to [facility] Emergency Department from home by [facility] EMS unit # 6 E 480 for shortness of breath. Patients has history of lung cancer. Patient has left pleural effusion and is scheduled for a pleuravax tomorrow but became very short of breath today. Patient is speaking full sentences and does not appear short of breath at triage. Patient states she was short of breath upon ambulation. Previous history: DM, HLD, HTN, lung cancer. EKG done at triage. FS at home was 183. Patient was on 02 N/C 3 liters. Ambulance started 6 liters. Consulted with Resident in charge regarding oxygen delivery. Advised to bring to treatment room CN aware. Patient is brought to [facility] Emergency Department from home by [facility] EMS unit # 6 E 910 for shortness of breath. Patients has history of lung cancer. Patient has left pleural effusion and is scheduled for a pleuravax tomorrow but became very short of breath today. Patient is speaking full sentences and does not appear short of breath at triage. Patient states she was short of breath upon ambulation. Previous history: DM, HLD, HTN, lung cancer. EKG done at triage. FS at home was 183. Patient was on 02 N/C 3 liters. Ambulance started 6 liters. Consulted with Resident in charge regarding oxygen delivery. Advised to bring to treatment room CN aware.  at triage. NAD noted. Patient is brought to [facility] Emergency Department from home by [facility] EMS unit # 6 E 203 for shortness of breath. Patients has history of lung cancer. Patient has left pleural effusion and is scheduled for a pleuravax tomorrow but became very short of breath today. Patient is speaking full sentences and does not appear short of breath at triage. Patient states she was short of breath upon ambulation. Previous history: DM, HLD, HTN, lung cancer. EKG done at triage. FS at home was 183. Patient was on 02 N/C 3 liters. Ambulance started 6 liters. Consulted with Resident in charge regarding oxygen delivery. Advised to bring to treatment room CN aware.  at triage. NAD noted.  AT 2126, patient c/o shortness of breath. 83% on 6 liters of N/C. Placed on 100 % NRB. CN Amina  aware.  Pulse oxygen now 96%.

## 2024-04-14 NOTE — ED PROVIDER NOTE - PROGRESS NOTE DETAILS
I spoke with CT surgery who agreed to admit the patient for Pleur-evac placement tomorrow.  Patient is agreeable to staying.  Patient is tolerating nasal cannula well and states she is feels comfortable.

## 2024-04-14 NOTE — ED ADULT NURSE NOTE - CHIEF COMPLAINT QUOTE
Patient is brought to [facility] Emergency Department from home by [facility] EMS unit # 6 E 612 for shortness of breath. Patients has history of lung cancer. Patient has left pleural effusion and is scheduled for a pleuravax tomorrow but became very short of breath today. Patient is speaking full sentences and does not appear short of breath at triage. Patient states she was short of breath upon ambulation. Previous history: DM, HLD, HTN, lung cancer. EKG done at triage. FS at home was 183. Patient was on 02 N/C 3 liters. Ambulance started 6 liters. Consulted with Resident in charge regarding oxygen delivery. Advised to bring to treatment room CN aware.  at triage. NAD noted.  AT 2126, patient c/o shortness of breath. 83% on 6 liters of N/C. Placed on 100 % NRB. CN Amina  aware.  Pulse oxygen now 96%.

## 2024-04-15 NOTE — H&P ADULT - NSHPLABSRESULTS_GEN_ALL_CORE
Xray Chest  2 Views PA/Lat (04.14.24 @ 22:40)  Increased large left pleural effusion.  Increased small to moderate right pleural effusion.    Sodium: 123 mmol/L (04.14.24 @ 22:48)

## 2024-04-15 NOTE — H&P ADULT - NSHPPHYSICALEXAM_GEN_ALL_CORE
CONSTITUTIONAL: NAD  HEENT: Moist oral mucosa, no pharyngeal injection or exudates  RESPIRATORY: On HFNC, speaking in full sentences, no obvious SOB, Diminished L base BS   CARDIOVASCULAR: Regular rate and rhythm, normal S1 and S2, no murmur  ABDOMEN: Soft, nondistended, nontender to palpation  EXTREMITIES: 2+ pitting lower extremity edema (chronic per patient in setting of fludrocortisone use)  PSYCH: A+O x 3; normal affect  NEUROLOGY: Facial expression symmetric, no gross sensory deficits appreciated, moves all extremities spontaneously  SKIN: No rashes, no palpable lesions

## 2024-04-15 NOTE — DISCHARGE NOTE PROVIDER - NSDCMRMEDTOKEN_GEN_ALL_CORE_FT
amLODIPine 5 mg oral tablet: 1 tab(s) orally 2 times a day  aspirin 81 mg oral delayed release tablet: 1 tab(s) orally once a day  fludrocortisone 0.1 mg oral tablet: 1 tab(s) orally once a day  HumaLOG KwikPen 100 units/mL injectable solution: 2 unit(s) injectable 3 times a day (before meals)  insulin glargine 100 units/mL subcutaneous solution: 10 unit(s) subcutaneous 2 times a day  lisinopril 20 mg oral tablet: 1 tab(s) orally 2 times a day  simvastatin 20 mg oral tablet: 1 tab(s) orally once a day (at bedtime)   acetaminophen 325 mg oral tablet: 2 tab(s) orally every 6 hours As needed Mild Pain (1 - 3)  amLODIPine 5 mg oral tablet: 1 tab(s) orally 2 times a day  aspirin 81 mg oral delayed release tablet: 1 tab(s) orally once a day  fludrocortisone 0.1 mg oral tablet: 1 tab(s) orally once a day  HumaLOG KwikPen 100 units/mL injectable solution: 2 unit(s) injectable 3 times a day (before meals)  insulin glargine 100 units/mL subcutaneous solution: 10 unit(s) subcutaneous 2 times a day  lisinopril 20 mg oral tablet: 1 tab(s) orally 2 times a day  simvastatin 20 mg oral tablet: 1 tab(s) orally once a day (at bedtime)

## 2024-04-15 NOTE — DISCHARGE NOTE PROVIDER - HOSPITAL COURSE
72 y/o Female w/ hx of HTN, HLD, DM1, Lung Ca (w/ mets to brain/bone), chronic hyponatremia, Thyroid Ca. (s/p thyroidectomy), w/ recurrent pleural effusions, now s/p LVATS, PleurX placement on 4/15/24. Her post operative course was uncomplicated. Her PleurX was capped and she was discharged home when VNS services were arranged. She is to have continued outpatient follow up with Dr. Ding. 72 y/o Female w/ hx of HTN, HLD, DM1, Lung Ca (w/ mets to brain/bone), chronic hyponatremia, Thyroid Ca. (s/p thyroidectomy), w/ recurrent pleural effusions, now s/p LVATS, PleurX placement on 4/15/24. Her post operative course included insertion of right pigtail catheter that was removed after drainage of fluid. chest X-ray s/p right PTC removal showed no significant findings. Patient is stable, not in acute distress, on room air. Her PleurX was capped and she is in good condition to be discharged home. She is to have continued outpatient follow up with Dr. Ding. Plan above as per Dr. Ding.    Vital Signs Last 24 Hrs  T(C): 36.9 (16 Apr 2024 16:09), Max: 37.1 (16 Apr 2024 12:51)  T(F): 98.5 (16 Apr 2024 16:09), Max: 98.8 (16 Apr 2024 12:51)  HR: 104 (16 Apr 2024 16:09) (85 - 104)  BP: 100/47 (16 Apr 2024 16:09) (92/51 - 110/51)  BP(mean): 64 (15 Apr 2024 17:00) (64 - 67)  RR: 18 (16 Apr 2024 16:09) (18 - 21)  SpO2: 96% (16 Apr 2024 16:09) (93% - 100%)    Parameters below as of 16 Apr 2024 16:09  Patient On (Oxygen Delivery Method): room air

## 2024-04-15 NOTE — ED ADULT NURSE REASSESSMENT NOTE - NS ED NURSE REASSESS COMMENT FT1
pt connected to pure wick.
pt transferred to the floor on high flow nasal cannula w/ rt and transporter and daughter, pt aware of POC, pt vitals stable, no other concerns are noted.
Break RElief RN-Pt in bed awake, denies chest pain, sob, pt on high flow oxygen, NAD, respirations even unlabored, 20g in left a/c no signs of infiltration, stretcher in lowest position, call bell in reach.

## 2024-04-15 NOTE — PRE-OP CHECKLIST - STERILIZATION AFFIRMATION
(1) flexion of extremities
n/a
Star Wedge Flap Text: The defect edges were debeveled with a #15 scalpel blade. Given the location of the defect, shape of the defect and the proximity to free margins a star wedge flap was deemed most appropriate. Using a sterile surgical marker, an appropriate rotation flap was drawn incorporating the defect and placing the expected incisions within the relaxed skin tension lines where possible. The area thus outlined was incised deep to adipose tissue with a #15 scalpel blade. The skin margins were undermined to an appropriate distance in all directions utilizing iris scissors. Following this, the designed flap was carried over into the primary defect and sutured into place.

## 2024-04-15 NOTE — DISCHARGE NOTE PROVIDER - NSDCFUADDINST_GEN_ALL_CORE_FT
Please continue to walk daily, increasing as tolerated, practice deep breathing and coughing. Please monitor your surgical sites for increased swelling, pain, redness, discharge or warmth from the area. If these symptoms or new SOB, chest pain, fevers or chills occur, please call the office, [phone].

## 2024-04-15 NOTE — DISCHARGE NOTE PROVIDER - CARE PROVIDER_API CALL
Louis Ding Ramana  Thoracic Surgery  [address] [place] [address] [place] [address]  Phone: [phone]  Fax: [phone]  Follow Up Time:

## 2024-04-15 NOTE — DISCHARGE NOTE PROVIDER - NSDCCAREPROVSEEN_GEN_ALL_CORE_FT
Louis Ding Louis Ding  Thoracic Surgery  [address] [place] [address] [place] [address]  Phone: [phone]  Fax: [phone]

## 2024-04-15 NOTE — CHART NOTE - NSCHARTNOTEFT_GEN_A_CORE
pt A&OX3, breathing much easier since pleurx pl;acement.     ICU Vital Signs Last 24 Hrs  T(C): 36.6 (15 Apr 2024 14:00), Max: 37.1 (15 Apr 2024 01:45)  T(F): 97.8 (15 Apr 2024 14:00), Max: 98.7 (15 Apr 2024 01:45)  HR: 90 (15 Apr 2024 16:30) (82 - 99)  BP: 103/55 (15 Apr 2024 16:30) (81/43 - 132/62)  BP(mean): 65 (15 Apr 2024 16:30) (51 - 75)  ABP: --  ABP(mean): --  RR: 18 (15 Apr 2024 16:30) (16 - 22)  SpO2: 95% (15 Apr 2024 16:30) (92% - 100%)    O2 Parameters below as of 15 Apr 2024 16:30  Patient On (Oxygen Delivery Method): nasal cannula  O2 Flow (L/min): 2      Pleurx unclamped, left to waterseal.   Pt to go to 8T

## 2024-04-15 NOTE — PROGRESS NOTE ADULT - SUBJECTIVE AND OBJECTIVE BOX
Patient seen resting comfortably in bed s/p PleurX placement today.  Denies any active complaints at this time.  Reports breathing comfortably on 2L via NC.    ICU Vital Signs Last 24 Hrs  T(C): 36.8 (15 Apr 2024 17:47), Max: 37.1 (15 Apr 2024 01:45)  T(F): 98.3 (15 Apr 2024 17:47), Max: 98.7 (15 Apr 2024 01:45)  HR: 92 (15 Apr 2024 17:47) (82 - 99)  BP: 105/52 (15 Apr 2024 17:47) (81/43 - 132/62)  BP(mean): 64 (15 Apr 2024 17:00) (51 - 75)  ABP: --  ABP(mean): --  RR: 18 (15 Apr 2024 17:47) (16 - 22)  SpO2: 99% (15 Apr 2024 17:47) (92% - 100%)    O2 Parameters below as of 15 Apr 2024 17:47  Patient On (Oxygen Delivery Method): nasal cannula  O2 Flow (L/min): 2      Gen: Alert, oriented, in NAD  Resp: Breathing comfortably on 2L via NC, no resp distress  Tubes: PleurX to PleurEvac on WS    A/P:  73-year-old female with PMH of lung cancer and a recurrent L pleural effusion s/p LVATS, PleurX placement on 4/15/24.    - Cont. pain control, well controlled currently.  - Plan for discharge home tomorrow when home care arranged.  - Restart BP meds as tolerated  - Keep PleurX to PleurEvac on WS. Cap when ready for discharge.

## 2024-04-15 NOTE — H&P ADULT - ASSESSMENT
A: Large L pleural effusion, Hyponatremia    P: Admit to Thoracic surgery       NPO for OR- L PleurX today       ISS       NaCl supplementationl repeat AM labs       Repeat pre-op labs

## 2024-04-15 NOTE — H&P ADULT - NSHPREVIEWOFSYSTEMS_GEN_ALL_CORE
Constitutional/General: (- ) Acute distress, (+) fatigue  	Eyes: (- ) Changes in vision, (- ) double vision, (- ) blurry vision  	ENT: (- ) Nasal congestion or rhinorrhea, (- ) changes in hearing, ( -) odynophagia or dysphagia   	Skin: (- ) Rashes  	Cardiovascular: (- ) Chest pain, ( -) heart palpitations, (- ) orthopnea/PND  	Pulmonary: (+) Shortness of breath, (- ) cough, (- ) pleuritic chest pain, (- ) hemoptysis   	Gastrointestinal: (- ) Nausea, ( -) vomiting, (- ) diarrhea, (- ) bloating, (- ) constipation, (- ) abdominal pain  	Genitourinary: (-) Dysuria, (- ) frequency, (- ) change in urine odor/appearance   	Musculoskeletal: ( -) Changes in strength, (- ) joint tenderness or swelling  	Neurologic: ( -) Changes in memory, (-) headache, (- ) weakness, (- ) paresthesias, (- ) imbalance   	Endocrine: ( -) Heat/cold intolerance, (- ) weight change, (- ) excessive sweating, ( -) polydipsia/polyuria  	Psychology: (- ) Changes in mood, (- ) anxiety, (- ) depression.  	Heme/Lymph: ( -) Easy bruising

## 2024-04-15 NOTE — H&P ADULT - HISTORY OF PRESENT ILLNESS
73-year-old female with PMH of lung cancer and a recurrent L pleural effusion presented to the ER last night c/o increasing SOB that had been worsening over the past few days despite 3L home O2 via [place].   Patient had had a L thoracentesis on 4/1/24 and is scheduled for a L PleurX today.   She states she has ALVARES just with standing up or walking a few feet.

## 2024-04-15 NOTE — PATIENT PROFILE ADULT - FUNCTIONAL ASSESSMENT - DAILY ACTIVITY 5.
Mid-Level Procedure Text (A): After obtaining clear surgical margins the patient was sent to a mid-level provider for surgical repair.  The patient understands they will receive post-surgical care and follow-up from the mid-level provider. 4 = No assist / stand by assistance

## 2024-04-15 NOTE — DISCHARGE NOTE PROVIDER - CARE PROVIDERS DIRECT ADDRESSES
[email or web address]

## 2024-04-15 NOTE — H&P ADULT - NSICDXPASTSURGICALHX_GEN_ALL_CORE_FT
PAST SURGICAL HISTORY:  Excision of Lipoma of neck     History of thoracentesis     S/P  Section x2 ,     S/P thyroidectomy

## 2024-04-15 NOTE — DISCHARGE NOTE PROVIDER - NSDCFUADDAPPT_GEN_ALL_CORE_FT
Please follow up with Dr. Ding in 10-14 days. Please call for an appointment.    Follow up with your PCP as needed. Call for an appointment.

## 2024-04-15 NOTE — H&P ADULT - NSICDXPASTMEDICALHX_GEN_ALL_CORE_FT
PAST MEDICAL HISTORY:  Anemia     Basal cell carcinoma forehead and leg    BCC (basal cell carcinoma)     BRCA2 positive     Chronic hyponatremia     DM type 1 (diabetes mellitus, type 1) Dx 47 years ago    Hyperlipidemia     Hypertension     Latex allergy     Pedal edema     Pleural effusion, left     SCC (squamous cell carcinoma) on chest    Thyroid follicular adenoma

## 2024-04-15 NOTE — PRE-OP CHECKLIST - DNR CLARIFICATION FORM COMPLETED
n/a Radha Sutton  [facility]  [facility] [address]  Scheduled Appointment: 07/05/2023

## 2024-04-15 NOTE — DISCHARGE NOTE PROVIDER - NSDCCPTREATMENT_GEN_ALL_CORE_FT
PRINCIPAL PROCEDURE  Procedure: Insertion of PleurX pleural catheter system  Findings and Treatment:

## 2024-04-15 NOTE — DISCHARGE NOTE PROVIDER - NSDCFUSCHEDAPPT_GEN_ALL_CORE_FT
Niranjan Maddox  [facility]  RADMED [address]  Scheduled Appointment: 04/17/2024    Génesis Plummer  [facility]  [facility]  Scheduled Appointment: 04/18/2024    [facility]  Scheduled Appointment: 04/22/2024    Libman, Richard B  [facility]  NEUROLOGY [address]  Scheduled Appointment: 04/26/2024    [facility]  CARDIOLOGY [address]   Scheduled Appointment: 05/01/2024    Dwayne Strauss  [facility]  CARDIOLOGY [address]   Scheduled Appointment: 05/01/2024    [facility]  Scheduled Appointment: 05/06/2024    Marcus Rizvi  [facility]  INTMED [address]  Scheduled Appointment: 05/20/2024    [facility]  Scheduled Appointment: 06/03/2024

## 2024-04-15 NOTE — PATIENT PROFILE ADULT - FALL HARM RISK - HARM RISK INTERVENTIONS
Assistance with ambulation/Assistance OOB with selected safe patient handling equipment/Communicate Risk of Fall with Harm to all staff/Discuss with provider need for PT consult/Monitor gait and stability/Provide patient with walking aids - walker, cane, crutches/Reinforce activity limits and safety measures with patient and family/Sit up slowly, dangle for a short time, stand at bedside before walking/Tailored Fall Risk Interventions/Use of alarms - bed, chair and/or voice tab/Visual Cue: Yellow wristband and red socks/Bed in lowest position, wheels locked, appropriate side rails in place/Call bell, personal items and telephone in reach/Instruct patient to call for assistance before getting out of bed or chair/Non-slip footwear when patient is out of bed/[place] to call system/Physically safe environment - no spills, clutter or unnecessary equipment/Purposeful Proactive Rounding/Room/bathroom lighting operational, light cord in reach

## 2024-04-16 NOTE — PHYSICAL THERAPY INITIAL EVALUATION ADULT - REHAB POTENTIAL, PT EVAL
PHYSICIAN ORDERS AND DISCHARGE INSTRUCTIONS     NOTE: Upon discharge from the [address], you will receive a patient experience survey. We would be grateful if you would take the time to fill this survey out. Wound care order history:                 MAX's   Right       Left               Date:              Cultures:                Grafts:                Antibiotics:           Continuing wound care orders and information:                 Residence:                Continue home health care with:              Your wound-care supplies will be provided by: Wound cleansing:              Do not scrub or use excessive force. Wash hands with soap and water before and after dressing changes. Prior to applying a clean dressing, cleanse wound with normal saline, wound cleanser, or mild soap and water. Ask the physician or nurse before getting the wound(s) wet in a shower     Daily Wound management:              Keep weight off wounds and reposition every 2 hours. Avoid standing for long periods of time. Apply wraps/stockings in AM and remove at bedtime. If swelling is present, elevate legs to the level of the heart or above for 30 minutes 4-5 times a day and/or when sitting. When taking antibiotics take entire prescription as ordered by physician do not stop taking until medicine is all gone. Orders for this week: 10/26/22     [facility] in [address] with soap and water, pat dry. Apply Nya to wound bed. Cover with Ioplex and ca alginate. Secure with 1\" coban. Change daily. Follow up with Rhianna Jensen CNP in 1 week in the wound care center. Call [phone] for any questions or concerns.   Date__________   Time____________ good, to achieve stated therapy goals

## 2024-04-16 NOTE — DISCHARGE NOTE NURSING/CASE MANAGEMENT/SOCIAL WORK - PATIENT PORTAL LINK FT
You can access the FollowMyHealth Patient Portal offered by [facility] by registering at the following website: [email or web address]. By joining [employer]’s FollowMyHealth portal, you will also be able to view your health information using other applications (apps) compatible with our system.

## 2024-04-16 NOTE — PHYSICAL THERAPY INITIAL EVALUATION ADULT - ADDITIONAL COMMENTS
Patient lives in an apartment with her , no steps to enter, (+) elevator. Patient ambulatory with a rollator as needed, patient reports she has home oxygen but rarely utilizes it. Independent with ADL's.     Patient left sitting in chair at bedside, /50, spO2 99% on room air, , following gait training, JOAQUIM Chery made aware

## 2024-04-16 NOTE — DISCHARGE NOTE NURSING/CASE MANAGEMENT/SOCIAL WORK - NSSCTYPOFSERV_GEN_ALL_CORE
Nurse will call and visit the day after discharge for pleurx drainage. You will be sent home with three pleurx kits.

## 2024-04-16 NOTE — PHYSICAL THERAPY INITIAL EVALUATION ADULT - GENERAL OBSERVATIONS, REHAB EVAL
Patient found sitting in chair at bedside, NAD. A&Ox4,  at bedside, on room air spO2 96%, +chest tube, +tele monitor, OK for PT per RN Krissy, patient agreeable to participate in PT evaluation

## 2024-04-16 NOTE — PHYSICAL THERAPY INITIAL EVALUATION ADULT - PERTINENT HX OF CURRENT PROBLEM, REHAB EVAL
Patient is a 73-year-old female with past medical history of lung cancer and a recurrent left pleural effusion s/p left VATS, PleurX placement on 4/15/24.

## 2024-04-17 PROBLEM — D64.9 ANEMIA, UNSPECIFIED: Chronic | Status: ACTIVE | Noted: 2024-01-01

## 2024-04-17 PROBLEM — D34 BENIGN NEOPLASM OF THYROID GLAND: Chronic | Status: ACTIVE | Noted: 2024-01-01

## 2024-04-17 PROBLEM — E87.1 HYPO-OSMOLALITY AND HYPONATREMIA: Chronic | Status: ACTIVE | Noted: 2024-01-01

## 2024-04-17 PROBLEM — C79.31 METASTASIS TO BRAIN: Status: ACTIVE | Noted: 2023-01-01

## 2024-04-17 NOTE — VITALS
[Maximal Pain Intensity: 0/10] : 0/10 [Least Pain Intensity: 0/10] : 0/10 [80: Normal activity with effort; some signs or symptoms of disease.] : 80: Normal activity with effort; some signs or symptoms of disease.  [Date: ____________] : Patient's last distress assessment performed on [unfilled]. [0 - No Distress] : Distress Level: 0 [Maximal Pain Intensity: 2/10] : 2/10 [Pain Location: ___] : Pain Location: [unfilled]

## 2024-04-17 NOTE — REVIEW OF SYSTEMS
[Negative] : Neurological [Fatigue] : fatigue [Shortness Of Breath] : shortness of breath [FreeTextEntry6] : no need for O2 [de-identified] : LEFT PLEURX site no infection noted

## 2024-04-17 NOTE — HISTORY OF PRESENT ILLNESS
[Home] : at home, [unfilled] , at the time of the visit. [Medical Office: ([facility])___] : at the medical office located in  [Verbal consent obtained from patient] : the patient, [unfilled] [FreeTextEntry1] :   INITIAL CONSULTATION: 11/21/2023 This is a 72F with IDDM, nonsmoker (1/2 pack per week x 2 years, 50 years ago) PMH follicular adenoma of thyroid (s/p partial thyroidectomy), BCC, SCC, positive for BRCA, noted two left neck lumps while taking a shower. Saw her internist Dr. Rizvi who worked her up and ref to Dr. Ding. Additional work up imaging revealed pulmonary mass c/w BRAF V600E NSCLC with adenopathy, and bone lesions suspicious for malignancy. She was found to have 4 brain lesions on CT and started on systemic therapy tafinlar/mekinist 10/23/2023. She is allergic to contrast.  Diagnosis: Brain metastases   Thoracic: Dr. Ding MEDICAL ONCOLOGIST: Dr. Plummer ** gaddollinium contrast allergy, needs premedication prednisone and benadryl; claustraphobia**   Recent Oncologic History:  CT Chest on 08/29/2023: - Right lower lobe mass with inferior 4.5 cm nodular component (2-119), and elongated component that tracks centrally to the right hilum, occluding the medial basal segmental bronchus. - Patent airways through the lobar bronchi. - Several solid nodules in all lobes, for instance, largest including 1.5 cm in the superior segment of the left lower lobe and the lingula. - Trace right pleural effusion. - Bilateral mediastinal, left supraclavicular, left axillary, and left subpectoral lymphadenopathy, largest including 2.7 cm in the left supraclavicular region. - Multiple lytic bone lesions including the manubrium, inferior sternum and left 11th rib.  09/28/2023: LYMPH NODE, Neck LEVEL 4, LEFT, US GUIDED CORE BIOPSY AND FNA POSITIVE FOR MALIGNANT CELLS. Metastatic lung adenocarcinoma PDL1 70%  PET/CT on 10/07/2023: 1. Right lower lobe mass not well delineated due to new right pleural effusion but appears more prominent than the on the most recent CT chest with increased activity. Multiple hypermetabolic lung nodules in both lung fields. Findings compatible with likely primary right lower lobe mass with multiple metastatic lung nodules. 2. Hypermetabolic nodes in the left neck, thorax and below the diaphragm compatible with biopsied konrad i. Extent of retroperitoneal and mesenteric konrad involvement is more extensive than usually seen with lung malignancy; may need further tissue evaluation. 3. Multiple FDG avid bone lesions and a hypermetabolic left gluteal muscle lesion compatible with metastases. Focus in the left side of the skull base is difficult to assess on this PET/CT and can be further evaluated with CT head or MRI. 4. Multiple foci of increased activity in the liver without corresponding low-dose CT abnormality most likely due to disease involvement.  CT 10/18 has 4 new brain mets.    No MRI  tumor genetics report? BRAF V600E  She states she has felt improved significantly on systemic treatment and was bedbound at first with no appetite with O2 most of the day. States that in 72h she radically improved, no longer needs O2, fully functional, with a strong appetite. She has no pain associated with her bone metastases.   Visit dated 1/4/2024 Patient returns with completed cranial images for review to guide the next steps in her care, Denies N/V, HA/unilateral extremity weakness/memory changes/gait disturbance/bowel/bladder dysfunction or other neurologic symptoms. No issues with speech or comprehension. Now able to participate in most activities' w/o difficulty.  Continues to follow with Dr. Plummer on Tafinlar/Mekinist per patient tolerating well.  CT head 12/29/2023 IMPRESSION: new 8mm metastatic lesion in the RIGHT anterior frontal cortex at the convexity. Decrease in the size of the previously noted RIGHT lateral frontal metastatic lesion now measuring 3 mm and inferior LEFT frontal region metastatic lesion now measuring 6.6 mm. Resolution of previously noted LEFT parietal metastatic lesion.   CT C/A/P 12/29/2023 IMPRESSION: In comparison with 10/7/2023, interval marked decrease of right lower lobe mass and bilateral metastatic nodule. Interval resolution of enlarged left axillary, mediastinal, bilateral hilar and retroperitoneal lymph nodes. Stable lytic bone metastases.  Visit dated 2/14/2024 Patient returns with short interval cranial images for review d/t a new 8mm metastatic lesion in the RIGHT anterior frontal cortex at the convexity. In the interim she presented to the hospital seeking medical care for confusion, aphasia, also had some difficulty with breathing at which time she needed supplemental O2 at home she was admitted for evaluation/treatment on 1/14/2024. Reports she was not feeling well prior to her admission and tested positive for COVID. Today she is back to her baseline neurologically. Still is a bit fatigued and seems deconditioned completed in home PT with good results. No need for supplemental O2 at home since and she has returned to ADLs w/o difficulty  Continues to follow with Dr. Plummer on Tafinlar/Mekinist per patient tolerating well.  CT head 1/16/2024 IMPRESSION: CT HEAD: No acute intracranial findings. The small enhancing lesions seen on the 10/23/2023 CT head are not visible.  Visit dated 4/17/2024 Patient returns for routine f/u and progress check with cranial images for review. Since her last visit patient presented to the ER @[facility] to seek treatment for increased SOB despite supplemental O2@ at home.  Noted to have a recurrent pleural effusion had a L thoracentesis on 4/1/24 She now s/p Insertion of PleurX pleural catheter system 4/15/2024.Dicahrged home 4/1/6/2024. Reports extremely tired. Insertion site a bit tender used OTC Tylenol PRN. Visiting RN in home today site assessed per patient w/o signs of obvious infection. Denies HAs/ any concerning neurological symptoms.   Continues to follow with Dr. Plummer on Tafinlar/Mekinist (next f/u 4/1/82024)  CT head w contrast 4/8/2024 IMPRESSION: 6 mm metastatic lesion in the LEFT gyrus rectus unchanged. Previously noted RIGHT frontal metastatic lesions are no longer visible.  CT C/A/P 4/8/2024 no final read at time of this entry

## 2024-04-17 NOTE — PHYSICAL EXAM
[General Appearance - Well Developed] : well developed [] : no respiratory distress [No Focal Deficits] : no focal deficits [Oriented To Time, Place, And Person] : oriented to person, place, and time [de-identified] : EXAM limited VISIT telephonic

## 2024-04-18 PROBLEM — J90 PLEURAL EFFUSION, NOT ELSEWHERE CLASSIFIED: Chronic | Status: ACTIVE | Noted: 2024-01-01

## 2024-04-18 PROBLEM — C44.91 BASAL CELL CARCINOMA OF SKIN, UNSPECIFIED: Chronic | Status: ACTIVE | Noted: 2024-01-01

## 2024-04-18 PROBLEM — R60.0 LOCALIZED EDEMA: Chronic | Status: ACTIVE | Noted: 2024-01-01

## 2024-04-18 NOTE — ASSESSMENT
[FreeTextEntry1] : Ms. Roe is a 74 yo w with IDDM, BRCA carrier, mild former smoker with adeno ca of lung, mets to multiple organs  PDL1 high- 70% NGS from tissue reviewed in detail BRAF V600E BRCA2 [id number]*22 (germ line likely) MSH6 G749fs*11 NFKBIA amplification NKX2-1 amplification  Given NGS results, recommend Tafinlar 75 BID and Mekinist 0.5 mg once daily CT chest and abdomen imaging on 12/29 did reveal interval marked decrease of right lower lobe mass and bilateral metastatic nodule. Interval resolution of enlarged left axillary, mediastinal, bilateral hilar and retroperitoneal lymph nodes. Stable lytic bone metastases. Hospitalized in Jan with tamponade. Had pericardial drainage and cytology pos for malignancy, NGS similar to prior.  She was readmitted with b/l pleural effusions- Had pleurx on rt draining 1-1.2L 3xweek.  Feeling much better Will follow up on NGS from cytology specimen. Pt feels too weak and wants to hold off on Xgeva this month which is OK LE edema- multifactorial- hypoalbuminemia, fludrocortisone, lack of ambulation Reviewed recent scans- CT scans not reported but stable except for increased fluid, which is obscuring lower lobe nodules.  Brain MRI shows no mets.  Hyponatremia and decreased aldosterone. Follow up with endo Extensive discussion regarding recent events conducted and emotional support was provided  OV in 6 weeks.   https://www.ncbi.nlm.nih.gov/pmc/articles/ZZR2740220/

## 2024-04-18 NOTE — REASON FOR VISIT
[Follow-Up Visit] : a follow-up [Spouse] : spouse [Home] : at home, [unfilled] , at the time of the visit. [Medical Office: ([facility])___] : at the medical office located in  [Verbal consent obtained from patient] : the patient, [unfilled] [FreeTextEntry2] : lung cancer

## 2024-04-18 NOTE — HISTORY OF PRESENT ILLNESS
[Disease: _____________________] : Disease: [unfilled] [M: ___] : M[unfilled] [AJCC Stage: ____] : AJCC Stage: [unfilled] [de-identified] : Ms. 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 She was followed up by endocrine and noted to have low aldosterone.  A few days later, pt noted two left neck lumps while taking a shower. Additional work up imaging revealed pulmonary mass, adenopathy, and bone lesions suspicious for malignancy.  CT Chest on 08/29/2023: - Right lower lobe mass with inferior 4.5 cm nodular component (2-119), and elongated component that tracks centrally to the right hilum, occluding the medial basal segmental bronchus. - Patent airways through the lobar bronchi. - Several solid nodules in all lobes, for instance, largest including 1.5 cm in the superior segment of the left lower lobe and the lingula. - Trace right pleural effusion. - Bilateral mediastinal, left supraclavicular, left axillary, and left subpectoral lymphadenopathy, largest including 2.7 cm in the left supraclavicular region. - Multiple lytic bone lesions including the manubrium, inferior sternum and left 11th rib. - Small pericardial effusion. Coronary artery calcifications.  Seen on 09/13/2023: CT Chest revealing RLL mass with lymphadenopathy, suspicious for advanced malignancy.  PFTs on 09/18/2023: FVC 2.01, 68%; FEV1 1.41, 63%; DLCO 10.9, 53%  Established care with Dr. Armen Christensen on 09/27/2023 for hoarseness of voice:  However, this was all suspected to be related to lung and med findings.  09/28/2023: S/p IR bx of left supraclavicular lymph node with Dr. Clement Santos. Path level 4 LN: Metastatic lung adenocarcinoma PDL1 70%  PET/CT on 10/07/2023: 1. Right lower lobe mass not well delineated due to new right pleural effusion but appears more prominent than the on the most recent CT chest with increased activity. Multiple hypermetabolic lung nodules in both lung fields. Findings compatible with likely primary right lower lobe mass with multiple metastatic lung nodules. 2. Hypermetabolic nodes in the left neck, thorax and below the diaphragm compatible with biopsied konrad i. Extent of retroperitoneal and mesenteric konrad involvement is more extensive than usually seen with lung malignancy; may need further tissue evaluation. 3. Multiple FDG avid bone lesions and a hypermetabolic left gluteal muscle lesion compatible with metastases. Focus in the left side of the skull base is difficult to assess on this PET/CT and can be further evaluated with CT head or MRI. 4. Multiple foci of increased activity in the liver without corresponding low-dose CT abnormality most likely due to disease involvement.  10/20/23: Very fatigued, hypoxic, now on home O2.   11/7/23: Has not been using O2. No longer on pain meds. Over-exerted herself and starting ambulating a lot few days ago, but that seems to have resulted in foot sprain, She rested a little and this seems to have improved  12/8/23: Pt states she is feeling much better. She has been able to do much more than she used to. She is not using O2 anymore. She does endorse some LE pain which seems to have improved a little   1/5/24: Pt seen in follow up. She is feeling well. Tolerating Mekanist and tafinlar. States would like to change dosing schedule to be better suited to her lifestyle. She had recent CT imaging and CT head. CT head revealed new small subcm lesion R. ant frontal cortex. Remaining lesions improved or resolved. Pt saw Dr. Maddox yesterday who will monitor new lesion on subsequent short interval CT scan. Pt developed acne like rash which seems to be related to Glucerna rather than mek/taf.   1/23/24: pt was recently admitted with severe dyspnea, noted to have large pericardial effusion with tamponade physiology. After having a drain placed and removal of about 800 cc of serosanguineous fluid, she felt much better, Hospital stay was c/w poor glycemic control related to timing of insulin and food supply at the hospital. Tafinlar and Mekinist were held and pt continues to be off of it at this time. of note, 3 weeks prior to the hospitalization, pt tested pos for COVID. She was referred to [facility] program and since she was asymtomatic and potential drug-drug interaction with cancer meds and Paxlovid, she was not treated.   3/12/24: Pt returns for follow up. She reports she is feeling well. Has had follow up with cardiology. Had repeat TTE and was good (per patient). She will have it repeated again in 6 weeks. SHe is tolerating Mekanist and Tafinlar well. No new complaints. Had Xgeva injection yesterday.   4/18/24: She presented to [facility] ED with c/o left pleural effusion noted on CXR done by cardiology. Endorsed this dyspnea on exertion. Now s/p left thoracentesis completed by CT Sx, Drained 1400cc serosanguineous fluid. GMS and culture are negative. Cytopathology is positive for malignant cells, metastatic adenocarcinoma. - She was discharged home to follow up outpatient in 2 weeks with CXR.  CXR on 04/01/2024: - Interval decrease in size left pleural effusion status post left thoracentesis without procedural pneumothorax. - Small right pleural effusion is similar to prior.  CT Head w/IV Head on 04/08/2024: - 6 mm metastatic lesion in the LEFT gyrus rectus unchanged. - Previously noted RIGHT frontal metastatic lesions are no longer visible.  CT Chest on 04/08/2024: Pending final read.  04/09/2024: Spoke with patient, she is s/p left thoracentesis on 4/1/24, 1400 cc removed at the time. She reports she feels winded, O2 at 94%. Had CT done last week which showed B/L effusions L>R. She now has pleurx cath on left. Has VNS svs for pleurx drainage 3 x a day. It is draining 1-1.2L each time.   [de-identified] : adeno ca

## 2024-04-19 NOTE — HISTORY OF PRESENT ILLNESS
[Disease: _____________________] : Disease: [unfilled] [M: ___] : M[unfilled] [AJCC Stage: ____] : AJCC Stage: [unfilled] [de-identified] : Ms. 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 She was followed up by endocrine and noted to have low aldosterone.  A few days later, pt noted two left neck lumps while taking a shower. Additional work up imaging revealed pulmonary mass, adenopathy, and bone lesions suspicious for malignancy.  CT Chest on 08/29/2023: - Right lower lobe mass with inferior 4.5 cm nodular component (2-119), and elongated component that tracks centrally to the right hilum, occluding the medial basal segmental bronchus. - Patent airways through the lobar bronchi. - Several solid nodules in all lobes, for instance, largest including 1.5 cm in the superior segment of the left lower lobe and the lingula. - Trace right pleural effusion. - Bilateral mediastinal, left supraclavicular, left axillary, and left subpectoral lymphadenopathy, largest including 2.7 cm in the left supraclavicular region. - Multiple lytic bone lesions including the manubrium, inferior sternum and left 11th rib. - Small pericardial effusion. Coronary artery calcifications.  Seen on 09/13/2023: CT Chest revealing RLL mass with lymphadenopathy, suspicious for advanced malignancy.  PFTs on 09/18/2023: FVC 2.01, 68%; FEV1 1.41, 63%; DLCO 10.9, 53%  Established care with Dr. Armen Christensen on 09/27/2023 for hoarseness of voice:  However, this was all suspected to be related to lung and med findings.  09/28/2023: S/p IR bx of left supraclavicular lymph node with Dr. Clement Santos. Path level 4 LN: Metastatic lung adenocarcinoma PDL1 70%  PET/CT on 10/07/2023: 1. Right lower lobe mass not well delineated due to new right pleural effusion but appears more prominent than the on the most recent CT chest with increased activity. Multiple hypermetabolic lung nodules in both lung fields. Findings compatible with likely primary right lower lobe mass with multiple metastatic lung nodules. 2. Hypermetabolic nodes in the left neck, thorax and below the diaphragm compatible with biopsied konrad i. Extent of retroperitoneal and mesenteric konrad involvement is more extensive than usually seen with lung malignancy; may need further tissue evaluation. 3. Multiple FDG avid bone lesions and a hypermetabolic left gluteal muscle lesion compatible with metastases. Focus in the left side of the skull base is difficult to assess on this PET/CT and can be further evaluated with CT head or MRI. 4. Multiple foci of increased activity in the liver without corresponding low-dose CT abnormality most likely due to disease involvement.  10/20/23: Very fatigued, hypoxic, now on home O2.   11/7/23: Has not been using O2. No longer on pain meds. Over-exerted herself and starting ambulating a lot few days ago, but that seems to have resulted in foot sprain, She rested a little and this seems to have improved  12/8/23: Pt states she is feeling much better. She has been able to do much more than she used to. She is not using O2 anymore. She does endorse some LE pain which seems to have improved a little   1/5/24: Pt seen in follow up. She is feeling well. Tolerating Mekanist and tafinlar. States would like to change dosing schedule to be better suited to her lifestyle. She had recent CT imaging and CT head. CT head revealed new small subcm lesion R. ant frontal cortex. Remaining lesions improved or resolved. Pt saw Dr. Maddox yesterday who will monitor new lesion on subsequent short interval CT scan. Pt developed acne like rash which seems to be related to Glucerna rather than mek/taf.   1/23/24: pt was recently admitted with severe dyspnea, noted to have large pericardial effusion with tamponade physiology. After having a drain placed and removal of about 800 cc of serosanguineous fluid, she felt much better, Hospital stay was c/w poor glycemic control related to timing of insulin and food supply at the hospital. Tafinlar and Mekinist were held and pt continues to be off of it at this time. of note, 3 weeks prior to the hospitalization, pt tested pos for COVID. She was referred to [facility] and since she was asymtomatic and potential drug-drug interaction with cancer meds and Paxlovid, she was not treated.  [de-identified] : adeno ca

## 2024-04-19 NOTE — HISTORY OF PRESENT ILLNESS
[Disease: _____________________] : Disease: [unfilled] [M: ___] : M[unfilled] [AJCC Stage: ____] : AJCC Stage: [unfilled] [de-identified] : Ms. 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 She was followed up by endocrine and noted to have low aldosterone.  A few days later, pt noted two left neck lumps while taking a shower. Additional work up imaging revealed pulmonary mass, adenopathy, and bone lesions suspicious for malignancy.  CT Chest on 08/29/2023: - Right lower lobe mass with inferior 4.5 cm nodular component (2-119), and elongated component that tracks centrally to the right hilum, occluding the medial basal segmental bronchus. - Patent airways through the lobar bronchi. - Several solid nodules in all lobes, for instance, largest including 1.5 cm in the superior segment of the left lower lobe and the lingula. - Trace right pleural effusion. - Bilateral mediastinal, left supraclavicular, left axillary, and left subpectoral lymphadenopathy, largest including 2.7 cm in the left supraclavicular region. - Multiple lytic bone lesions including the manubrium, inferior sternum and left 11th rib. - Small pericardial effusion. Coronary artery calcifications.  Seen on 09/13/2023: CT Chest revealing RLL mass with lymphadenopathy, suspicious for advanced malignancy.  PFTs on 09/18/2023: FVC 2.01, 68%; FEV1 1.41, 63%; DLCO 10.9, 53%  Established care with Dr. Armen Christensen on 09/27/2023 for hoarseness of voice:  However, this was all suspected to be related to lung and med findings.  09/28/2023: S/p IR bx of left supraclavicular lymph node with Dr. Clement Santos. Path level 4 LN: Metastatic lung adenocarcinoma PDL1 70%  PET/CT on 10/07/2023: 1. Right lower lobe mass not well delineated due to new right pleural effusion but appears more prominent than the on the most recent CT chest with increased activity. Multiple hypermetabolic lung nodules in both lung fields. Findings compatible with likely primary right lower lobe mass with multiple metastatic lung nodules. 2. Hypermetabolic nodes in the left neck, thorax and below the diaphragm compatible with biopsied konrad i. Extent of retroperitoneal and mesenteric konrad involvement is more extensive than usually seen with lung malignancy; may need further tissue evaluation. 3. Multiple FDG avid bone lesions and a hypermetabolic left gluteal muscle lesion compatible with metastases. Focus in the left side of the skull base is difficult to assess on this PET/CT and can be further evaluated with CT head or MRI. 4. Multiple foci of increased activity in the liver without corresponding low-dose CT abnormality most likely due to disease involvement.  10/20/23: Very fatigued, hypoxic, now on home O2.   11/7/23: Has not been using O2. No longer on pain meds. Over-exerted herself and starting ambulating a lot few days ago, but that seems to have resulted in foot sprain, She rested a little and this seems to have improved  12/8/23: Pt states she is feeling much better. She has been able to do much more than she used to. She is not using O2 anymore. She does endorse some LE pain which seems to have improved a little   1/5/24: Pt seen in follow up. She is feeling well. Tolerating Mekanist and tafinlar. States would like to change dosing schedule to be better suited to her lifestyle. She had recent CT imaging and CT head. CT head revealed new small subcm lesion R. ant frontal cortex. Remaining lesions improved or resolved. Pt saw Dr. Maddox yesterday who will monitor new lesion on subsequent short interval CT scan. Pt developed acne like rash which seems to be related to Glucerna rather than mek/taf.   1/23/24: pt was recently admitted with severe dyspnea, noted to have large pericardial effusion with tamponade physiology. After having a drain placed and removal of about 800 cc of serosanguineous fluid, she felt much better, Hospital stay was c/w poor glycemic control related to timing of insulin and food supply at the hospital. Tafinlar and Mekinist were held and pt continues to be off of it at this time. of note, 3 weeks prior to the hospitalization, pt tested pos for COVID. She was referred to [facility] and since she was asymtomatic and potential drug-drug interaction with cancer meds and Paxlovid, she was not treated.  [de-identified] : adeno ca

## 2024-04-19 NOTE — PHYSICAL EXAM
[Fully active, able to carry on all pre-disease performance without restriction] : Status 0 - Fully active, able to carry on all pre-disease performance without restriction [Thin] : thin [Normal] : affect appropriate [de-identified] : cervicsl LAD resolved [de-identified] : clear

## 2024-04-19 NOTE — PHYSICAL EXAM
[Fully active, able to carry on all pre-disease performance without restriction] : Status 0 - Fully active, able to carry on all pre-disease performance without restriction [Thin] : thin [Normal] : affect appropriate [de-identified] : cervicsl LAD resolved [de-identified] : clear

## 2024-04-19 NOTE — ASSESSMENT
[FreeTextEntry1] : Ms. Roe is a 72 yo w with IDDM, BRCA carrier, mild former smoker with adeno ca of lung, mets to multiple organs  PDL1 high- 70% NGS from tissue reviewed in detail BRAF V600E BRCA2 E6212sc*22 (germ line likely) MSH6 G749fs*11 NFKBIA amplification NKX2-1 amplification  Given NGS results, recommend Tafinlar 75 BID and Mekinist 0.5 mg once daily She has been on it for .3months and is tolerating well.. Recently contracted COVId and not treated due to not being severelt symptomatic. She did have a prolonged URI/LRI  CT chest and abdomen imaging on 12/29 did reveal interval marked decrease of right lower lobe mass and bilateral metastatic nodule. Interval resolution of enlarged left axillary, mediastinal, bilateral hilar and retroperitoneal lymph nodes. Stable lytic bone metastases. Recent hospital notes reviewed. Pt admitted with tamponade which is likely post-viral rather than cancer med-related.  Post-viral pericardial effusion has been well-documented. Reference url below.  Reviewed images and labs from inpatient From cancer perspective, there is no indication of POD cytology from pericardial fluid is pending and will follow that While MEK/BRAFi can cause QTc prolongation and LVH failure, they are not known to cause pericardial effusions.  Based on all of these inferences, I recommended restarting the meds cautiously. Pt was educated about signs and symptoms of tamponade and to reach out ASAP if she has any AEs.  Pt had received Xgeva a few days prior to hospitalization. Despite the temporal coincidence, there is no suggestion that that the effusions are related to Xgeva. Ok to continue Q month  BW sent today including CBC CMP LDH and CEA CT head reviewed: ? new sub centimeter lesion. Scans in the hospital suggested infarcts- pt presented with aphasia. She has neuro work up pending, but per neurology, the event is unlikely to be neurological.  Repeat scans in 3 months extensive discussion regarding recent events conducted and emotional support was provided  OV in 3 weeks.    https://www.ncbi.nlm.nih.gov/pmc/articles/AGP7978073/

## 2024-04-19 NOTE — ASSESSMENT
[FreeTextEntry1] : Ms. Roe is a 72 yo w with IDDM, BRCA carrier, mild former smoker with adeno ca of lung, mets to multiple organs  PDL1 high- 70% NGS from tissue reviewed in detail BRAF V600E BRCA2 A2360mm*22 (germ line likely) MSH6 G749fs*11 NFKBIA amplification NKX2-1 amplification  Given NGS results, recommend Tafinlar 75 BID and Mekinist 0.5 mg once daily She has been on it for .3months and is tolerating well.. Recently contracted COVId and not treated due to not being severelt symptomatic. She did have a prolonged URI/LRI  CT chest and abdomen imaging on 12/29 did reveal interval marked decrease of right lower lobe mass and bilateral metastatic nodule. Interval resolution of enlarged left axillary, mediastinal, bilateral hilar and retroperitoneal lymph nodes. Stable lytic bone metastases. Recent hospital notes reviewed. Pt admitted with tamponade which is likely post-viral rather than cancer med-related.  Post-viral pericardial effusion has been well-documented. Reference url below.  Reviewed images and labs from inpatient From cancer perspective, there is no indication of POD cytology from pericardial fluid is pending and will follow that While MEK/BRAFi can cause QTc prolongation and LVH failure, they are not known to cause pericardial effusions.  Based on all of these inferences, I recommended restarting the meds cautiously. Pt was educated about signs and symptoms of tamponade and to reach out ASAP if she has any AEs.  Pt had received Xgeva a few days prior to hospitalization. Despite the temporal coincidence, there is no suggestion that that the effusions are related to Xgeva. Ok to continue Q month  BW sent today including CBC CMP LDH and CEA CT head reviewed: ? new sub centimeter lesion. Scans in the hospital suggested infarcts- pt presented with aphasia. She has neuro work up pending, but per neurology, the event is unlikely to be neurological.  Repeat scans in 3 months extensive discussion regarding recent events conducted and emotional support was provided  OV in 3 weeks.    https://www.ncbi.nlm.nih.gov/pmc/articles/VDX5915046/

## 2024-04-28 NOTE — HISTORY OF PRESENT ILLNESS
[FreeTextEntry1] : 73-year-old woman.  Summary from [facility] notes from mid 1/24, written by me and now further modified by me..  She has a PMHx significant for smoking, HTN, HLD, type 1 DM, thyroidectomy, BCC, SCC, RLL mass with possible possible mets to brain and bone (8/23). Awoke 6am with sob, abdominal pain, and dysarthria,  also had shortness of breath. Reportedly hypoxic to 70s.  Hypothermic 35.6, hypotensive 81/56. CT CAP with large pericardial and b/l pleural effusions. Repeat CTH and CTA H/N performed on 1/16/24 w/o acute findings.  2-hour EEG showed no epileptiform activity or seizures.  NIHSS: 10 (Questions +2, Right superior Quadrantanopia +1, Severe aphasia +2, Dysarthria +1, b/l leg drift +4) preMRS: 0  Impression: Resolved presumed expressive aphasia and right superior quadrantanopia due to diffuse brain dysfunction due to toxic metabolic encephalopathy, no evidence for acute ischemic stroke or seizure.   Recommendations: [] MRI brain can be performed outpatient -probably no contrast due to gadolinium allergy; will need PO sedation [] Treat with ASA 81 mg/day for now if no contraindication if MRI (inpatient or outpatient) shows no infarct, then aspirin can be stopped  5/10/2024.

## 2024-04-28 NOTE — DISCUSSION/SUMMARY
[FreeTextEntry1] : 5/10/2024.  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  In mid 1/24 she awoke with speech disturbance, dysarthria and/or possibly "aphasia" and was thought to have a right superior quadrantanopia.  CT head/CTA neck and head and EEG were unremarkable and her deficits resolved.  The diagnosis was unclear but thought at the time to be more likely toxic-metabolic encephalopathy rather than acute ischemic stroke or seizure.  She was treated with aspirin as a precaution  She should have MRI brain (no contrast due to gadolinium allergy?  And needs sedation?).  If MRI brain shows no evidence of infarction (either recent or remote) then aspirin can most likely be stopped if there is no other indication for an antiplatelet agent.

## 2024-05-01 NOTE — ASSESSMENT
[FreeTextEntry1] : Ms. 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   S/p IR bx of left supraclavicular lymph node with Dr. Clement Santos. Path level 4 LN: Metastatic lung adenocarcinoma.  Currently on systemic therapy since 11/2023 and started on Xgeva in 01/2024 with Dr. Plummer. Plan to continue therapy.   Patient with recurrent malignant left pleural effusion s/p thoracentesis x2. Now s/p Left VATS insertion of left pleurX catheter on 04/15/2024.  Patient is here today for follow up with imaging.   I have independently reviewed the medical records and imaging at the time of this office consultation. CXR reviewed, with small right sided pleural effusion. She remains asymptomatic. Discussed decrease drainage to 2x/weekly given low output. I would like her to return to clinic in 3 weeks with repeat CXR. She is agreeable. Continue follow up with heme/onc.   Recommendations reviewed with patient during this office visit, and all questions answered; Patient instructed on the importance of follow up and verbalizes understanding.    I, ERIC Marie, personally performed the evaluation and management (E/M) services for this established patient. That E/M includes conducting the examination, assessing all new/exacerbated conditions, and establishing a new plan of care. Today, my ACP, Wood Wolff NP, was here to observe my evaluation and management services for this new problem/exacerbated condition to be followed going forward.

## 2024-05-01 NOTE — CONSULT LETTER
[FreeTextEntry2] : Dr. Marcus Rizvi (PCP) [FreeTextEntry3] : Louis Ding MD, FACS , Division of Thoracic Surgery [facility] Thoracic Surgery [facility] Department of Cardiovascular & Thoracic Surgery  [employer] [facility]

## 2024-05-01 NOTE — HISTORY OF PRESENT ILLNESS
[FreeTextEntry1] : Ms. 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   CT Chest on 08/29/2023: - Right lower lobe mass with inferior 4.5 cm nodular component (2-119), and elongated component that tracks centrally to the right hilum, occluding the medial basal segmental bronchus.  - Patent airways through the lobar bronchi.  - Several solid nodules in all lobes, for instance, largest including 1.5 cm in the superior segment of the left lower lobe and the lingula.  - Trace right pleural effusion. - Bilateral mediastinal, left supraclavicular, left axillary, and left subpectoral lymphadenopathy, largest including 2.7 cm in the left supraclavicular region. - Multiple lytic bone lesions including the manubrium, inferior sternum and left 11th rib. - Small pericardial effusion. Coronary artery calcifications.  PFTs on 09/18/2023: FVC 2.01, 68%; FEV1 1.41, 63%; DLCO 10.9, 53%  Established care with Dr. Armen Christensen on 09/27/2023: Patient with L paratracheal and supraclavicular masses in context R lung and hilar mass and other bony findings. I highly suspect a primary lung CA w met disease and not a primary neck CA process. I will f/u on path w recs from managing to follow.  09/28/2023: S/p IR bx of left supraclavicular lymph node with Dr. Clement Santos. Path level 4 LN: Metastatic lung adenocarcinoma  PET/CT on 10/07/2023: 1. Right lower lobe mass not well delineated due to new right pleural effusion but appears more prominent than the on the most recent CT chest with increased activity. Multiple hypermetabolic lung nodules in both lung fields. Findings compatible with likely primary right lower lobe mass with multiple metastatic lung nodules. 2. Hypermetabolic nodes in the left neck, thorax and below the diaphragm compatible with biopsied konrad i. Extent of retroperitoneal and mesenteric konrad involvement is more extensive than usually seen with lung malignancy; may need further tissue evaluation. 3. Multiple FDG avid bone lesions and a hypermetabolic left gluteal muscle lesion compatible with metastases. Focus in the left side of the skull base is difficult to assess on this PET/CT and can be further evaluated with CT head or MRI. 4. Multiple foci of increased activity in the liver without corresponding low-dose CT abnormality most likely due to disease involvement.  10/11/2023: Path consistent + Tumor involvement in level 4 LN. Discussed necessity of following up with Heme Onc for further evaluation and treatment. PET/CT reviewed- Right pleural effusion demonstrated. Discussed IR guided Thoracentesis for diagnostic and therapeutic purposes. Patient agrees to proceed. Has stage 4 disease and is not candidate for resectional surgery.   CT Head w/wo IV Contrast on 10/23/2023: - At least 4 enhancing nodules, largest within the inferomedial aspect of the left frontal lobe measuring 1.4 x 1.3 x 1.2 cm, compatible with intracranial metastatic disease.  CT CA/P w/IV Contrast on 12/29/2023: - Interval marked decrease of right lower lobe mass and near resolution of bilateral metastatic nodules.  - The residual 2.7 cm right lower lobe nodule previously measured 5 cm. - Scattered bone metastases are unchanged for example 3 cm lytic lesion within the manubrium (series 3 image 19) and lytic lesion within posterior left 10th rib (image 66). - Interval resolution of enlarged left axillary, mediastinal, bilateral hilar and retroperitoneal lymph nodes.  01/14-01/18/2024: Admitted to [facility] with c/o confusion, SOB, chest pain, hypothermia, tachycardia, hypotension, and expressive aphasia. CT C/A/P with large pericardial effusion s/p pericardial window. S/p 1Liter IVF and started on IV pressor. Pt went urgently for drainage with IR, s/p drain of pericardial effusion 800 sanguineous fluid. She was admitted to CCU. Repeat echo showed trace pericardial effusion. CT Sx was consulted for window. No intervention recommended at the time given that effusion improved with drain. Pericardial drain was pulled on 1/16 without any complication. Repeat echo on 1/17 showed no effusion.  CT C/A/P on 01/14/2024: - New large pericardial effusion. - New small to moderate sized bilateral layering pleural effusions.  - Bilateral distal airways impaction.  - Multifocal pulmonary opacities predominantly of the lower lobes and interlobular septal thickening are noted.  - Pulmonary opacities limit evaluation for previously described right lower lobe mass and small residual metastatic nodules. - Significant gallbladder wall edema may be due to third spacing.  - Small abdominopelvic ascites. 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  02/14/2024: Seen by rad/onc- Dr. Maddox, CT shows excellent cytoreduction of all previously seen lesions. There appears to be a questionable new subcm lesion. Believe this can be followed. Plan for 6 weeks f/u with CT Head.  Last seen on 02/21/2024: For follow up for recently noted pericardial effusion during hospitalization in 01/2024. Discussed with patient that she needs to repeat echo to assess for pericardial effusion. As per patient she has an appointment with her cardiologist next Monday. RTC PRN.   TTE on 02/27/2024:  - EF 71% - Mild mitral regurgitation. - Trace pericardial effusion seen  03/12/2024: Seen by Dr. Plummer (heme/onc). Plan to continue Xgeva every month. CT head with ? new subcm lesion., patient presented with aphasis (following Dr. Libman- Neuro). Plan to repeat scan in 04/2024.   CXR on 03/29/2024: - Large left pleural effusion, increased from previous. - Stable smaller right pleural effusion.  04/01/2024: She presented to [facility] ED with c/o left pleural effusion noted on CXR done by cardiology. Endorsed this dyspnea on exertion. Now s/p left thoracentesis completed by CT Sx, Drained 1400cc serosanguineous fluid. GMS and culture are negative. Cytopathology is positive for malignant cells, metastatic adenocarcinoma.   - She was discharged home to follow up outpatient in 2 weeks with CXR.  CXR on 04/01/2024: - Interval decrease in size left pleural effusion status post left thoracentesis without procedural pneumothorax. - Small right pleural effusion is similar to prior.  CT Head w/IV Head on 04/08/2024: - 6 mm metastatic lesion in the LEFT gyrus rectus unchanged.  - Previously noted RIGHT frontal metastatic lesions are no longer visible.  CT Chest on 04/08/2024: - Multiple lymph nodes are present in the left subpectoral region and the left axilla. They have increased in size and number when compared to previous exam. One of the largest lymph node noted in the left axilla measures approximately 1.7 x 1.1 cm. - Several nodules are noted within both lungs. One of the largest nodule is noted in the left upper lobe and measures 0.6 cm. - Minimal patchy groundglass opacities in both upper lobes are noted.  - Compressive atelectasis is noted involving portions of the lingula and both lower lobes.  - Small-to-moderate size left pleural effusion and small right pleural effusion are noted.  04/10/2024: CT chest with recurrent pleural effusion L>R. Discussed placement of left pleurX catheter.  Now s/p Left VATS insertion of left pleurX catheter on 04/15/2024. 1L of fluid was drained.  - Her post operative course included insertion of right pigtail catheter that was removed after drainage of fluid.  Currently draining 3x/weekly: On average she has drained between  cc, last drained on Monday with less than 5 cc.  CXR today: reviewed.   Patient is here today for follow up follow up. Overall, she reports to be feeling well. Denies any chest pain, shortness of breath, cough, or hemoptysis.

## 2024-05-02 PROBLEM — R60.0 EDEMA, LOWER EXTREMITY: Status: ACTIVE | Noted: 2024-01-01

## 2024-05-02 NOTE — DISCUSSION/SUMMARY
[FreeTextEntry1] : EKG today shows:   Sinus Rhythm at 91 bpm.  Normal intervals and axis.  NS ST/T abnormality; no significant change.  Echo today shows: Normal left and right atrial size.Left ventricular systolic function is normal with an ejection fraction of 67 % by Farrell's method of disks.The right ventricular cavity is normal in size, with normal wall thickness and normal systolic function. Mild mitral regurgitation.Mild pulmonic regurgitation.Small circumferential pericaridal effusion.Large right pleural effusion noted.Compared to echocardiogram done on 2/27/24, pericardial effusion appears slightly larger.  Right pleural effusion is new.   PLAN: 1. Pericardial effusion s/p drainage  - f/u TTE shows no significant re-accumulation.  2.  Hypertension - not an active issue at this time. -   Reduce lisinopril to 10 mg. qd; may consider d/c depending on course.  3.  LE Edema - agree with d/c Norvaxc - advised reducing fluid intake - will discuss adding Lasix with Dr. Phillips.  4.  HypoNa -  Fluid restriction - stop salt supplementation - increase protein intake (recent albumen 2.6) - will speak to Dr. Phillips.    44 minutes spent on today's office visit.  Return in 6 weeks. [EKG obtained to assist in diagnosis and management of assessed problem(s)] : EKG obtained to assist in diagnosis and management of assessed problem(s)

## 2024-05-02 NOTE — REASON FOR VISIT
[FreeTextEntry1] :    Keisha Roe returns today for follow-up visit re hypertension, hyperlipidemia, and mild mitral valve insufficiency.  She also is being treated for lung Ca and required drainage of a pericardial effusion earlier this year.

## 2024-05-02 NOTE — HISTORY OF PRESENT ILLNESS
[FreeTextEntry1] : Within the past few mos., a pulmonary mass was seen with lytic lesions seen chest film. Bx of a supraclavicular node was c/w lung adenoca. W/U has also raised concern of liver and brain involvement.     In Jan., she was admitted to [facility] and was found to have a large pericardial effusion requiring drainage by IR.      As she returns today, she has been having problems with low Na levels and also LE edema.  BP has also been lower of late.  Amlodipine was stopped 5 days ago and lisinopril reduced to once daily.  She is on Florinef at Dr. Phillips's direction.  She has been consuming extra NaCL due to the low sodium level and extra fluid due to the chemo.  She continues to f/u with Heme=onc and f/y scans have shown some signs of remission.  She required placement of a Pleur-X catheter for a L pleural effusion.  She does not describe exertional chest discomfort or significant ALVARES.  There have been no episodes of lightheadedness or LOC.

## 2024-05-22 PROBLEM — C79.31 MALIGNANT NEOPLASM METASTATIC TO BRAIN: Status: ACTIVE | Noted: 2023-01-01

## 2024-05-22 PROBLEM — J90 PLEURAL EFFUSION, RIGHT: Status: ACTIVE | Noted: 2023-01-01

## 2024-05-22 PROBLEM — J90 PLEURAL EFFUSION, LEFT: Status: ACTIVE | Noted: 2024-01-01

## 2024-05-22 PROBLEM — C34.90 ADENOCARCINOMA OF LUNG: Status: ACTIVE | Noted: 2024-01-01

## 2024-05-22 NOTE — CONSULT LETTER
[FreeTextEntry2] : Dr. Marcus Rizvi (PCP) [FreeTextEntry3] : Louis Ding MD, FACS , Division of Thoracic Surgery [facility] Thoracic Surgery [facility] Department of Cardiovascular & Thoracic Surgery  [employer] [facility]

## 2024-05-22 NOTE — PHYSICAL EXAM
[Heart Rate And Rhythm] : heart rate was normal and rhythm regular [Heart Sounds] : normal S1 and S2 [Heart Sounds Gallop] : no gallops [Murmurs] : no murmurs [Heart Sounds Pericardial Friction Rub] : no pericardial rub [Examination Of The Chest] : the chest was normal in appearance [Chest Visual Inspection Thoracic Asymmetry] : no chest asymmetry [Diminished Respiratory Excursion] : normal chest expansion [FreeTextEntry1] : diminished RLL lung sound

## 2024-05-22 NOTE — HISTORY OF PRESENT ILLNESS
[FreeTextEntry1] : Ms. 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   CT Chest on 08/29/2023: - Right lower lobe mass with inferior 4.5 cm nodular component (2-119), and elongated component that tracks centrally to the right hilum, occluding the medial basal segmental bronchus.  - Patent airways through the lobar bronchi.  - Several solid nodules in all lobes, for instance, largest including 1.5 cm in the superior segment of the left lower lobe and the lingula.  - Trace right pleural effusion. - Bilateral mediastinal, left supraclavicular, left axillary, and left subpectoral lymphadenopathy, largest including 2.7 cm in the left supraclavicular region. - Multiple lytic bone lesions including the manubrium, inferior sternum and left 11th rib. - Small pericardial effusion. Coronary artery calcifications.  PFTs on 09/18/2023: FVC 2.01, 68%; FEV1 1.41, 63%; DLCO 10.9, 53%  Established care with Dr. Armen Christensen on 09/27/2023: Patient with L paratracheal and supraclavicular masses in context R lung and hilar mass and other bony findings. I highly suspect a primary lung CA w met disease and not a primary neck CA process. I will f/u on path w recs from managing to follow.  09/28/2023: S/p IR bx of left supraclavicular lymph node with Dr. Clement Santos. Path level 4 LN: Metastatic lung adenocarcinoma  PET/CT on 10/07/2023: 1. Right lower lobe mass not well delineated due to new right pleural effusion but appears more prominent than the on the most recent CT chest with increased activity. Multiple hypermetabolic lung nodules in both lung fields. Findings compatible with likely primary right lower lobe mass with multiple metastatic lung nodules. 2. Hypermetabolic nodes in the left neck, thorax and below the diaphragm compatible with biopsied konrad i. Extent of retroperitoneal and mesenteric konrad involvement is more extensive than usually seen with lung malignancy; may need further tissue evaluation. 3. Multiple FDG avid bone lesions and a hypermetabolic left gluteal muscle lesion compatible with metastases. Focus in the left side of the skull base is difficult to assess on this PET/CT and can be further evaluated with CT head or MRI. 4. Multiple foci of increased activity in the liver without corresponding low-dose CT abnormality most likely due to disease involvement.  10/11/2023: Path consistent + Tumor involvement in level 4 LN. Discussed necessity of following up with Heme Onc for further evaluation and treatment. PET/CT reviewed- Right pleural effusion demonstrated. Discussed IR guided Thoracentesis for diagnostic and therapeutic purposes. Patient agrees to proceed. Has stage 4 disease and is not candidate for resectional surgery.   CT Head w/wo IV Contrast on 10/23/2023: - At least 4 enhancing nodules, largest within the inferomedial aspect of the left frontal lobe measuring 1.4 x 1.3 x 1.2 cm, compatible with intracranial metastatic disease.  CT CA/P w/IV Contrast on 12/29/2023: - Interval marked decrease of right lower lobe mass and near resolution of bilateral metastatic nodules.  - The residual 2.7 cm right lower lobe nodule previously measured 5 cm. - Scattered bone metastases are unchanged for example 3 cm lytic lesion within the manubrium (series 3 image 19) and lytic lesion within posterior left 10th rib (image 66). - Interval resolution of enlarged left axillary, mediastinal, bilateral hilar and retroperitoneal lymph nodes.  01/14-01/18/2024: Admitted to [facility] with c/o confusion, SOB, chest pain, hypothermia, tachycardia, hypotension, and expressive aphasia. CT C/A/P with large pericardial effusion s/p pericardial window. S/p 1Liter IVF and started on IV pressor. Pt went urgently for drainage with IR, s/p drain of pericardial effusion 800 sanguineous fluid. She was admitted to CCU. Repeat echo showed trace pericardial effusion. CT Sx was consulted for window. No intervention recommended at the time given that effusion improved with drain. Pericardial drain was pulled on 1/16 without any complication. Repeat echo on 1/17 showed no effusion.  CT C/A/P on 01/14/2024: - New large pericardial effusion. - New small to moderate sized bilateral layering pleural effusions.  - Bilateral distal airways impaction.  - Multifocal pulmonary opacities predominantly of the lower lobes and interlobular septal thickening are noted.  - Pulmonary opacities limit evaluation for previously described right lower lobe mass and small residual metastatic nodules. - Significant gallbladder wall edema may be due to third spacing.  - Small abdominopelvic ascites. 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  02/14/2024: Seen by rad/onc- Dr. Maddox, CT shows excellent cytoreduction of all previously seen lesions. There appears to be a questionable new subcm lesion. Believe this can be followed. Plan for 6 weeks f/u with CT Head.  Last seen on 02/21/2024: For follow up for recently noted pericardial effusion during hospitalization in 01/2024. Discussed with patient that she needs to repeat echo to assess for pericardial effusion. As per patient she has an appointment with her cardiologist next Monday. RTC PRN.   TTE on 02/27/2024:  - EF 71% - Mild mitral regurgitation. - Trace pericardial effusion seen  03/12/2024: Seen by Dr. Plummer (heme/onc). Plan to continue Xgeva every month. CT head with ? new subcm lesion., patient presented with aphasis (following Dr. Libman- Neuro). Plan to repeat scan in 04/2024.   CXR on 03/29/2024: - Large left pleural effusion, increased from previous. - Stable smaller right pleural effusion.  04/01/2024: She presented to [facility] ED with c/o left pleural effusion noted on CXR done by cardiology. Endorsed this dyspnea on exertion. Now s/p left thoracentesis completed by CT Sx, Drained 1400cc serosanguineous fluid. GMS and culture are negative. Cytopathology is positive for malignant cells, metastatic adenocarcinoma.   - She was discharged home to follow up outpatient in 2 weeks with CXR.  CXR on 04/01/2024: - Interval decrease in size left pleural effusion status post left thoracentesis without procedural pneumothorax. - Small right pleural effusion is similar to prior.  CT Head w/IV Head on 04/08/2024: - 6 mm metastatic lesion in the LEFT gyrus rectus unchanged.  - Previously noted RIGHT frontal metastatic lesions are no longer visible.  CT Chest on 04/08/2024: - Multiple lymph nodes are present in the left subpectoral region and the left axilla. They have increased in size and number when compared to previous exam. One of the largest lymph node noted in the left axilla measures approximately 1.7 x 1.1 cm. - Several nodules are noted within both lungs. One of the largest nodule is noted in the left upper lobe and measures 0.6 cm. - Minimal patchy groundglass opacities in both upper lobes are noted.  - Compressive atelectasis is noted involving portions of the lingula and both lower lobes.  - Small-to-moderate size left pleural effusion and small right pleural effusion are noted.  Now s/p Left VATS insertion of left pleurX catheter on 04/15/2024. 1L of fluid was drained.  - Her post operative course included insertion of right pigtail catheter that was removed after drainage of fluid.  CXR on 05/01/2024: - Bilateral small effusions with left-sided Pleurx catheter and mild increase in right effusion.  05/13/2024: VNS contacted office; patient has been drainage less than 5 cc for several weeks. Drainage decreased to once a week.  Currently draining once/week: less than 5 cc  CXR today: right pleural effusion  Patient is here today for follow up with CXR. Reports shortness of breath with exertion. Denies cough, fever, or chills.

## 2024-05-22 NOTE — ASSESSMENT
[FreeTextEntry1] : Ms. 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   S/p IR bx of left supraclavicular lymph node with Dr. Clement Santos. Path level 4 LN: Metastatic lung adenocarcinoma.  Currently on systemic therapy since 11/2023 and started on Xgeva in 01/2024 with Dr. Plummer. Plan to continue therapy.   Patient with recurrent malignant left pleural effusion s/p thoracentesis x2. Now s/p Left VATS insertion of left pleurX catheter on 04/15/2024.  Patient is here today for follow up with CXR.  I have independently reviewed the medical records and imaging at the time of this office consultation. CXR reviewed, revealing right pleural effusion, which is likely the cause of her symptoms. Discussed placement of pleurX catheter vs. thoracentesis. Discussed there is a high chance of recurrece after thoracentesis. She has opted for pleurX catheter. Additionally, low output on the left pleurX catheter, discussed it is appropriate to remove the left pleurX catheter. Procedural approach reviewed with patient. Discussed risks in details, patient is agreeable to proceed. Will schedule for Right pleurx catheter placement and left pleurX catheter removal. PST on admit.   Recommendations reviewed with patient during this office visit, and all questions answered; Patient instructed on the importance of follow up and verbalizes understanding.    I, ERIC Marie, personally performed the evaluation and management (E/M) services for this established patient. That E/M includes conducting the examination, assessing all new/exacerbated conditions, and establishing a new plan of care. Today, my ACP, Wood Wolff NP, was here to observe my evaluation and management services for this new problem/exacerbated condition to be followed going forward.

## 2024-05-24 PROBLEM — S81.801A OPEN WOUND OF BOTH LOWER EXTREMITIES: Status: ACTIVE | Noted: 2024-01-01

## 2024-05-28 NOTE — DISCHARGE NOTE PROVIDER - NSDCFUSCHEDAPPT_GEN_ALL_CORE_FT
[facility]  Scheduled Appointment: 06/03/2024    Marcus Rizvi  [facility]  INTMED [address]  Scheduled Appointment: 06/04/2024    Génesis Plummer  [facility]  Scheduled Appointment: 06/06/2024    Libman, Richard B  [facility]  NEUROLOGY [address]  Scheduled Appointment: 06/14/2024    Dwayne Strauss  [facility]  CARDIOLOGY [address]   Scheduled Appointment: 06/28/2024    [facility]  MRI  [facility]  Scheduled Appointment: 07/12/2024    Niranjan Maddox  [facility]  RADMED [address] [facility]  Scheduled Appointment: 07/17/2024

## 2024-05-28 NOTE — H&P ADULT - NSHPSOCIALHISTORY_GEN_ALL_CORE
Does not use illicit drugs (V49.89) (Z78.9)  Former smoker (V15.82) (Z87.891)  Has 2 children  Marital History - Currently     Retired  Social alcohol use (Z78.9)

## 2024-05-28 NOTE — DISCHARGE NOTE PROVIDER - NSDCCPTREATMENT_GEN_ALL_CORE_FT
PRINCIPAL PROCEDURE  Procedure: Insertion, pigtail catheter, pleural  Findings and Treatment: Right      SECONDARY PROCEDURE  Procedure: Removal, PleurX catheter system, pleural cavity  Findings and Treatment: Left

## 2024-05-28 NOTE — DISCHARGE NOTE PROVIDER - HOSPITAL COURSE
73F with PMHx of HTN, HLD, DM1, thyroidectomy, Adeno lung CA w RLL mass dx 8/2023. Stage 4 w/ mets to brain & bone, Anemia, BCC, Chronic Hyponatremia, HLD, Pedal edema, Recurrent L pleural effuson, SCC, Thyroid folliculr adenoma-> Thyroidectomy, Exc neck lipoma, C-sxn x 2. She previously had a left pleurX placed on 4/15/24 for pleural effusions. On 5/28/24 she was brought in for the removal of the pleureX catheter and placement of right pleurX. Pre-operatively, she went into Afib with RVR for which she was treated with cardizem and then converted to sinus bradycardia/ sinus rhythm while bearing down. Or was cancelled, left pleurX was removed at bedside and right pigtail catheter was placed.

## 2024-05-28 NOTE — H&P ADULT - ASSESSMENT
73 year old female wit metastatic lung adenocarcinoma.Currently on systemic therapy since 11/2023 and started on Xgeva in 01/2024 with Dr. Plummer. Plan to continue therapy. Patient with recurrent malignant left pleural effusion now s/p Left VATS insertion of left pleurX catheter on 04/15/2024. Now with right pleural effusion. Patient here today for left pleurX removal due to low output and placement of right pleurX for effusion.

## 2024-05-28 NOTE — DISCHARGE NOTE PROVIDER - NSDCFUADDINST_GEN_ALL_CORE_FT
Postop course was uncomplicated and she was discharged home on POD#  Follow up with Dr. Ding in 10-14 days. Call Thoracic Surgery office [phone] to schedule an appointment. Please, make an appt with your Primary care provider.   You may shower in 24 hours with dressing and remove in the shower. Keep the wound clean and dry it well after showering.  It’s OK if some fluid comes out of the chest tube hole unless blood or purulent. No driving while taking pain meds. Some coughing and discomfort is expected.

## 2024-05-28 NOTE — PROCEDURE NOTE - GENERAL PROCEDURE DETAILS
Left pleurX catheter indicated for removal. Sterile technique utilized. Local anesthetic applied. Blunt dissection with franck clamp while holding tension on pleurX. PleurX then removed and a silk suture was placed, dressed with gauze and tape.

## 2024-05-28 NOTE — H&P ADULT - HISTORY OF PRESENT ILLNESS
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 Post IR bx of left supraclavicular lymph node with Dr. Clement Santos. Path level 4 LN: Metastatic lung adenocarcinoma.Currently on systemic therapy since 11/2023 and started on Xgeva in 01/2024 with Dr. Plummer. Plan to continue therapy. Patient with recurrent malignant left pleural effusion s/p thoracentesis x2. Now s/p Left VATS insertion of left pleurX catheter on 04/15/2024.Patient with low left pleurX catheter output, discussed removal. Also, noted with right pleural effusion which is likely cause of symptoms. Discussed placement of pleurX catheter vs. thoracentesis. Discussed there is a high chance of recurrece after thoracentesis. She has opted for pleurX catheter.

## 2024-05-28 NOTE — DISCHARGE NOTE PROVIDER - NSDCMRMEDTOKEN_GEN_ALL_CORE_FT
acetaminophen 325 mg oral tablet: 2 tab(s) orally every 6 hours As needed Mild Pain (1 - 3)  amLODIPine 5 mg oral tablet: 1 tab(s) orally 2 times a day  aspirin 81 mg oral delayed release tablet: 1 tab(s) orally once a day  fludrocortisone 0.1 mg oral tablet: 1 tab(s) orally once a day  HumaLOG KwikPen 100 units/mL injectable solution: 2 unit(s) injectable 3 times a day (before meals)  insulin glargine 100 units/mL subcutaneous solution: 10 unit(s) subcutaneous 2 times a day  lisinopril 20 mg oral tablet: 1 tab(s) orally 2 times a day  simvastatin 20 mg oral tablet: 1 tab(s) orally once a day (at bedtime)

## 2024-05-28 NOTE — H&P ADULT - NSHPPHYSICALEXAM_GEN_ALL_CORE
Pulmonary:. diminished RLL lung sound.  Heart: heart rate was normal and rhythm regular, normal S1 and S2, no gallops, no murmurs and no pericardial rub.  Chest: the chest was normal in appearance, no chest asymmetry and normal chest expansion.  ?

## 2024-05-28 NOTE — DISCHARGE NOTE PROVIDER - CARE PROVIDER_API CALL
Louis Ding Ramana  Thoracic Surgery  [address] [place] [address] [place] [address]  Phone: [phone]  Fax: [phone]  Follow Up Time:

## 2024-05-29 NOTE — CONSULT NOTE ADULT - ASSESSMENT
Assessment/Plan:    Wound Consult requested to assist w/ management of    Compression BLE  Consider LEXI/PVR, XRay, Duplex, MRI, CT  BLE elevation  Abx per Medicine/ ID  Moisturize intact skin w/ SWEEN cream BID  Nutrition Consult for optimization as tolerated in pt w/ Protein Calorie Malnutirion        Inadequate PO intake        consider MVI & Vit C to promote wound healing        encourage high quality protein when appropriate  Hyperglycemia - consider HgA1c        FS w/ ISS q6h, consider Endo Consult  Anemia- transfusions, Fe studies  Continue turning and positioning w/ offloading assistive devices as per protocol  Continue w/ Pericare as per protocol  Waffle Cushion to chair when oob to chair  Continue w/ low air loss fluidized bed surface     Care as per medicine, will follow w/ you    Upon discharge f/u as outpatient at [facility] [address] [place], [place] [phone]  Seen w/ attng and D/w team    Thank you for this consult  DARIANA Lane, DAVE (pager #[phone])    If after 4PM or before 7:30AM on Mon-Friday or weekend/holiday please contact general surgery for urgent matters.   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 Post IR bx of left supraclavicular lymph node with Dr. Clement Santos. Path level 4 LN: Metastatic lung adenocarcinoma.Currently on systemic therapy since 11/2023 and started on Xgeva in 01/2024 with Dr. Plummer. Plan to continue therapy. Patient with recurrent malignant left pleural effusion s/p thoracentesis x2. Now s/p Left VATS insertion of left pleurX catheter on 04/15/2024. Patient with low left pleurX catheter output, s/p removal 5/28/24. Admitted with right pleural effusion which is likely cause of symptom s/p R pigtail placement (large Right pleural effusion) on 5/28/24 and R pleurodesis with Bleomycin on 5/29/24, transferred to ICU for hypotension, A-fib with RVR. Pt remains on Pressor support.     Wound Consult requested to assist w/ management of bilateral lower extremity wounds  -Multiple ulceration to bilateral anterior lower legs. Appear superficial. Wound bases predominantly with fibrin film. Moderate to large serofibrinous drainage.  -No purulent drainage, no odor.  -Periwound skin without overt s/s of acute soft tissue infection.  -Bilateral feet cool to touch, plantar feet with faint mottling. DP pulses palpable but weak, Right DP significantly weaker than Left.   -Bilateral LE (+) 4 pitting edema; RLE > LLE.  -Patient with h/o SCC to legs (per H&P) pt's spouse does not recall diagnosis of SCC, patient lethargic and withdrawn; unable to obtain history. Wounds do not appear to be malignant. Findings are suggestive of sequela from fluid overload now compromised by arterial insufficiency. However, would recommend monitoring closely, if concern for recurrence arises, recommend Dermatology Consult or outpatient follow up.   -While findings may be a result of vasopressor support, would recommend obtaining LEXI/PVR and/or consulting vascular team. Clinically (+) arterial insufficiency without overt s/s of acute ischemic limb. Regardless of etiology of arterial insufficiency full wound healing at this time is guarded.  -Recommend venous doppler r/o RLE DVT  -Consider outpatient follow up for venous insufficiency.  -Topical recommendations: Cleanse wounds and periwound skin with NS, pat dry. Apply Liquid barrier film to periwound skin. Apply Aquacel hydrofiber to wound bases, cover with abdominal pads and kerlix wrap. Once LEXI/PVR is completed and patient is off vasopressor support, may consider compression wraps at later time.   -B/L LE offload heels with complete cair boots.   -Recommend nutrition consult for optimization  -DM type 1 management per primary team.    Upon discharge f/u at [facility] [address] [facility] [phone]  Findings and plan discussed in detail with patient's spouse, Dr. JUMANA Cowan primary ICU attending and primary RN at bedside. All questions and concerns addressed to meet patient's family's satisfaction.  Will continue to monitor periodically throughout hospitalization. Please reconsult earlier as needed.  Remainder of care per primary team.    Thank you for this consult  DARIANA Lane, DAVE (pager #[phone])    If after 4PM or before 7:30AM on Mon-Friday or weekend/holiday please contact general surgery for urgent matters.   Team A- [id number]   Team B- [id number]  For non-urgent matters e-mail [email or web address]    I spent 75 minutes face to face with this patient of which more than 50% of the time was spent counseling & coordinating care of this pt

## 2024-05-29 NOTE — PATIENT PROFILE ADULT - FALL HARM RISK - HARM RISK INTERVENTIONS

## 2024-05-29 NOTE — CONSULT NOTE ADULT - SUBJECTIVE AND OBJECTIVE BOX
Incomplete full note to follow. B/L LE without s/s of acute soft tissue infection.  RLE edema > LLE. Spoke with primary ICU team, recommendations made for venous duplex r/o DVT.  B/L LE cool to touch, +1 dp pulses, capillary refill > 3 seconds, bilateral plantar toes and heels with faint mottling. Recommendations made to obtain LEXI/PVR.  -->Topical recommendations: Cleanse wounds and periwound skin with NS, pat dry. Apply Liquid barrier film to periwound skin. Apply Aquacel hydrofiber to wound bases, cover with abdominal pads and kerlix wrap.   -->B/L LE offload heels with complete cair boots.       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 Post IR bx of left supraclavicular lymph node with Dr. Clement Santos. Path level 4 LN: Metastatic lung adenocarcinoma. Currently on systemic therapy since 2023 and started on Xgeva in 2024 with Dr. Plummer. Plan to continue therapy. Patient with recurrent malignant left pleural effusion s/p thoracentesis x2. Now s/p Left VATS insertion of left pleurX catheter on 04/15/2024. Patient with low left pleurX catheter output, discussed removal. Also, noted with right pleural effusion which is likely cause of symptoms. Discussed placement of pleurX catheter vs. thoracentesis. Discussed there is a high chance of recurrence after thoracentesis. She has opted for pleurX catheter. (28 May 2024 07:12)    Wound consult requested to assist w/ management of bilateral lower leg anterior wounds.       Current Diet: Diet, Consistent Carbohydrate/No Snacks (24 @ 21:37)      PAST MEDICAL & SURGICAL HISTORY:  Hypertension    DM type 1 (diabetes mellitus, type 1)  Dx 47 years ago    Basal cell carcinoma  forehead and leg    SCC (squamous cell carcinoma)  on chest    BRCA2 positive    Hyperlipidemia    Latex allergy    Thyroid follicular adenoma    BCC (basal cell carcinoma)    Anemia    Chronic hyponatremia    Pedal edema    Pleural effusion, left    S/P  Section x2  ,     Excision of Lipoma of neck      S/P thyroidectomy    History of thoracentesis      REVIEW OF SYSTEMS:     MEDICATIONS  (STANDING):  dextrose 10% Bolus 125 milliLiter(s) IV Bolus once  dextrose 5%. 1000 milliLiter(s) (50 mL/Hr) IV Continuous <Continuous>  dextrose 5%. 1000 milliLiter(s) (100 mL/Hr) IV Continuous <Continuous>  dextrose 50% Injectable 25 Gram(s) IV Push once  dextrose 50% Injectable 12.5 Gram(s) IV Push once  diltiazem Infusion 10 mG/Hr (10 mL/Hr) IV Continuous <Continuous>  fentaNYL    Injectable 25 MICROGram(s) IV Push once  glucagon  Injectable 1 milliGRAM(s) IntraMuscular once  heparin   Injectable 5000 Unit(s) SubCutaneous every 8 hours  insulin lispro (ADMELOG) corrective regimen sliding scale   SubCutaneous at bedtime  insulin lispro (ADMELOG) corrective regimen sliding scale   SubCutaneous three times a day before meals  lactated ringers. 1000 milliLiter(s) (75 mL/Hr) IV Continuous <Continuous>  midodrine 10 milliGRAM(s) Oral every 8 hours  phenylephrine    Infusion 0.9 MICROgram(s)/kG/Min (16.9 mL/Hr) IV Continuous <Continuous>  polyethylene glycol 3350 17 Gram(s) Oral daily  senna 2 Tablet(s) Oral at bedtime  simvastatin 20 milliGRAM(s) Oral at bedtime    MEDICATIONS  (PRN):  bisacodyl Suppository 10 milliGRAM(s) Rectal daily PRN Constipation  dextrose Oral Gel 15 Gram(s) Oral once PRN Blood Glucose LESS THAN 70 milliGRAM(s)/deciliter      Allergies    Actonel (Unknown)  Gadavist (Anaphylaxis; Hives)  latex (Urticaria)  codeine (Vomiting)    Intolerances        SOCIAL HISTORY:  / /single/ ; (+)HHA/ lives in SNF; Former smoker, Denies smoking, ETOH, drugs    FAMILY HISTORY:  No pertinent family history in first degree relatives        PHYSICAL EXAM:  Vital Signs Last 24 Hrs  T(C): 36.4 (29 May 2024 12:00), Max: 36.5 (29 May 2024 08:00)  T(F): 97.6 (29 May 2024 12:00), Max: 97.7 (29 May 2024 08:00)  HR: 63 (29 May 2024 13:00) (56 - 79)  BP: 98/56 (29 May 2024 13:00) (81/47 - 115/58)  BP(mean): 70 (29 May 2024 13:00) (57 - 82)  RR: 18 (29 May 2024 13:00) (15 - 36)  SpO2: 94% (29 May 2024 13:00) (90% - 100%)    Parameters below as of 29 May 2024 14:00  Patient On (Oxygen Delivery Method): nasal cannula w/ humidification        Constitutional: NAD / gaurded but stable,  A&Ox3/ Alert/ Confused  cachectic/ MO/ Obese/ frail  WD/ WN/ WG/ Disheveled  (+) low airloss support surface, (+) fluidized postioining devices, (+) complete cair boots  HEENT:  NC/AT, PERRL, EOMI, mucosa moist, throat clear, trachea midline, neck supple  Cardiovascular: RRR   Respiratory: CTA  Gastrointestinal soft NT/ND (+)BS  (+)PEG (+)ostomy  Neurology  weakened strength & sensation grossly intact/ paraesthesia  nonverbal, no follow commands/ paraplegic  Musculoskeletal:  limited/ FROM, no deformities/ contractures  Vascular: BLE equally warm/ cool,  no cyanosis, clubbing, edema  >LE //BLE edema equal  DP/PT pulses palpable  no acute ischemia noted  hemosiderin staining  Skin:  moist w/ good turgor  frail,  ecchymosis w/o hematoma  serosanguinous drainage  No odor, erythema, increased warmth, tenderness, induration, fluctuance        LABS/ CULTURES/ RADIOLOGY:                        8.3    23.14 )-----------( 388      ( 29 May 2024 05:10 )             27.2       134  |  104  |  42  ----------------------------<  204      [24 @ 05:10]  5.3   |  16  |  0.76        Ca     7.8     [24 @ 05:10]      Mg     2.70     [24 @ 05:10]      Phos  3.7     [24 @ 05:10]    TPro  6.4  /  Alb  2.2  /  TBili  0.3  /  DBili  x   /  AST  19  /  ALT  11  /  AlkPhos  112  [24 @ 11:35]        CK 35      [24 @ 11:35]               Wound SURGERY CONSULT NOTE    HPI:  73 year old female, former smoker, w/ hx of HTN, HLD, DM1, 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 Post IR bx of left supraclavicular lymph node with Dr. Clement Santos. Path level 4 LN: Metastatic lung adenocarcinoma. Currently on systemic therapy since 2023 and started on Xgeva in 2024 with Dr. Plummer. Plan to continue therapy. Patient with recurrent malignant left pleural effusion s/p thoracentesis x2. Now s/p Left VATS insertion of left pleurX catheter on 04/15/2024. Patient with low left pleurX catheter output, discussed removal. Also, noted with right pleural effusion which is likely cause of symptoms. Discussed placement of pleurX catheter vs. thoracentesis. Discussed there is a high chance of recurrence after thoracentesis. She has opted for pleurX catheter. (28 May 2024 07:12)     Patient s/p R pigtail placement on 24 ( large Right pleural effusion) and R pleurodesis with Bleomycin on 24    Wound consult requested to assist w/ management of bilateral lower leg anterior wounds. Patient lethargic, minimally interactive, requesting to rest and not have to interact at this time but consenting to lower leg assessment and dressing application. Spouse at bedside reports that patient's legs became quite swollen over the last few weeks resulting in several open wounds with moderate to large drainage. Reports she has been applying Silvadene daily which she finds soothing. Has not worn compression socks recently. Does not follow up with outpatient wound or vascular doctor to his knowledge. Reports that patient has well controlled Diabetes type 1. Per spouse patient has been predominantly healthy, independent, ambulates. Prior to hospitalization she has been lethargic, increasingly sedentary. Per spouse patient's appetite is poor. Patient did not provide subjective data due to lethargy.      Current Diet: Diet, Consistent Carbohydrate/No Snacks (24 @ 21:37)      PAST MEDICAL & SURGICAL HISTORY:  Hypertension    DM type 1 (diabetes mellitus, type 1)  Dx 47 years ago    Basal cell carcinoma  forehead and leg    SCC (squamous cell carcinoma)  on chest    BRCA2 positive    Hyperlipidemia    Latex allergy    Thyroid follicular adenoma    BCC (basal cell carcinoma)    Anemia    Chronic hyponatremia    Pedal edema    Pleural effusion, left    S/P  Section x2  ,     Excision of Lipoma of neck  1995    S/P thyroidectomy    History of thoracentesis      REVIEW OF SYSTEMS: Limited, pt lethargic, withdrawn.       MEDICATIONS  (STANDING):  dextrose 10% Bolus 125 milliLiter(s) IV Bolus once  dextrose 5%. 1000 milliLiter(s) (50 mL/Hr) IV Continuous <Continuous>  dextrose 5%. 1000 milliLiter(s) (100 mL/Hr) IV Continuous <Continuous>  dextrose 50% Injectable 25 Gram(s) IV Push once  dextrose 50% Injectable 12.5 Gram(s) IV Push once  diltiazem Infusion 10 mG/Hr (10 mL/Hr) IV Continuous <Continuous>  fentaNYL    Injectable 25 MICROGram(s) IV Push once  glucagon  Injectable 1 milliGRAM(s) IntraMuscular once  heparin   Injectable 5000 Unit(s) SubCutaneous every 8 hours  insulin lispro (ADMELOG) corrective regimen sliding scale   SubCutaneous at bedtime  insulin lispro (ADMELOG) corrective regimen sliding scale   SubCutaneous three times a day before meals  lactated ringers. 1000 milliLiter(s) (75 mL/Hr) IV Continuous <Continuous>  midodrine 10 milliGRAM(s) Oral every 8 hours  phenylephrine    Infusion 0.9 MICROgram(s)/kG/Min (16.9 mL/Hr) IV Continuous <Continuous>  polyethylene glycol 3350 17 Gram(s) Oral daily  senna 2 Tablet(s) Oral at bedtime  simvastatin 20 milliGRAM(s) Oral at bedtime    MEDICATIONS  (PRN):  bisacodyl Suppository 10 milliGRAM(s) Rectal daily PRN Constipation  dextrose Oral Gel 15 Gram(s) Oral once PRN Blood Glucose LESS THAN 70 milliGRAM(s)/deciliter      Allergies    Actonel (Unknown)  Gadavist (Anaphylaxis; Hives)  latex (Urticaria)  codeine (Vomiting)    Intolerances        SOCIAL HISTORY:  , lives at home with spouse. Has two adult children and grandchildren. Retired. Active in DM type 1 education.  Former smoker, drinks socially, denies illicit drug use.      FAMILY HISTORY:  No pertinent family history in first degree relatives        PHYSICAL EXAM:  Vital Signs Last 24 Hrs  T(C): 36.4 (29 May 2024 12:00), Max: 36.5 (29 May 2024 08:00)  T(F): 97.6 (29 May 2024 12:00), Max: 97.7 (29 May 2024 08:00)  HR: 63 (29 May 2024 13:00) (56 - 79)  BP: 98/56 (29 May 2024 13:00) (81/47 - 115/58)  BP(mean): 70 (29 May 2024 13:00) (57 - 82)  RR: 18 (29 May 2024 13:00) (15 - 36)  SpO2: 94% (29 May 2024 13:00) (90% - 100%)    Parameters below as of 29 May 2024 14:00  Patient On (Oxygen Delivery Method): nasal cannula w/ humidification    Wt: 50 kg    Constitutional: Lethargic, oriented x 3, frail, ill appearing, cachetic.  (+) low airloss support surface, (+) fluidized positioning devices, (+) complete cair boots  HEENT:  NC/AT, nonicteric, mucosa moist  Cardiovascular: rate regular, (+) vasopressor support  Respiratory: nonlabored, supplemental oxygen via NS, equal chest expansion, Right pigtail in place.  Musculoskeletal: no gross deformities or contractures.   Vascular: B/L LE increased coolness from midfoot to distal toes, faint mottling of plantar heels and toes. Bilateral LE +4 pitting edema RLE > LLE.  LLE +2 dp pulses, RLE +1 Dp pulses. Capillary refill sluggish.   Multiple wounds to anterior lower legs/shins in various stages of healing.  -Right anterior lower leg measured in cluster- [id number]- multiple punched out ulcerations with well defined irregular borders. >75% with moist adherent fibrin film, 25% with pink-moist dermis. Moderate to large serofibrinous drainage. No purulent drainage, no odor. Periwound skin without erythema, no increase edema, no fluctuance, no crepitus, compartments soft, nontender.   -Left anterior lower leg measured in cluster- [id number]- multiple shallow ulcerations with irregular well defined borders (less ulcerations than as compared to Left leg). >75% with moist adherent fibrin film, 25% with pink-moist dermis. Moderate to large serofibrinous drainage. No purulent drainage, no odor. Periwound skin without erythema, no increase edema, no fluctuance, no crepitus, compartments soft, nontender.   Skin:  moist w/ good turgor  Exam limited, patient lethargic. Consented for me to examine legs, requested not to be turned and positioned for assessment of back/sacrum/buttocks. Per Rn at bedside, skin intact. Patients wishes respected.      LABS/ CULTURES/ RADIOLOGY:                        8.3    23.14 )-----------( 388      ( 29 May 2024 05:10 )             27.2       134  |  104  |  42  ----------------------------<  204      [24 @ 05:10]  5.3   |  16  |  0.76        Ca     7.8     [24 @ 05:10]      Mg     2.70     [24 @ 05:10]      Phos  3.7     [24 @ 05:10]    TPro  6.4  /  Alb  2.2  /  TBili  0.3  /  DBili  x   /  AST  19  /  ALT  11  /  AlkPhos  112  [24 @ 11:35]        CK 35      [24 @ 11:35]        A1C with Estimated Average Glucose (04.15.24 @ 01:25)   A1C with Estimated Average Glucose Result: 7.1

## 2024-05-29 NOTE — CHART NOTE - NSCHARTNOTEFT_GEN_A_CORE
Bleomycin Pleurodesis performed at the bedside. Consent obtained from patient and placed in chart.  Pre-procedural pain medication given. Chest tube was clamped 6 inches from proximal end of pleurovac tube. 20cc of Lidocaine was injected into the pleural space via pigtail catheter, followed by 30cc of NS flush. 45 Units of Bleomycin in 50cc NS was then delivered, chased by a 30cc NS flush. Patient to be turned every 15 minutes. Patient tolerated procedure well. Chest tube will be put to suction x48hr following.

## 2024-05-30 NOTE — DIETITIAN INITIAL EVALUATION ADULT - OTHER INFO
74 y/o female with hx BCC, SCC, HLD, HTN, DM Type I, thyroid follicular adenoma, chronic hyponatremia and metastatic lung ca admitted with pleural effusion s/p R pleurodesis. Visited with pt to obtain nutrition hx. Pt reported having a fair appetite and oral intake at this time. She said she is lactose intolerant but does not have any other food allergies; updated [place] to reflect intolerance and took food preferences of patient. Pt without any GI distress at present; last BM 5/28. Her UBW has been 110 lbs with admit wt the same wt. She does, however, exhibit moderate subcutaneous fat store loss and muscle mass wasting which present her at moderate risk for malnutrition. Discussed oral supplementation with patient who was amenable towards receiving Glucerna Therapeutic Nutrition Shake 240mls 2x daily (440kcals, 20g protein) to enhance her oral intake and optimize her overall nutrition. Educated pt on ways to add calories and protein to standard meals and include snacks between meals. Small frequent meals, nutrient dense foods, protein-rich foods were encouraged during visit. Pt said she has had DM since she was a child and is fully aware of Consistent Carbohydrate dietary restrictions. Patient appreciative for visit and intervention offered. RDN services to remain available as needed.

## 2024-05-30 NOTE — DIETITIAN INITIAL EVALUATION ADULT - PHYSICAL ASSESSMENT TEMPLES
How Severe Are Your Spot(S)?: mild What Type Of Note Output Would You Prefer (Optional)?: Standard Output What Is The Reason For Today's Visit?: Full Body Skin Examination What Is The Reason For Today's Visit? (Being Monitored For X): concerning skin lesions on an annual basis Additional History: Radiation burn from radiation moderate

## 2024-05-30 NOTE — DIETITIAN INITIAL EVALUATION ADULT - PERTINENT LABORATORY DATA
05-30    135  |  107  |  35<H>  ----------------------------<  129<H>  4.7   |  16<L>  |  0.69    Ca    6.7<L>      30 May 2024 03:43  Phos  3.1     05-30  Mg     2.30     05-30    POCT Blood Glucose.: 107 mg/dL (05-30-24 @ 12:40)  A1C with Estimated Average Glucose Result: 7.1 % (04-15-24 @ 01:25)  A1C with Estimated Average Glucose Result: 7.0 % (01-15-24 @ 05:20)

## 2024-05-30 NOTE — DIETITIAN INITIAL EVALUATION ADULT - ADD RECOMMEND
1. Provide encouragement with PO intake, menu selections, and assistance with meals as needed. Reinforce nutrition education as needed - RD remains available. Continue to monitor nutritional intake, labs, weights, BM, skin, edema and clinical course.

## 2024-05-31 NOTE — CONSULT NOTE ADULT - SUBJECTIVE AND OBJECTIVE BOX
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 Post IR bx of left supraclavicular lymph node with Dr. Clement Santos. Path level 4 LN: Metastatic lung adenocarcinoma.Currently on systemic therapy since 2023 and started on Xgeva in 2024 with Dr. Plummer. Plan to continue therapy. Patient with recurrent malignant left pleural effusion s/p thoracentesis x2. Now s/p Left VATS insertion of left pleurX catheter on 04/15/2024.Patient with low left pleurX catheter output, discussed removal. Also, noted with right pleural effusion which is likely cause of symptoms. Discussed placement of pleurX catheter vs. thoracentesis. Discussed there is a high chance of recurrece after thoracentesis. She has opted for pleurX catheter. (28 May 2024 07:12)      PERTINENT PM/SXH:   Hypertension    DM type 1 (diabetes mellitus, type 1)    Basal cell carcinoma    SCC (squamous cell carcinoma)    BRCA2 positive    Hyperlipidemia    Latex allergy    Thyroid follicular adenoma    BCC (basal cell carcinoma)    Anemia    Chronic hyponatremia    Pedal edema    Pleural effusion, left      S/P  Section x2    Excision of Lipoma of neck    BRCA2 positive    S/P thyroidectomy    History of thoracentesis      FAMILY HISTORY:  No pertinent family history in first degree relatives      Family Hx substance abuse [ ]yes [ ]no    ITEMS NOT CHECKED ARE NOT PRESENT    SOCIAL HISTORY:   Significant other/partner[ ]  Children[ ]  Buddhism/Spirituality:  Substance hx:  [ ]   Tobacco hx:  [ ]   Alcohol hx: [ ]   Home Opioid hx:  [ ] I-Stop Reference No:  Living Situation: [ ]Home  [ ]Long term care  [ ]Rehab [ ]Other    ADVANCE DIRECTIVES:    DNR/MOLST  [ ]  Living Will  [ ]   DECISION MAKER(s):  [ ] Health Care Proxy(s)  [ ] Surrogate(s)  [ ] Guardian           Name(s): Phone Number(s):    BASELINE (I)ADL(s) (prior to admission):  [place]: [ ]Total  [ ] Moderate [ ]Dependent    Allergies    Actonel (Unknown)  Gadavist (Anaphylaxis; Hives)  latex (Urticaria)  codeine (Vomiting)    Intolerances    lactose (Diarrhea)  MEDICATIONS  (STANDING):  albuterol/ipratropium for Nebulization 3 milliLiter(s) Nebulizer every 6 hours  dexMEDEtomidine Infusion 0.3 MICROgram(s)/kG/Hr (3.75 mL/Hr) IV Continuous <Continuous>  dextrose 10% Bolus 125 milliLiter(s) IV Bolus once  dextrose 5%. 1000 milliLiter(s) (100 mL/Hr) IV Continuous <Continuous>  dextrose 5%. 1000 milliLiter(s) (50 mL/Hr) IV Continuous <Continuous>  dextrose 50% Injectable 25 Gram(s) IV Push once  dextrose 50% Injectable 12.5 Gram(s) IV Push once  diltiazem Infusion 10 mG/Hr (10 mL/Hr) IV Continuous <Continuous>  dornase rupali Solution 2.5 milliGRAM(s) Inhalation daily  glucagon  Injectable 1 milliGRAM(s) IntraMuscular once  heparin   Injectable 5000 Unit(s) SubCutaneous every 12 hours  insulin glargine Injectable (LANTUS) 4 Unit(s) SubCutaneous every morning  insulin lispro (ADMELOG) corrective regimen sliding scale   SubCutaneous three times a day before meals  insulin lispro (ADMELOG) corrective regimen sliding scale   SubCutaneous at bedtime  lactated ringers. 1000 milliLiter(s) (30 mL/Hr) IV Continuous <Continuous>  midodrine 15 milliGRAM(s) Oral every 8 hours  phenylephrine    Infusion 0.5 MICROgram(s)/kG/Min (9.38 mL/Hr) IV Continuous <Continuous>  piperacillin/tazobactam IVPB.- 3.375 Gram(s) IV Intermittent once  piperacillin/tazobactam IVPB.. 3.375 Gram(s) IV Intermittent every 8 hours  polyethylene glycol 3350 17 Gram(s) Oral daily  senna 2 Tablet(s) Oral at bedtime  simvastatin 20 milliGRAM(s) Oral at bedtime  sodium chloride 3%  Inhalation 4 milliLiter(s) Inhalation every 12 hours  vancomycin  IVPB 750 milliGRAM(s) IV Intermittent every 24 hours    MEDICATIONS  (PRN):  bisacodyl Suppository 10 milliGRAM(s) Rectal daily PRN Constipation  dextrose Oral Gel 15 Gram(s) Oral once PRN Blood Glucose LESS THAN 70 milliGRAM(s)/deciliter    PRESENT SYMPTOMS: [ ]Unable to self-report  [ ] CPOT [ ] PAINADs [ ] RDOS  Source if other than patient:  [ ]Family   [ ]Team     Pain: [ ]yes [ ]no  QOL impact -   Location -                    Aggravating factors -  Quality -  Radiation -  Timing-  Severity (0-10 scale):  Minimal acceptable level/pain goal (0-10 scale):     CPOT:    https://www.[email or web address]/getattachment/[email or web address]/Critical-Care-Pain-Observation-Tool-(CPOT)    PAIN AD Score:   http://geriatrictoolkit.[email or web address]/painad.pdf (press ctrl +  left click to view)    Dyspnea:                           [ ]Mild [ ]Moderate [ ]Severe    RDOS:  0 to 2  minimal or no respiratory distress   3  mild distress  4 to 6 moderate distress  >7 severe distress  https://homecareinformation.net/handouts/hen/Respiratory_Distress_Observation_Scale.pdf (Ctrl +  left click to view)     Anxiety:                             [ ]Mild [ ]Moderate [ ]Severe  Fatigue:                             [ ]Mild [ ]Moderate [ ]Severe  Nausea:                             [ ]Mild [ ]Moderate [ ]Severe  Loss of appetite:              [ ]Mild [ ]Moderate [ ]Severe  Constipation:                    [ ]Mild [ ]Moderate [ ]Severe    PCSSQ[Palliative Care Spiritual Screening Question]   Severity (0-10): deferred  Score of 4 or > indicate consideration of Chaplaincy referral.  Chaplaincy Referral: [ ] yes [ ] refused [ ] following [ ] Deferred     Caregiver [place]? : [ ] yes [ ] no [ ] Deferred [ ] Declined             Social work referral [ ] Patient & Family Centered Care Referral [ ]     Anticipatory Grief present?:  [ ] yes [ ] no  [ ] Deferred                  Social work referral [ ] Chaplaincy Referral[ ]    Other Symptoms:  [ x]All other review of systems negative   [ ] Unable to obtain ROS due to poor mental status     Palliative Performance Status Version 2:         %    http://npcrc.org/files/news/palliative_performance_scale_ppsv2.pdf    PHYSICAL EXAM:  Vital Signs Last 24 Hrs  T(C): 35.9 (31 May 2024 04:00), Max: 36.5 (31 May 2024 00:00)  T(F): 96.6 (31 May 2024 04:00), Max: 97.7 (31 May 2024 00:00)  HR: 64 (31 May 2024 09:07) (57 - 99)  BP: 108/56 (31 May 2024 07:00) (78/41 - 125/65)  BP(mean): 70 (31 May 2024 07:00) (51 - 85)  RR: 25 (31 May 2024 07:00) (14 - 35)  SpO2: 92% (31 May 2024 09:07) (92% - 100%)    Parameters below as of 31 May 2024 07:31  Patient On (Oxygen Delivery Method): BiPAP/CPAP     I&O's Summary    30 May 2024 07:01  -  31 May 2024 07:00  --------------------------------------------------------  IN: 2229.5 mL / OUT: 1800 mL / NET: 429.6 mL      GENERAL: [ ]Cachexia    [ x]Alert  [ ]Oriented x   [ ]Lethargic  [ ]Unarousable  [x ]Verbal  [ ]Non-Verbal  Behavioral:   [ ] Anxiety  [ ] Delirium [ ] Agitation [ ] Other  HEENT:  [ ]Normal   [x ]Dry mouth   [ ]ET Tube/Trach  [ ]Oral lesions  PULMONARY:   [ x]Clear [ ]Tachypnea  [ ]Audible excessive secretions   [ ]Rhonchi        [ ]Right [ ]Left [ ]Bilateral  [ ]Crackles        [ ]Right [ ]Left [ ]Bilateral  [ ]Wheezing     [ ]Right [ ]Left [ ]Bilateral  [ ]Diminished breath sounds [ ]right [ ]left [ ]bilateral  CARDIOVASCULAR:    [x ]Regular [ ]Irregular [ ]Tachy  [ ][place]varo [ ]Murmur [ ]Other  GASTROINTESTINAL:  [x ]Soft  [ ]Distended   [ ]+BS  [x ]Non tender [ ]Tender  [ ]Other [ ]PEG [ ]OGT/ NGT  Last BM:  GENITOURINARY:  [ x]Normal [ ] Incontinent   [ ]Oliguria/Anuria   [ ][place]  MUSCULOSKELETAL:   [ ]Normal   [ x]Weakness  [ ]Bed/Wheelchair bound [ ]Edema  NEUROLOGIC:   [ ]No focal deficits  [ x]Cognitive impairment  [ ]Dysphagia [ ]Dysarthria [ ]Paresis [ ]Other   SKIN:   [ ]Normal  [ ]Rash  [ ]Other  [ ]Pressure ulcer(s)       Present on admission [ ]y [ ]n      CRITICAL CARE:  [ ] Shock Present  [ ]Septic [ ]Cardiogenic [ ]Neurologic [ ]Hypovolemic  [ ]  Vasopressors [ ]  Inotropes   [ ]Respiratory failure present [ ]Mechanical ventilation [ ]Non-invasive ventilatory support [ ]High flow    [ ]Acute  [ ]Chronic [ ]Hypoxic  [ ]Hypercarbic [ ]Other  [ ]Other organ failure     LABS:                        8.5    28.64 )-----------( 267      ( 31 May 2024 03:15 )             27.9       132<L>  |  103  |  46<H>  ----------------------------<  203<H>  5.0   |  14<L>  |  0.88    Ca    7.5<L>      31 May 2024 03:15  Phos  3.9       Mg     2.50         TPro  5.2<L>  /  Alb  2.3<L>  /  TBili  0.3  /  DBili  x   /  AST  26  /  ALT  10  /  AlkPhos  101        Urinalysis Basic - ( 31 May 2024 03:15 )    Color: x / Appearance: x / SG: x / pH: x  Gluc: 203 mg/dL / Ketone: x  / Bili: x / Urobili: x   Blood: x / Protein: x / Nitrite: x   Leuk Esterase: x / RBC: x / WBC x   Sq Epi: x / Non Sq Epi: x / Bacteria: x      RADIOLOGY & ADDITIONAL STUDIES:    PROTEIN CALORIE MALNUTRITION PRESENT: [ ]mild [ ]moderate [ ]severe [ ]underweight [ ]morbid obesity  https://www.andeal.org/vault/5990/web/files/ONC/Table_Clinical%20Characteristics%20to%20Document%20Malnutrition-White%20JV%20et%20al%2020.pdf    Height (cm): 167.6 (24 @ 07:00), 165.1 (04-15-24 @ 05:02), 165.1 (24 @ 13:27)  Weight (kg): 50 (24 @ 12:02), 56.8 (04-15-24 @ 05:02), 49.3 (24 @ 11:00)  BMI (kg/m2): 17.8 (24 @ 07:00), 18.3 (24 @ 12:02), 20.8 (04-15-24 @ 05:02)    [ ]PPSV2 < or = to 30% [ ]significant weight loss  [ ]poor nutritional intake  [ ]anasarca[ ]Artificial Nutrition      Other REFERRALS:  [ ]Hospice  [ ]Child Life  [ ]Social Work  [ ]Case management [ ]Holistic Therapy     Goals of Care Document:

## 2024-05-31 NOTE — CONSULT NOTE ADULT - CONSULT REASON
Bilateral lower leg wounds present on admission.
Long cancer history, extensive METS, worsening respiratory status

## 2024-05-31 NOTE — PROGRESS NOTE ADULT - NUTRITIONAL ASSESSMENT
This patient has been assessed with a concern for Malnutrition and has been determined to have a diagnosis/diagnoses of Moderate protein-calorie malnutrition and Underweight (BMI < 19).    This patient is being managed with:   Diet Consistent Carbohydrate/No Snacks-  Entered: May 28 2024  9:37PM

## 2024-06-01 NOTE — CHART NOTE - NSCHARTNOTEFT_GEN_A_CORE
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  Patient with recurrent malignant left pleural effusion s/p thoracentesis x2. Now s/p Left VATS insertion of left pleurX catheter on 04/15/2024. Patient's  left pleurX catheter was removed. Also, noted with right pleural effusion requiring R pigtail placement  on 24 and R pleurodesis with Bleomycin on 24. Pt developed septic shock, respiratory failure and family made her DNR /DNI.   I was informed that pt has no pulse, rhythm or recordable BP or spontaneous respirations. Pt was declared  at 8.42AM on 2024.

## 2024-06-01 NOTE — PROGRESS NOTE ADULT - SUBJECTIVE AND OBJECTIVE BOX
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 Post IR bx of left supraclavicular lymph node with Dr. Clement Santos. Path level 4 LN: Metastatic lung adenocarcinoma.Currently on systemic therapy since 2023 and started on Xgeva in 2024 with Dr. Plummer. Plan to continue therapy. Patient with recurrent malignant left pleural effusion s/p thoracentesis x2. Now s/p Left VATS insertion of left pleurX catheter on 04/15/2024.Patient with low left pleurX catheter output, discussed removal. Also, noted with right pleural effusion which is likely cause of symptoms. Discussed placement of pleurX catheter vs. thoracentesis. Discussed there is a high chance of recurrece after thoracentesis. She has opted for pleurX catheter. (28 May 2024 07:12)      CHIEF COMPLAINT: Follow up in ICU  for postoperative care of patient who is s/p lung surgery    Procedure:  R pigtail placement ( large Right pleural effusion) 24        R pleurodesis with Bleomycin 24                       Issues:              Hypotension without shock requiring pressors  Hypoxemia - On 5L N.C              Pleural effusion              Chest tube in place  DM type 1 (diabetes mellitus, type 1)  Dx 47 years ago  Basal cell carcinoma -forehead and leg  SCC (squamous cell carcinoma) - on chest  BRCA2 positive  Hyperlipidemia  Latex allergy  Thyroid follicular adenoma  Anemia  Chronic hyponatremia  Pedal edema  Pleural effusion, left s/p PleurX   S/P thyroidectomy  History of thoracentesis      Postop course:     Patient reports moderate pain at chest wall incision sites which is worse with coughing and deep breathing without associated fever or dyspnea. Pain is improved with use of PCA and  oral pain meds.         Home Medications:  acetaminophen 325 mg oral tablet: 2 tab(s) orally every 6 hours As needed Mild Pain (1 - 3) (2024 16:13)  amLODIPine 5 mg oral tablet: 1 tab(s) orally 2 times a day (2024 12:28)  aspirin 81 mg oral delayed release tablet: 1 tab(s) orally once a day (2024 12:10)  fludrocortisone 0.1 mg oral tablet: 1 tab(s) orally once a day (15 Apr 2024 02:13)  insulin glargine 100 units/mL subcutaneous solution: 10 unit(s) subcutaneous 2 times a day (2024 12:32)  lisinopril 20 mg oral tablet: 1 tab(s) orally 2 times a day (2024 12:10)  simvastatin 20 mg oral tablet: 1 tab(s) orally once a day (at bedtime) (2024 15:37)    PAST MEDICAL & SURGICAL HISTORY:  Hypertension      DM type 1 (diabetes mellitus, type 1)  Dx 47 years ago      Basal cell carcinoma  forehead and leg      SCC (squamous cell carcinoma)  on chest      BRCA2 positive      Hyperlipidemia      Latex allergy      Thyroid follicular adenoma      BCC (basal cell carcinoma)      Anemia      Chronic hyponatremia      Pedal edema      Pleural effusion, left      S/P  Section x2  ,       Excision of Lipoma of neck        S/P thyroidectomy      History of thoracentesis        Vital Signs Last 24 Hrs  T(C): 36.5 (30 May 2024 08:00), Max: 36.5 (29 May 2024 16:00)  T(F): 97.7 (30 May 2024 08:00), Max: 97.7 (29 May 2024 16:00)  HR: 65 (30 May 2024 11:00) (60 - 80)  BP: 94/37 (30 May 2024 11:00) (79/46 - 123/74)  BP(mean): 53 (30 May 2024 11:00) (53 - 91)  RR: 27 (30 May 2024 11:00) (15 - 37)  SpO2: 97% (30 May 2024 11:00) (84% - 100%)    Parameters below as of 30 May 2024 12:00  Patient On (Oxygen Delivery Method): nasal cannula w/ humidification  O2 Flow (L/min): 3    I&O's Detail    29 May 2024 07:01  -  30 May 2024 07:00  --------------------------------------------------------  IN:    Dexmedetomidine: 11.4 mL    IV PiggyBack: 250 mL    Lactated Ringers: 1800 mL    Phenylephrine: 369.3 mL  Total IN: 2430.7 mL    OUT:    Chest Tube (mL): 840 mL    Voided (mL): 550 mL  Total OUT: 1390 mL    Total NET: 1040.7 mL      30 May 2024 07:01  -  30 May 2024 11:35  --------------------------------------------------------  IN:    Lactated Ringers: 225 mL  Total IN: 225 mL    OUT:    Chest Tube (mL): 0 mL  Total OUT: 0 mL    Total NET: 225 mL        CAPILLARY BLOOD GLUCOSE      POCT Blood Glucose.: 124 mg/dL (30 May 2024 11:00)  POCT Blood Glucose.: 160 mg/dL (30 May 2024 08:02)  POCT Blood Glucose.: 146 mg/dL (29 May 2024 21:57)  POCT Blood Glucose.: 146 mg/dL (29 May 2024 17:28)  POCT Blood Glucose.: 232 mg/dL (29 May 2024 12:58)    Home Medications:  acetaminophen 325 mg oral tablet: 2 tab(s) orally every 6 hours As needed Mild Pain (1 - 3) (2024 16:13)  amLODIPine 5 mg oral tablet: 1 tab(s) orally 2 times a day (2024 12:28)  aspirin 81 mg oral delayed release tablet: 1 tab(s) orally once a day (2024 12:10)  fludrocortisone 0.1 mg oral tablet: 1 tab(s) orally once a day (15 Apr 2024 02:13)  insulin glargine 100 units/mL subcutaneous solution: 10 unit(s) subcutaneous 2 times a day (2024 12:32)  lisinopril 20 mg oral tablet: 1 tab(s) orally 2 times a day (2024 12:10)  simvastatin 20 mg oral tablet: 1 tab(s) orally once a day (at bedtime) (2024 15:37)    MEDICATIONS  (STANDING):  dexMEDEtomidine Infusion 0.3 MICROgram(s)/kG/Hr (3.75 mL/Hr) IV Continuous <Continuous>  dextrose 10% Bolus 125 milliLiter(s) IV Bolus once  dextrose 5%. 1000 milliLiter(s) (100 mL/Hr) IV Continuous <Continuous>  dextrose 5%. 1000 milliLiter(s) (50 mL/Hr) IV Continuous <Continuous>  dextrose 50% Injectable 25 Gram(s) IV Push once  dextrose 50% Injectable 12.5 Gram(s) IV Push once  diltiazem Infusion 10 mG/Hr (10 mL/Hr) IV Continuous <Continuous>  fludroCORTISONE 0.1 milliGRAM(s) Oral daily  glucagon  Injectable 1 milliGRAM(s) IntraMuscular once  heparin   Injectable 5000 Unit(s) SubCutaneous every 8 hours  insulin lispro (ADMELOG) corrective regimen sliding scale   SubCutaneous at bedtime  insulin lispro (ADMELOG) corrective regimen sliding scale   SubCutaneous three times a day before meals  lactated ringers. 1000 milliLiter(s) (75 mL/Hr) IV Continuous <Continuous>  midodrine 10 milliGRAM(s) Oral every 8 hours  phenylephrine    Infusion 0.5 MICROgram(s)/kG/Min (9.38 mL/Hr) IV Continuous <Continuous>  polyethylene glycol 3350 17 Gram(s) Oral daily  senna 2 Tablet(s) Oral at bedtime  simvastatin 20 milliGRAM(s) Oral at bedtime    MEDICATIONS  (PRN):  bisacodyl Suppository 10 milliGRAM(s) Rectal daily PRN Constipation  dextrose Oral Gel 15 Gram(s) Oral once PRN Blood Glucose LESS THAN 70 milliGRAM(s)/deciliter        Physical exam:                             General:               Pt is awake, alert,  appears to be in pain but not in distress                                                  Neuro:                  Nonfocal                             Psych:                   A&Ox3                          Cardiovascular:   S1 & S2, regular                           Respiratory:         Air entry is fair and equal on both sides, has bilateral conducted sounds                           GI:                          Soft, nondistended and nontender, Bowel sounds active                            Ext:                        No cyanosis or edema     Labs:                                                                           8.3    21.40 )-----------( 288      ( 30 May 2024 03:43 )             27.8             05-30    135  |  107  |  35<H>  ----------------------------<  129<H>  4.7   |  16<L>  |  0.69    Ca    6.7<L>      30 May 2024 03:43  Phos  3.1     05-  Mg     2.30     -30                        Urinalysis Basic - ( 30 May 2024 06:30 )    Color: Yellow / Appearance: Clear / S.020 / pH: x  Gluc: x / Ketone: Negative mg/dL  / Bili: Negative / Urobili: 0.2 mg/dL   Blood: x / Protein: Negative mg/dL / Nitrite: Negative   Leuk Esterase: Trace / RBC: 0 /HPF / WBC 0 /HPF   Sq Epi: x / Non Sq Epi: 0 /HPF / Bacteria: Occasional /HPF        CXR:    Plan:  General: 73yFemale s/p  R pigtail placement ( large Right pleural effusion) on 24 and R pleurodesis with Bleomycin on 24, transferred to ICU for hypotension, A-fib with RVR. Pt remains on Pressor support, but converted to NSR.                             Neuro:                                         Pain control with  Tylenol PRN / Lidopatch                            Cardiovascular:      Hypotension without shock requiring pressors - Titrate Bashir to MAP >65  POCUS - showed Intravascular volume depletion, give 5% Albumin and continue IVF  Continue Midodrine 10mg q8hrs + Fludrocortisone 0.1mg daily                                        Telemetry (medical test) - Reviewed by me today independently. Pt is in normal sinus rhythm.                                          Continue hemodynamic monitoring to prevent decompensation.     HLD -On Zocar                            Respiratory:                                         Hypoxemia due to pleural effusion requiring 5L O2 via nasal cannula  - Wean nasal cannula for goal O2sat above 92%.                                              Pleural effusion - s/p pigtail and pleurodesis with Bleo                                                  Chest PT and frequent suctioning.                                                    Monitor chest tube output                                         Continuous pulse oximetry for support & to prevent decompensation.                                         Chest tube to suction x 48hrs                                                                                            GI                                                                              Continue Zofran / Reglan for nausea - PRN                                         Continue bowel regimen	                                                                 Renal:                                         Give 5% Albumin and continue IVF 75cc/hr                                         Monitor I/Os and electrolytes                                                                                        Hem/ Onc:                                         DVT prophylaxis with SQ Heparin and SCDs                                         Monitor chest tube output &  signs of bleeding.                                          Follow CBC in AM                           Infectious disease:     Leukocytosis - Less likely infectious  U/A is negative                                          Monitor for fever                                            chest tube  sites looks clean                            Endocrine                                             DM / Hyperglycemia: Continue Accu-Checks with coverage, Restart Lantus   Encourage PO intake                                                Pertinent clinical, laboratory, radiographic, hemodynamic, echocardiographic, respiratory data, microbiologic data and chart were reviewed and analyzed frequently throughout the course of the day and night. Patient seen, examined and plan discussed with CT Surgeon Dr. Ding / CTICU team during rounds.  This individual is in need of critical care monitoring with impairment of one or more vital organ system and is at high risk for life-threatening hemodynamic compromise. Failure to initiate the recommended interventions on an urgent basis incurs considerable risk of sudden, clinically significant, or life-threatening deterioration in the patient's condition.    OOB to chair and ambulate with physical therapy as tolerated.   Status discussed with patient and updated plan of care.     I have spent 50 minutes with this patient  including 20 minutes of time monitoring hemodynamic status, respiratory status and  coordinating care in the ICU.  I have spent  70 / 120   minutes of critical care time with this pt between  7am and 11.59pm monitoring hemodynamic status, managing fluid resuscitation /  pressors to prevent / worsening of shock and ventilator management. Critical care time does not include procedures.        Ariel Laureano MD                                                                    
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 Post IR bx of left supraclavicular lymph node with Dr. Clement Santos. Path level 4 LN: Metastatic lung adenocarcinoma.Currently on systemic therapy since 2023 and started on Xgeva in 2024 with Dr. Plummer. Plan to continue therapy. Patient with recurrent malignant left pleural effusion s/p thoracentesis x2. Now s/p Left VATS insertion of left pleurX catheter on 04/15/2024.Patient with low left pleurX catheter output, discussed removal. Also, noted with right pleural effusion which is likely cause of symptoms. Discussed placement of pleurX catheter vs. thoracentesis. Discussed there is a high chance of recurrece after thoracentesis. She has opted for pleurX catheter. (28 May 2024 07:12)        CHIEF COMPLAINT: Follow up in ICU  for postoperative care of patient who is s/p lung surgery    Procedure:  R pigtail placement ( large Right pleural effusion) 24        R pleurodesis with Bleomycin 24                       Issues:              Hypotension without shock requiring pressors  Hypoxemia - On 5L N[place]              Pleural effusion              Chest tube in place  DM type 1 (diabetes mellitus, type 1)  Dx 47 years ago  Basal cell carcinoma -forehead and leg  SCC (squamous cell carcinoma) - on chest  BRCA2 positive  Hyperlipidemia  Latex allergy  Thyroid follicular adenoma  Anemia  Chronic hyponatremia  Pedal edema  Pleural effusion, left s/p PleurX   S/P thyroidectomy  History of thoracentesisPAST MEDICAL & SURGICAL HISTORY:  Hypertension      DM type 1 (diabetes mellitus, type 1)  Dx 47 years ago      Basal cell carcinoma  forehead and leg      SCC (squamous cell carcinoma)  on chest      BRCA2 positive      Hyperlipidemia      Latex allergy      Thyroid follicular adenoma      BCC (basal cell carcinoma)      Anemia      Chronic hyponatremia      Pedal edema      Pleural effusion, left      S/P  Section x2  ,       Excision of Lipoma of neck        S/P thyroidectomy      History of thoracentesis                VITAL SIGNS:  Vital Signs Last 24 Hrs  T(C): 35.9 (31 May 2024 04:00), Max: 36.5 (31 May 2024 00:00)  T(F): 96.6 (31 May 2024 04:00), Max: 97.7 (31 May 2024 00:00)  HR: 86 (31 May 2024 07:31) (57 - 99)  BP: 108/56 (31 May 2024 07:00) (78/41 - 125/65)  BP(mean): 70 (31 May 2024 07:00) (51 - 85)  RR: 25 (31 May 2024 07:00) (14 - 35)  SpO2: 100% (31 May 2024 07:31) (92% - 100%)    Parameters below as of 31 May 2024 07:31  Patient On (Oxygen Delivery Method): BiPAP/CPAP        I/Os:   I&O's Detail    30 May 2024 07:01  -  31 May 2024 07:00  --------------------------------------------------------  IN:    Albumin 5%  - 250 mL: 250 mL    Dexmedetomidine: 21.3 mL    dextrose 5% + lactated ringers: 625 mL    IV PiggyBack: 425 mL    Lactated Ringers: 375 mL    Lactated Ringers: 270 mL    Phenylephrine: 33.6 mL    Phenylephrine: 229.7 mL  Total IN: 2229.5 mL    OUT:    Chest Tube (mL): 1470 mL    Indwelling Catheter - Urethral (mL): 30 mL    Voided (mL): 300 mL  Total OUT: 1800 mL    Total NET: 429.6 mL          CAPILLARY BLOOD GLUCOSE      POCT Blood Glucose.: 202 mg/dL (30 May 2024 21:11)  POCT Blood Glucose.: 198 mg/dL (30 May 2024 16:26)  POCT Blood Glucose.: 107 mg/dL (30 May 2024 12:40)  POCT Blood Glucose.: 124 mg/dL (30 May 2024 11:00)      =======================MEDICATIONS===================  MEDICATIONS  (STANDING):  albuterol/ipratropium for Nebulization 3 milliLiter(s) Nebulizer every 6 hours  dexMEDEtomidine Infusion 0.3 MICROgram(s)/kG/Hr (3.75 mL/Hr) IV Continuous <Continuous>  dextrose 10% Bolus 125 milliLiter(s) IV Bolus once  dextrose 5%. 1000 milliLiter(s) (100 mL/Hr) IV Continuous <Continuous>  dextrose 5%. 1000 milliLiter(s) (50 mL/Hr) IV Continuous <Continuous>  dextrose 50% Injectable 25 Gram(s) IV Push once  dextrose 50% Injectable 12.5 Gram(s) IV Push once  diltiazem Infusion 10 mG/Hr (10 mL/Hr) IV Continuous <Continuous>  dornase rupali Solution 2.5 milliGRAM(s) Inhalation daily  fentaNYL    Injectable 25 MICROGram(s) IV Push once  glucagon  Injectable 1 milliGRAM(s) IntraMuscular once  heparin   Injectable 5000 Unit(s) SubCutaneous every 12 hours  insulin glargine Injectable (LANTUS) 4 Unit(s) SubCutaneous every morning  insulin lispro (ADMELOG) corrective regimen sliding scale   SubCutaneous three times a day before meals  insulin lispro (ADMELOG) corrective regimen sliding scale   SubCutaneous at bedtime  lactated ringers. 1000 milliLiter(s) (30 mL/Hr) IV Continuous <Continuous>  midodrine 15 milliGRAM(s) Oral every 8 hours  phenylephrine    Infusion 0.5 MICROgram(s)/kG/Min (9.38 mL/Hr) IV Continuous <Continuous>  piperacillin/tazobactam IVPB.- 3.375 Gram(s) IV Intermittent once  piperacillin/tazobactam IVPB.- 3.375 Gram(s) IV Intermittent once  piperacillin/tazobactam IVPB.. 3.375 Gram(s) IV Intermittent every 8 hours  polyethylene glycol 3350 17 Gram(s) Oral daily  senna 2 Tablet(s) Oral at bedtime  simvastatin 20 milliGRAM(s) Oral at bedtime  sodium chloride 3%  Inhalation 4 milliLiter(s) Inhalation every 12 hours  vancomycin  IVPB 750 milliGRAM(s) IV Intermittent every 24 hours    MEDICATIONS  (PRN):  bisacodyl Suppository 10 milliGRAM(s) Rectal daily PRN Constipation  dextrose Oral Gel 15 Gram(s) Oral once PRN Blood Glucose LESS THAN 70 milliGRAM(s)/deciliter        PHYSICAL EXAM============================  General:                         Awake, alert, not in any distress  Neuro:                            Moving all extremities to commands.   Respiratory:	Air entry fair and  bilateral conducted sounds                                           Effort even and unlabored.  CV:		Regular rate and rhythm. Normal S1/S2                                          Distal pulses present.  Abdomen:	                     Soft, non-distended. Bowel sounds present   Skin:		No rash.  Extremities:	Warm, no cyanosis or edema.  Palpable pulses    ============================LABS=========================                        8.5    28.64 )-----------( 267      ( 31 May 2024 03:15 )             27.9     05-    132<L>  |  103  |  46<H>  ----------------------------<  203<H>  5.0   |  14<L>  |  0.88    Ca    7.5<L>      31 May 2024 03:15  Phos  3.9     -  Mg     2.50         TPro  5.2<L>  /  Alb  2.3<L>  /  TBili  0.3  /  DBili  x   /  AST  26  /  ALT  10  /  AlkPhos  101  -    LIVER FUNCTIONS - ( 31 May 2024 03:15 )  Alb: 2.3 g/dL / Pro: 5.2 g/dL / ALK PHOS: 101 U/L / ALT: 10 U/L / AST: 26 U/L / GGT: x             ABG - ( 31 May 2024 03:00 )  pH, Arterial: 7.40  pH, Blood: x     /  pCO2: 24    /  pO2: 71    / HCO3: 15    / Base Excess: -8.7  /  SaO2: 94.6        Urinalysis Basic - ( 31 May 2024 03:15 )    Color: x / Appearance: x / SG: x / pH: x  Gluc: 203 mg/dL / Ketone: x  / Bili: x / Urobili: x   Blood: x / Protein: x / Nitrite: x   Leuk Esterase: x / RBC: x / WBC x   Sq Epi: x / Non Sq Epi: x / Bacteria: x    A/P:  73yFemale s/p  R pigtail placement ( large Right pleural effusion) on 24 and R pleurodesis with Bleomycin on 24, transferred to ICU for hypotension, A-fib with RVR. Pt remains on Pressor support, but converted to NSR.                             Neuro:                                         Pain control with  Tylenol PRN / Lidopatch                                    Agitation:                           Cardiovascular:      Hypotension without shock requiring pressors - Titrate Bashir to MAP >65  POCUS - showed Intravascular volume depletion, give 5% Albumin and continue IVF  Continue Midodrine 10mg q8hrs + Fludrocortisone 0.1mg daily                                        Telemetry (medical test) - Reviewed by me today independently. Pt is in normal sinus rhythm.                                          Continue hemodynamic monitoring to prevent decompensation.     HLD -On Zocar                            Respiratory:                                         acute respiratory failure, with Hypoxemia , worsening  - currently on BiPAP.                                          On precedex gtt                                             Pleural effusion - s/p pigtail and pleurodesis with Bleo                                                  Chest PT and frequent suctioning.                                                    Monitor chest tube output                                         Continuous pulse oximetry for support & to prevent decompensation.                                         Chest tube to suction x 48hrs                                                                                            GI                                                                              Continue Zofran / Reglan for nausea - PRN                                         Continue bowel regimen	                                                                 Renal:                                          continue IVF                                          Monitor I/Os and electrolytes                                                                                        Hem/ Onc:                                         DVT prophylaxis with SQ Heparin and SCDs                                         Monitor chest tube output &  signs of bleeding.                                          Follow CBC in AM                           Infectious disease:     Leukocytosis - s/p Bleo pleurodesis   U/A is negative                                          Monitor for fever , c/w ABXs                                           chest tube  sites looks clean                            Endocrine                                             DM / Hyperglycemia: Continue Accu-Checks with coverage, Restart Lantus   Encourage PO intake                                                Pertinent clinical, laboratory, radiographic, hemodynamic, echocardiographic, respiratory data, microbiologic data and chart were reviewed and analyzed frequently throughout the course of the day and night. Patient seen, examined and plan discussed with CT Surgeon Dr. Ding / CTICU team during rounds.  This individual is in need of critical care monitoring with impairment of one or more vital organ system and is at high risk for life-threatening hemodynamic compromise. Failure to initiate the recommended interventions on an urgent basis incurs considerable risk of sudden, clinically significant, or life-threatening deterioration in the patient's condition.    OOB to chair and ambulate with physical therapy as tolerated.   Status discussed with patient and updated plan of care.     I have spent  109   minutes of critical care time with this pt between  7am and 11.59pm monitoring hemodynamic status, managing fluid resuscitation /  pressors to prevent / worsening of shock and ventilator management. Critical care time does not include procedures.          Honey Chambers DO FACEP    
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 Post IR bx of left supraclavicular lymph node with Dr. Clement Santos. Path level 4 LN: Metastatic lung adenocarcinoma.Currently on systemic therapy since 2023 and started on Xgeva in 2024 with Dr. Plummer. Plan to continue therapy. Patient with recurrent malignant left pleural effusion s/p thoracentesis x2. Now s/p Left VATS insertion of left pleurX catheter on 04/15/2024.Patient with low left pleurX catheter output, discussed removal. Also, noted with right pleural effusion which is likely cause of symptoms. Discussed placement of pleurX catheter vs. thoracentesis. Discussed there is a high chance of recurrece after thoracentesis. She has opted for pleurX catheter. (28 May 2024 07:12)      Procedure: R pigtail placment    Issues:  Hypotension  A fib, w RVR, new onset  DM1  Lung ca  Pleural effusion  HLD        PAST MEDICAL & SURGICAL HISTORY:  Hypertension      DM type 1 (diabetes mellitus, type 1)  Dx 47 years ago      Basal cell carcinoma  forehead and leg      SCC (squamous cell carcinoma)  on chest      BRCA2 positive      Hyperlipidemia      Latex allergy      Thyroid follicular adenoma      BCC (basal cell carcinoma)      Anemia      Chronic hyponatremia      Pedal edema      Pleural effusion, left      S/P  Section x2  ,       Excision of Lipoma of neck        S/P thyroidectomy      History of thoracentesis                VITAL SIGNS:  Vital Signs Last 24 Hrs  T(C): --  T(F): --  HR: 154 (28 May 2024 12:00) (83 - 157)  BP: 87/68 (28 May 2024 12:00) (81/58 - 112/65)  BP(mean): 76 (28 May 2024 12:00) (76 - 79)  RR: 33 (28 May 2024 12:00) (32 - 60)  SpO2: 100% (28 May 2024 12:00) (91% - 100%)    Parameters below as of 28 May 2024 12:00  Patient On (Oxygen Delivery Method): mask, nonrebreather        I/Os:   I&O's Detail      CAPILLARY BLOOD GLUCOSE      POCT Blood Glucose.: 154 mg/dL (28 May 2024 10:52)      =======================MEDICATIONS===================  MEDICATIONS  (STANDING):  albumin human  5% IVPB 250 milliLiter(s) IV Intermittent once  diltiazem Infusion 10 mG/Hr (10 mL/Hr) IV Continuous <Continuous>  diltiazem Injectable 5 milliGRAM(s) IV Push once  magnesium sulfate  IVPB 1 Gram(s) IV Intermittent once    MEDICATIONS  (PRN):        PHYSICAL EXAM============================  General:                         Awake, alert, not in any distress  Neuro:                            Moving all extremities to commands.   Respiratory:	Air entry fair and  bilateral conducted sounds                                           Effort even and unlabored.  CV:		Irregular rate and rhythm. Normal S1/S2, A fib w RVR                                          Distal pulses present.  Abdomen:	                     Soft, non-distended. Bowel sounds present   Skin:		No rash.  Extremities:	Warm, no cyanosis, +edema.  Palpable pulses    ============================LABS=========================                        8.6    20.00 )-----------( 389      ( 28 May 2024 11:35 )             29.0         135  |  103  |  38<H>  ----------------------------<  166<H>  5.8<H>   |  18<L>  |  0.79    Ca    8.2<L>      28 May 2024 11:35  Phos  3.7       Mg     2.40         TPro  6.4  /  Alb  2.2<L>  /  TBili  0.3  /  DBili  x   /  AST  19  /  ALT  11  /  AlkPhos  112      LIVER FUNCTIONS - ( 28 May 2024 11:35 )  Alb: 2.2 g/dL / Pro: 6.4 g/dL / ALK PHOS: 112 U/L / ALT: 11 U/L / AST: 19 U/L / GGT: x               Urinalysis Basic - ( 28 May 2024 11:35 )    Color: x / Appearance: x / SG: x / pH: x  Gluc: 166 mg/dL / Ketone: x  / Bili: x / Urobili: x   Blood: x / Protein: x / Nitrite: x   Leuk Esterase: x / RBC: x / WBC x   Sq Epi: x / Non Sq Epi: x / Bacteria: x    Plan:   Called to ASD unit for hypotension/A fib w RVR pre-op. Admit to CTICU  as per Dr. Ding    =============================NEUROLOGY============================  Pain control with  Tylenol IV   Nonfocal  ==============================RESPIRATORY========================  Acute resp distress, hypoxia on 110% NRB mask: Bilat effusions pleural, w L plurax in place  R pigtail placement at bedside  repeat  CXR  Using incentive spirometry   Monitor chest tube output  Chest tube to suction 	  Continue bronchodilators, pulmonary toilet    ============================CARDIOVASCULAR======================  Continue hemodynamic monitoring.  Hypotension likely A fib w RVR, and hypovolemia , IVF. cardizem IVP, Cardiem drip  On arthur gtt, titrate to SBP>90  =====================RENAL===================  Continue LR    Monitor I/Os and electrolytes    ====================GASTROINTESTINAL===================    Continue GI prophylaxis with  Protonix  Continue Zofran / Reglan for nausea - PRN	    ========================HEMATOLOGIC/ONCOLOGIC====================  Monitor chest tube output. No signs of active bleeding.   Follow CBC in AM    ============================INFECTIOUS DISEASE========================  Monitor for fever / leukocytosis.  All surgical incision / chest tube  sites look clean      Pt is on GI & DVT prophylaxis  OOB & ambulate       Pertinent clinical, laboratory, radiographic, hemodynamic, echocardiographic, respiratory data, microbiologic data and chart were reviewed and analyzed frequently throughout the course of the day and night  Patient seen, examined and plan discussed with CT Surgery / CTICU team during rounds.    Pt's status discussed with family at bedside, updated status    Critical care time 65 minutes at bedside providing critical care services     Honey Chambers DO FACEP    
LIAN BENITEZ      73y   Female   [id number]         Actonel (Unknown)  Gadavist (Anaphylaxis; Hives)  lactose (Diarrhea)  latex (Urticaria)  codeine (Vomiting)             Daily     Daily Weight in k.9 (2024 00:00)Drug Dosing Weight  Height (cm): 167.6 (31 May 2024 07:00)  Weight (kg): 50 (28 May 2024 12:02)  BMI (kg/m2): 17.8 (31 May 2024 07:00)  BSA (m2): 1.55 (31 May 2024 07:00)    73 year old female, former smoker, w/ hx of HTN, HLD, DM1, 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 Post IR bx of left supraclavicular lymph node with Dr. Clement Santos. Path level 4 LN: Metastatic lung adenocarcinoma.Currently on systemic therapy since 2023 and started on Xgeva in 2024 with Dr. Plummer. Plan to continue therapy. Patient with recurrent malignant left pleural effusion s/p thoracentesis x2. Now s/p Left VATS insertion of left pleurX catheter on 04/15/2024.Patient with low left pleurX catheter output, discussed removal. Also, noted with right pleural effusion which is likely cause of symptoms. Discussed placement of pleurX catheter vs. thoracentesis. Discussed there is a high chance of recurrece after thoracentesis. She has opted for pleurX catheter. (28 May 2024 07:12)      CHIEF COMPLAINT: Follow up in ICU  for postoperative care of patient who is s/p lung surgery    Procedure:  R pigtail placement ( large Right pleural effusion) 24        R pleurodesis with Bleomycin 24                       Issues:              Septic shock   Hypoxemia               Pleural effusion              Chest tube in place  DM type 1 (diabetes mellitus, type 1)  Dx 47 years ago  Basal cell carcinoma -forehead and leg  SCC (squamous cell carcinoma) - on chest  BRCA2 positive  Hyperlipidemia  Latex allergy  Thyroid follicular adenoma  Anemia  Chronic hyponatremia  Pedal edema  Pleural effusion, left s/p PleurX   S/P thyroidectomy  History of thoracentesis      Home Medications:  acetaminophen 325 mg oral tablet: 2 tab(s) orally every 6 hours As needed Mild Pain (1 - 3) (2024 16:13)  amLODIPine 5 mg oral tablet: 1 tab(s) orally 2 times a day (2024 12:28)  aspirin 81 mg oral delayed release tablet: 1 tab(s) orally once a day (2024 12:10)  fludrocortisone 0.1 mg oral tablet: 1 tab(s) orally once a day (15 Apr 2024 02:13)  insulin glargine 100 units/mL subcutaneous solution: 10 unit(s) subcutaneous 2 times a day (2024 12:32)  lisinopril 20 mg oral tablet: 1 tab(s) orally 2 times a day (2024 12:10)  simvastatin 20 mg oral tablet: 1 tab(s) orally once a day (at bedtime) (2024 15:37)    PAST MEDICAL & SURGICAL HISTORY:  Hypertension      DM type 1 (diabetes mellitus, type 1)  Dx 47 years ago      Basal cell carcinoma  forehead and leg      SCC (squamous cell carcinoma)  on chest      BRCA2 positive      Hyperlipidemia      Latex allergy      Thyroid follicular adenoma      BCC (basal cell carcinoma)      Anemia      Chronic hyponatremia      Pedal edema      Pleural effusion, left      S/P  Section x2  ,       Excision of Lipoma of neck        S/P thyroidectomy      History of thoracentesis        Vital Signs Last 24 Hrs  T(C): 36.2 (2024 04:00), Max: 36.4 (31 May 2024 12:00)  T(F): 97.1 (2024 04:00), Max: 97.5 (31 May 2024 12:00)  HR: 69 (2024 04:00) (64 - 100)  BP: 48/27 (2024 04:00) (48/27 - 97/52)  BP(mean): 35 (2024 04:00) (35 - 68)  RR: 15 (2024 04:00) (6 - 35)  SpO2: 91% (2024 04:00) (83% - 98%)    Parameters below as of 2024 04:00  Patient On (Oxygen Delivery Method): face tent  O2 Flow (L/min): 15  O2 Concentration (%): 98  I&O's Detail    31 May 2024 07:01  -  2024 07:00  --------------------------------------------------------  IN:    Lactated Ringers: 150 mL    Morphine: 4 mL    Morphine: 53 mL    Phenylephrine: 112.6 mL  Total IN: 319.6 mL    OUT:    Chest Tube (mL): 490 mL    Indwelling Catheter - Urethral (mL): 355 mL  Total OUT: 845 mL    Total NET: -525.4 mL        CAPILLARY BLOOD GLUCOSE      POCT Blood Glucose.: 280 mg/dL (31 May 2024 08:52)    Home Medications:  acetaminophen 325 mg oral tablet: 2 tab(s) orally every 6 hours As needed Mild Pain (1 - 3) (2024 16:13)  amLODIPine 5 mg oral tablet: 1 tab(s) orally 2 times a day (2024 12:28)  aspirin 81 mg oral delayed release tablet: 1 tab(s) orally once a day (2024 12:10)  fludrocortisone 0.1 mg oral tablet: 1 tab(s) orally once a day (15 Apr 2024 02:13)  insulin glargine 100 units/mL subcutaneous solution: 10 unit(s) subcutaneous 2 times a day (2024 12:32)  lisinopril 20 mg oral tablet: 1 tab(s) orally 2 times a day (2024 12:10)  simvastatin 20 mg oral tablet: 1 tab(s) orally once a day (at bedtime) (2024 15:37)    MEDICATIONS  (STANDING):  morphine  Infusion 1 mG/Hr (1 mL/Hr) IV Continuous <Continuous>    MEDICATIONS  (PRN):  midazolam Injectable 2 milliGRAM(s) IV Push every 4 hours PRN aggitation        Physical exam:     General:               Pt is unresponsive, on Morphine drip                                                Neuro:                  Unresponsive                            Psych:                   Could not assess                          Cardiovascular:   S1 & S2, regular                           Respiratory:         Air entry is decreased on both sides                          GI:                          Soft, nondistended                           Ext:                        No cyanosis, has bilateral pedal edema                               Labs:                                                                           8.5    28.64 )-----------( 267      ( 31 May 2024 03:15 )             27.9             05-31    132<L>  |  103  |  46<H>  ----------------------------<  203<H>  5.0   |  14<L>  |  0.88    Ca    7.5<L>      31 May 2024 03:15  Phos  3.9       Mg     2.50         TPro  5.2<L>  /  Alb  2.3<L>  /  TBili  0.3  /  DBili  x   /  AST  26  /  ALT  10  /  AlkPhos  101                      LIVER FUNCTIONS - ( 31 May 2024 03:15 )  Alb: 2.3 g/dL / Pro: 5.2 g/dL / ALK PHOS: 101 U/L / ALT: 10 U/L / AST: 26 U/L / GGT: x           Urinalysis Basic - ( 31 May 2024 03:15 )    Color: x / Appearance: x / SG: x / pH: x  Gluc: 203 mg/dL / Ketone: x  / Bili: x / Urobili: x   Blood: x / Protein: x / Nitrite: x   Leuk Esterase: x / RBC: x / WBC x   Sq Epi: x / Non Sq Epi: x / Bacteria: x        CXR:    < from: Xray Chest 1 View- PORTABLE-Routine (Xray Chest 1 View- PORTABLE-Routine in AM.) (24 @ 05:53) >  IMPRESSION:  Follow-up with right-sided chest tube, effusions and diffuse interstitial   opacities.  No pneumothorax.      Plan:  General: 73yFemale s/p  R pigtail placement ( large Right pleural effusion) on 24 and R pleurodesis with Bleomycin on 24, transferred to ICU for hypotension, A-fib with RVR. Pt was  on Pressors support yesterday with worsening of respiratory and hemodynamic status. Family made her DNR / DNI and comfort care only.                              Neuro:                                         Comfort care, on MSo4 drip + Versed PRN                            Cardiovascular:      Shock, currently off pressors and comfort care only.                            Respiratory:                                         Hypoxemia - On face mask     Comfort measures only                                                                                           GI                                                                              NPO                                                                 Renal:                                         No active intervention                                                                                        Hem/ Onc:                                         Hold  SQ Heparin                                                                     Infectious disease:     Sepsis with shock: All antibiotics stopped, No active intervention                            Endocrine:    DM / Hyperglycemia: No Accu-Cheks,  No active intervention                                                Pt is on comfort measures only. Family at bedside        Ariel Laureano MD                                                                    
LIAN BENITEZ  [id number]  Patient is a 73y old  Female who presents with a chief complaint of   HPI:  73 year old female, former smoker, w/ hx of HTN, HLD, DM1, follicular adenoma of thyroid (s/p total thyroidectomy), BCC (forehead and chest), SCC (legs), positive for BRCA, anemia, who presented with two left neck lumps and wheezing since 03/2023; workup imaging revealed pulmonary mass, adenopathy, and bone lesions suspicious for malignancy. Post IR bx of left supraclavicular lymph node with Dr. Clement Santos. Path level 4 LN: Metastatic lung adenocarcinoma.Currently on systemic therapy since 11/2023 and started on Xgeva in 01/2024 with Dr. Plummer. Plan to continue therapy. Patient with recurrent malignant left pleural effusion s/p thoracentesis x2. Now s/p Left VATS insertion of left pleurX catheter on 04/15/2024.Patient with low left pleurX catheter output, discussed removal. Also, noted with right pleural effusion which is likely cause of symptoms. Discussed placement of pleurX catheter vs. thoracentesis. Discussed there is a high chance of recurrece after thoracentesis. She has opted for pleurX catheter. (28 May 2024 07:12)      Procedure:  5/28  R pigtail placement ( large Right pleural effusion)  5/29 R pleurodesis with Bleomycin    Issues:  Hypotension  A fib, w RVR, new onset  DM1  Lung ca  Pleural effusion  HLD    today   she feels better,  on venti mask o2  telemetry NSR ( was atrial fibrillation )  on Bashir drip for BP support  plan for bleomycin pleurodesis                 Physical Exam:  Gen: awake  CNS: non focal  Neck: no JVD  RES : clear , no wheezing    Chest:   + chest tubes   ( pgtail)            CVS: Regular  rhythm. Normal S1/S2  Abd: Soft, non-distended. Bowel sounds present.  Skin: No rash.  Ext:  no edema    Vital Signs Last 24 Hrs  T(C): 36.5 (29 May 2024 08:00), Max: 36.5 (29 May 2024 08:00)  T(F): 97.7 (29 May 2024 08:00), Max: 97.7 (29 May 2024 08:00)  HR: 79 (29 May 2024 09:00) (56 - 157)  BP: 109/71 (29 May 2024 09:00) (81/47 - 126/103)  BP(mean): 81 (29 May 2024 09:00) (57 - 109)  RR: 25 (29 May 2024 09:00) (15 - 60)  SpO2: 90% (29 May 2024 09:00) (90% - 100%)    Parameters below as of 29 May 2024 09:00  Patient On (Oxygen Delivery Method): mask, Venturi  O2 Flow (L/min): 15  O2 Concentration (%): 50    LABS:  LABS: Laboratory data was independently reviewed by me today.                              8.3    23.14 )-----------( 388      ( 29 May 2024 05:10 )             27.2     05-29    134<L>  |  104  |  42<H>  ----------------------------<  204<H>  5.3   |  16<L>  |  0.76    Ca    7.8<L>      29 May 2024 05:10  Phos  3.7     05-29  Mg     2.70     05-29    TPro  6.4  /  Alb  2.2<L>  /  TBili  0.3  /  DBili  x   /  AST  19  /  ALT  11  /  AlkPhos  112  05-28    ===========================I/O===========================   I&O's Detail    28 May 2024 07:01  -  29 May 2024 07:00  --------------------------------------------------------  IN:    Diltiazem: 35 mL    IV PiggyBack: 1515 mL    Lactated Ringers: 75 mL    Oral Fluid: 150 mL    Phenylephrine: 327.9 mL  Total IN: 2102.9 mL    OUT:    Chest Tube (mL): 2235 mL    Voided (mL): 150 mL  Total OUT: 2385 mL    Total NET: -282.1 mL      29 May 2024 07:01  -  29 May 2024 10:01  --------------------------------------------------------  IN:    Lactated Ringers: 225 mL  Total IN: 225 mL    OUT:    Chest Tube (mL): 40 mL  Total OUT: 40 mL    Total NET: 185 mL      =====================Rad/Cardio/cultures =================  RADIOLOGY:  Radiology images were independently reviewed by me today. Reports were reviewed by me today.  Echo:Reviewed    ======================================================  Home Medications:  acetaminophen 325 mg oral tablet: 2 tab(s) orally every 6 hours As needed Mild Pain (1 - 3) (16 Apr 2024 16:13)  amLODIPine 5 mg oral tablet: 1 tab(s) orally 2 times a day (18 Jan 2024 12:28)  aspirin 81 mg oral delayed release tablet: 1 tab(s) orally once a day (18 Jan 2024 12:10)  fludrocortisone 0.1 mg oral tablet: 1 tab(s) orally once a day (15 Apr 2024 02:13)  insulin glargine 100 units/mL subcutaneous solution: 10 unit(s) subcutaneous 2 times a day (18 Jan 2024 12:32)  lisinopril 20 mg oral tablet: 1 tab(s) orally 2 times a day (18 Jan 2024 12:10)  simvastatin 20 mg oral tablet: 1 tab(s) orally once a day (at bedtime) (14 Jan 2024 15:37)    PAST MEDICAL & SURGICAL HISTORY:  Hypertension  DM type 1 (diabetes mellitus, type 1)Dx 47 years ago  Basal cell carcinomaforehead and leg  SCC (squamous cell carcinoma)on chest  BRCA2 positive  Hyperlipidemia  Latex allergy  Thyroid follicular adenoma  BCC (basal cell carcinoma)  Anemia  Chronic hyponatremia  Pedal edema  History of thoracentesis        ====================ASSESMENT/MDM ==============  5/28  R pigtail placement ( large Right pleural effusion)  5/29 R pleurodesis with bleomycin  Hypotension  A fib, w RVR, new onset  DM1  Lung ca  Pleural effusion  HLD  Hypertension  DM type 1 (diabetes mellitus, type 1)Dx 47 years ago  Basal cell carcinomaforehead and leg  SCC (squamous cell carcinoma)on chest  BRCA2 positive  Hyperlipidemia  Latex allergy  Thyroid follicular adenoma  BCC (basal cell carcinoma)  Anemia  Chronic hyponatremia  Pedal edema    Plan:  ====================== NEUROLOGY============  pain control: with iv tyleno      ====================== RESPIRATORY============  Acute resp distress, hypoxia was on 100% NRB --> venti mask   R pigtail placement to drain r pleural effusion  cxr less effusion  Chemical pleurodesis, with Bleomycin  informed consent obtained from patient  Using incentive spirometry   Monitor chest tube output  Chest tube to suction 	        ======================CARDIOVASCULAR =========  new onset atrial fibrillation with rapid ventricular rate  responded to cardizem, now NSR    Hypotension: on Bashir drip , titrate for mean bp above 60      diltiazem Infusion 10 mG/Hr (10 mL/Hr) IV Continuous <Continuous>  midodrine 10 milliGRAM(s) Oral every 8 hours  phenylephrine    Infusion 0.9 MICROgram(s)/kG/Min (16.9 mL/Hr) IV Continuous <Continuous>    titrate pressors for mean BP above 60      =======================Hematology===============      leukocytosis : reactive   H/H stable  8/27      VTE PPX:   heparin   Injectable 5000 Unit(s) SubCutaneous every 8 hours    monitor for bleeding, chest tubes output  monitor  Hb/Hct ,plts ,coags     ========================RENAL ===================  05-29    134<L>  |  104  |  42<H>  ----------------------------<  204<H>  5.3   |  16<L>  |  0.76    Ca    7.8<L>      29 May 2024 05:10  Phos  3.7     05-29  Mg     2.70     05-29    TPro  6.4  /  Alb  2.2<L>  /  TBili  0.3  /  DBili  x   /  AST  19  /  ALT  11  /  AlkPhos  112  05-28    monitor I/Os, BUN/Creatinine.   Replete lytes PRN  [place]      ======================== GASTROINTESTINAL========  Diet: consistent carb  GI prophylaxis :not indicated   Bowel regimen:    LIVER FUNCTIONS - ( 28 May 2024 11:35 )  lb: 2.2 g/dL / Pro: 6.4 g/dL / ALK PHOS: 112 U/L / ALT: 11 U/L / AST: 19 U/L / GGT: x                                                                                                                                                                                                                                                                                                        bisacodyl Suppository 10 milliGRAM(s) Rectal daily PRN Constipation  dextrose 10% Bolus 125 milliLiter(s) IV Bolus once  dextrose 5%. 1000 milliLiter(s) (50 mL/Hr) IV Continuous <Continuous>  dextrose 5%. 1000 milliLiter(s) (100 mL/Hr) IV Continuous <Continuous>  lactated ringers. 1000 milliLiter(s) (75 mL/Hr) IV Continuous <Continuous>  polyethylene glycol 3350 17 Gram(s) Oral daily   senna 2 Tablet(s) Oral at bedtime    ======================= ENDOCRINE  =============  Glycemic control : insulin drip  , Lantus/NPH, Lispro sliding scale  statin     dextrose 50% Injectable 25 Gram(s) IV Push once  dextrose 50% Injectable 12.5 Gram(s) IV Push once  dextrose Oral Gel 15 Gram(s) Oral once PRN Blood Glucose LESS THAN 70 milliGRAM(s)/deciliter  glucagon  Injectable 1 milliGRAM(s) IntraMuscular once  insulin lispro (ADMELOG) corrective regimen sliding scale   SubCutaneous three times a day before meals  insulin lispro (ADMELOG) corrective regimen sliding scale   SubCutaneous at bedtime  simvastatin 20 milliGRAM(s) Oral at bedtime    ========================INFECTIOUS DISEASE========  No active antibiotic coverage      -----------------------------------------------------------------------------------------------------------  ALL MEDICATIONS   MEDICATIONS  (STANDING):  bleomycin PF IntraPleural (Non - oncologic) 45 Unit(s) IntraPleural. once  dextrose 10% Bolus 125 milliLiter(s) IV Bolus once  dextrose 5%. 1000 milliLiter(s) (50 mL/Hr) IV Continuous <Continuous>  dextrose 5%. 1000 milliLiter(s) (100 mL/Hr) IV Continuous <Continuous>  dextrose 50% Injectable 12.5 Gram(s) IV Push once  dextrose 50% Injectable 25 Gram(s) IV Push once  diltiazem Infusion 10 mG/Hr (10 mL/Hr) IV Continuous <Continuous>  glucagon  Injectable 1 milliGRAM(s) IntraMuscular once  heparin   Injectable 5000 Unit(s) SubCutaneous every 8 hours  insulin lispro (ADMELOG) corrective regimen sliding scale   SubCutaneous three times a day before meals  insulin lispro (ADMELOG) corrective regimen sliding scale   SubCutaneous at bedtime  lactated ringers. 1000 milliLiter(s) (75 mL/Hr) IV Continuous <Continuous>  midodrine 10 milliGRAM(s) Oral every 8 hours  phenylephrine    Infusion 0.9 MICROgram(s)/kG/Min (16.9 mL/Hr) IV Continuous <Continuous>  polyethylene glycol 3350 17 Gram(s) Oral daily  senna 2 Tablet(s) Oral at bedtime  simvastatin 20 milliGRAM(s) Oral at bedtime    MEDICATIONS  (PRN):  bisacodyl Suppository 10 milliGRAM(s) Rectal daily PRN Constipation  dextrose Oral Gel 15 Gram(s) Oral once PRN Blood Glucose LESS THAN 70 milliGRAM(s)/deciliter    ------------------------------------------------------------------------------------------------------  Patient requires continuous monitoring with:  bedside rhythm monitoring, continuous  pulse oximetry monitoring, O2 supplement titrate for O2 sat above 90%  ;    intermittent blood gas analysis.  Care plan discussed with CT ICU care team and attending surgeon Dr Damian  patient remain critical, at risk for life threatening decompensation; required more than usual post op care;   I have [id number] providing non routine post op care, revaluated multiple times.      Noe Cwoan MD  intensivist   HOWARD NAVARRO

## 2024-06-01 NOTE — PROGRESS NOTE ADULT - NUTRITIONAL ASSESSMENT
This patient has been assessed with a concern for Malnutrition and has been determined to have a diagnosis/diagnoses of Moderate protein-calorie malnutrition and Underweight (BMI < 19).

## 2024-06-03 ENCOUNTER — APPOINTMENT (OUTPATIENT)
Dept: INFUSION THERAPY | Facility: HOSPITAL | Age: 74
End: 2024-06-03

## 2024-06-04 ENCOUNTER — APPOINTMENT (OUTPATIENT)
Dept: INTERNAL MEDICINE | Facility: CLINIC | Age: 74
End: 2024-06-04

## 2024-06-05 ENCOUNTER — NON-APPOINTMENT (OUTPATIENT)
Age: 74
End: 2024-06-05

## 2024-06-05 LAB
CULTURE RESULTS: SIGNIFICANT CHANGE UP
SPECIMEN SOURCE: SIGNIFICANT CHANGE UP

## 2024-06-06 ENCOUNTER — APPOINTMENT (OUTPATIENT)
Dept: HEMATOLOGY ONCOLOGY | Facility: CLINIC | Age: 74
End: 2024-06-06

## 2024-06-11 NOTE — DISCHARGE NOTE FOR THE EXPIRED PATIENT - HOSPITAL COURSE
74 y/o Female with hx of HTN, HLD, DM1, thyroidectomy, Adeno lung CA w RLL mass dx 2023. 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 Her L PleurX was removed at bedside and a new R PTC was placed. Bleomycin was administered intrapleural through the PTC. She required some pressors for low BP and increasing O2 requirements. Her family made her DNR/DNI on 24 and the patient  in the morning on 24.

## 2024-06-14 ENCOUNTER — APPOINTMENT (OUTPATIENT)
Dept: NEUROLOGY | Facility: CLINIC | Age: 74
End: 2024-06-14

## 2024-06-28 ENCOUNTER — APPOINTMENT (OUTPATIENT)
Dept: CARDIOLOGY | Facility: CLINIC | Age: 74
End: 2024-06-28

## 2024-07-12 ENCOUNTER — APPOINTMENT (OUTPATIENT)
Dept: MRI IMAGING | Facility: CLINIC | Age: 74
End: 2024-07-12

## 2024-07-17 ENCOUNTER — APPOINTMENT (OUTPATIENT)
Dept: RADIATION ONCOLOGY | Facility: CLINIC | Age: 74
End: 2024-07-17

## 2024-11-19 NOTE — PHYSICAL EXAM
Please inform patient of C-spine x-ray results.     1. Degenerative changes [General Appearance - Well Developed] : well developed [Normal Appearance] : normal appearance [Well Groomed] : well groomed [General Appearance - Well Nourished] : well nourished [General Appearance - In No Acute Distress] : no acute distress [Normal Conjunctiva] : the conjunctiva exhibited no abnormalities [Eyelids - No Xanthelasma] : the eyelids demonstrated no xanthelasmas [Normal Jugular Venous A Waves Present] : normal jugular venous A waves present [Normal Jugular Venous V Waves Present] : normal jugular venous V waves present [Respiration, Rhythm And Depth] : normal respiratory rhythm and effort [Auscultation Breath Sounds / Voice Sounds] : lungs were clear to auscultation bilaterally [Heart Rate And Rhythm] : heart rate and rhythm were normal [Bowel Sounds] : normal bowel sounds [Abdomen Soft] : soft [Abdomen Tenderness] : non-tender [Abnormal Walk] : normal gait [Nail Clubbing] : no clubbing of the fingernails [Cyanosis, Localized] : no localized cyanosis [Skin Color & Pigmentation] : normal skin color and pigmentation [] : no rash [No Venous Stasis] : no venous stasis [Impaired Insight] : insight and judgment were intact [Affect] : the affect was normal [Mood] : the mood was normal [FreeTextEntry1] : 2+ pulses in the upper and lower extremities. No edema.